# Patient Record
Sex: FEMALE | Race: BLACK OR AFRICAN AMERICAN | Employment: OTHER | URBAN - METROPOLITAN AREA
[De-identification: names, ages, dates, MRNs, and addresses within clinical notes are randomized per-mention and may not be internally consistent; named-entity substitution may affect disease eponyms.]

---

## 2017-01-06 ENCOUNTER — OFFICE VISIT (OUTPATIENT)
Dept: CARDIOLOGY CLINIC | Age: 67
End: 2017-01-06

## 2017-01-06 VITALS
DIASTOLIC BLOOD PRESSURE: 64 MMHG | BODY MASS INDEX: 24.03 KG/M2 | OXYGEN SATURATION: 98 % | HEART RATE: 64 BPM | RESPIRATION RATE: 16 BRPM | WEIGHT: 130.6 LBS | HEIGHT: 62 IN | SYSTOLIC BLOOD PRESSURE: 120 MMHG

## 2017-01-06 DIAGNOSIS — I49.5 TACHY-BRADY SYNDROME (HCC): Primary | ICD-10-CM

## 2017-01-06 NOTE — MR AVS SNAPSHOT
Visit Information Date & Time Provider Department Dept. Phone Encounter #  
 1/6/2017  3:00 PM Zuleyma Pantoja MD [facility] [phone] [id number] Upcoming Health Maintenance Date Due  
 GLAUCOMA SCREENING Q2Y 12/24/2015 OSTEOPOROSIS SCREENING (DEXA) 12/24/2015 Pneumococcal 65+ Low/Medium Risk (1 of 2 - PCV13) 12/24/2015 INFLUENZA AGE 9 TO ADULT 8/1/2016 FOBT Q 1 YEAR AGE 50-75 9/3/2016 MEDICARE YEARLY EXAM 12/1/2016 BREAST CANCER SCRN MAMMOGRAM 10/10/2018 DTaP/Tdap/Td series (2 - Td) 10/19/2025 Allergies as of 1/6/2017  Review Complete On: 1/6/2017 By: Zuleyma Pantoja MD  
  
 Severity Noted Reaction Type Reactions Ampicillin  05/15/2013    Hives, Nausea and Vomiting Current Immunizations  Never Reviewed Name Date Pneumococcal Polysaccharide (PPSV-23) 5/16/2013  6:39 PM  
 Tdap 10/19/2015  2:32 AM  
  
 Not reviewed this visit You Were Diagnosed With   
  
 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  
 120/64 (BP 1 Location: Left arm, BP Patient Position: Sitting) 64 16 5' 2\" (1.575 m) 130 lb 9.6 oz (59.2 kg) 98% BMI OB Status Smoking Status 23.89 kg/m2 Hysterectomy Never Smoker BMI and BSA Data Body Mass Index Body Surface Area  
 23.89 kg/m 2 1.61 m 2 Preferred Pharmacy Pharmacy Name Phone [address] [phone] Your Updated Medication List  
  
   
This list is accurate as of: 1/6/17  4:21 PM.  Always use your most recent med list.  
  
  
  
  
 albuterol 90 mcg/actuation inhaler Commonly known as:  PROVENTIL HFA, VENTOLIN HFA, PROAIR HFA Take 2 Puffs by inhalation every four (4) hours as needed for Wheezing. ALPRAZolam 1 mg tablet Commonly known as:  Johnnie Franklin Take 1 mg by mouth three (3) times daily. * clindamycin 150 mg capsule Commonly known as:  CLEOCIN  
  
 * clindamycin 300 mg capsule Commonly known as:  CLEOCIN  
  
 desonide 0.05 % topical lotion Commonly known as:  Isma Moan Apply  to affected area two (2) times a day. FLUoxetine 20 mg capsule Commonly known as:  PROzac Take 20 mg by mouth daily. fluticasone-salmeterol 250-50 mcg/dose diskus inhaler Commonly known as:  ADVAIR Take 1 Puff by inhalation every twelve (12) hours. hydroCHLOROthiazide 12.5 mg tablet Commonly known as:  HYDRODIURIL Take once daily HYDROcodone-acetaminophen 5-325 mg per tablet Commonly known as:  Leyda Petri Take 1 Tab by mouth every eight (8) hours as needed for Pain. Max Daily Amount: 3 Tabs. hydroquinone 4 % topical cream  
Commonly known as:  Roselinda Salon Apply  to affected area two (2) times a day. lidocaine 5 % Commonly known as:  LIDODERM  
1 Patch by TransDERmal route every twenty-four (24) hours. magnesium oxide 400 mg tablet Commonly known as:  MAG-OX Take 1 Tab by mouth daily. methocarbamol 750 mg tablet Commonly known as:  ROBAXIN Take 1 Tab by mouth three (3) times daily as needed. metoprolol tartrate 25 mg tablet Commonly known as:  LOPRESSOR Take 1 Tab by mouth two (2) times a day. 1/2 tablet twice a day  
  
 potassium chloride 20 mEq tablet Commonly known as:  K-DUR, KLOR-CON Take 1 Tab by mouth daily. * rivaroxaban 20 mg Tab tablet Commonly known as:  Shauna Taqueria Take 1 Tab by mouth daily. * rivaroxaban 20 mg Tab tablet Commonly known as:  Shauna Taqueria Take 1 Tab by mouth daily. rosuvastatin 10 mg tablet Commonly known as:  CRESTOR Take 1 tablet every day SEROquel 50 mg tablet Generic drug:  QUEtiapine Take 50 mg by mouth three (3) times daily. * Notice:   This list has 4 medication(s) that are the same as other medications prescribed for you. Read the directions carefully, and ask your doctor or other care provider to review them with you. We Performed the Following AMB POC EKG ROUTINE W/ 12 LEADS, INTER & REP [45279 CPT(R)] Patient Instructions Follow up with Jacob Larry in 6 months Introducing [facility] & [facility] SERVICES! Mary Saeede introduces [employer] patient portal. Now you can access parts of your medical record, email your doctor's office, and request medication refills online. 1. In your internet browser, go to [email or web address] 2. Click on the First Time User? Click Here link in the Sign In box. You will see the New Member Sign Up page. 3. Enter your [employer] Access Code exactly as it appears below. You will not need to use this code after youve completed the sign-up process. If you do not sign up before the expiration date, you must request a new code. · [employer] Access Code: [email or web address] Expires: 2/19/2017  4:25 PM 
 
4. Enter the last four digits of your Social Security Number (xxxx) and Date of Birth (mm/dd/yyyy) as indicated and click Submit. You will be taken to the next sign-up page. 5. Create a [employer] ID. This will be your [employer] login ID and cannot be changed, so think of one that is secure and easy to remember. 6. Create a [employer] password. You can change your password at any time. 7. Enter your Password Reset Question and Answer. This can be used at a later time if you forget your password. 8. Enter your e-mail address. You will receive e-mail notification when new information is available in [address] 9. Click Sign Up. You can now view and download portions of your medical record. 10. Click the Download Summary menu link to download a portable copy of your medical information. If you have questions, please visit the Frequently Asked Questions section of the [employer] website.  Remember, [employer] is NOT to be used for urgent needs. For medical emergencies, dial 911. Now available from your iPhone and Android! Please provide this summary of care documentation to your next provider. Your primary care clinician is listed as Swapna Moncada. If you have any questions after today's visit, please call [phone].

## 2017-01-06 NOTE — PROGRESS NOTES
Visited with Ms. Efrain Cicichevyscottie while she was in clinic today. She had questions regarding her drug assistance application, we reviewed notes which indicated that application was moving fwd as of 12/29. No new information. Also, she had questions about clearance for dental and orthopedic procedures, she was advised to discuss with dentist and with orthopedist, and if desires to move fwd their offices will request clearance from MD. The patient verbalized understanding and will call our office with any further questions or concerns.

## 2017-01-06 NOTE — PROGRESS NOTES
HISTORY OF PRESENT ILLNESS  Samantha Prieto is a 77 y.o. female. Patient with h/o PSVT and  Tachy iron followed in [place], admitted on 5/13 at [facility] with chest pain, at that time nuclear stress test showed no ischemia or MI and EF of 69%, patient remained in NSR during hospital stay. Here for follow up  Echo on 7/12 EF 55% mild TR and MR  Also h/o PAF as per her cardiologist in [place].   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  Stress echo on 7/15 : no ischemia or Mi and EF 60%  PAF during stress echo on 7/5/16 started on metoprolol at that time  Stress echo on 7/16 negative for ischemia and EF 60%  holter on 7/16:holter 7/28/2016 no AF, one  bpm and rare 2:1 av block, so no pacer yet          Past Medical History                 Past Medical History        Diagnosis      Date              Tachycardia                 Bradycardia                 Bipolar 2 disorder                 Hypercholesteremia                 Hypertension                 DVT (deep venous thrombosis)                 Tachycardia                 Asthma                 Psychiatric disorder      [de-identified]         Past Surgical History      Procedure    Laterality    Date          Hx back surgery                Hx hysterectomy                Ep study w/induction       3/30/2015                     Ablation of svt       3/30/2015                     Hx heart catheterization                Hx colonoscopy       9-          History                Social History          Marital Status:                Spouse Name:    N/A            Number of Children:    1          Years of Education:    N/A               Occupational History          retired--[employer]---disabled                  Social History Main Topics          Smoking status:    Never Smoker           Smokeless tobacco:    Never Used          Alcohol Use:    No          Drug Use:    No          Sexual Activity:    Not on file               Other Topics    Concern          Not on file          Social History Narrative            HPI  Occasional brief palpitations at night  Review of Systems   Respiratory: Negative. Cardiovascular: Positive for palpitations. Negative for chest pain, orthopnea, claudication, leg swelling and PND. Visit Vitals    /64 (BP 1 Location: Left arm, BP Patient Position: Sitting)    Pulse 64    Resp 16    Ht 5' 2\" (1.575 m)    Wt 59.2 kg (130 lb 9.6 oz)    SpO2 98%    BMI 23.89 kg/m2         Physical Exam   Neck: No JVD present. Carotid bruit is not present. Cardiovascular: Normal rate and regular rhythm. Pulmonary/Chest: Effort normal and breath sounds normal.   Abdominal: Soft. Musculoskeletal: She exhibits no edema. Psychiatric: She has a normal mood and affect. Current Outpatient Prescriptions on File Prior to Visit   Medication Sig Dispense Refill    rivaroxaban (XARELTO) 20 mg tab tablet Take 1 Tab by mouth daily. 10 Tab 0    potassium chloride (K-DUR, KLOR-CON) 20 mEq tablet Take 1 Tab by mouth daily. 90 Tab 3    rivaroxaban (XARELTO) 20 mg tab tablet Take 1 Tab by mouth daily. 21 Tab 0    metoprolol tartrate (LOPRESSOR) 25 mg tablet Take 1 Tab by mouth two (2) times a day. 1/2 tablet twice a day 60 Tab 3    HYDROcodone-acetaminophen (NORCO) 5-325 mg per tablet Take 1 Tab by mouth every eight (8) hours as needed for Pain. Max Daily Amount: 3 Tabs. 40 Tab 0    rosuvastatin (CRESTOR) 10 mg tablet Take 1 tablet every day 90 Tab 3    magnesium oxide (MAG-OX) 400 mg tablet Take 1 Tab by mouth daily. 90 Tab 3    albuterol (PROVENTIL HFA, VENTOLIN HFA, PROAIR HFA) 90 mcg/actuation inhaler Take 2 Puffs by inhalation every four (4) hours as needed for Wheezing. 3 Inhaler 3    fluticasone-salmeterol (ADVAIR) 250-50 mcg/dose diskus inhaler Take 1 Puff by inhalation every twelve (12) hours.  1 Inhaler 11    Hydrochlorothiazide (HYDRODIURIL) 12.5 mg tablet Take once daily 90 Tab 3    methocarbamol (ROBAXIN) 750 mg tablet Take 1 Tab by mouth three (3) times daily as needed. 270 Tab 3    clindamycin (CLEOCIN) 300 mg capsule       clindamycin (CLEOCIN) 150 mg capsule       hydroquinone (ESOTERICA, MELQUIN) 4 % topical cream Apply  to affected area two (2) times a day. 30 g 6    lidocaine (LIDODERM) 5 %(700 mg/patch) 1 Patch by TransDERmal route every twenty-four (24) hours. 3 Package 3    desonide (TRIDESILON) 0.05 % topical lotion Apply  to affected area two (2) times a day. 180 mL 3    ALPRAZolam (XANAX) 1 mg tablet Take 1 mg by mouth three (3) times daily.  QUEtiapine (SEROQUEL) 50 mg tablet Take 50 mg by mouth three (3) times daily.  FLUoxetine (PROZAC) 20 mg capsule Take 20 mg by mouth daily. No current facility-administered medications on file prior to visit. Lab Results   Component Value Date/Time    Cholesterol, total 167 01/11/2016 03:58 PM    HDL Cholesterol 99 01/11/2016 03:58 PM    LDL, calculated 49 01/11/2016 03:58 PM    VLDL, calculated 19 01/11/2016 03:58 PM    Triglyceride 96 01/11/2016 03:58 PM    CHOL/HDL Ratio 2.5 05/16/2013 02:30 AM     Lab Results   Component Value Date/Time    ALT 31 01/11/2016 03:58 PM    AST 18 01/11/2016 03:58 PM    Alk. 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 Continue observation for now, her ECG shows NSR, NSTT and pac  PAF: newly diagnosed. Now back in NSR. Already on [address] for DVT, this will need to be continued given also PAF now, continue small dose of lopressor.  Her ECG shows NSR and nt  HTN: controlled  DVT on xarelto long term no untoward effects thus far  HLD: on statin , closely followed by her pcp  CP:  Not recurrentl   See her back in 6 months otherwise

## 2017-02-09 ENCOUNTER — OFFICE VISIT (OUTPATIENT)
Dept: CARDIOLOGY CLINIC | Age: 67
End: 2017-02-09

## 2017-02-09 VITALS
BODY MASS INDEX: 24.84 KG/M2 | DIASTOLIC BLOOD PRESSURE: 80 MMHG | SYSTOLIC BLOOD PRESSURE: 138 MMHG | WEIGHT: 135 LBS | HEIGHT: 62 IN | HEART RATE: 73 BPM

## 2017-02-09 DIAGNOSIS — Z95.828 GREENFIELD FILTER IN PLACE: ICD-10-CM

## 2017-02-09 DIAGNOSIS — I48.0 PAF (PAROXYSMAL ATRIAL FIBRILLATION) (HCC): Primary | ICD-10-CM

## 2017-02-09 DIAGNOSIS — F41.9 ANXIETY: ICD-10-CM

## 2017-02-09 DIAGNOSIS — Z86.79 S/P CATHETER ABLATION OF SLOW PATHWAY: ICD-10-CM

## 2017-02-09 DIAGNOSIS — Z79.01 CHRONIC ANTICOAGULATION: ICD-10-CM

## 2017-02-09 DIAGNOSIS — M54.16 LUMBAR RADICULAR PAIN: ICD-10-CM

## 2017-02-09 DIAGNOSIS — I10 ESSENTIAL HYPERTENSION: ICD-10-CM

## 2017-02-09 DIAGNOSIS — Z98.890 S/P CATHETER ABLATION OF SLOW PATHWAY: ICD-10-CM

## 2017-02-09 NOTE — MR AVS SNAPSHOT
Visit Information Date & Time Provider Department Dept. Phone Encounter #  
 2/9/2017  4:40 PM Patric Baumgarten, MD [facility] [place] [facility]air [phone] [id number] Follow-up Instructions Return in about 6 months (around 8/9/2017). Follow-up and Disposition History Upcoming Health Maintenance Date Due  
 GLAUCOMA SCREENING Q2Y 12/24/2015 OSTEOPOROSIS SCREENING (DEXA) 12/24/2015 Pneumococcal 65+ Low/Medium Risk (1 of 2 - PCV13) 12/24/2015 INFLUENZA AGE 9 TO ADULT 8/1/2016 FOBT Q 1 YEAR AGE 50-75 9/3/2016 MEDICARE YEARLY EXAM 12/1/2016 BREAST CANCER SCRN MAMMOGRAM 10/10/2018 DTaP/Tdap/Td series (2 - Td) 10/19/2025 Allergies as of 2/9/2017  Review Complete On: 2/9/2017 By: Patric Baumgarten, MD  
  
 Severity Noted Reaction Type Reactions Ampicillin  05/15/2013    Hives, Nausea and Vomiting Current Immunizations  Never Reviewed Name Date Pneumococcal Polysaccharide (PPSV-23) 5/16/2013  6:39 PM  
 Tdap 10/19/2015  2:32 AM  
  
 Not reviewed this visit You Were Diagnosed With   
  
 Codes Comments PAF (paroxysmal atrial fibrillation) ([facility] [place] [address]    -  Primary ICD-10-CM: I48.0 ICD-9-CM: 427.31 Essential hypertension     ICD-10-CM: I10 
ICD-9-CM: 401.9 S/P catheter ablation of slow pathway     ICD-10-CM: Z98.890, Z86.79 
ICD-9-CM: V45.89 Freddie filter in place     ICD-10-CM: L92.742 ICD-9-CM: V45.89 Anxiety     ICD-10-CM: F41.9 ICD-9-CM: 300.00 Lumbar radicular pain     ICD-10-CM: M54.16 
ICD-9-CM: 724.4 Chronic anticoagulation     ICD-10-CM: Z79.01 
ICD-9-CM: V58.61 Vitals BP Pulse Height(growth percentile) Weight(growth percentile) BMI OB Status 138/80 (BP 1 Location: Right arm, BP Patient Position: Sitting) 73 5' 2\" (1.575 m) 135 lb (61.2 kg) 24.69 kg/m2 Hysterectomy Smoking Status Never Smoker Vitals History BMI and BSA Data Body Mass Index Body Surface Area 24.69 kg/m 2 1.64 m 2 Preferred Pharmacy Pharmacy Name Phone [facility] #[id number]- [facility] [address] [phone] Your Updated Medication List  
  
   
This list is accurate as of: 2/9/17 11:59 PM.  Always use your most recent med list.  
  
  
  
  
 albuterol 90 mcg/actuation inhaler Commonly known as:  PROVENTIL HFA, VENTOLIN HFA, PROAIR HFA Take 2 Puffs by inhalation every four (4) hours as needed for Wheezing. ALPRAZolam 1 mg tablet Commonly known as:  Pixie Oz Take 1 mg by mouth three (3) times daily. FLUoxetine 20 mg capsule Commonly known as:  PROzac Take 20 mg by mouth daily. fluticasone-salmeterol 250-50 mcg/dose diskus inhaler Commonly known as:  ADVAIR Take 1 Puff by inhalation every twelve (12) hours. hydroCHLOROthiazide 12.5 mg tablet Commonly known as:  HYDRODIURIL Take once daily HYDROcodone-acetaminophen 5-325 mg per tablet Commonly known as:  [address] Take 1 Tab by mouth every eight (8) hours as needed for Pain. Max Daily Amount: 3 Tabs.  
  
 lidocaine 5 % Commonly known as:  LIDODERM  
1 Patch by TransDERmal route every twenty-four (24) hours. magnesium oxide 400 mg tablet Commonly known as:  MAG-OX Take 1 Tab by mouth daily. methocarbamol 750 mg tablet Commonly known as:  ROBAXIN Take 1 Tab by mouth three (3) times daily as needed. metoprolol tartrate 25 mg tablet Commonly known as:  LOPRESSOR Take 1 Tab by mouth two (2) times a day. 1/2 tablet twice a day  
  
 potassium chloride 20 mEq tablet Commonly known as:  K-DUR, KLOR-CON Take 1 Tab by mouth daily. rivaroxaban 20 mg Tab tablet Commonly known as:  Brooklynn Hamiltonman Take 1 Tab by mouth daily. rosuvastatin 10 mg tablet Commonly known as:  CRESTOR Take 1 tablet every day SEROquel 50 mg tablet Generic drug:  QUEtiapine Take 50 mg by mouth three (3) times daily. Follow-up Instructions Return in about 6 months (around 8/9/2017). Patient Instructions Follow up with Dr. David Cartagena in 6 months. Introducing [facility] & HEALTH SERVICES! [employer] introduces [employer] patient portal. Now you can access parts of your medical record, email your doctor's office, and request medication refills online. 1. In your internet browser, go to [email or web address] 2. Click on the First Time User? Click Here link in the Sign In box. You will see the New Member Sign Up page. 3. Enter your [employer] Access Code exactly as it appears below. You will not need to use this code after youve completed the sign-up process. If you do not sign up before the expiration date, you must request a new code. · [employer] Access Code: [email or web address] Expires: 2/19/2017  4:25 PM 
 
4. Enter the last four digits of your Social Security Number (xxxx) and Date of Birth (mm/dd/yyyy) as indicated and click Submit. You will be taken to the next sign-up page. 5. Create a [employer] ID. This will be your [employer] login ID and cannot be changed, so think of one that is secure and easy to remember. 6. Create a [employer] password. You can change your password at any time. 7. Enter your Password Reset Question and Answer. This can be used at a later time if you forget your password. 8. Enter your e-mail address. You will receive e-mail notification when new information is available in [address] 9. Click Sign Up. You can now view and download portions of your medical record. 10. Click the Download Summary menu link to download a portable copy of your medical information. If you have questions, please visit the Frequently Asked Questions section of the [employer] website. Remember, [employer] is NOT to be used for urgent needs. For medical emergencies, dial 911. Now available from your iPhone and Android! Please provide this summary of care documentation to your next provider. Your primary care clinician is listed as Swapna Moncada. If you have any questions after today's visit, please call [phone].

## 2017-02-09 NOTE — PROGRESS NOTES
Cardiac Electrophysiology Office Note     Subjective:      Giovanni Gale is a 77 y.o. female patient who has had palpitations since 1998 and she used to live in [facility]  She had seen Dr Erika Hdz in 2013 and this is the first long 45 min of SVT so she came to ER  ECG showed short RP SVT  She was given adenosine and ECG showed sinus rhythm with second degree AV block, intermittent LBBB but she has recovered with inferolateral TWI but AV node conduction is normal when I saw her  She said she has IVC filter and 2 DVT before so she is on xarelto  She had h/o psvt Tachy iron followed in [facility]  Echo 7/12 EF 55% mild TR and MR  nuc 5/13 no ischemia or MI and EF of 69%  holter 9/13 SR, few PAC, PVC  h/o ? PAF as per her cardiologist in [place] [address] on 5/14: 0-9% b/l  TTE 1/15 LVEF 55 % to 60 %. No WMA. Mild MR, TR. Then she had EP study done and 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 She did well and last year 7/2016 when she did stress test she developed AFIB so this has made her really anxious  She is on metoprolol 12.5mg BID. She is on xarelto for hx of DVT, recently she was approved for drug assistance from the company. She mentions she may have had one episode that 10 minutes, she has not had any further episodes of palpitations since then. She wore a holter 7/28/2016 no AF, one  bpm and rare 2:1 av block, so no pacer yet per Dr Erika Hdz. Current Outpatient Prescriptions   Medication Sig Dispense Refill    potassium chloride (K-DUR, KLOR-CON) 20 mEq tablet Take 1 Tab by mouth daily. 90 Tab 3    rivaroxaban (XARELTO) 20 mg tab tablet Take 1 Tab by mouth daily. 21 Tab 0    metoprolol tartrate (LOPRESSOR) 25 mg tablet Take 1 Tab by mouth two (2) times a day.  1/2 tablet twice a day (Patient taking differently: Take 12.5 mg by mouth two (2) times a day.) 60 Tab 3    HYDROcodone-acetaminophen (NORCO) 5-325 mg per tablet Take 1 Tab by mouth every eight (8) hours as needed for Pain. Max Daily Amount: 3 Tabs. 40 Tab 0    rosuvastatin (CRESTOR) 10 mg tablet Take 1 tablet every day 90 Tab 3    magnesium oxide (MAG-OX) 400 mg tablet Take 1 Tab by mouth daily. 90 Tab 3    albuterol (PROVENTIL HFA, VENTOLIN HFA, PROAIR HFA) 90 mcg/actuation inhaler Take 2 Puffs by inhalation every four (4) hours as needed for Wheezing. 3 Inhaler 3    fluticasone-salmeterol (ADVAIR) 250-50 mcg/dose diskus inhaler Take 1 Puff by inhalation every twelve (12) hours. 1 Inhaler 11    Hydrochlorothiazide (HYDRODIURIL) 12.5 mg tablet Take once daily 90 Tab 3    methocarbamol (ROBAXIN) 750 mg tablet Take 1 Tab by mouth three (3) times daily as needed. 270 Tab 3    lidocaine (LIDODERM) 5 %(700 mg/patch) 1 Patch by TransDERmal route every twenty-four (24) hours. 3 Package 3    ALPRAZolam (XANAX) 1 mg tablet Take 1 mg by mouth three (3) times daily.  QUEtiapine (SEROQUEL) 50 mg tablet Take 50 mg by mouth three (3) times daily.  FLUoxetine (PROZAC) 20 mg capsule Take 20 mg by mouth daily. Allergies   Allergen Reactions    Ampicillin Hives and Nausea and Vomiting     Past Medical History   Diagnosis Date    Asthma     Bipolar 2 disorder ([facility] [address]     CAD (coronary artery disease)      ablation    DVT (deep venous thrombosis) (HCC)     Hypercholesteremia     Hypertension     Tachycardia      Past Surgical History   Procedure Laterality Date    Hx back surgery      Hx hysterectomy      Ep study w/induction  3/30/2015          Ablation of svt  3/30/2015          Hx heart catheterization      Hx colonoscopy  9-     No SVT in her family history. Social History   Substance Use Topics    Smoking status: Never Smoker    Smokeless tobacco: Never Used    Alcohol use No        Review of Systems:   Constitutional: Negative for fever, chills, weight loss  HEENT: Negative for nosebleeds, vision changes.    Respiratory: Negative for cough, hemoptysis, sputum production, and wheezing. Cardiovascular: Negative for chest pain, + palpitations, no orthopnea, claudication, leg swelling, syncope, and PND. Gastrointestinal: Negative for nausea, vomiting, diarrhea, constipation, blood in stool and melena. Genitourinary: Negative for dysuria, and hematuria. Musculoskeletal: Negative for myalgias, arthralgia. Skin: Negative for rash. Heme: Does not bleed or bruise easily. Neurological: Negative for speech change and focal weakness     Objective:     Visit Vitals    /80 (BP 1 Location: Right arm, BP Patient Position: Sitting)    Pulse 73    Ht 5' 2\" (1.575 m)    Wt 135 lb (61.2 kg)    BMI 24.69 kg/m2      Physical Exam:   Constitutional: Well-nourished. No distress. Head: Normocephalic and atraumatic. Eyes: Pupils are equal, round  Neck: supple. No JVD present. Cardiovascular: Normal rate, regular rhythm and normal heart sounds. Exam reveals no gallop and no friction rub. Pulmonary/Chest: Effort normal and breath sounds normal. No wheezes. Abdominal:  obesity  Musculoskeletal: no edema. Neurological: alert,oriented. Skin: Skin is warm and dry  Psychiatric: normal mood and affect. Behavior is normal. Judgment and thought content normal.        Assessment/Plan:       ICD-10-CM ICD-9-CM    1. PAF (paroxysmal atrial fibrillation) ([facility]) I48.0 427.31    2. Essential hypertension I10 401.9    3. S/P catheter ablation of slow pathway Z98.890 V45.89     Z86.79     4. Freddie filter in place Z95.828 V45.89    5. Anxiety F41.9 300.00    6. Lumbar radicular pain M54.16 724.4    7. Chronic anticoagulation Z79.01 V58.61      Reviewed PAF, she is doing well with small dose of metoprolol. She is on xarelto for hx of recurrent DVT and now emboli CVA prevention. She is tolerating the xarelto without bleeding side effects.  She did not noticed 2 :1 av block  She thinks palpitations still limited in frequency and duration so no ablation yet  She would not be good with more medications as she could require pacemaker to take more    Follow-up Disposition:  Return in about 6 months (around 8/9/2017). as she asked    Thank you for involving me in this patient's care and please call with further concerns or questions. Dolly Talley M.D.   Electrophysiology/Cardiology  [facility] [place]  [address] , [address]  [phone]

## 2017-02-09 NOTE — PROGRESS NOTES
Cardiac Electrophysiology Office Note     Subjective:      Radha Null is a 77 y.o. female patient who has had palpitations since 1998 and she used to live in [facility]  She had seen Dr Homero Rodriguez in 2013 and this is the first long 45 min of SVT so she came to ER  ECG showed short RP SVT  She was given adenosine and ECG showed sinus rhythm with second degree AV block, intermittent LBBB but she has recovered with inferolateral TWI but AV node conduction is normal when I saw her  She said she has IVC filter and 2 DVT before so she is on xarelto  h/o psvt Tachy iron followed in [facility]  Echo 7/12 EF 55% mild TR and MR  nuc 5/13 no ischemia or MI and EF of 69%  holter 9/13 SR, few PAC, PVC  h/o ? PAF as per her cardiologist in [place] [address] on 5/14: 0-9% b/l  TTE 1/15 LVEF 55 % to 60 %. No WMA. Mild MR, TR. 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 She just saw Dr. Homero Rodriguez, 1/2017. She had a stress test 7/2016, during the stress test she went into atrial fibrillation and was started on metoprolol 12.5mg BID. She is on xarelto for hx of DVT, recently she was approved for drug assistance. She mentions she may have had one episode that 10 minutes, she has not had any further episodes of palpitations since then. She wore a holter 7/28/2016 no AF, one  bpm and rare 2:1 av block, so no pacer yet per Dr Homero oRdriguez. Current Outpatient Prescriptions   Medication Sig Dispense Refill    potassium chloride (K-DUR, KLOR-CON) 20 mEq tablet Take 1 Tab by mouth daily. 90 Tab 3    rivaroxaban (XARELTO) 20 mg tab tablet Take 1 Tab by mouth daily. 21 Tab 0    metoprolol tartrate (LOPRESSOR) 25 mg tablet Take 1 Tab by mouth two (2) times a day.  1/2 tablet twice a day (Patient taking differently: Take 12.5 mg by mouth two (2) times a day.) 60 Tab 3    HYDROcodone-acetaminophen (NORCO) 5-325 mg per tablet Take 1 Tab by mouth every eight (8) hours as needed for Pain. Max Daily Amount: 3 Tabs. 40 Tab 0    rosuvastatin (CRESTOR) 10 mg tablet Take 1 tablet every day 90 Tab 3    magnesium oxide (MAG-OX) 400 mg tablet Take 1 Tab by mouth daily. 90 Tab 3    albuterol (PROVENTIL HFA, VENTOLIN HFA, PROAIR HFA) 90 mcg/actuation inhaler Take 2 Puffs by inhalation every four (4) hours as needed for Wheezing. 3 Inhaler 3    fluticasone-salmeterol (ADVAIR) 250-50 mcg/dose diskus inhaler Take 1 Puff by inhalation every twelve (12) hours. 1 Inhaler 11    Hydrochlorothiazide (HYDRODIURIL) 12.5 mg tablet Take once daily 90 Tab 3    methocarbamol (ROBAXIN) 750 mg tablet Take 1 Tab by mouth three (3) times daily as needed. 270 Tab 3    lidocaine (LIDODERM) 5 %(700 mg/patch) 1 Patch by TransDERmal route every twenty-four (24) hours. 3 Package 3    ALPRAZolam (XANAX) 1 mg tablet Take 1 mg by mouth three (3) times daily.  QUEtiapine (SEROQUEL) 50 mg tablet Take 50 mg by mouth three (3) times daily.  FLUoxetine (PROZAC) 20 mg capsule Take 20 mg by mouth daily. Allergies   Allergen Reactions    Ampicillin Hives and Nausea and Vomiting     Past Medical History   Diagnosis Date    Asthma     B[facility] 2 disorder ([facility] [address]     CAD (coronary artery disease)      ablation    DVT (deep venous thrombosis) (HCC)     Hypercholesteremia     Hypertension     Tachycardia      Past Surgical History   Procedure Laterality Date    Hx back surgery      Hx hysterectomy      Ep study w/induction  3/30/2015          Ablation of svt  3/30/2015          Hx heart catheterization      Hx colonoscopy  9-     No SVT in her family history. Social History   Substance Use Topics    Smoking status: Never Smoker    Smokeless tobacco: Never Used    Alcohol use No        Review of Systems:   Constitutional: Negative for fever, chills, weight loss  HEENT: Negative for nosebleeds, vision changes.    Respiratory: Negative for cough, hemoptysis, sputum production, and wheezing. Cardiovascular: Negative for chest pain, + palpitations, no orthopnea, claudication, leg swelling, syncope, and PND. Gastrointestinal: Negative for nausea, vomiting, diarrhea, constipation, blood in stool and melena. Genitourinary: Negative for dysuria, and hematuria. Musculoskeletal: Negative for myalgias, arthralgia. Skin: Negative for rash. Heme: Does not bleed or bruise easily. Neurological: Negative for speech change and focal weakness     Objective:     Visit Vitals    /80 (BP 1 Location: Right arm, BP Patient Position: Sitting)    Pulse 73    Ht 5' 2\" (1.575 m)    Wt 135 lb (61.2 kg)    BMI 24.69 kg/m2      Physical Exam:   Constitutional: Well-nourished. No distress. Head: Normocephalic and atraumatic. Eyes: Pupils are equal, round  Neck: supple. No JVD present. Cardiovascular: Normal rate, regular rhythm and normal heart sounds. Exam reveals no gallop and no friction rub. Pulmonary/Chest: Effort normal and breath sounds normal. No wheezes. Abdominal:  obesity  Musculoskeletal: no edema. Neurological: alert,oriented. Skin: Skin is warm and dry  Psychiatric: normal mood and affect. Behavior is normal. Judgment and thought content normal.        1/7/17 ECG  Sinus  Rhythm heart rate 84 bpm      Assessment/Plan:       ICD-10-CM ICD-9-CM    1. PAF (paroxysmal atrial fibrillation) ([facility]) I48.0 427.31    2. Essential hypertension I10 401.9    3. S/P catheter ablation of slow pathway Z98.890 V45.89     Z86.79     4. Boyle filter in place Z95.828 V45.89    5. Anxiety F41.9 300.00    6. Lumbar radicular pain M54.16 724.4    7. Chronic anticoagulation Z79.01 V58.61        Reviewed PAF, she is doing well with small dose of metoprolol. She is on xarelto for hx of recurrent DVT and now emboli CVA prevention. She is tolerating the xarelto without bleeding side effects. Reviewed s/s of A fib.      Follow-up Disposition: Not on File    Thank you for involving me in this patient's care and please call with further concerns or questions. Radha Licea M.D.   Electrophysiology/Cardiology  [facility] [place]  [facility] [address], [facility] [address] [place] [address]  [phone]

## 2017-02-09 NOTE — PROGRESS NOTES
Chief Complaint   Patient presents with    Irregular Heart Beat    Hypertension    Follow-up     Tried to verify medications but did not bring a list or bottles. Notified to bring a list or bottles with next appointment.

## 2017-02-28 ENCOUNTER — OFFICE VISIT (OUTPATIENT)
Dept: INTERNAL MEDICINE CLINIC | Age: 67
End: 2017-02-28

## 2017-02-28 VITALS
DIASTOLIC BLOOD PRESSURE: 79 MMHG | BODY MASS INDEX: 24.61 KG/M2 | OXYGEN SATURATION: 97 % | SYSTOLIC BLOOD PRESSURE: 155 MMHG | TEMPERATURE: 98.1 F | HEIGHT: 62 IN | WEIGHT: 133.7 LBS | HEART RATE: 67 BPM

## 2017-02-28 DIAGNOSIS — E78.5 DYSLIPIDEMIA: ICD-10-CM

## 2017-02-28 DIAGNOSIS — I48.0 PAROXYSMAL ATRIAL FIBRILLATION (HCC): Primary | ICD-10-CM

## 2017-02-28 DIAGNOSIS — I10 ESSENTIAL HYPERTENSION: ICD-10-CM

## 2017-02-28 DIAGNOSIS — F41.1 ANXIETY NEUROSIS: ICD-10-CM

## 2017-02-28 DIAGNOSIS — M54.16 LUMBAR RADICULAR PAIN: ICD-10-CM

## 2017-02-28 RX ORDER — HYDROCODONE BITARTRATE AND ACETAMINOPHEN 5; 325 MG/1; MG/1
1 TABLET ORAL
Qty: 40 TAB | Refills: 0 | Status: SHIPPED | OUTPATIENT
Start: 2017-02-28 | End: 2017-09-12 | Stop reason: SDUPTHER

## 2017-02-28 NOTE — MR AVS SNAPSHOT
Visit Information Date & Time Provider Department Dept. Phone Encounter #  
 2/28/2017  3:30 PM [facility] [phone] [id number] Your Appointments 5/30/2017  4:15 PM  
Any with Roldan Colin MD  
[address] [place] [address] and Primary Care [facility]) Appt Note: follow up 3mnths  
 [address] [place]  
  
   
 [phone] [id number]  
  
    
 8/16/2017 11:20 AM  
ESTABLISHED PATIENT with Juan C Reyes MD  
[facility] ([facility]) Appt Note: 6 month f/u per Dr. Nohemi Gordon [address]  
[address] [facility] 100 [facility] 7 [id number] Upcoming Health Maintenance Date Due  
 GLAUCOMA SCREENING Q2Y 12/24/2015 OSTEOPOROSIS SCREENING (DEXA) 12/24/2015 Pneumococcal 65+ Low/Medium Risk (1 of 2 - PCV13) 12/24/2015 INFLUENZA AGE 9 TO ADULT 8/1/2016 FOBT Q 1 YEAR AGE 50-75 9/3/2016 MEDICARE YEARLY EXAM 12/1/2016 BREAST CANCER SCRN MAMMOGRAM 10/10/2018 DTaP/Tdap/Td series (2 - Td) 10/19/2025 Allergies as of 2/28/2017  Review Complete On: 2/28/2017 By: Megan Black Severity Noted Reaction Type Reactions Ampicillin  05/15/2013    Hives, Nausea and Vomiting Current Immunizations  Never Reviewed Name Date Pneumococcal Polysaccharide (PPSV-23) 5/16/2013  6:39 PM  
 Tdap 10/19/2015  2:32 AM  
  
 Not reviewed this visit You Were Diagnosed With   
  
 Codes Comments Lumbar radicular pain    -  Primary ICD-10-CM: M54.16 
ICD-9-CM: 724.4 Essential hypertension     ICD-10-CM: I10 
ICD-9-CM: 401.9 Dyslipidemia     ICD-10-CM: E78.5 ICD-9-CM: 272.4 Anxiety neurosis     ICD-10-CM: F41.1 ICD-9-CM: 300.00 Vitals BP  
  
  
  
  
  
 155/79 (BP 1 Location: Right arm, BP Patient Position: Sitting) BMI and BSA Data Body Mass Index Body Surface Area  
 24.45 kg/m 2 1.63 m 2 Preferred Pharmacy Pharmacy Name Phone [id number]- [place], [place] - [address] [phone] Your Updated Medication List  
  
   
This list is accurate as of: 2/28/17  5:38 PM.  Always use your most recent med list.  
  
  
  
  
 albuterol 90 mcg/actuation inhaler Commonly known as:  PROVENTIL HFA, VENTOLIN HFA, PROAIR HFA Take 2 Puffs by inhalation every four (4) hours as needed for Wheezing. ALPRAZolam 1 mg tablet Commonly known as:  Ace Sa Take 1 mg by mouth three (3) times daily. FLUoxetine 20 mg capsule Commonly known as:  PROzac Take 20 mg by mouth daily. fluticasone-salmeterol 250-50 mcg/dose diskus inhaler Commonly known as:  ADVAIR Take 1 Puff by inhalation every twelve (12) hours. hydroCHLOROthiazide 12.5 mg tablet Commonly known as:  HYDRODIURIL Take once daily HYDROcodone-acetaminophen 5-325 mg per tablet Commonly known as:  Marie Oyster Take 1 Tab by mouth every eight (8) hours as needed for Pain. Max Daily Amount: 3 Tabs.  
  
 lidocaine 5 % Commonly known as:  LIDODERM  
1 Patch by TransDERmal route every twenty-four (24) hours. magnesium oxide 400 mg tablet Commonly known as:  MAG-OX Take 1 Tab by mouth daily. methocarbamol 750 mg tablet Commonly known as:  ROBAXIN Take 1 Tab by mouth three (3) times daily as needed. metoprolol tartrate 25 mg tablet Commonly known as:  LOPRESSOR Take 1 Tab by mouth two (2) times a day. 1/2 tablet twice a day  
  
 potassium chloride 20 mEq tablet Commonly known as:  K-DUR, KLOR-CON Take 1 Tab by mouth daily. rivaroxaban 20 mg Tab tablet Commonly known as:  Jonas Josué Take 1 Tab by mouth daily. rosuvastatin 10 mg tablet Commonly known as:  CRESTOR Take 1 tablet every day SEROquel 50 mg tablet Generic drug:  QUEtiapine Take 50 mg by mouth three (3) times daily. Prescriptions Printed Refills HYDROcodone-acetaminophen (NORCO) 5-325 mg per tablet 0 Sig: Take 1 Tab by mouth every eight (8) hours as needed for Pain. Max Daily Amount: 3 Tabs. Class: Print Route: Oral  
  
We Performed the Following APOLIPOPROTEIN B R731935 CPT(R)] CBC WITH AUTOMATED DIFF [19492 CPT(R)] LIPID PANEL [38954 CPT(R)] METABOLIC PANEL, COMPREHENSIVE [99060 CPT(R)] NM COLLECTION VENOUS BLOOD,VENIPUNCTURE [id number] CPT(R)] TSH 3RD GENERATION [30953 CPT(R)] URINALYSIS W/ RFLX MICROSCOPIC [38841 CPT(R)] Introducing [facility]! Kishan Friend introduces Ink[employer] patient portal. Now you can access parts of your medical record, email your doctor's office, and request medication refills online. 1. In your internet browser, go to [email or web address] 2. Click on the First Time User? Click Here link in the Sign In box. You will see the New Member Sign Up page. 3. Enter your Ink[employer] Access Code exactly as it appears below. You will not need to use this code after youve completed the sign-up process. If you do not sign up before the expiration date, you must request a new code. · Ink361 Access Code: [email or web address] Expires: 5/29/2017  5:38 PM 
 
4. Enter the last four digits of your Social Security Number (xxxx) and Date of Birth (mm/dd/yyyy) as indicated and click Submit. You will be taken to the next sign-up page. 5. Create a [employer] [employer] ID. This will be your Ink361 login ID and cannot be changed, so think of one that is secure and easy to remember. 6. Create a Ink[employer] password. You can change your password at any time. 7. Enter your Password Reset Question and Answer. This can be used at a later time if you forget your password. 8. Enter your e-mail address. You will receive e-mail notification when new information is available in [address] 9. Click Sign Up. You can now view and download portions of your medical record. 10. Click the Download Summary menu link to download a portable copy of your medical information. If you have questions, please visit the Frequently Asked Questions section of the [employer] website. Remember, [employer] is NOT to be used for urgent needs. For medical emergencies, dial 911. Now available from your iPhone and Android! Please provide this summary of care documentation to your next provider. Your primary care clinician is listed as Swapna Moncada. If you have any questions after today's visit, please call [phone].

## 2017-02-28 NOTE — PROGRESS NOTES
Chief Complaint   Patient presents with   [facility] Annual Wellness Visit     1. Have you been to the ER, urgent care clinic since your last visit? Hospitalized since your last visit? No    2. Have you seen or consulted any other health care providers outside of the [facility] since your last visit? Include any pap smears or colon screening.  No

## 2017-03-03 RX ORDER — HYDROQUINONE 40 MG/G
CREAM TOPICAL 2 TIMES DAILY
Qty: 30 G | Refills: 6 | Status: SHIPPED | OUTPATIENT
Start: 2017-03-03 | End: 2017-05-16 | Stop reason: SDUPTHER

## 2017-03-04 NOTE — PROGRESS NOTES
[facility]  [facility]  [address] [place] [address]  Phone:  [phone]  Fax: [phone]       Chief Complaint   Patient presents with   [facility] Wellness Visit   . SUBJECTIVE:    Kody Wood is a 77 y.o. female comes in for return visit stating that she has done fairly well. She follows up on a regular basis with her cardiologist.  She remains on cardioactive medication. She has had recurrence of pain in her back radiating to her leg and wishes to have a refill on her analgesic which she gets filled quite sparingly. She has been taking her antihypertensive medication as prescribed as is the case with her statin on a consistent basis. She also follows-up with her psychiatrist.     Fortunately she has not had a major problem with her medication. Current Outpatient Prescriptions   Medication Sig Dispense Refill    HYDROcodone-acetaminophen (NORCO) 5-325 mg per tablet Take 1 Tab by mouth every eight (8) hours as needed for Pain. Max Daily Amount: 3 Tabs. 40 Tab 0    potassium chloride (K-DUR, KLOR-CON) 20 mEq tablet Take 1 Tab by mouth daily. 90 Tab 3    rivaroxaban (XARELTO) 20 mg tab tablet Take 1 Tab by mouth daily. 21 Tab 0    metoprolol tartrate (LOPRESSOR) 25 mg tablet Take 1 Tab by mouth two (2) times a day. 1/2 tablet twice a day (Patient taking differently: Take 12.5 mg by mouth two (2) times a day.) 60 Tab 3    rosuvastatin (CRESTOR) 10 mg tablet Take 1 tablet every day 90 Tab 3    magnesium oxide (MAG-OX) 400 mg tablet Take 1 Tab by mouth daily. 90 Tab 3    Hydrochlorothiazide (HYDRODIURIL) 12.5 mg tablet Take once daily 90 Tab 3    methocarbamol (ROBAXIN) 750 mg tablet Take 1 Tab by mouth three (3) times daily as needed. 270 Tab 3    lidocaine (LIDODERM) 5 %(700 mg/patch) 1 Patch by TransDERmal route every twenty-four (24) hours.  3 Package 3    ALPRAZolam (XANAX) 1 mg tablet Take 1 mg by mouth three (3) times daily.      QUEtiapine (SEROQUEL) 50 mg tablet Take 50 mg by mouth three (3) times daily.  FLUoxetine (PROZAC) 20 mg capsule Take 20 mg by mouth daily.  hydroquinone (ESOTERICA, MELQUIN) 4 % topical cream Apply  to affected area two (2) times a day. 30 g 6    albuterol (PROVENTIL HFA, VENTOLIN HFA, PROAIR HFA) 90 mcg/actuation inhaler Take 2 Puffs by inhalation every four (4) hours as needed for Wheezing. 3 Inhaler 3    fluticasone-salmeterol (ADVAIR) 250-50 mcg/dose diskus inhaler Take 1 Puff by inhalation every twelve (12) hours.  1 Inhaler 11     Past Medical History:   Diagnosis Date    Asthma     B[facility] 2 [facility] ([facility] [address]     CAD (coronary artery disease)     ablation    [facility]     Hypercholesteremia     Hypertension     Tachycardia      Past Surgical History:   Procedure Laterality Date    ABLATION OF SVT  3/30/2015         EP STUDY W/INDUCTION  3/30/2015         HX BACK SURGERY      HX COLONOSCOPY  9-    HX HEART CATHETERIZATION      HX HYSTERECTOMY       Allergies   Allergen Reactions    Ampicillin Hives and Nausea and Vomiting         REVIEW OF SYSTEMS:  General: negative for - chills or fever  ENT: negative for - headaches, nasal congestion or tinnitus  Respiratory: negative for - cough, hemoptysis, shortness of breath or wheezing  Cardiovascular : negative for - chest pain, edema, palpitations or shortness of breath  Gastrointestinal: negative for - abdominal pain, blood in stools, heartburn or nausea/vomiting  Genito-Urinary: no dysuria, trouble voiding, or hematuria  Musculoskeletal: negative for - gait disturbance, joint pain, joint stiffness or joint swelling  Neurological: no TIA or stroke symptoms  Hematologic: no bruises, no bleeding, no swollen glands  Integument: no lumps, mole changes, nail changes or rash  Endocrine: no malaise/lethargy or unexpected weight changes      Social History     Social History    Marital status:  Spouse name: N/A    Number of children: 1    Years of education: N/A     Occupational History    retired--[employer]---disabled      Social History Main Topics    Smoking status: Never Smoker    Smokeless tobacco: Never Used    Alcohol use No    Drug use: No    Sexual activity: Yes     Partners: Male     Other Topics Concern    None     Social History Narrative     History reviewed. No pertinent family history. OBJECTIVE:    Visit Vitals    /79 (BP 1 Location: Right arm, BP Patient Position: Sitting)    Pulse 67    Temp 98.1 °F (36.7 °C) (Oral)    Ht 5' 2\" (1.575 m)    Wt 133 lb 11.2 oz (60.6 kg)    SpO2 97%    BMI 24.45 kg/m2     CONSTITUTIONAL: well , well nourished, appears age appropriate  EYES: perrla, eom intact  ENMT:moist mucous membranes, pharynx clear  NECK: supple. Thyroid normal  RESPIRATORY: Chest: clear to ascultation and percussion   CARDIOVASCULAR: Heart: regular rate and rhythm  GASTROINTESTINAL: Abdomen: soft, bowel sounds active  HEMATOLOGIC: no pathological lymph nodes palpated  MUSCULOSKELETAL: Extremities: no edema, pulse 1+   INTEGUMENT: No unusual rashes or suspicious skin lesions noted. Nails appear normal.  NEUROLOGIC: non-focal exam   MENTAL STATUS: alert and oriented, appropriate affect      ASSESSMENT:  1. Paroxysmal atrial fibrillation (HCC)    2. Lumbar radicular pain    3. Essential hypertension    4. Dyslipidemia    5. Anxiety neurosis        PLAN:    1. She will follow-up with her cardiologist for her paroxysmal atrial fibrillation. 2. She has a minor flare of her lumbar radiculopathy. I will give her an analgesic for this. Her last refill was three months ago. 3. Blood pressure is excellent. No adjustment is made today. Specifically her blood pressure is 135/70.  4. She will continue her statin as prescribed and efficacy and compliance will be assessed. 5. Her anxiety level is doing quite well.   She will follow-up with her psychiatrist. .  Orders Placed This Encounter    APOLIPOPROTEIN B    CBC WITH AUTOMATED DIFF    LIPID PANEL    URINALYSIS W/ RFLX MICROSCOPIC    TSH 3RD GENERATION    METABOLIC PANEL, COMPREHENSIVE    HYDROcodone-acetaminophen (NORCO) 5-325 mg per tablet         Follow-up Disposition:  Return in about 3 months (around 5/28/2017).       Jarrett Brink MD

## 2017-04-06 RX ORDER — HYDROCHLOROTHIAZIDE 12.5 MG/1
TABLET ORAL
Qty: 90 TAB | Refills: 3 | Status: SHIPPED | OUTPATIENT
Start: 2017-04-06 | End: 2017-04-13 | Stop reason: SDUPTHER

## 2017-04-13 RX ORDER — HYDROCHLOROTHIAZIDE 12.5 MG/1
TABLET ORAL
Qty: 30 TAB | Refills: 11 | Status: SHIPPED | OUTPATIENT
Start: 2017-04-13 | End: 2018-04-06 | Stop reason: SDUPTHER

## 2017-05-16 ENCOUNTER — OFFICE VISIT (OUTPATIENT)
Dept: INTERNAL MEDICINE CLINIC | Age: 67
End: 2017-05-16

## 2017-05-16 VITALS
RESPIRATION RATE: 18 BRPM | SYSTOLIC BLOOD PRESSURE: 151 MMHG | DIASTOLIC BLOOD PRESSURE: 81 MMHG | BODY MASS INDEX: 24.18 KG/M2 | WEIGHT: 131.4 LBS | OXYGEN SATURATION: 95 % | TEMPERATURE: 97.8 F | HEIGHT: 62 IN | HEART RATE: 59 BPM

## 2017-05-16 DIAGNOSIS — M54.16 LUMBAR RADICULAR PAIN: ICD-10-CM

## 2017-05-16 DIAGNOSIS — E78.5 DYSLIPIDEMIA: ICD-10-CM

## 2017-05-16 DIAGNOSIS — I48.0 PAROXYSMAL ATRIAL FIBRILLATION (HCC): ICD-10-CM

## 2017-05-16 DIAGNOSIS — I10 ESSENTIAL HYPERTENSION: ICD-10-CM

## 2017-05-16 DIAGNOSIS — J06.9 UPPER RESPIRATORY TRACT INFECTION, UNSPECIFIED TYPE: Primary | ICD-10-CM

## 2017-05-16 DIAGNOSIS — L30.9 DERMATITIS: ICD-10-CM

## 2017-05-16 RX ORDER — HYDROQUINONE 40 MG/G
CREAM TOPICAL 2 TIMES DAILY
Qty: 30 G | Refills: 6 | Status: SHIPPED | OUTPATIENT
Start: 2017-05-16 | End: 2017-09-12 | Stop reason: SDUPTHER

## 2017-05-16 RX ORDER — LIDOCAINE 50 MG/G
1 PATCH TOPICAL EVERY 24 HOURS
Qty: 3 PACKAGE | Refills: 3 | Status: SHIPPED | OUTPATIENT
Start: 2017-05-16 | End: 2019-10-03

## 2017-05-16 RX ORDER — AZITHROMYCIN 250 MG/1
TABLET, FILM COATED ORAL
Qty: 6 TAB | Refills: 0 | Status: SHIPPED | OUTPATIENT
Start: 2017-05-16 | End: 2017-05-21

## 2017-05-16 RX ORDER — DESONIDE 0.5 MG/ML
LOTION TOPICAL 2 TIMES DAILY
Qty: 118 ML | Refills: 11 | Status: SHIPPED | OUTPATIENT
Start: 2017-05-16 | End: 2018-03-29 | Stop reason: SDUPTHER

## 2017-05-16 RX ORDER — CODEINE PHOSPHATE AND GUAIFENESIN 10; 100 MG/5ML; MG/5ML
5 SOLUTION ORAL
Qty: 140 ML | Refills: 0 | Status: SHIPPED | OUTPATIENT
Start: 2017-05-16 | End: 2017-10-03 | Stop reason: ALTCHOICE

## 2017-05-16 NOTE — PROGRESS NOTES
[address] [facility]  [place] [address]  [address] [address] [facility] [address]  Phone:  [phone]  Fax: [phone]       Chief Complaint   Patient presents with    Cold Symptoms     patient states that she has had chest congestion x 7 days. she states that she has not taken any OTC medication. .      SUBJECTIVE:    Mery Dexter is a 77 y.o. female comes in for return visit complaining of upper respiratory symptoms consisting predominantly of nasal congestion and coughing. She has a cough that is occasionally productive of yellowish to greenish mucus but this is quite sporadic at this point. This started on Thursday which was about six days ago. Her low back pain is reasonably stable at this point. She also wishes to have refills on her dermatitis medication. She has been taking her antihypertensive medication as prescribed. Finally she follows up with her cardiologist for paroxysmal atrial fibrillation. Current Outpatient Prescriptions   Medication Sig Dispense Refill    lidocaine (LIDODERM) 5 % 1 Patch by TransDERmal route every twenty-four (24) hours. 3 Package 3    hydroquinone (ESOTERICA, MELQUIN) 4 % topical cream Apply  to affected area two (2) times a day. 30 g 6    desonide (TRIDESILON) 0.05 % topical lotion Apply  to affected area two (2) times a day. 118 mL 11    azithromycin (ZITHROMAX) 250 mg tablet Take 2 tablets today, then take 1 tablet daily 6 Tab 0    guaiFENesin-codeine (ROBITUSSIN AC) 100-10 mg/5 mL solution Take 5 mL by mouth three (3) times daily as needed for Cough. Max Daily Amount: 15 mL. 140 mL 0    hydroCHLOROthiazide (HYDRODIURIL) 12.5 mg tablet Take once daily 30 Tab 11    HYDROcodone-acetaminophen (NORCO) 5-325 mg per tablet Take 1 Tab by mouth every eight (8) hours as needed for Pain. Max Daily Amount: 3 Tabs. 40 Tab 0    potassium chloride (K-DUR, KLOR-CON) 20 mEq tablet Take 1 Tab by mouth daily.  90 Tab 3    rivaroxaban (XARELTO) 20 mg tab tablet Take 1 Tab by mouth daily. 21 Tab 0    metoprolol tartrate (LOPRESSOR) 25 mg tablet Take 1 Tab by mouth two (2) times a day. 1/2 tablet twice a day (Patient taking differently: Take 12.5 mg by mouth two (2) times a day.) 60 Tab 3    rosuvastatin (CRESTOR) 10 mg tablet Take 1 tablet every day 90 Tab 3    magnesium oxide (MAG-OX) 400 mg tablet Take 1 Tab by mouth daily. 90 Tab 3    methocarbamol (ROBAXIN) 750 mg tablet Take 1 Tab by mouth three (3) times daily as needed. 270 Tab 3    ALPRAZolam (XANAX) 1 mg tablet Take 1 mg by mouth three (3) times daily.  QUEtiapine (SEROQUEL) 50 mg tablet Take 50 mg by mouth three (3) times daily.  FLUoxetine (PROZAC) 20 mg capsule Take 20 mg by mouth daily.  fluticasone-salmeterol (ADVAIR) 250-50 mcg/dose diskus inhaler Take 1 Puff by inhalation every twelve (12) hours. 1 Inhaler 11    albuterol (PROVENTIL HFA, VENTOLIN HFA, PROAIR HFA) 90 mcg/actuation inhaler Take 2 Puffs by inhalation every four (4) hours as needed for Wheezing.  1 Inhaler 11     Past Medical History:   Diagnosis Date    Asthma     Bipolar 2 disorder ([facility] [address]     CAD (coronary artery disease)     ablation    DVT (deep venous thrombosis) (HCC)     Hypercholesteremia     Hypertension     Tachycardia      Past Surgical History:   Procedure Laterality Date    ABLATION OF SVT  3/30/2015         EP STUDY W/INDUCTION  3/30/2015         HX BACK SURGERY      HX COLONOSCOPY  9-    HX HEART CATHETERIZATION      HX HYSTERECTOMY       Allergies   Allergen Reactions    Ampicillin Hives and Nausea and Vomiting         REVIEW OF SYSTEMS:  General: negative for - chills or fever  ENT: negative for - headaches, nasal congestion or tinnitus  Respiratory: negative for - cough, hemoptysis, shortness of breath or wheezing  Cardiovascular : negative for - chest pain, edema, palpitations or shortness of breath  Gastrointestinal: negative for - abdominal pain, blood in stools, heartburn or nausea/vomiting  Genito-Urinary: no dysuria, trouble voiding, or hematuria  Musculoskeletal: negative for - gait disturbance, joint pain, joint stiffness or joint swelling  Neurological: no TIA or stroke symptoms  Hematologic: no bruises, no bleeding, no swollen glands  Integument: no lumps, mole changes, nail changes or rash  Endocrine: no malaise/lethargy or unexpected weight changes      Social History     Social History    Marital status:      Spouse name: N/A    Number of children: 1    Years of education: N/A     Occupational History    retired--[employer]---disabled      Social History Main Topics    Smoking status: Never Smoker    Smokeless tobacco: Never Used    Alcohol use No    Drug use: No    Sexual activity: Yes     Partners: Male     Other Topics Concern    None     Social History Narrative     History reviewed. No pertinent family history. OBJECTIVE:    Visit Vitals    /81 (BP 1 Location: Right arm, BP Patient Position: Sitting)    Pulse (!) 59    Temp 97.8 °F (36.6 °C) (Oral)    Resp 18    Ht 5' 2\" (1.575 m)    Wt 131 lb 6.4 oz (59.6 kg)    SpO2 95%    BMI 24.03 kg/m2     CONSTITUTIONAL: well , well nourished, appears age appropriate  EYES: perrla, eom intact  ENMT:moist mucous membranes, pharynx clear  NECK: supple. Thyroid normal  RESPIRATORY: Chest: clear to ascultation and percussion   CARDIOVASCULAR: Heart: regular rate and rhythm  GASTROINTESTINAL: Abdomen: soft, bowel sounds active  HEMATOLOGIC: no pathological lymph nodes palpated  MUSCULOSKELETAL: Extremities: no edema, pulse 1+   INTEGUMENT: No unusual rashes or suspicious skin lesions noted. Nails appear normal.  NEUROLOGIC: non-focal exam   MENTAL STATUS: alert and oriented, appropriate affect      ASSESSMENT:  1. Upper respiratory tract infection, unspecified type    2. Lumbar radicular pain    3. Dermatitis    4. Essential hypertension    5.  [facility] ([facility]    6. Dyslipidemia        PLAN:    1. The patient has an upper respiratory infection uncomplicated other than occasional reactive airway disease. Symptomatic treatment for now. 2. Her lumbar radiculopathy remains but is not as intrusive as it formally was. 3. She will be given prescriptions for her topical preparations for her chronic, recurring dermatitis. 4. Blood pressure is acceptable today at 140/80. No adjustments are made. 5. Her rhythm disturbance is also stable. She follows-up with her cardiologist on a regular basis. 6. She remains on her statin for her increased cardiovascular risk. .  Orders Placed This Encounter    APOLIPOPROTEIN B    CBC WITH AUTOMATED DIFF    LIPID PANEL    URINALYSIS W/ RFLX MICROSCOPIC    TSH 3RD GENERATION    METABOLIC PANEL, COMPREHENSIVE    MICROSCOPIC EXAMINATION    lidocaine (LIDODERM) 5 %    hydroquinone (ESOTERICA, MELQUIN) 4 % topical cream    desonide (TRIDESILON) 0.05 % topical lotion    azithromycin (ZITHROMAX) 250 mg tablet    guaiFENesin-codeine (ROBITUSSIN AC) 100-10 mg/5 mL solution         Follow-up Disposition:  Return in about 3 months (around 8/16/2017).       Ana Luisa Ghosh MD

## 2017-05-16 NOTE — MR AVS SNAPSHOT
Visit Information Date & Time Provider Department Dept. Phone Encounter #  
 5/16/2017 12:00 PM Baldemar Parks MD [facility] and [place] [address] [id number] Your Appointments 5/30/2017  4:15 PM  
Any with Baldemar Parks MD  
[address] [place] [address] and Primary Care [address]) Appt Note: follow up 3mnths  
 [phone]  
  
   
 [phone]  
  
    
 8/16/2017 11:20 AM  
ESTABLISHED PATIENT with Brennan Garg MD  
[facility] ([address]) Appt Note: 6 month f/u per Dr. Lexis Wheeler 200 1400 [address] [place] [address] [address] [facility] [address] Upcoming Health Maintenance Date Due FOBT Q 1 YEAR AGE 50-75 9/3/2016 MEDICARE YEARLY EXAM 12/1/2016 GLAUCOMA SCREENING Q2Y 8/16/2017* INFLUENZA AGE 9 TO ADULT 8/1/2017 Pneumococcal 65+ Low/Medium Risk (2 of 2 - PPSV23) 5/16/2018 BREAST CANCER SCRN MAMMOGRAM 10/10/2018 DTaP/Tdap/Td series (2 - Td) 10/19/2025 *Topic was postponed. The date shown is not the original due date. Allergies as of 5/16/2017  Review Complete On: 5/16/2017 By: Vaughn Perdomo Severity Noted Reaction Type Reactions Ampicillin  05/15/2013    Hives, Nausea and Vomiting Current Immunizations  Never Reviewed Name Date Pneumococcal Polysaccharide (PPSV-23) 5/16/2013  6:39 PM  
 Tdap 10/19/2015  2:32 AM  
  
 Not reviewed this visit You Were Diagnosed With   
  
 Codes Comments Upper respiratory tract infection, unspecified type    -  Primary ICD-10-CM: J06.9 ICD-9-CM: 465.9 Lumbar radicular pain     ICD-10-CM: M54.16 
ICD-9-CM: 724.4 Dermatitis     ICD-10-CM: L30.9 ICD-9-CM: 692.9 Essential hypertension     ICD-10-CM: I10 
ICD-9-CM: 401.9 Paroxysmal atrial fibrillation (HCC)     ICD-10-CM: I48.0 ICD-9-CM: 427.31 Dyslipidemia     ICD-10-CM: E78.5 ICD-9-CM: 272.4 Vitals BP Pulse Temp Resp Height(growth percentile) Weight(growth percentile) 151/81 (BP 1 Location: Right arm, BP Patient Position: Sitting) (!) 59 97.8 °F (36.6 °C) (Oral) 18 5' 2\" (1.575 m) 131 lb 6.4 oz (59.6 kg) SpO2 BMI OB Status Smoking Status 95% 24.03 kg/m2 Hysterectomy Never Smoker BMI and BSA Data Body Mass Index Body Surface Area 24.03 kg/m 2 1.61 m 2 Preferred Pharmacy Pharmacy Name Phone [facility][id number]- [place], [place] - [address] [phone] Your Updated Medication List  
  
   
This list is accurate as of: 5/16/17  1:46 PM.  Always use your most recent med list.  
  
  
  
  
 albuterol 90 mcg/actuation inhaler Commonly known as:  PROVENTIL HFA, VENTOLIN HFA, PROAIR HFA Take 2 Puffs by inhalation every four (4) hours as needed for Wheezing. ALPRAZolam 1 mg tablet Commonly known as:  Daily Barks Take 1 mg by mouth three (3) times daily. azithromycin 250 mg tablet Commonly known as:  [place] Janes Take 2 tablets today, then take 1 tablet daily  
  
 desonide 0.05 % topical lotion Commonly known as:  Martha Armor Apply  to affected area two (2) times a day. FLUoxetine 20 mg capsule Commonly known as:  PROzac Take 20 mg by mouth daily. fluticasone-salmeterol 250-50 mcg/dose diskus inhaler Commonly known as:  ADVAIR Take 1 Puff by inhalation every twelve (12) hours. guaiFENesin-codeine 100-10 mg/5 mL solution Commonly known as:  ROBITUSSIN AC Take 5 mL by mouth three (3) times daily as needed for Cough. Max Daily Amount: 15 mL.  
  
 hydroCHLOROthiazide 12.5 mg tablet Commonly known as:  HYDRODIURIL Take once daily HYDROcodone-acetaminophen 5-325 mg per tablet Commonly known as:  [address] Take 1 Tab by mouth every eight (8) hours as needed for Pain.  Max Daily Amount: 3 Tabs. hydroquinone 4 % topical cream  
Commonly known as:  Kellen Baptise Apply  to affected area two (2) times a day. lidocaine 5 % Commonly known as:  LIDODERM  
1 Patch by TransDERmal route every twenty-four (24) hours. magnesium oxide 400 mg tablet Commonly known as:  MAG-OX Take 1 Tab by mouth daily. methocarbamol 750 mg tablet Commonly known as:  ROBAXIN Take 1 Tab by mouth three (3) times daily as needed. metoprolol tartrate 25 mg tablet Commonly known as:  LOPRESSOR Take 1 Tab by mouth two (2) times a day. 1/2 tablet twice a day  
  
 potassium chloride 20 mEq tablet Commonly known as:  K-DUR, KLOR-CON Take 1 Tab by mouth daily. rivaroxaban 20 mg Tab tablet Commonly known as:  Corean Miser Take 1 Tab by mouth daily. rosuvastatin 10 mg tablet Commonly known as:  CRESTOR Take 1 tablet every day SEROquel 50 mg tablet Generic drug:  QUEtiapine Take 50 mg by mouth three (3) times daily. Prescriptions Printed Refills  
 guaiFENesin-codeine (ROBITUSSIN AC) 100-10 mg/5 mL solution 0 Sig: Take 5 mL by mouth three (3) times daily as needed for Cough. Max Daily Amount: 15 mL. Class: Print Route: Oral  
  
Prescriptions Sent to Pharmacy Refills  
 lidocaine (LIDODERM) 5 % 3 Si Patch by TransDERmal route every twenty-four (24) hours. Class: Normal  
 Pharmacy: [facility]/pharmacy [address] [place] [address] [address] Ph #: [phone] Route: TransDERmal  
 hydroquinone (ESOTERICA, MELQUIN) 4 % topical cream 6 Sig: Apply  to affected area two (2) times a day. Class: Normal  
 Pharmacy: [facility]/pharmacy [address] [place] [address] [address] Ph #: [phone] Route: Topical  
 desonide (TRIDESILON) 0.05 % topical lotion 11 Sig: Apply  to affected area two (2) times a day.   
 Class: Normal  
 [facility] [address] Ph #: [phone] Route: Topical  
 azithromycin (ZITHROMAX) 250 mg tablet 0 Sig: Take 2 tablets today, then take 1 tablet daily Class: Normal  
 Pharmacy: [facility]pharmacy [address] [address] [facility] [address] Ph #: [phone] We Performed the Following APOLIPOPROTEIN B [id number] CPT(R)] CBC WITH AUTOMATED DIFF [93746 CPT(R)] LIPID PANEL [58678 CPT(R)] METABOLIC PANEL, COMPREHENSIVE [39043 CPT(R)] WV COLLECTION VENOUS BLOOD,VENIPUNCTURE [id number] CPT(R)] TSH 3RD GENERATION [61457 CPT(R)] URINALYSIS W/ RFLX MICROSCOPIC [67697 CPT(R)] Introducing [facility]! Tiffany Ilan introduces [employer] patient portal. Now you can access parts of your medical record, email your doctor's office, and request medication refills online. 1. In your internet browser, go to [email or web address] 2. Click on the First Time User? Click Here link in the Sign In box. You will see the New Member Sign Up page. 3. Enter your [employer] Access Code exactly as it appears below. You will not need to use this code after youve completed the sign-up process. If you do not sign up before the expiration date, you must request a new code. · [employer] Access Code: [email or web address] Expires: 5/29/2017  6:38 PM 
 
4. Enter the last four digits of your Social Security Number (xxxx) and Date of Birth (mm/dd/yyyy) as indicated and click Submit. You will be taken to the next sign-up page. 5. Create a [employer] ID. This will be your [employer] login ID and cannot be changed, so think of one that is secure and easy to remember. 6. Create a [employer] password. You can change your password at any time. 7. Enter your Password Reset Question and Answer. This can be used at a later time if you forget your password. 8. Enter your e-mail address. You will receive e-mail notification when new information is available in [address] 9. Click Sign Up. You can now view and download portions of your medical record. 10. Click the Download Summary menu link to download a portable copy of your medical information. If you have questions, please visit the Frequently Asked Questions section of the "[employer] website. Remember, "[employer] is NOT to be used for urgent needs. For medical emergencies, dial 911. Now available from your iPhone and Android! Please provide this summary of care documentation to your next provider. Your primary care clinician is listed as Swapna Moncada. If you have any questions after today's visit, please call [phone].

## 2017-05-16 NOTE — PROGRESS NOTES
Chief Complaint   Patient presents with    Cold Symptoms     patient states that she has had chest congestion x 7 days. she states that she has not taken any OTC medication. 1. Have you been to the ER, urgent care clinic since your last visit? Hospitalized since your last visit? No    2. Have you seen or consulted any other health care providers outside of the [address] [place] [address] since your last visit? Include any pap smears or colon screening.  No

## 2017-05-17 LAB
ALBUMIN SERPL-MCNC: 4.6 G/DL (ref 3.6–4.8)
ALBUMIN/GLOB SERPL: 1.2 {RATIO} (ref 1.2–2.2)
ALP SERPL-CCNC: 43 IU/L (ref 39–117)
ALT SERPL-CCNC: 18 IU/L (ref 0–32)
APO B SERPL-MCNC: 66 MG/DL (ref 54–133)
APPEARANCE UR: CLEAR
AST SERPL-CCNC: 25 IU/L (ref 0–40)
BACTERIA #/AREA URNS HPF: ABNORMAL /[HPF]
BASOPHILS # BLD AUTO: 0 X10E3/UL (ref 0–0.2)
BASOPHILS NFR BLD AUTO: 1 %
BILIRUB SERPL-MCNC: 1 MG/DL (ref 0–1.2)
BILIRUB UR QL STRIP: NEGATIVE
BUN SERPL-MCNC: 9 MG/DL (ref 8–27)
BUN/CREAT SERPL: 9 (ref 12–28)
CALCIUM SERPL-MCNC: 10.1 MG/DL (ref 8.7–10.3)
CASTS URNS QL MICRO: ABNORMAL /LPF
CHLORIDE SERPL-SCNC: 101 MMOL/L (ref 96–106)
CHOLEST SERPL-MCNC: 158 MG/DL (ref 100–199)
CO2 SERPL-SCNC: 22 MMOL/L (ref 18–29)
COLOR UR: YELLOW
CREAT SERPL-MCNC: 0.96 MG/DL (ref 0.57–1)
EOSINOPHIL # BLD AUTO: 0.2 X10E3/UL (ref 0–0.4)
EOSINOPHIL NFR BLD AUTO: 3 %
EPI CELLS #/AREA URNS HPF: ABNORMAL /HPF
ERYTHROCYTE [DISTWIDTH] IN BLOOD BY AUTOMATED COUNT: 14.7 % (ref 12.3–15.4)
GLOBULIN SER CALC-MCNC: 3.7 G/DL (ref 1.5–4.5)
GLUCOSE SERPL-MCNC: 94 MG/DL (ref 65–99)
GLUCOSE UR QL: NEGATIVE
HCT VFR BLD AUTO: 41.7 % (ref 34–46.6)
HDLC SERPL-MCNC: 80 MG/DL
HGB BLD-MCNC: 14.1 G/DL (ref 11.1–15.9)
HGB UR QL STRIP: NEGATIVE
IMM GRANULOCYTES # BLD: 0 X10E3/UL (ref 0–0.1)
IMM GRANULOCYTES NFR BLD: 0 %
KETONES UR QL STRIP: NEGATIVE
LDLC SERPL CALC-MCNC: 60 MG/DL (ref 0–99)
LEUKOCYTE ESTERASE UR QL STRIP: ABNORMAL
LYMPHOCYTES # BLD AUTO: 2.4 X10E3/UL (ref 0.7–3.1)
LYMPHOCYTES NFR BLD AUTO: 51 %
MCH RBC QN AUTO: 29.8 PG (ref 26.6–33)
MCHC RBC AUTO-ENTMCNC: 33.8 G/DL (ref 31.5–35.7)
MCV RBC AUTO: 88 FL (ref 79–97)
MICRO URNS: ABNORMAL
MONOCYTES # BLD AUTO: 0.5 X10E3/UL (ref 0.1–0.9)
MONOCYTES NFR BLD AUTO: 10 %
MUCOUS THREADS URNS QL MICRO: PRESENT
NEUTROPHILS # BLD AUTO: 1.6 X10E3/UL (ref 1.4–7)
NEUTROPHILS NFR BLD AUTO: 35 %
NITRITE UR QL STRIP: NEGATIVE
PH UR STRIP: 7 [PH] (ref 5–7.5)
PLATELET # BLD AUTO: 225 X10E3/UL (ref 150–379)
POTASSIUM SERPL-SCNC: 4.1 MMOL/L (ref 3.5–5.2)
PROT SERPL-MCNC: 8.3 G/DL (ref 6–8.5)
PROT UR QL STRIP: NEGATIVE
RBC # BLD AUTO: 4.73 X10E6/UL (ref 3.77–5.28)
RBC #/AREA URNS HPF: ABNORMAL /HPF
SODIUM SERPL-SCNC: 144 MMOL/L (ref 134–144)
SP GR UR: 1 (ref 1–1.03)
TRIGL SERPL-MCNC: 89 MG/DL (ref 0–149)
TSH SERPL DL<=0.005 MIU/L-ACNC: 0.84 UIU/ML (ref 0.45–4.5)
UROBILINOGEN UR STRIP-MCNC: 0.2 MG/DL (ref 0.2–1)
VLDLC SERPL CALC-MCNC: 18 MG/DL (ref 5–40)
WBC # BLD AUTO: 4.6 X10E3/UL (ref 3.4–10.8)
WBC #/AREA URNS HPF: ABNORMAL /HPF

## 2017-05-18 RX ORDER — ALBUTEROL SULFATE 90 UG/1
2 AEROSOL, METERED RESPIRATORY (INHALATION)
Qty: 3 INHALER | Refills: 3 | Status: SHIPPED | OUTPATIENT
Start: 2017-05-18 | End: 2017-05-19 | Stop reason: SDUPTHER

## 2017-05-18 RX ORDER — FLUTICASONE PROPIONATE AND SALMETEROL 250; 50 UG/1; UG/1
1 POWDER RESPIRATORY (INHALATION) EVERY 12 HOURS
Qty: 1 INHALER | Refills: 11 | Status: SHIPPED | OUTPATIENT
Start: 2017-05-18 | End: 2017-05-19 | Stop reason: SDUPTHER

## 2017-05-19 RX ORDER — FLUTICASONE PROPIONATE AND SALMETEROL 250; 50 UG/1; UG/1
1 POWDER RESPIRATORY (INHALATION) EVERY 12 HOURS
Qty: 1 INHALER | Refills: 11 | Status: SHIPPED | OUTPATIENT
Start: 2017-05-19 | End: 2019-10-04 | Stop reason: SDUPTHER

## 2017-05-19 RX ORDER — ALBUTEROL SULFATE 90 UG/1
2 AEROSOL, METERED RESPIRATORY (INHALATION)
Qty: 1 INHALER | Refills: 11 | Status: SHIPPED | OUTPATIENT
Start: 2017-05-19 | End: 2017-09-12 | Stop reason: CLARIF

## 2017-05-26 RX ORDER — NITROFURANTOIN 25; 75 MG/1; MG/1
100 CAPSULE ORAL 2 TIMES DAILY
Qty: 6 CAP | Refills: 0 | Status: SHIPPED | OUTPATIENT
Start: 2017-05-26 | End: 2017-05-29

## 2017-06-09 RX ORDER — POTASSIUM CHLORIDE 20 MEQ/1
20 TABLET, EXTENDED RELEASE ORAL DAILY
Qty: 90 TAB | Refills: 3 | Status: SHIPPED | OUTPATIENT
Start: 2017-06-09 | End: 2017-12-26 | Stop reason: SDUPTHER

## 2017-06-14 RX ORDER — METOPROLOL TARTRATE 25 MG/1
TABLET, FILM COATED ORAL
Qty: 45 TAB | Refills: 3 | Status: SHIPPED | OUTPATIENT
Start: 2017-06-14 | End: 2017-10-20 | Stop reason: SDUPTHER

## 2017-06-23 ENCOUNTER — OFFICE VISIT (OUTPATIENT)
Dept: INTERNAL MEDICINE CLINIC | Age: 67
End: 2017-06-23

## 2017-06-23 VITALS
WEIGHT: 129 LBS | DIASTOLIC BLOOD PRESSURE: 81 MMHG | BODY MASS INDEX: 23.74 KG/M2 | SYSTOLIC BLOOD PRESSURE: 127 MMHG | HEIGHT: 62 IN | OXYGEN SATURATION: 99 % | HEART RATE: 64 BPM | RESPIRATION RATE: 21 BRPM | TEMPERATURE: 97.5 F

## 2017-06-23 DIAGNOSIS — J06.9 UPPER RESPIRATORY TRACT INFECTION, UNSPECIFIED TYPE: Primary | ICD-10-CM

## 2017-06-23 DIAGNOSIS — I10 ESSENTIAL HYPERTENSION: ICD-10-CM

## 2017-06-23 DIAGNOSIS — Z00.00 ROUTINE GENERAL MEDICAL EXAMINATION AT A HEALTH CARE FACILITY: ICD-10-CM

## 2017-06-23 DIAGNOSIS — I48.0 PAROXYSMAL ATRIAL FIBRILLATION (HCC): ICD-10-CM

## 2017-06-23 NOTE — MR AVS SNAPSHOT
Visit Information Date & Time Provider Department Dept. Phone Encounter #  
 6/23/2017 11:00 AM Jorge Salinas MD [facility] [phone] [id number] Your Appointments 8/16/2017 11:20 AM  
ESTABLISHED PATIENT with Mirna Marinelli MD  
[facility] ([facility]) Appt Note: 6 month f/u per Dr. Erin Lassiter [address] [facility] [id number]  
[address] [facility] [address]  
  
    
 8/29/2017  3:30 PM  
Any with Jorge Salinas MD  
[address] [facility] [address] [place] [address]) Appt Note: follow up 3mnths  
 [address] [facility]  
  
   
 [address] [id number] Upcoming Health Maintenance Date Due FOBT Q 1 YEAR AGE 50-75 9/3/2016 GLAUCOMA SCREENING Q2Y 8/16/2017* INFLUENZA AGE 9 TO ADULT 8/1/2017 Pneumococcal 65+ Low/Medium Risk (2 of 2 - PPSV23) 5/16/2018 MEDICARE YEARLY EXAM 6/24/2018 BREAST CANCER SCRN MAMMOGRAM 10/10/2018 DTaP/Tdap/Td series (2 - Td) 10/19/2025 *Topic was postponed. The date shown is not the original due date. Allergies as of 6/23/2017  Review Complete On: 6/23/2017 By: Sameera Pettit Severity Noted Reaction Type Reactions Ampicillin  05/15/2013    Hives, Nausea and Vomiting Current Immunizations  Never Reviewed Name Date Pneumococcal Polysaccharide (PPSV-23) 5/16/2013  6:39 PM  
 Tdap 10/19/2015  2:32 AM  
  
 Not reviewed this visit Vitals BP Pulse Temp Resp Height(growth percentile) Weight(growth percentile) 127/81 (BP 1 Location: Right arm, BP Patient Position: Sitting) 64 97.5 °F (36.4 °C) (Oral) 21 5' 2\" (1.575 m) 129 lb (58.5 kg) SpO2 BMI OB Status Smoking Status 99% 23.59 kg/m2 Hysterectomy Never Smoker BMI and BSA Data  Body Mass Index Body Surface Area  
 23.59 kg/m 2 1.6 m 2  
  
  
 Preferred Pharmacy Pharmacy Name Phone [facility]/PHARMACY #[id number]- [facility] [address] [phone] Your Updated Medication List  
  
   
This list is accurate as of: 6/23/17 11:36 AM.  Always use your most recent med list.  
  
  
  
  
 albuterol 90 mcg/actuation inhaler Commonly known as:  PROVENTIL HFA, VENTOLIN HFA, PROAIR HFA Take 2 Puffs by inhalation every four (4) hours as needed for Wheezing. ALPRAZolam 1 mg tablet Commonly known as:  Peola Layla Take 1 mg by mouth three (3) times daily. desonide 0.05 % topical lotion Commonly known as:  Beth Montoya Apply  to affected area two (2) times a day. FLUoxetine 20 mg capsule Commonly known as:  PROzac Take 20 mg by mouth daily. fluticasone-salmeterol 250-50 mcg/dose diskus inhaler Commonly known as:  ADVAIR Take 1 Puff by inhalation every twelve (12) hours. guaiFENesin-codeine 100-10 mg/5 mL solution Commonly known as:  ROBITUSSIN AC Take 5 mL by mouth three (3) times daily as needed for Cough. Max Daily Amount: 15 mL.  
  
 hydroCHLOROthiazide 12.5 mg tablet Commonly known as:  HYDRODIURIL Take once daily HYDROcodone-acetaminophen 5-325 mg per tablet Commonly known as:  Kavon Brittle Take 1 Tab by mouth every eight (8) hours as needed for Pain. Max Daily Amount: 3 Tabs. hydroquinone 4 % topical cream  
Commonly known as:  Moustapha Quick Apply  to affected area two (2) times a day. lidocaine 5 % Commonly known as:  LIDODERM  
1 Patch by TransDERmal route every twenty-four (24) hours. magnesium oxide 400 mg tablet Commonly known as:  MAG-OX Take 1 Tab by mouth daily. methocarbamol 750 mg tablet Commonly known as:  ROBAXIN Take 1 Tab by mouth three (3) times daily as needed. metoprolol tartrate 25 mg tablet Commonly known as:  LOPRESSOR  
1/2 tablet twice a day  
  
 potassium chloride 20 mEq tablet Commonly known as:  K-DUR, KLOR-CON Take 1 Tab by mouth daily. rivaroxaban 20 mg Tab tablet Commonly known as:  Zina Carrier Take 1 Tab by mouth daily. rosuvastatin 10 mg tablet Commonly known as:  CRESTOR Take 1 tablet every day SEROquel 50 mg tablet Generic drug:  QUEtiapine Take 50 mg by mouth three (3) times daily. Introducing [facility]! Yamileth Cardenas introduces SpeakUp patient portal. Now you can access parts of your medical record, email your doctor's office, and request medication refills online. 1. In your internet browser, go to [email or web address] 2. Click on the First Time User? Click Here link in the Sign In box. You will see the New Member Sign Up page. 3. Enter your SpeakUp Access Code exactly as it appears below. You will not need to use this code after youve completed the sign-up process. If you do not sign up before the expiration date, you must request a new code. · SpeakUp Access Code: [email or web address] Expires: 9/21/2017 11:14 AM 
 
4. Enter the last four digits of your Social Security Number (xxxx) and Date of Birth (mm/dd/yyyy) as indicated and click Submit. You will be taken to the next sign-up page. 5. Create a SpeakUp ID. This will be your SpeakUp login ID and cannot be changed, so think of one that is secure and easy to remember. 6. Create a SpeakUp password. You can change your password at any time. 7. Enter your Password Reset Question and Answer. This can be used at a later time if you forget your password. 8. Enter your e-mail address. You will receive e-mail notification when new information is available in [address] 9. Click Sign Up. You can now view and download portions of your medical record. 10. Click the Download Summary menu link to download a portable copy of your medical information.  
 
If you have questions, please visit the Frequently Asked Questions section of the 10X10 Room. Remember, eLamahart is NOT to be used for urgent needs. For medical emergencies, dial 911. Now available from your iPhone and Android! Please provide this summary of care documentation to your next provider. Your primary care clinician is listed as Swapna Moncada. If you have any questions after today's visit, please call [phone].

## 2017-06-23 NOTE — PROGRESS NOTES
Chief Complaint   [facility]   [facility]   . SUBECTIVE:    Jp De Leon is a 77 y.o. female comes in for return visit complaining of a three day history of increasing clearing of her throat, nasal congestion and the clearing of her throat of slight blood tinging of her mucus. This is predominantly worse in the morning. She has no other upper respiratory symptoms. She denies any chest pain or shortness of breath. She is compliant with all of her cardioactive medications. Current Outpatient Prescriptions   Medication Sig Dispense Refill    metoprolol tartrate (LOPRESSOR) 25 mg tablet 1/2 tablet twice a day 45 Tab 3    potassium chloride (K-DUR, KLOR-CON) 20 mEq tablet Take 1 Tab by mouth daily. 90 Tab 3    fluticasone-salmeterol (ADVAIR) 250-50 mcg/dose diskus inhaler Take 1 Puff by inhalation every twelve (12) hours. 1 Inhaler 11    albuterol (PROVENTIL HFA, VENTOLIN HFA, PROAIR HFA) 90 mcg/actuation inhaler Take 2 Puffs by inhalation every four (4) hours as needed for Wheezing. 1 Inhaler 11    desonide (TRIDESILON) 0.05 % topical lotion Apply  to affected area two (2) times a day. 118 mL 11    hydroCHLOROthiazide (HYDRODIURIL) 12.5 mg tablet Take once daily 30 Tab 11    rivaroxaban (XARELTO) 20 mg tab tablet Take 1 Tab by mouth daily. 21 Tab 0    rosuvastatin (CRESTOR) 10 mg tablet Take 1 tablet every day 90 Tab 3    magnesium oxide (MAG-OX) 400 mg tablet Take 1 Tab by mouth daily. 90 Tab 3    methocarbamol (ROBAXIN) 750 mg tablet Take 1 Tab by mouth three (3) times daily as needed. 270 Tab 3    ALPRAZolam (XANAX) 1 mg tablet Take 1 mg by mouth three (3) times daily.  QUEtiapine (SEROQUEL) 50 mg tablet Take 50 mg by mouth three (3) times daily.  FLUoxetine (PROZAC) 20 mg capsule Take 20 mg by mouth daily.  lidocaine (LIDODERM) 5 % 1 Patch by TransDERmal route every twenty-four (24) hours.  3 Package 3    hydroquinone (ESOTERICA, MELQUIN) 4 % topical cream Apply  to affected area two (2) times a day. 30 g 6    guaiFENesin-codeine (ROBITUSSIN AC) 100-10 mg/5 mL solution Take 5 mL by mouth three (3) times daily as needed for Cough. Max Daily Amount: 15 mL. 140 mL 0    HYDROcodone-acetaminophen (NORCO) 5-325 mg per tablet Take 1 Tab by mouth every eight (8) hours as needed for Pain. Max Daily Amount: 3 Tabs.  36 Tab 0     Past Medical History:   Diagnosis Date    Asthma     Bipolar 2 disorder ([address]     CAD (coronary artery disease)     ablation    D[facility]deep venous thrombosis) ([facility])     Hypercholesteremia     Hypertension     Tachycardia      Past Surgical History:   Procedure Laterality Date    ABLATION OF SVT  3/30/2015         EP STUDY W/INDUCTION  3/30/2015         HX BACK SURGERY      HX COLONOSCOPY  9-    HX HEART CATHETERIZATION      HX HYSTERECTOMY       Allergies   Allergen Reactions    Ampicillin Hives and Nausea and Vomiting       REVIEW OF SYSTEMS:  Review of Systems - Negative except   ENT ROS: negative for - headaches, hearing change, nasal congestion, oral lesions, tinnitus, visual changes or   Respiratory ROS: no cough, shortness of breath, or wheezing  Cardiovascular ROS: no chest pain or dyspnea on exertion  Gastrointestinal ROS: no abdominal pain, change in bowel habits, or black or blood  Genito-Urinary ROS: no dysuria, trouble voiding, or hematuria  Musculoskeletal ROS: negative  Neurological ROS: no TIA or stroke symptoms      Social History     Social History    Marital status:      Spouse name: N/A    Number of children: 1    Years of education: N/A     Occupational History    retired--[employer]---disabled      Social History Main Topics    Smoking status: Never Smoker    Smokeless tobacco: Never Used    Alcohol use No    Drug use: No    Sexual activity: Yes     Partners: Male     Other Topics Concern    None     Social History Narrative   r  No family history on file.    OBJECTIVE:  Visit Vitals    /81 (BP 1 Location: Right arm, BP Patient Position: Sitting)    Pulse 64    Temp 97.5 °F (36.4 °C) (Oral)    Resp 21    Ht 5' 2\" (1.575 m)    Wt 129 lb (58.5 kg)    SpO2 99%    BMI 23.59 kg/m2     ENT: perrla,  eom intact  NECK: supple. Thyroid normal, no JVD  CHEST: clear to ascultation and percussion   HEART: regular rate and rhythm  ABD: soft, bowel sounds active,   EXTREMITIES: no edema, pulse 1+  INTEGUMENT: clear      ASSESSMENT:  1. Upper respiratory tract infection, unspecified type    2. Paroxysmal atrial fibrillation (HCC)    3. Essential hypertension    4. Routine general medical examination at a health care facility        PLAN:    1. Current symptoms are compatible with a viral rhinitis. This is self limiting. I suggest Claritin D12 one tablet twice a day 7 a.m and 3 p.m for the next five days. 2. The blood tinging of the mucus is emanating from her gingiva most likely. I suggest using Hydrogen peroxide at least twice a day full strength as a rinse. I will see how she fairs with this. 3. Blood pressure is excellent. No adjustments are made. 4. Heart rhythm and rate are stable. Follow-up Disposition:  Return in about 3 months (around 9/23/2017). Olga Lidia Santoyo MD  This is a Subsequent Medicare Annual Wellness Visit providing Personalized Prevention Plan Services (PPPS) (Performed 12 months after initial AWV and PPPS )    I have reviewed the patient's medical history in detail and updated the computerized patient record.      History     Past Medical History:   Diagnosis Date    Asthma     [facility]     CAD (coronary artery disease)     ablation    [facility]     Hypercholesteremia     Hypertension     Tachycardia       Past Surgical History:   Procedure Laterality Date    ABLATION OF SVT  3/30/2015         EP STUDY W/INDUCTION  3/30/2015         HX BACK SURGERY      HX COLONOSCOPY  9-    HX HEART CATHETERIZATION      HX HYSTERECTOMY       Current Outpatient Prescriptions   Medication Sig Dispense Refill    metoprolol tartrate (LOPRESSOR) 25 mg tablet 1/2 tablet twice a day 45 Tab 3    potassium chloride (K-DUR, KLOR-CON) 20 mEq tablet Take 1 Tab by mouth daily. 90 Tab 3    fluticasone-salmeterol (ADVAIR) 250-50 mcg/dose diskus inhaler Take 1 Puff by inhalation every twelve (12) hours. 1 Inhaler 11    albuterol (PROVENTIL HFA, VENTOLIN HFA, PROAIR HFA) 90 mcg/actuation inhaler Take 2 Puffs by inhalation every four (4) hours as needed for Wheezing. 1 Inhaler 11    desonide (TRIDESILON) 0.05 % topical lotion Apply  to affected area two (2) times a day. 118 mL 11    hydroCHLOROthiazide (HYDRODIURIL) 12.5 mg tablet Take once daily 30 Tab 11    rivaroxaban (XARELTO) 20 mg tab tablet Take 1 Tab by mouth daily. 21 Tab 0    rosuvastatin (CRESTOR) 10 mg tablet Take 1 tablet every day 90 Tab 3    magnesium oxide (MAG-OX) 400 mg tablet Take 1 Tab by mouth daily. 90 Tab 3    methocarbamol (ROBAXIN) 750 mg tablet Take 1 Tab by mouth three (3) times daily as needed. 270 Tab 3    ALPRAZolam (XANAX) 1 mg tablet Take 1 mg by mouth three (3) times daily.  QUEtiapine (SEROQUEL) 50 mg tablet Take 50 mg by mouth three (3) times daily.  FLUoxetine (PROZAC) 20 mg capsule Take 20 mg by mouth daily.  lidocaine (LIDODERM) 5 % 1 Patch by TransDERmal route every twenty-four (24) hours. 3 Package 3    hydroquinone (ESOTERICA, MELQUIN) 4 % topical cream Apply  to affected area two (2) times a day. 30 g 6    guaiFENesin-codeine (ROBITUSSIN AC) 100-10 mg/5 mL solution Take 5 mL by mouth three (3) times daily as needed for Cough. Max Daily Amount: 15 mL. 140 mL 0    HYDROcodone-acetaminophen (NORCO) 5-325 mg per tablet Take 1 Tab by mouth every eight (8) hours as needed for Pain. Max Daily Amount: 3 Tabs.  40 Tab 0     Allergies   Allergen Reactions    Ampicillin Hives and Nausea and Vomiting     No family history on file. 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 No    Do you average more than 7 drinks per week? No        Functional Ability and Level of Safety:     Hearing Loss   none    Activities of Daily Living   Self-care. Requires assistance with: no ADLs    Fall Risk   Fall Risk Assessment, last 12 mths 6/23/2017   Able to walk? Yes   Fall in past 12 months? No     Abuse Screen   Patient is not abused    Review of Systems   A comprehensive review of systems was negative.     Physical Examination     Evaluation of Cognitive Function:  Mood/affect:  neutral  Appearance: age appropriate  Family member/caregiver input: na    Visit Vitals    /81 (BP 1 Location: Right arm, BP Patient Position: Sitting)    Pulse 64    Temp 97.5 °F (36.4 °C) (Oral)    Resp 21    Ht 5' 2\" (1.575 m)    Wt 129 lb (58.5 kg)    SpO2 99%    BMI 23.59 kg/m2     General appearance: alert, cooperative, no distress, appears stated age  Neck: supple, symmetrical, trachea midline, no adenopathy, thyroid: not enlarged, symmetric, no tenderness/mass/nodules, no carotid bruit and no JVD  Lungs: clear to auscultation bilaterally  Heart: regular rate and rhythm, S1, S2 normal, no murmur, click, rub or gallop  Abdomen: soft, non-tender. Bowel sounds normal. No masses,  no organomegaly  Extremities: extremities normal, atraumatic, no cyanosis or edema  Neurologic: Grossly normal    Patient Care Team:  Jorge Salinas MD as PCP - General (Internal Medicine)  Mirna Marinelli MD (Cardiology)  Pearl Jackson MD (Cardiology)  Samm Estrada RN as Ambulatory Care Navigator    Advice/Referrals/Counseling   Education and counseling provided:  Are appropriate based on today's review and evaluation      Assessment/Plan       ICD-10-CM ICD-9-CM    1. Upper respiratory tract infection, unspecified type J06.9 465.9    2. Paroxysmal atrial fibrillation (HCC) I48.0 427.31    3. Essential hypertension I10 401.9    4. Routine general medical examination at a health care facility Z00.00 V70.0    .

## 2017-06-23 NOTE — PROGRESS NOTES
.1. Have you been to the ER, urgent care clinic since your last visit? Hospitalized since your last visit? No    2. Have you seen or consulted any other health care providers outside of the [facility] since your last visit? Include any pap smears or colon screening.  No

## 2017-07-06 DIAGNOSIS — I49.5 TACHY-BRADY SYNDROME (HCC): ICD-10-CM

## 2017-07-06 DIAGNOSIS — E78.5 DYSLIPIDEMIA: ICD-10-CM

## 2017-07-06 RX ORDER — ROSUVASTATIN CALCIUM 10 MG/1
TABLET, COATED ORAL
Qty: 90 TAB | Refills: 3 | Status: SHIPPED | OUTPATIENT
Start: 2017-07-06 | End: 2017-07-25 | Stop reason: SDUPTHER

## 2017-07-25 DIAGNOSIS — E78.5 DYSLIPIDEMIA: ICD-10-CM

## 2017-07-25 DIAGNOSIS — I49.5 TACHY-BRADY SYNDROME (HCC): ICD-10-CM

## 2017-07-26 RX ORDER — ROSUVASTATIN CALCIUM 10 MG/1
TABLET, COATED ORAL
Qty: 90 TAB | Refills: 3 | Status: SHIPPED | OUTPATIENT
Start: 2017-07-26 | End: 2018-10-09 | Stop reason: SDUPTHER

## 2017-09-12 ENCOUNTER — OFFICE VISIT (OUTPATIENT)
Dept: INTERNAL MEDICINE CLINIC | Age: 67
End: 2017-09-12

## 2017-09-12 VITALS
HEIGHT: 62 IN | WEIGHT: 136 LBS | BODY MASS INDEX: 25.03 KG/M2 | TEMPERATURE: 97.9 F | SYSTOLIC BLOOD PRESSURE: 134 MMHG | HEART RATE: 68 BPM | RESPIRATION RATE: 18 BRPM | DIASTOLIC BLOOD PRESSURE: 87 MMHG | OXYGEN SATURATION: 95 %

## 2017-09-12 DIAGNOSIS — I10 ESSENTIAL HYPERTENSION: ICD-10-CM

## 2017-09-12 DIAGNOSIS — E78.5 DYSLIPIDEMIA: ICD-10-CM

## 2017-09-12 DIAGNOSIS — Z12.31 ENCOUNTER FOR SCREENING MAMMOGRAM FOR HIGH-RISK PATIENT: ICD-10-CM

## 2017-09-12 DIAGNOSIS — Z20.2 SEXUALLY TRANSMITTED DISEASE EXPOSURE: ICD-10-CM

## 2017-09-12 DIAGNOSIS — J45.20 MILD INTERMITTENT ASTHMA WITHOUT COMPLICATION: ICD-10-CM

## 2017-09-12 DIAGNOSIS — M54.16 LUMBAR RADICULAR PAIN: ICD-10-CM

## 2017-09-12 DIAGNOSIS — Z23 ENCOUNTER FOR IMMUNIZATION: Primary | ICD-10-CM

## 2017-09-12 DIAGNOSIS — G47.00 INSOMNIA, UNSPECIFIED TYPE: ICD-10-CM

## 2017-09-12 DIAGNOSIS — M85.80 OSTEOPENIA, UNSPECIFIED LOCATION: ICD-10-CM

## 2017-09-12 RX ORDER — HYDROQUINONE 40 MG/G
CREAM TOPICAL 2 TIMES DAILY
Qty: 30 G | Refills: 6 | Status: SHIPPED | OUTPATIENT
Start: 2017-09-12 | End: 2018-03-29 | Stop reason: SDUPTHER

## 2017-09-12 RX ORDER — ALBUTEROL SULFATE 90 UG/1
2 AEROSOL, METERED RESPIRATORY (INHALATION)
Qty: 1 INHALER | Refills: 11 | Status: SHIPPED | OUTPATIENT
Start: 2017-09-12 | End: 2018-11-01 | Stop reason: SDUPTHER

## 2017-09-12 RX ORDER — HYDROCODONE BITARTRATE AND ACETAMINOPHEN 5; 325 MG/1; MG/1
1 TABLET ORAL
Qty: 40 TAB | Refills: 0 | Status: SHIPPED | OUTPATIENT
Start: 2017-09-12 | End: 2017-10-03 | Stop reason: ALTCHOICE

## 2017-09-12 NOTE — MR AVS SNAPSHOT
Visit Information Date & Time Provider Department Dept. Phone Encounter #  
 9/12/2017  2:30 PM Jaime Patricia [facility] Sports Medicine and [address] [id number] Your Appointments 11/20/2017  1:00 PM  
Any with Marycruz Torres MD  
[address] [place] [address] and Primary Care [facility]) Appt Note: 3 month f/u  
 [address] [facility]  
  
   
 [address] [phone] Upcoming Health Maintenance Date Due  
 GLAUCOMA SCREENING Q2Y 12/24/2015 FOBT Q 1 YEAR AGE 50-75 9/3/2016 INFLUENZA AGE 9 TO ADULT 8/1/2017 Pneumococcal 65+ Low/Medium Risk (2 of 2 - PPSV23) 5/16/2018 MEDICARE YEARLY EXAM 6/24/2018 BREAST CANCER SCRN MAMMOGRAM 10/10/2018 DTaP/Tdap/Td series (2 - Td) 10/19/2025 Allergies as of 9/12/2017  Review Complete On: 9/12/2017 By: Paula Adames LPN Severity Noted Reaction Type Reactions Ampicillin  05/15/2013    Hives, Nausea and Vomiting Current Immunizations  Never Reviewed Name Date Influenza High Dose Vaccine PF 9/12/2017 Pneumococcal Polysaccharide (PPSV-23) 5/16/2013  6:39 PM  
 Tdap 10/19/2015  2:32 AM  
  
 Not reviewed this visit You Were Diagnosed With   
  
 Codes Comments Encounter for immunization    -  Primary ICD-10-CM: P07 ICD-9-CM: V03.89 Lumbar radicular pain     ICD-10-CM: M54.16 
ICD-9-CM: 724.4 Insomnia, unspecified type     ICD-10-CM: G47.00 ICD-9-CM: 780.52 Osteopenia, unspecified location     ICD-10-CM: M85.80 ICD-9-CM: 733.90 Essential hypertension     ICD-10-CM: I10 
ICD-9-CM: 401.9 Dyslipidemia     ICD-10-CM: E78.5 ICD-9-CM: 272.4 Mild intermittent asthma without complication     AYG-14-UA: J45.20 ICD-9-CM: 493.90 Sexually transmitted disease exposure     ICD-10-CM: Z20.2 ICD-9-CM: V01.6  Encounter for screening mammogram for high-risk patient     ICD-10-CM: Z12.31 
 ICD-9-CM: V76.11 Vitals BP Pulse Temp Resp Height(growth percentile) Weight(growth percentile) 134/87 (BP 1 Location: Right leg, BP Patient Position: Sitting) 68 97.9 °F (36.6 °C) (Oral) 18 5' 2\" (1.575 m) 136 lb (61.7 kg) SpO2 BMI OB Status Smoking Status 95% 24.87 kg/m2 Hysterectomy Never Smoker Vitals History BMI and BSA Data Body Mass Index Body Surface Area  
 24.87 kg/m 2 1.64 m 2 Preferred Pharmacy Pharmacy Name Phone [facility] [address] [place] [phone] [address] [phone] Your Updated Medication List  
  
   
This list is accurate as of: 9/12/17  4:55 PM.  Always use your most recent med list.  
  
  
  
  
 albuterol 90 mcg/actuation inhaler Commonly known as:  VENTOLIN HFA Take 2 Puffs by inhalation every four (4) hours as needed for Wheezing. ALPRAZolam 1 mg tablet Commonly known as:  Ronny Harvey Take 1 mg by mouth three (3) times daily. desonide 0.05 % topical lotion Commonly known as:  Volney Ambrosia Apply  to affected area two (2) times a day. FLUoxetine 20 mg capsule Commonly known as:  PROzac Take 20 mg by mouth daily. fluticasone-salmeterol 250-50 mcg/dose diskus inhaler Commonly known as:  ADVAIR Take 1 Puff by inhalation every twelve (12) hours. guaiFENesin-codeine 100-10 mg/5 mL solution Commonly known as:  ROBITUSSIN AC Take 5 mL by mouth three (3) times daily as needed for Cough. Max Daily Amount: 15 mL.  
  
 hydroCHLOROthiazide 12.5 mg tablet Commonly known as:  HYDRODIURIL Take once daily HYDROcodone-acetaminophen 5-325 mg per tablet Commonly known as:  Ritchie Plum Take 1 Tab by mouth every eight (8) hours as needed for Pain. Max Daily Amount: 3 Tabs. hydroquinone 4 % topical cream  
Commonly known as:  Maria Teresa Bon Apply  to affected area two (2) times a day. lidocaine 5 % Commonly known as:  Roz Mcfarlane  
 1 Patch by TransDERmal route every twenty-four (24) hours. magnesium oxide 400 mg tablet Commonly known as:  MAG-OX Take 1 Tab by mouth daily. methocarbamol 750 mg tablet Commonly known as:  ROBAXIN Take 1 Tab by mouth three (3) times daily as needed. metoprolol tartrate 25 mg tablet Commonly known as:  LOPRESSOR  
1/2 tablet twice a day  
  
 potassium chloride 20 mEq tablet Commonly known as:  K-DUR, KLOR-CON Take 1 Tab by mouth daily. rivaroxaban 20 mg Tab tablet Commonly known as:  Rip Hollow Take 1 Tab by mouth daily. rosuvastatin 10 mg tablet Commonly known as:  CRESTOR Take 1 tablet every day SEROquel 50 mg tablet Generic drug:  QUEtiapine Take 50 mg by mouth three (3) times daily. Prescriptions Printed Refills HYDROcodone-acetaminophen (NORCO) 5-325 mg per tablet 0 Sig: Take 1 Tab by mouth every eight (8) hours as needed for Pain. Max Daily Amount: 3 Tabs. Class: Print Route: Oral  
  
Prescriptions Sent to Pharmacy Refills  
 hydroquinone (ESOTERICA, MELQUIN) 4 % topical cream 6 Sig: Apply  to affected area two (2) times a day. Class: Normal  
 Pharmacy: [address] [place] [address], [address] [place] [address] Ph #: [phone] Route: Topical  
 albuterol (VENTOLIN HFA) 90 mcg/actuation inhaler 11 Sig: Take 2 Puffs by inhalation every four (4) hours as needed for Wheezing. Class: Normal  
 Pharmacy: [address] [place] [address] Ph #: [phone] Route: Inhalation We Performed the Following HCV RNA MANOHAR QUALITATIVE [87212 CPT(R)] HIV 1/2 AG/AB, 4TH GENERATION,W RFLX CONFIRM [RVK07167 Custom] INFLUENZA VIRUS VACCINE, HIGH DOSE SEASONAL, PRESERVATIVE FREE [90106 CPT(R)] NM IMMUNIZ ADMIN,1 SINGLE/COMB VAC/TOXOID [id number] CPT(R)] To-Do List   
 09/19/2017   Imaging:  CHRIS MAMMO BI SCREENING INCL CAD   
  
  
 Introducing [facility]! [employer] introduces Action patient portal. Now you can access parts of your medical record, email your doctor's office, and request medication refills online. 1. In your internet browser, go to [email or web address] 2. Click on the First Time User? Click Here link in the Sign In box. You will see the New Member Sign Up page. 3. Enter your Action Access Code exactly as it appears below. You will not need to use this code after youve completed the sign-up process. If you do not sign up before the expiration date, you must request a new code. · Action Access Code: [email or web address] Expires: 9/21/2017 11:14 AM 
 
4. Enter the last four digits of your Social Security Number (xxxx) and Date of Birth (mm/dd/yyyy) as indicated and click Submit. You will be taken to the next sign-up page. 5. Create a Action ID. This will be your Action login ID and cannot be changed, so think of one that is secure and easy to remember. 6. Create a Action password. You can change your password at any time. 7. Enter your Password Reset Question and Answer. This can be used at a later time if you forget your password. 8. Enter your e-mail address. You will receive e-mail notification when new information is available in [address] 9. Click Sign Up. You can now view and download portions of your medical record. 10. Click the Download Summary menu link to download a portable copy of your medical information. If you have questions, please visit the Frequently Asked Questions section of the Action website. Remember, Action is NOT to be used for urgent needs. For medical emergencies, dial 911. Now available from your iPhone and Android! Please provide this summary of care documentation to your next provider. Your primary care clinician is listed as Swapna Moncada. If you have any questions after today's visit, please call [phone].

## 2017-09-12 NOTE — PROGRESS NOTES
1. Have you been to the ER, urgent care clinic since your last visit? Hospitalized since your last visit? No    2. Have you seen or consulted any other health care providers outside of the [address] [place] [address] since your last visit? Include any pap smears or colon screening.  No

## 2017-09-12 NOTE — PROGRESS NOTES
[facility] and Primary Care  [place] [address]  [address] [place] [address]  Phone:  [phone]  Fax: [phone]       Chief Complaint   Patient presents with    Follow-up     3 month    . SUBJECTIVE:    Brissa Carmen is a 77 y.o. female Comes in for return visit with several complaints. She complains of insomnia. She wants to know if she can take Melatonin. She has episodic reactive airway disease, and has a Rescue inhaler, as well as Advair. She does not use it that much. She would like to have a bone density study. She follows with a psychiatrist and takes Xanax on a p.r.n. basis. She is reducing the number that she takes daily by one tablet. She has pain in her right buttock when she walks distances. She has a history paroxysmal atrial fibrillation and remains on her Xarelto. She has a past history of primary hypertension and dyslipidemia. Current Outpatient Prescriptions   Medication Sig Dispense Refill    HYDROcodone-acetaminophen (NORCO) 5-325 mg per tablet Take 1 Tab by mouth every eight (8) hours as needed for Pain. Max Daily Amount: 3 Tabs. 40 Tab 0    hydroquinone (ESOTERICA, MELQUIN) 4 % topical cream Apply  to affected area two (2) times a day. 30 g 6    albuterol (VENTOLIN HFA) 90 mcg/actuation inhaler Take 2 Puffs by inhalation every four (4) hours as needed for Wheezing. 1 Inhaler 11    rosuvastatin (CRESTOR) 10 mg tablet Take 1 tablet every day 90 Tab 3    magnesium oxide (MAG-OX) 400 mg tablet Take 1 Tab by mouth daily. 90 Tab 3    metoprolol tartrate (LOPRESSOR) 25 mg tablet 1/2 tablet twice a day 45 Tab 3    potassium chloride (K-DUR, KLOR-CON) 20 mEq tablet Take 1 Tab by mouth daily. 90 Tab 3    lidocaine (LIDODERM) 5 % 1 Patch by TransDERmal route every twenty-four (24) hours. 3 Package 3    desonide (TRIDESILON) 0.05 % topical lotion Apply  to affected area two (2) times a day.  118 mL 11    hydroCHLOROthiazide (HYDRODIURIL) 12.5 mg tablet Take once daily 30 Tab 11    rivaroxaban (XARELTO) 20 mg tab tablet Take 1 Tab by mouth daily. 21 Tab 0    methocarbamol (ROBAXIN) 750 mg tablet Take 1 Tab by mouth three (3) times daily as needed. 270 Tab 3    ALPRAZolam (XANAX) 1 mg tablet Take 1 mg by mouth three (3) times daily.  QUEtiapine (SEROQUEL) 50 mg tablet Take 50 mg by mouth three (3) times daily.  FLUoxetine (PROZAC) 20 mg capsule Take 20 mg by mouth daily.  fluticasone-salmeterol (ADVAIR) 250-50 mcg/dose diskus inhaler Take 1 Puff by inhalation every twelve (12) hours. 1 Inhaler 11    guaiFENesin-codeine (ROBITUSSIN AC) 100-10 mg/5 mL solution Take 5 mL by mouth three (3) times daily as needed for Cough.  Max Daily Amount: 15 mL. 140 mL 0     Past Medical History:   Diagnosis Date    Asthma     Bipolar 2 disorder ([facility] [address]     CAD (coronary artery disease)     ablation    DVT [facility]deep venous thrombosis) ([facility])     Hypercholesteremia     Hypertension     Tachycardia      Past Surgical History:   Procedure Laterality Date    ABLATION OF SVT  3/30/2015         EP STUDY W/INDUCTION  3/30/2015         HX BACK SURGERY      HX COLONOSCOPY  9-    HX HEART CATHETERIZATION      HX HYSTERECTOMY       Allergies   Allergen Reactions    Ampicillin Hives and Nausea and Vomiting         REVIEW OF SYSTEMS:  General: negative for - chills or fever  ENT: negative for - headaches, nasal congestion or tinnitus  Respiratory: negative for - cough, hemoptysis, shortness of breath or wheezing  Cardiovascular : negative for - chest pain, edema, palpitations or shortness of breath  Gastrointestinal: negative for - abdominal pain, blood in stools, heartburn or nausea/vomiting  Genito-Urinary: no dysuria, trouble voiding, or hematuria  Musculoskeletal: negative for - gait disturbance, joint pain, joint stiffness or joint swelling  Neurological: no TIA or stroke symptoms  Hematologic: no bruises, no bleeding, no swollen glands  Integument: no lumps, mole changes, nail changes or rash  Endocrine: no malaise/lethargy or unexpected weight changes      Social History     Social History    Marital status:      Spouse name: N/A    Number of children: 1    Years of education: N/A     Occupational History    retired--[employer]---disabled      Social History Main Topics    Smoking status: Never Smoker    Smokeless tobacco: Never Used    Alcohol use No      Comment: occasional    Drug use: No    Sexual activity: Yes     Partners: Male     Other Topics Concern    None     Social History Narrative     History reviewed. No pertinent family history. OBJECTIVE:    Visit Vitals    /87 (BP 1 Location: Right leg, BP Patient Position: Sitting)    Pulse 68    Temp 97.9 °F (36.6 °C) (Oral)    Resp 18    Ht 5' 2\" (1.575 m)    Wt 136 lb (61.7 kg)    SpO2 95%    BMI 24.87 kg/m2     CONSTITUTIONAL: well , well nourished, appears age appropriate  EYES: perrla, eom intact  ENMT:moist mucous membranes, pharynx clear  NECK: supple. Thyroid normal  RESPIRATORY: Chest: clear to ascultation and percussion   CARDIOVASCULAR: Heart: regular rate and rhythm  GASTROINTESTINAL: Abdomen: soft, bowel sounds active  HEMATOLOGIC: no pathological lymph nodes palpated  MUSCULOSKELETAL: Extremities: no edema, pulse 1+   INTEGUMENT: No unusual rashes or suspicious skin lesions noted. Nails appear normal.  NEUROLOGIC: non-focal exam   MENTAL STATUS: alert and oriented, appropriate affect      ASSESSMENT:  1. Encounter for immunization    2. Lumbar radicular pain    3. Insomnia, unspecified type    4. Osteopenia, unspecified location    5. Essential hypertension    6. Dyslipidemia    7. Mild intermittent asthma without complication    8. Sexually transmitted disease exposure    9. Encounter for screening mammogram for high-risk patient        PLAN:    1.  The patient continues to have periodic pain in her buttock related to radiculopathy. She will continue p.r.n. use of analgesic, which she takes quite sparingly. Her last prescription for this was in May. 2. She does have insomnia and will take Benadryl for this. 3. For her osteopenia, gone density study will be obtained. 4. Blood pressure is excellent today. No adjustments are made. 5. She will continue statin as prescribed. Her last Apo B level was excellent. 6. She does have mild reactive airway disease episodically. She will use her Rescue inhaler primarily and it becomes sustained. A dual steroid/LABA MDI. Jamal Gilman Orders Placed This Encounter    CHRIS MAMMO BI SCREENING INCL CAD    Influenza virus vaccine (FLUZONE HIGH-DOSE) 65 years and older    HIV 1/2 AG/AB, 4TH GENERATION,W RFLX CONFIRM    HCV RNA MANOHAR QUALITATIVE    HYDROcodone-acetaminophen (NORCO) 5-325 mg per tablet    hydroquinone (ESOTERICA, MELQUIN) 4 % topical cream    albuterol (VENTOLIN HFA) 90 mcg/actuation inhaler         Follow-up Disposition:  Return in about 3 months (around 12/12/2017).       Bret Corral MD

## 2017-09-15 LAB
HCV RNA SERPL QL NAA+PROBE: NEGATIVE
HIV 1+2 AB+HIV1 P24 AG SERPL QL IA: NON REACTIVE

## 2017-10-02 ENCOUNTER — OFFICE VISIT (OUTPATIENT)
Dept: CARDIOLOGY CLINIC | Age: 67
End: 2017-10-02

## 2017-10-02 VITALS
RESPIRATION RATE: 16 BRPM | WEIGHT: 136 LBS | OXYGEN SATURATION: 99 % | BODY MASS INDEX: 25.03 KG/M2 | HEIGHT: 62 IN | SYSTOLIC BLOOD PRESSURE: 120 MMHG | DIASTOLIC BLOOD PRESSURE: 80 MMHG | HEART RATE: 73 BPM

## 2017-10-02 DIAGNOSIS — I48.92 ATRIAL FLUTTER, UNSPECIFIED TYPE (HCC): Primary | ICD-10-CM

## 2017-10-02 DIAGNOSIS — I49.5 TACHY-BRADY SYNDROME (HCC): ICD-10-CM

## 2017-10-02 NOTE — PROGRESS NOTES
HISTORY OF PRESENT ILLNESS  Steph Santacruz is a 77 y.o. female. .Patient with h/o PSVT and  Tachy iron followed in [place], admitted on 5/13 at [facility] with chest pain, at that time nuclear stress test showed no ischemia or MI and EF of 69%, patient remained in NSR during hospital stay. Here for follow up  Echo on 7/12 EF 55% mild TR and MR  Also h/o PAF as per her cardiologist in [place].   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  Stress echo on 7/15 : no ischemia or Mi and EF 60%  PAF during stress echo on 7/5/16 started on metoprolol at that time  Stress echo on 7/16 negative for ischemia and EF 60%  holter on 7/16:holter 7/28/2016 no AF, one  bpm and rare 2:1 av block, so no pacer yet             Past Medical History                     Past Medical History        Diagnosis      Date              Tachycardia                 Bradycardia                 Bipolar 2 disorder                 Hypercholesteremia                 Hypertension                 DVT (deep venous thrombosis)                 Tachycardia                 Asthma                 Psychiatric disorder      [de-identified]              Past Surgical History      Procedure    Laterality    Date          Hx back surgery                Hx hysterectomy                Ep study w/induction       3/30/2015                     Ablation of svt       3/30/2015                     Hx heart catheterization                Hx colonoscopy       9-          History                    Social History          Marital Status:                Spouse Name:    N/A            Number of Children:    1          Years of Education:    N/A                  Occupational History          retired--[employer]---disabled                     Social History Main Topics          Smoking status:    Never Smoker           Smokeless tobacco:    Never Used          Alcohol Use:    No          Drug Use:    No          Sexual Activity:    Not on file          HPI  She tells me that she felt af 2 months ago and maybe a bit anxious this am but overall doing well no cp or sob or fatigue or dizziness reported  Review of Systems   Respiratory: Negative. Cardiovascular: Negative. Visit Vitals    /80 (BP 1 Location: Left arm, BP Patient Position: Sitting)    Pulse 73    Resp 16    Ht 5' 2\" (1.575 m)    Wt 61.7 kg (136 lb)    SpO2 99%    BMI 24.87 kg/m2       Physical Exam   Neck: No JVD present. Carotid bruit is not present. Cardiovascular: Normal rate. An irregularly irregular rhythm present. Pulmonary/Chest: Effort normal and breath sounds normal.   Abdominal: Soft. Musculoskeletal: She exhibits no edema. Psychiatric: She has a normal mood and affect. Current Outpatient Prescriptions on File Prior to Visit   Medication Sig Dispense Refill    HYDROcodone-acetaminophen (NORCO) 5-325 mg per tablet Take 1 Tab by mouth every eight (8) hours as needed for Pain. Max Daily Amount: 3 Tabs. 40 Tab 0    hydroquinone (ESOTERICA, MELQUIN) 4 % topical cream Apply  to affected area two (2) times a day. 30 g 6    albuterol (VENTOLIN HFA) 90 mcg/actuation inhaler Take 2 Puffs by inhalation every four (4) hours as needed for Wheezing. 1 Inhaler 11    rosuvastatin (CRESTOR) 10 mg tablet Take 1 tablet every day 90 Tab 3    magnesium oxide (MAG-OX) 400 mg tablet Take 1 Tab by mouth daily. 90 Tab 3    metoprolol tartrate (LOPRESSOR) 25 mg tablet 1/2 tablet twice a day 45 Tab 3    potassium chloride (K-DUR, KLOR-CON) 20 mEq tablet Take 1 Tab by mouth daily. 90 Tab 3    lidocaine (LIDODERM) 5 % 1 Patch by TransDERmal route every twenty-four (24) hours. 3 Package 3    desonide (TRIDESILON) 0.05 % topical lotion Apply  to affected area two (2) times a day.  118 mL 11    hydroCHLOROthiazide (HYDRODIURIL) 12.5 mg tablet Take once daily 30 Tab 11    rivaroxaban (XARELTO) 20 mg tab tablet Take 1 Tab by mouth daily. 21 Tab 0    methocarbamol (ROBAXIN) 750 mg tablet Take 1 Tab by mouth three (3) times daily as needed. 270 Tab 3    ALPRAZolam (XANAX) 1 mg tablet Take 1 mg by mouth three (3) times daily.  QUEtiapine (SEROQUEL) 50 mg tablet Take 50 mg by mouth three (3) times daily.  FLUoxetine (PROZAC) 20 mg capsule Take 20 mg by mouth daily.  fluticasone-salmeterol (ADVAIR) 250-50 mcg/dose diskus inhaler Take 1 Puff by inhalation every twelve (12) hours. 1 Inhaler 11    guaiFENesin-codeine (ROBITUSSIN AC) 100-10 mg/5 mL solution Take 5 mL by mouth three (3) times daily as needed for Cough. Max Daily Amount: 15 mL. 140 mL 0     No current facility-administered medications on file prior to visit. 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 Continue observation for now,   PAF:  Now back in AF , a bit anxious but not really symptomatic , no palpitations or other cardiac symptoms reported. Her ECG shows atrial flutter with controlled rate  Already on [address] [place] [address] for DVT, this will need to be continued long term continue small dose of lopressor. Proceed with follow up echo and hold off holter for now. Given previous h/o excessive bradycardia I will refer her back to ep before start considering cardioversion and or additional medications  HTN: controlled  DVT on xarelto long term no untoward effects thus far  HLD: on statin , closely followed by her pcp  CP:  Not recurrent!   See her back after ep evaluation

## 2017-10-02 NOTE — MR AVS SNAPSHOT
Visit Information Date & Time Provider Department Dept. Phone Encounter #  
 10/2/2017  2:20 PM Alba Cook MD [facility] Cori Mathew [phone] [id number] Your Appointments 12/21/2017  3:30 PM  
Any with Mary Welch MD  
[address] [place] [address] and Primary Care [address]) Appt Note: 3 month f/u; 3 month f/u  
 [address] [facility]  
  
   
 [id number] [phone] Upcoming Health Maintenance Date Due  
 GLAUCOMA SCREENING Q2Y 12/24/2015 FOBT Q 1 YEAR AGE 50-75 9/3/2016 Pneumococcal 65+ Low/Medium Risk (2 of 2 - PPSV23) 5/16/2018 MEDICARE YEARLY EXAM 6/24/2018 BREAST CANCER SCRN MAMMOGRAM 10/10/2018 DTaP/Tdap/Td series (2 - Td) 10/19/2025 Allergies as of 10/2/2017  Review Complete On: 10/2/2017 By: Antonio Zavala Severity Noted Reaction Type Reactions Ampicillin  05/15/2013    Hives, Nausea and Vomiting Current Immunizations  Never Reviewed Name Date Influenza High Dose Vaccine PF 9/12/2017 Pneumococcal Polysaccharide (PPSV-23) 5/16/2013  6:39 PM  
 Tdap 10/19/2015  2:32 AM  
  
 Not reviewed this visit You Were Diagnosed With   
  
 Codes Comments Tachy-iron syndrome ([facility]    -  Primary ICD-10-CM: I49.5 ICD-9-CM: 427.81 Vitals BP Pulse Resp Height(growth percentile) Weight(growth percentile) SpO2  
 120/80 (BP 1 Location: Left arm, BP Patient Position: Sitting) 73 16 5' 2\" (1.575 m) 136 lb (61.7 kg) 99% BMI OB Status Smoking Status 24.87 kg/m2 Hysterectomy Never Smoker BMI and BSA Data Body Mass Index Body Surface Area  
 24.87 kg/m 2 1.64 m 2 Preferred Pharmacy Pharmacy Name Phone [facility] [address] - [address] [place] [address] [address] [place] [address] [phone] Your Updated Medication List  
  
   
This list is accurate as of: 10/2/17  4:49 PM.  Always use your most recent med list.  
  
  
  
  
 albuterol 90 mcg/actuation inhaler Commonly known as:  VENTOLIN HFA Take 2 Puffs by inhalation every four (4) hours as needed for Wheezing. ALPRAZolam 1 mg tablet Commonly known as:  Laurance Kaska Take 1 mg by mouth three (3) times daily. desonide 0.05 % topical lotion Commonly known as:  Katherine Carte Apply  to affected area two (2) times a day. FLUoxetine 20 mg capsule Commonly known as:  PROzac Take 20 mg by mouth daily. fluticasone-salmeterol 250-50 mcg/dose diskus inhaler Commonly known as:  ADVAIR Take 1 Puff by inhalation every twelve (12) hours. guaiFENesin-codeine 100-10 mg/5 mL solution Commonly known as:  ROBITUSSIN AC Take 5 mL by mouth three (3) times daily as needed for Cough. Max Daily Amount: 15 mL.  
  
 hydroCHLOROthiazide 12.5 mg tablet Commonly known as:  HYDRODIURIL Take once daily HYDROcodone-acetaminophen 5-325 mg per tablet Commonly known as:  Cobb Minor Take 1 Tab by mouth every eight (8) hours as needed for Pain. Max Daily Amount: 3 Tabs. hydroquinone 4 % topical cream  
Commonly known as:  Deveron Salines Apply  to affected area two (2) times a day. lidocaine 5 % Commonly known as:  LIDODERM  
1 Patch by TransDERmal route every twenty-four (24) hours. magnesium oxide 400 mg tablet Commonly known as:  MAG-OX Take 1 Tab by mouth daily. methocarbamol 750 mg tablet Commonly known as:  ROBAXIN Take 1 Tab by mouth three (3) times daily as needed. metoprolol tartrate 25 mg tablet Commonly known as:  LOPRESSOR  
1/2 tablet twice a day  
  
 potassium chloride 20 mEq tablet Commonly known as:  K-DUR, KLOR-CON Take 1 Tab by mouth daily. rivaroxaban 20 mg Tab tablet Commonly known as:  Marcine Veronica Take 1 Tab by mouth daily. rosuvastatin 10 mg tablet Commonly known as:  CRESTOR Take 1 tablet every day SEROquel 50 mg tablet Generic drug:  QUEtiapine Take 50 mg by mouth three (3) times daily. We Performed the Following AMB POC EKG ROUTINE W/ 12 LEADS, INTER & REP [83050 CPT(R)] To-Do List   
 10/11/2017 2:30 PM  
  Appointment with [facility] CHRIS 2 at [address] [place] [address] ([phone]) Shower or bathe using soap and water. Do not use deodorant, powder, perfumes, or lotion the day of your exam.  If your prior mammograms were not performed at [facility] 6 please bring films with you or forward prior images 2 days before your procedure. Check in at registration 15min before your appointment time unless you were instructed to do otherwise. A script is not necessary for screening. If you have one, please bring it on the day of the mammogram or have it faxed to the department. [facility]  [phone] [facility] [phone] [facility] [phone] [facility] [phone] Patient Instructions Echo now Refer to  for Atrial flutter Introducing [facility] HE[facility]! Amairani Gan introduces [email or web address] patient portal. Now you can access parts of your medical record, email your doctor's office, and request medication refills online. 1. In your internet browser, go to [email or web address] 2. Click on the First Time User? Click Here link in the Sign In box. You will see the New Member Sign Up page. 3. Enter your [email or web address] Access Code exactly as it appears below. You will not need to use this code after youve completed the sign-up process. If you do not sign up before the expiration date, you must request a new code. · [email or web address] Access Code: [email or web address] Expires: 12/31/2017  3:49 PM 
 
4. Enter the last four digits of your Social Security Number (xxxx) and Date of Birth (mm/dd/yyyy) as indicated and click Submit. You will be taken to the next sign-up page. 5. Create a [email or web address] ID.  This will be your [email or web address] login ID and cannot be changed, so think of one that is secure and easy to remember. 6. Create a [employer] password. You can change your password at any time. 7. Enter your Password Reset Question and Answer. This can be used at a later time if you forget your password. 8. Enter your e-mail address. You will receive e-mail notification when new information is available in [address] 9. Click Sign Up. You can now view and download portions of your medical record. 10. Click the Download Summary menu link to download a portable copy of your medical information. If you have questions, please visit the Frequently Asked Questions section of the [employer] website. Remember, [employer] is NOT to be used for urgent needs. For medical emergencies, dial 911. Now available from your iPhone and Android! Please provide this summary of care documentation to your next provider. Your primary care clinician is listed as Swapna Moncada. If you have any questions after today's visit, please call [phone].

## 2017-10-03 ENCOUNTER — OFFICE VISIT (OUTPATIENT)
Dept: CARDIOLOGY CLINIC | Age: 67
End: 2017-10-03

## 2017-10-03 VITALS
WEIGHT: 135.2 LBS | SYSTOLIC BLOOD PRESSURE: 130 MMHG | BODY MASS INDEX: 24.88 KG/M2 | HEIGHT: 62 IN | RESPIRATION RATE: 16 BRPM | DIASTOLIC BLOOD PRESSURE: 84 MMHG | HEART RATE: 84 BPM

## 2017-10-03 DIAGNOSIS — I48.92 ATRIAL FLUTTER, UNSPECIFIED TYPE (HCC): ICD-10-CM

## 2017-10-03 DIAGNOSIS — I10 ESSENTIAL HYPERTENSION WITH GOAL BLOOD PRESSURE LESS THAN 140/90: ICD-10-CM

## 2017-10-03 DIAGNOSIS — I48.0 PAF (PAROXYSMAL ATRIAL FIBRILLATION) (HCC): Primary | ICD-10-CM

## 2017-10-03 DIAGNOSIS — I49.5 TACHY-BRADY SYNDROME (HCC): ICD-10-CM

## 2017-10-03 DIAGNOSIS — F41.9 ANXIETY: ICD-10-CM

## 2017-10-03 NOTE — PROGRESS NOTES
Cardiac Electrophysiology Office Note     Subjective:      Senthil Ca is a 77 y.o. female patient who has atrial fibrillation when she saw Dr Bria Stevenson yesterday  She had palpitations since 1998 and she used to live in [facility]  She had seen Dr Bria Stevenson in 2013 and this is the first long 45 min of SVT so she came to ER  ECG showed short RP SVT  She was given adenosine and ECG showed sinus rhythm with second degree AV block, intermittent LBBB but she has recovered with inferolateral TWI but AV node conduction is normal when I saw her  She said she has IVC filter and 2 DVT before so she is on xarelto  She had h/o psvt tachy iron followed in [place]  Then she had EP study done and 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 She did well and last year 7/2016 when she did stress test she developed AFIB so this has made her really anxious. She wore a holter 7/28/2016 no AF, one  bpm and rare 2:1 av block, so no pacer yet per Dr Bria Stevenson. She is here today for follow up referred back today by Dr. Bria Stevenson for atrial fibrillation with controlled ventricular rate. She was aware that she was fluttering. She denies lightheadedness, dizziness, palpitations or SOB. This morning she felt irregularity with her pulse. She says she felt atrial fibrillation twice this year. She is on metoprolol 12.5mg BID. She is on xarelto for hx of DVT, recently she was approved for drug assistance from the company. Cardiographics  Echo 7/12 EF 55% mild TR and MR  nuc 5/13 no ischemia or MI and EF of 69%  holter 9/13 SR, few PAC, PVC  h/o ? PAF as per her cardiologist in [facility] 67 on 5/14: 0-9% b/l  TTE 1/15 LVEF 55 % to 60 %. No WMA. Mild MR, TR. Current Outpatient Prescriptions   Medication Sig Dispense Refill    hydroquinone (ESOTERICA, MELQUIN) 4 % topical cream Apply  to affected area two (2) times a day.  30 g 6    albuterol (VENTOLIN HFA) 90 mcg/actuation inhaler Take 2 Puffs by inhalation every four (4) hours as needed for Wheezing. 1 Inhaler 11    rosuvastatin (CRESTOR) 10 mg tablet Take 1 tablet every day 90 Tab 3    magnesium oxide (MAG-OX) 400 mg tablet Take 1 Tab by mouth daily. 90 Tab 3    metoprolol tartrate (LOPRESSOR) 25 mg tablet 1/2 tablet twice a day 45 Tab 3    potassium chloride (K-DUR, KLOR-CON) 20 mEq tablet Take 1 Tab by mouth daily. 90 Tab 3    fluticasone-salmeterol (ADVAIR) 250-50 mcg/dose diskus inhaler Take 1 Puff by inhalation every twelve (12) hours. 1 Inhaler 11    lidocaine (LIDODERM) 5 % 1 Patch by TransDERmal route every twenty-four (24) hours. 3 Package 3    desonide (TRIDESILON) 0.05 % topical lotion Apply  to affected area two (2) times a day. 118 mL 11    hydroCHLOROthiazide (HYDRODIURIL) 12.5 mg tablet Take once daily 30 Tab 11    rivaroxaban (XARELTO) 20 mg tab tablet Take 1 Tab by mouth daily. 21 Tab 0    methocarbamol (ROBAXIN) 750 mg tablet Take 1 Tab by mouth three (3) times daily as needed. 270 Tab 3    ALPRAZolam (XANAX) 1 mg tablet Take 1 mg by mouth three (3) times daily.  QUEtiapine (SEROQUEL) 50 mg tablet Take 50 mg by mouth three (3) times daily.  FLUoxetine (PROZAC) 20 mg capsule Take 20 mg by mouth daily. Allergies   Allergen Reactions    Ampicillin Hives and Nausea and Vomiting     Past Medical History:   Diagnosis Date    Asthma     Bipolar 2 disorder ([facility]     CAD (coronary artery disease)     ablation    DVT (deep venous thrombosis) ([facility])     Hypercholesteremia     Hypertension     Tachycardia      Past Surgical History:   Procedure Laterality Date    ABLATION OF SVT  3/30/2015         EP STUDY W/INDUCTION  3/30/2015         HX BACK SURGERY      HX COLONOSCOPY  9-    HX HEART CATHETERIZATION      HX HYSTERECTOMY       No SVT in her family history.   Social History   Substance Use Topics    Smoking status: Never Smoker    Smokeless tobacco: Never Used    Alcohol use No      Comment: occasional        Review of Systems:   Constitutional: Negative for fever, chills, weight loss  HEENT: Negative for nosebleeds, vision changes. Respiratory: Negative for cough, hemoptysis, sputum production, and wheezing. Cardiovascular: Negative for chest pain, + palpitations, no orthopnea, claudication, leg swelling, syncope, and PND. Gastrointestinal: Negative for nausea, vomiting, diarrhea, constipation, blood in stool and melena. Genitourinary: Negative for dysuria, and hematuria. Musculoskeletal: Negative for myalgias, arthralgia. Skin: Negative for rash. Heme: Does not bleed or bruise easily. Neurological: Negative for speech change and focal weakness     Objective:     Visit Vitals    /84 (BP 1 Location: Left arm, BP Patient Position: Sitting)    Pulse 84    Resp 16    Ht 5' 2\" (1.575 m)    Wt 135 lb 3.2 oz (61.3 kg)    BMI 24.73 kg/m2      Physical Exam:   Constitutional: Well-nourished. No distress. Head: Normocephalic and atraumatic. Eyes: Pupils are equal, round  Neck: supple. No JVD present. Cardiovascular: Normal rate, irregular rhythm and normal heart sounds. Exam reveals no gallop and no friction rub. Pulmonary/Chest: Effort normal and breath sounds normal. No wheezes. Abdominal:  obesity  Musculoskeletal: no edema. Neurological: alert,oriented. Skin: Skin is warm and dry  Psychiatric: normal mood and affect. Behavior is normal. Judgment and thought content normal.      ECG yesterday atrial fibrillation/flutter with ventricular rate controlled    Assessment/Plan:       ICD-10-CM ICD-9-CM    1. PAF (paroxysmal atrial fibrillation) ([facility]) I48.0 427.31    2. Atrial flutter, unspecified type ([facility] I48.92 427.32    3. Anxiety F41.9 300.00    4. Essential hypertension with goal blood pressure less than 140/90 I10 401.9    5. 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  Do not recommend DCC/AAD d/t hx of bradycardia (AV block) and then she would need pacemaker. She wants to come back on Thursday to have an ECG done to see if she is still in atrial fibrillation. If she is in atrial fibrillation she wants to proceed with atrial fibrillation ablation. She is tolerating xarelto without bleeding side effects. BP controlled. Risks include but are not limited to bleeding in the groin, infection in the groin and/or heart valves, fistula between the groin arteries and veins, valvular damage, diaphragmatic paralysis, aortic dissection, heart attack, stroke, blood clot in the leg, pulmonary embolism, lung collapse (pneumothorax or hemothorax), heart collapse (pericardial tamponade), death. The added risks for left atrial ablation may be atrial-esophageal fistula, pulmonary vein stenoses, kidney failure (from contrast injection), higher risk of bleeding, stroke and heart attack. Elective or emergency surgery may be required to repair some of these complications. Prolonged hospitalization would be required. General anesthesia and transeptal catheterization may be required for the procedure    Follow-up Disposition:  Return in about 4 weeks (around 10/31/2017). as she asked    Thank you for involving me in this patient's care and please call with further concerns or questions. Mirna Griffin M.D.   Electrophysiology/Cardiology  [facility] [place]  [address] [place] [address]  [address] [facility], [address] [place] [address]  [phone]

## 2017-10-03 NOTE — PROGRESS NOTES
Chief Complaint   Patient presents with    Irregular Heart Beat     f/u per Dr. Clark Nurse     1. Have you been to the ER, urgent care clinic since your last visit? Hospitalized since your last visit? NO    2. Have you seen or consulted any other health care providers outside of the [address] [place] [address] since your last visit? Include any pap smears or colon screening.  NO

## 2017-10-03 NOTE — PROGRESS NOTES
Cardiac Electrophysiology Office Note     Subjective:      Joseline uY is a 77 y.o. 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 She did well and last year 7/2016 when she did stress test she developed AFIB so this has made her really anxious. She wore a holter 7/28/2016 no AF, one  bpm and rare 2:1 av block, so no pacer yet per Dr Danna Abraham. She is here today for follow up referred back today by Dr. Danna Abraham for atrial fibrillation with controlled ventricular rate. She was unaware that she was in atrial flutter. She denies lightheadedness, dizziness, palpitations or SOB. This morning she felt irregularity with her pulse. She says she felt atrial fibrillation twice this year. She is on metoprolol 12.5mg BID. She is on xarelto for hx of DVT, recently she was approved for drug assistance from the company. Cardiographics  Echo 7/12 EF 55% mild TR and MR  nuc 5/13 no ischemia or MI and EF of 69%  holter 9/13 SR, few PAC, PVC  h/o ? PAF as per her cardiologist in [facility] on 5/14: 0-9% b/l  TTE 1/15 LVEF 55 % to 60 %. No WMA. Mild MR, TR. Current Outpatient Prescriptions   Medication Sig Dispense Refill    hydroquinone (ESOTERICA, MELQUIN) 4 % topical cream Apply  to affected area two (2) times a day.  30 g 6    albuterol (VENTOLIN HFA) 90 mcg/actuation inhaler Take 2 Puffs by inhalation every four (4) hours as needed for Wheezing. 1 Inhaler 11    rosuvastatin (CRESTOR) 10 mg tablet Take 1 tablet every day 90 Tab 3    magnesium oxide (MAG-OX) 400 mg tablet Take 1 Tab by mouth daily. 90 Tab 3    metoprolol tartrate (LOPRESSOR) 25 mg tablet 1/2 tablet twice a day 45 Tab 3    potassium chloride (K-DUR, KLOR-CON) 20 mEq tablet Take 1 Tab by mouth daily. 90 Tab 3    fluticasone-salmeterol (ADVAIR) 250-50 mcg/dose diskus inhaler Take 1 Puff by inhalation every twelve (12) hours. 1 Inhaler 11    lidocaine (LIDODERM) 5 % 1 Patch by TransDERmal route every twenty-four (24) hours. 3 Package 3    desonide (TRIDESILON) 0.05 % topical lotion Apply  to affected area two (2) times a day. 118 mL 11    hydroCHLOROthiazide (HYDRODIURIL) 12.5 mg tablet Take once daily 30 Tab 11    rivaroxaban (XARELTO) 20 mg tab tablet Take 1 Tab by mouth daily. 21 Tab 0    methocarbamol (ROBAXIN) 750 mg tablet Take 1 Tab by mouth three (3) times daily as needed. 270 Tab 3    ALPRAZolam (XANAX) 1 mg tablet Take 1 mg by mouth three (3) times daily.  QUEtiapine (SEROQUEL) 50 mg tablet Take 50 mg by mouth three (3) times daily.  FLUoxetine (PROZAC) 20 mg capsule Take 20 mg by mouth daily. Allergies   Allergen Reactions    Ampicillin Hives and Nausea and Vomiting     Past Medical History:   Diagnosis Date    Asthma     Bipolar 2 disorder ([facility]     CAD (coronary artery disease)     ablation    DVT (deep venous thrombosis) (HCC)     Hypercholesteremia     Hypertension     Tachycardia      Past Surgical History:   Procedure Laterality Date    ABLATION OF SVT  3/30/2015         EP STUDY W/INDUCTION  3/30/2015         HX BACK SURGERY      HX COLONOSCOPY  9-    HX HEART CATHETERIZATION      HX HYSTERECTOMY       No SVT in her family history.   Social History   Substance Use Topics    Smoking status: Never Smoker    Smokeless tobacco: Never Used    Alcohol use No      Comment: occasional        Review of Systems:   Constitutional: Negative for fever, chills, weight loss  HEENT: Negative for nosebleeds, vision changes. Respiratory: Negative for cough, hemoptysis, sputum production, and wheezing. Cardiovascular: Negative for chest pain, + palpitations, no orthopnea, claudication, leg swelling, syncope, and PND. Gastrointestinal: Negative for nausea, vomiting, diarrhea, constipation, blood in stool and melena. Genitourinary: Negative for dysuria, and hematuria. Musculoskeletal: Negative for myalgias, arthralgia. Skin: Negative for rash. Heme: Does not bleed or bruise easily. Neurological: Negative for speech change and focal weakness     Objective:     Visit Vitals    /84 (BP 1 Location: Left arm, BP Patient Position: Sitting)    Pulse 84    Resp 16    Ht 5' 2\" (1.575 m)    Wt 135 lb 3.2 oz (61.3 kg)    BMI 24.73 kg/m2      Physical Exam:   Constitutional: Well-nourished. No distress. Head: Normocephalic and atraumatic. Eyes: Pupils are equal, round  Neck: supple. No JVD present. Cardiovascular: Normal rate, irregular rhythm and normal heart sounds. Exam reveals no gallop and no friction rub. Pulmonary/Chest: Effort normal and breath sounds normal. No wheezes. Abdominal:  obesity  Musculoskeletal: no edema. Neurological: alert,oriented. Skin: Skin is warm and dry  Psychiatric: normal mood and affect. Behavior is normal. Judgment and thought content normal.        Assessment/Plan:       ICD-10-CM ICD-9-CM    1. PAF (paroxysmal atrial fibrillation) ([facility]) I48.0 427.31    2. Atrial flutter, unspecified type ([facility] I48.92 427.32    3. Anxiety F41.9 300.00    4. Essential hypertension with goal blood pressure less than 140/90 I10 401.9    5. 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 Do not recommend DCC/AAD d/t hx of bradycardia. She wants to come back on Thursday to have an ECG done to see if she is still in atrial fibrillation.  If she is in atrial fibrillation she wants to proceed with atrial fibrillation ablation. She is tolerating xarelto without bleeding side effects. BP controlled. Follow-up Disposition:  Return in about 6 months (around 4/3/2018). as she asked    Thank you for involving me in this patient's care and please call with further concerns or questions. Rosita Diop M.D.   Electrophysiology/Cardiology  [facility] [place]  [address] [place] [address]  [phone]

## 2017-10-03 NOTE — MR AVS SNAPSHOT
Visit Information Date & Time Provider Department Dept. Phone Encounter #  
 10/3/2017  1:20 PM Albert Grimes MD [facility] Elizabeth Zamarripa [phone] [id number] Follow-up Instructions Return in about 6 months (around 4/3/2018). Your Appointments 10/5/2017  3:00 PM  
ECHO CARDIOGRAMS 2D with Maya Junior [facility] ([facility] SCHEDULING) Appt Note: af per dr Carmel Aguayo 200 [facility] [address]  
[address] [facility] [address]  
  
    
 12/21/2017  3:30 PM  
Any with Lizbet Merchant MD  
[address] [facility]) Appt Note: 3 month f/u; 3 month f/u  
 [address] [facility]  
  
   
 [address] [id number] Upcoming Health Maintenance Date Due  
 GLAUCOMA SCREENING Q2Y 12/24/2015 FOBT Q 1 YEAR AGE 50-75 9/3/2016 Pneumococcal 65+ Low/Medium Risk (2 of 2 - PPSV23) 5/16/2018 MEDICARE YEARLY EXAM 6/24/2018 BREAST CANCER SCRN MAMMOGRAM 10/10/2018 DTaP/Tdap/Td series (2 - Td) 10/19/2025 Allergies as of 10/3/2017  Review Complete On: 10/3/2017 By: Albert Grimes MD  
  
 Severity Noted Reaction Type Reactions Ampicillin  05/15/2013    Hives, Nausea and Vomiting Current Immunizations  Never Reviewed Name Date Influenza High Dose Vaccine PF 9/12/2017 Pneumococcal Polysaccharide (PPSV-23) 5/16/2013  6:39 PM  
 Tdap 10/19/2015  2:32 AM  
  
 Not reviewed this visit You Were Diagnosed With   
  
 Codes Comments PAF (paroxysmal atrial fibrillation) ([facility]    -  Primary ICD-10-CM: I48.0 ICD-9-CM: 427.31 Atrial flutter, unspecified type ([facility]     ICD-10-CM: I48.92 
ICD-9-CM: 427.32 Anxiety     ICD-10-CM: F41.9 ICD-9-CM: 300.00 Essential hypertension with goal blood pressure less than 140/90     ICD-10-CM: I10 
ICD-9-CM: 401.9 Tachy-iron syndrome ([facility]     ICD-10-CM: I49.5 ICD-9-CM: 427.81 Vitals BP Pulse Resp Height(growth percentile) Weight(growth percentile) BMI  
 130/84 (BP 1 Location: Left arm, BP Patient Position: Sitting) 84 16 5' 2\" (1.575 m) 135 lb 3.2 oz (61.3 kg) 24.73 kg/m2 OB Status Smoking Status Hysterectomy Never Smoker Vitals History BMI and BSA Data Body Mass Index Body Surface Area 24.73 kg/m 2 1.64 m 2 Preferred Pharmacy Pharmacy Name Phone [facility] [address] [place] [address] [address] [phone] Your Updated Medication List  
  
   
This list is accurate as of: 10/3/17  2:14 PM.  Always use your most recent med list.  
  
  
  
  
 albuterol 90 mcg/actuation inhaler Commonly known as:  VENTOLIN HFA Take 2 Puffs by inhalation every four (4) hours as needed for Wheezing. ALPRAZolam 1 mg tablet Commonly known as:  Edenton Fine Take 1 mg by mouth three (3) times daily. desonide 0.05 % topical lotion Commonly known as:  Lady Jose Juan Apply  to affected area two (2) times a day. FLUoxetine 20 mg capsule Commonly known as:  PROzac Take 20 mg by mouth daily. fluticasone-salmeterol 250-50 mcg/dose diskus inhaler Commonly known as:  ADVAIR Take 1 Puff by inhalation every twelve (12) hours. hydroCHLOROthiazide 12.5 mg tablet Commonly known as:  HYDRODIURIL Take once daily  
  
 hydroquinone 4 % topical cream  
Commonly known as:  Katia Edelson Apply  to affected area two (2) times a day. lidocaine 5 % Commonly known as:  LIDODERM  
1 Patch by TransDERmal route every twenty-four (24) hours. magnesium oxide 400 mg tablet Commonly known as:  MAG-OX Take 1 Tab by mouth daily. methocarbamol 750 mg tablet Commonly known as:  ROBAXIN Take 1 Tab by mouth three (3) times daily as needed. metoprolol tartrate 25 mg tablet Commonly known as:  LOPRESSOR  
 1/2 tablet twice a day  
  
 potassium chloride 20 mEq tablet Commonly known as:  K-DUR, KLOR-CON Take 1 Tab by mouth daily. rivaroxaban 20 mg Tab tablet Commonly known as:  Arvlyndsey Corley Take 1 Tab by mouth daily. rosuvastatin 10 mg tablet Commonly known as:  CRESTOR Take 1 tablet every day SEROquel 50 mg tablet Generic drug:  QUEtiapine Take 50 mg by mouth three (3) times daily. Follow-up Instructions Return in about 6 months (around 4/3/2018). To-Do List   
 10/11/2017 2:30 PM  
  Appointment with [facility] CHRIS 2 at [address] [place] [address] ([phone]) Shower or bathe using soap and water. Do not use deodorant, powder, perfumes, or lotion the day of your exam.  If your prior mammograms were not performed at [facility] 6 please bring films with you or forward prior images 2 days before your procedure. Check in at registration 15min before your appointment time unless you were instructed to do otherwise. A script is not necessary for screening. If you have one, please bring it on the day of the mammogram or have it faxed to the department. [facility]  [phone] [facility] [phone] [facility] [phone] [facility] [phone] Patient Instructions Please come in 15 minutes before your Echo on Thursday 10/5/17 so we can do and EKG Introducing \[facility]\"" & HEALTH SERVICES! Amy Ca introduces CloudApps patient portal. Now you can access parts of your medical record, email your doctor's office, and request medication refills online. 1. In your internet browser, go to [email or web address] 2. Click on the First Time User? Click Here link in the Sign In box. You will see the New Member Sign Up page. 3. Enter your CloudApps Access Code exactly as it appears below. You will not need to use this code after youve completed the sign-up process. If you do not sign up before the expiration date, you must request a new code. · Indie Vinos Access Code: [email or web address] Expires: 12/31/2017  3:49 PM 
 
4. Enter the last four digits of your Social Security Number (xxxx) and Date of Birth (mm/dd/yyyy) as indicated and click Submit. You will be taken to the next sign-up page. 5. Create a Indie Vinos ID. This will be your Indie Vinos login ID and cannot be changed, so think of one that is secure and easy to remember. 6. Create a Indie Vinos password. You can change your password at any time. 7. Enter your Password Reset Question and Answer. This can be used at a later time if you forget your password. 8. Enter your e-mail address. You will receive e-mail notification when new information is available in [address] 9. Click Sign Up. You can now view and download portions of your medical record. 10. Click the Download Summary menu link to download a portable copy of your medical information. If you have questions, please visit the Frequently Asked Questions section of the Indie Vinos website. Remember, Indie Vinos is NOT to be used for urgent needs. For medical emergencies, dial 911. Now available from your iPhone and Android! Please provide this summary of care documentation to your next provider. Your primary care clinician is listed as Swapna Moncada. If you have any questions after today's visit, please call [phone].

## 2017-10-05 ENCOUNTER — CLINICAL SUPPORT (OUTPATIENT)
Dept: CARDIOLOGY CLINIC | Age: 67
End: 2017-10-05

## 2017-10-05 DIAGNOSIS — I48.92 ATRIAL FLUTTER, UNSPECIFIED TYPE (HCC): ICD-10-CM

## 2017-10-05 DIAGNOSIS — I48.0 PAROXYSMAL ATRIAL FIBRILLATION (HCC): Primary | ICD-10-CM

## 2017-10-05 NOTE — PROGRESS NOTES
ECG show atrial fibrillation ventricular rate 63 bpm  She denies palpitations, irregular heart beat, SOB, chest pain or LE edema. She is xarelto for [facility]. She will call back if she decides to schedule the ablation. She will follow up with Dr Joana Severino in November.

## 2017-10-05 NOTE — PROGRESS NOTES
I spoke to her in person  She has gotten booklet on ablation and has to decide and call back if she wants ablation

## 2017-10-06 ENCOUNTER — DOCUMENTATION ONLY (OUTPATIENT)
Dept: CARDIOLOGY CLINIC | Age: 67
End: 2017-10-06

## 2017-10-09 ENCOUNTER — OFFICE VISIT (OUTPATIENT)
Dept: INTERNAL MEDICINE CLINIC | Age: 67
End: 2017-10-09

## 2017-10-09 VITALS
WEIGHT: 135.1 LBS | DIASTOLIC BLOOD PRESSURE: 83 MMHG | TEMPERATURE: 97.6 F | OXYGEN SATURATION: 97 % | BODY MASS INDEX: 24.86 KG/M2 | RESPIRATION RATE: 14 BRPM | SYSTOLIC BLOOD PRESSURE: 125 MMHG | HEIGHT: 62 IN | HEART RATE: 71 BPM

## 2017-10-09 DIAGNOSIS — J20.9 ACUTE BRONCHITIS, UNSPECIFIED ORGANISM: ICD-10-CM

## 2017-10-09 DIAGNOSIS — J06.9 UPPER RESPIRATORY TRACT INFECTION, UNSPECIFIED TYPE: Primary | ICD-10-CM

## 2017-10-09 DIAGNOSIS — I48.0 PAROXYSMAL ATRIAL FIBRILLATION (HCC): ICD-10-CM

## 2017-10-09 NOTE — PROGRESS NOTES
Chief Complaint   Patient presents with    Cold     patient complains of cough with \"green phlem\" and cold symptoms, and tooth is bothering her. patient also states that she went to see her cardiologist a few days last week and was informed that she is in \"A-fib\". 1. Have you been to the ER, urgent care clinic since your last visit? Hospitalized since your last visit? No    2. Have you seen or consulted any other health care providers outside of the [address] [place] [address] since your last visit? Include any pap smears or colon screening.  No

## 2017-10-09 NOTE — MR AVS SNAPSHOT
Visit Information Date & Time Provider Department Dept. Phone Encounter #  
 10/9/2017 11:45 AM Lexi Mcdaniel, [address] [facility] 21  Your Appointments 11/7/2017 11:40 AM  
ESTABLISHED PATIENT with Thor Barajas MD  
[facility] ([address]) Appt Note: appt konstantin'd by pt possible ablation [address]  [address] [place] [address]  
[address] [address]  
  
    
 12/21/2017  3:30 PM  
Any with Lexi Mcdaniel MD  
[address] [facility] [address]) Appt Note: 3 month f/u; 3 month f/u  
 [address] [facility]  
  
   
 [address] [phone] Upcoming Health Maintenance Date Due  
 GLAUCOMA SCREENING Q2Y 12/24/2015 FOBT Q 1 YEAR AGE 50-75 9/3/2016 Pneumococcal 65+ Low/Medium Risk (2 of 2 - PPSV23) 5/16/2018 MEDICARE YEARLY EXAM 6/24/2018 BREAST CANCER SCRN MAMMOGRAM 10/10/2018 DTaP/Tdap/Td series (2 - Td) 10/19/2025 Allergies as of 10/9/2017  Review Complete On: 10/9/2017 By: Moon Mahan Severity Noted Reaction Type Reactions Ampicillin  05/15/2013    Hives, Nausea and Vomiting Current Immunizations  Never Reviewed Name Date Influenza High Dose Vaccine PF 9/12/2017 Pneumococcal Polysaccharide (PPSV-23) 5/16/2013  6:39 PM  
 Tdap 10/19/2015  2:32 AM  
  
 Not reviewed this visit Vitals BP Pulse Temp Resp Height(growth percentile) Weight(growth percentile) 125/83 (BP 1 Location: Left arm, BP Patient Position: Sitting) 71 97.6 °F (36.4 °C) (Oral) 14 5' 2\" (1.575 m) 135 lb 1.6 oz (61.3 kg) SpO2 BMI OB Status Smoking Status 97% 24.71 kg/m2 Hysterectomy Never Smoker BMI and BSA Data Body Mass Index Body Surface Area 24.71 kg/m 2 1.64 m 2 Preferred Pharmacy Pharmacy Name Phone [facility], [place] - [address] [phone] Your Updated Medication List  
  
   
This list is accurate as of: 10/9/17  2:13 PM.  Always use your most recent med list.  
  
  
  
  
 albuterol 90 mcg/actuation inhaler Commonly known as:  VENTOLIN HFA Take 2 Puffs by inhalation every four (4) hours as needed for Wheezing. ALPRAZolam 1 mg tablet Commonly known as:  Lita Devi Take 1 mg by mouth three (3) times daily. desonide 0.05 % topical lotion Commonly known as:  Jany Pimple Apply  to affected area two (2) times a day. FLUoxetine 20 mg capsule Commonly known as:  PROzac Take 20 mg by mouth daily. fluticasone-salmeterol 250-50 mcg/dose diskus inhaler Commonly known as:  ADVAIR Take 1 Puff by inhalation every twelve (12) hours. hydroCHLOROthiazide 12.5 mg tablet Commonly known as:  HYDRODIURIL Take once daily  
  
 hydroquinone 4 % topical cream  
Commonly known as:  Red Petite Apply  to affected area two (2) times a day. lidocaine 5 % Commonly known as:  LIDODERM  
1 Patch by TransDERmal route every twenty-four (24) hours. magnesium oxide 400 mg tablet Commonly known as:  MAG-OX Take 1 Tab by mouth daily. methocarbamol 750 mg tablet Commonly known as:  ROBAXIN Take 1 Tab by mouth three (3) times daily as needed. metoprolol tartrate 25 mg tablet Commonly known as:  LOPRESSOR  
1/2 tablet twice a day  
  
 potassium chloride 20 mEq tablet Commonly known as:  K-DUR, KLOR-CON Take 1 Tab by mouth daily. rivaroxaban 20 mg Tab tablet Commonly known as:  Naveed Monday Take 1 Tab by mouth daily. rosuvastatin 10 mg tablet Commonly known as:  CRESTOR Take 1 tablet every day SEROquel 50 mg tablet Generic drug:  QUEtiapine Take 50 mg by mouth three (3) times daily.   
  
  
  
  
To-Do List   
 10/11/2017 2:30 PM  
 Appointment with [facility] CHRIS 2 at [address] [place] [address] ([phone]) Shower or bathe using soap and water. Do not use deodorant, powder, perfumes, or lotion the day of your exam.  If your prior mammograms were not performed at [facility] 6 please bring films with you or forward prior images 2 days before your procedure. Check in at registration 15min before your appointment time unless you were instructed to do otherwise. A script is not necessary for screening. If you have one, please bring it on the day of the mammogram or have it faxed to the department. [facility]  [phone] [facility] [phone] [facility] [place] [phone] [facility] [phone] Introducing [facility] & [facility] SERVICES! Naman Escamilla introduces [email or web address] patient portal. Now you can access parts of your medical record, email your doctor's office, and request medication refills online. 1. In your internet browser, go to [email or web address] 2. Click on the First Time User? Click Here link in the Sign In box. You will see the New Member Sign Up page. 3. Enter your [email or web address] Access Code exactly as it appears below. You will not need to use this code after youve completed the sign-up process. If you do not sign up before the expiration date, you must request a new code. · [email or web address] Access Code: [email or web address] Expires: 12/31/2017  3:49 PM 
 
4. Enter the last four digits of your Social Security Number (xxxx) and Date of Birth (mm/dd/yyyy) as indicated and click Submit. You will be taken to the next sign-up page. 5. Create a R-Evolution Industriest ID. This will be your [email or web address] login ID and cannot be changed, so think of one that is secure and easy to remember. 6. Create a [employer] password. You can change your password at any time. 7. Enter your Password Reset Question and Answer. This can be used at a later time if you forget your password. 8. Enter your e-mail address.  You will receive e-mail notification when new information is available in Chiasma. 9. Click Sign Up. You can now view and download portions of your medical record. 10. Click the Download Summary menu link to download a portable copy of your medical information. If you have questions, please visit the Frequently Asked Questions section of the Chiasma website. Remember, Chiasma is NOT to be used for urgent needs. For medical emergencies, dial 911. Now available from your iPhone and Android! Please provide this summary of care documentation to your next provider. Your primary care clinician is listed as Swapna Moncada. If you have any questions after today's visit, please call [phone].

## 2017-10-15 NOTE — PROGRESS NOTES
Chief Complaint   Patient presents with    Cold     patient complains of cough with \"green phlem\" and cold symptoms, and tooth is bothering her. patient also states that she went to see her cardiologist a few days last week and was informed that she is in \"A-fib\". .      SUBECTIVE:    Dora Reyna is a 77 y.o. female Comes in for return visit complaining of upper respiratory symptoms the last three or four days. Nasal congestion has been better, but she is now left with a hacky cough productive of greenish sputum. She has not had any wheezing. She denies any shortness of breath also. She is in atrial fibrillation and her cardiologist is quite aware of this. They are not anxious to do anything just yet. Her ventricular rate is well controlled. Current Outpatient Prescriptions   Medication Sig Dispense Refill    hydroquinone (ESOTERICA, MELQUIN) 4 % topical cream Apply  to affected area two (2) times a day. 30 g 6    albuterol (VENTOLIN HFA) 90 mcg/actuation inhaler Take 2 Puffs by inhalation every four (4) hours as needed for Wheezing. 1 Inhaler 11    rosuvastatin (CRESTOR) 10 mg tablet Take 1 tablet every day 90 Tab 3    magnesium oxide (MAG-OX) 400 mg tablet Take 1 Tab by mouth daily. 90 Tab 3    metoprolol tartrate (LOPRESSOR) 25 mg tablet 1/2 tablet twice a day 45 Tab 3    potassium chloride (K-DUR, KLOR-CON) 20 mEq tablet Take 1 Tab by mouth daily. 90 Tab 3    fluticasone-salmeterol (ADVAIR) 250-50 mcg/dose diskus inhaler Take 1 Puff by inhalation every twelve (12) hours. 1 Inhaler 11    lidocaine (LIDODERM) 5 % 1 Patch by TransDERmal route every twenty-four (24) hours. 3 Package 3    desonide (TRIDESILON) 0.05 % topical lotion Apply  to affected area two (2) times a day. 118 mL 11    hydroCHLOROthiazide (HYDRODIURIL) 12.5 mg tablet Take once daily 30 Tab 11    rivaroxaban (XARELTO) 20 mg tab tablet Take 1 Tab by mouth daily.  21 Tab 0    methocarbamol (ROBAXIN) 750 mg tablet Take 1 Tab by mouth three (3) times daily as needed. 270 Tab 3    ALPRAZolam (XANAX) 1 mg tablet Take 1 mg by mouth three (3) times daily.  QUEtiapine (SEROQUEL) 50 mg tablet Take 50 mg by mouth three (3) times daily.  FLUoxetine (PROZAC) 20 mg capsule Take 20 mg by mouth daily. Past Medical History:   Diagnosis Date    Asthma     Bipolar 2 disorder ([facility]     CAD (coronary artery disease)     ablation    D[facility] (deep venous thrombosis) ([facility])     Hypercholesteremia     Hypertension     Tachycardia      Past Surgical History:   Procedure Laterality Date    ABLATION OF SVT  3/30/2015         EP STUDY W/INDUCTION  3/30/2015         HX BACK SURGERY      HX COLONOSCOPY  9-    HX HEART CATHETERIZATION      HX HYSTERECTOMY       Allergies   Allergen Reactions    Ampicillin Hives and Nausea and Vomiting       REVIEW OF SYSTEMS:  Review of Systems - Negative except   ENT ROS: negative for - headaches, hearing change, nasal congestion, oral lesions, tinnitus, visual changes or   Respiratory ROS: no cough, shortness of breath, or wheezing  Cardiovascular ROS: no chest pain or dyspnea on exertion  Gastrointestinal ROS: no abdominal pain, change in bowel habits, or black or blood  Genito-Urinary ROS: no dysuria, trouble voiding, or hematuria  Musculoskeletal ROS: negative  Neurological ROS: no TIA or stroke symptoms      Social History     Social History    Marital status:      Spouse name: N/A    Number of children: 1    Years of education: N/A     Occupational History    retired--[employer]---disabled      Social History Main Topics    Smoking status: Never Smoker    Smokeless tobacco: Never Used    Alcohol use No      Comment: occasional    Drug use: No    Sexual activity: Yes     Partners: Male     Other Topics Concern    None     Social History Narrative   r  History reviewed. No pertinent family history.     OBJECTIVE:  Visit Vitals    /83 (BP 1 Location: Left arm, BP Patient Position: Sitting)    Pulse 71    Temp 97.6 °F (36.4 °C) (Oral)    Resp 14    Ht 5' 2\" (1.575 m)    Wt 135 lb 1.6 oz (61.3 kg)    SpO2 97%    BMI 24.71 kg/m2     ENT: perrla,  eom intact  NECK: supple. Thyroid normal, no JVD  CHEST: clear to ascultation and percussion   HEART: irregular rate and rhythm  ABD: soft, bowel sounds active,   EXTREMITIES: no edema, pulse 1+  INTEGUMENT: clear      ASSESSMENT:  1. Upper respiratory tract infection, unspecified type    2. Acute bronchitis, unspecified organism    3. Paroxysmal atrial fibrillation (HCC)        PLAN:    1. The patient has upper respiratory infection complicated by bronchitis. There does not appear to be active airway component at this point. Antibiotics only. 2. She will follow with the cardiologist regarding the atrial fib. She is fully anticoagulated so this is not a problem. Fortunately her ventricular rate is well controlled also. 3. She will continue all of her other medications. Follow-up Disposition:  Return keep old apt.       Lizbet Merchant MD

## 2017-10-19 ENCOUNTER — TELEPHONE (OUTPATIENT)
Dept: INTERNAL MEDICINE CLINIC | Age: 67
End: 2017-10-19

## 2017-10-19 NOTE — TELEPHONE ENCOUNTER
Patient called stating that she is still having green mucous. Per Dr. Piyush Venegas patient given Levoquin 500mg 1 tab daily x 7 days.

## 2017-10-20 DIAGNOSIS — I48.20 CHRONIC ATRIAL FIBRILLATION (HCC): ICD-10-CM

## 2017-10-20 RX ORDER — LANOLIN ALCOHOL/MO/W.PET/CERES
400 CREAM (GRAM) TOPICAL DAILY
Qty: 90 TAB | Refills: 3 | Status: SHIPPED | OUTPATIENT
Start: 2017-10-20 | End: 2018-01-04 | Stop reason: SDUPTHER

## 2017-10-20 RX ORDER — METOPROLOL TARTRATE 25 MG/1
TABLET, FILM COATED ORAL
Qty: 90 TAB | Refills: 3 | Status: SHIPPED | OUTPATIENT
Start: 2017-10-20 | End: 2018-01-04 | Stop reason: SDUPTHER

## 2017-10-24 ENCOUNTER — OFFICE VISIT (OUTPATIENT)
Dept: INTERNAL MEDICINE CLINIC | Age: 67
End: 2017-10-24

## 2017-10-24 VITALS
BODY MASS INDEX: 24.68 KG/M2 | TEMPERATURE: 97.5 F | RESPIRATION RATE: 16 BRPM | SYSTOLIC BLOOD PRESSURE: 141 MMHG | DIASTOLIC BLOOD PRESSURE: 81 MMHG | OXYGEN SATURATION: 99 % | HEART RATE: 69 BPM | HEIGHT: 62 IN | WEIGHT: 134.1 LBS

## 2017-10-24 DIAGNOSIS — I48.0 PAROXYSMAL ATRIAL FIBRILLATION (HCC): ICD-10-CM

## 2017-10-24 DIAGNOSIS — M65.9 SYNOVITIS OF KNEE: Primary | ICD-10-CM

## 2017-10-24 NOTE — MR AVS SNAPSHOT
Visit Information Date & Time Provider Department Dept. Phone Encounter #  
 10/24/2017 11:45 AM Liz Mchugh, [address] Sports Medicine and [address] [id number] Your Appointments 11/7/2017 11:40 AM  
ESTABLISHED PATIENT with Lucero Stein MD  
[facility] ([facility]) Appt Note: appt konstantin'd by pt possible ablation kmr [address]  [address] [facility] [address] [facility] [address]  
[address] [facility] [address]  
  
    
 12/21/2017  3:30 PM  
Any with Liz Mchugh MD  
[address] [facility]) Appt Note: 3 month f/u; 3 month f/u  
 [address] [facility]  
  
   
 [address] [id number] Upcoming Health Maintenance Date Due  
 GLAUCOMA SCREENING Q2Y 12/24/2015 FOBT Q 1 YEAR AGE 50-75 9/3/2016 Pneumococcal 65+ Low/Medium Risk (2 of 2 - PPSV23) 5/16/2018 MEDICARE YEARLY EXAM 6/24/2018 BREAST CANCER SCRN MAMMOGRAM 10/10/2018 DTaP/Tdap/Td series (2 - Td) 10/19/2025 Allergies as of 10/24/2017  Review Complete On: 10/24/2017 By: Charmaine Alvarez Severity Noted Reaction Type Reactions Ampicillin  05/15/2013    Hives, Nausea and Vomiting Current Immunizations  Never Reviewed Name Date Influenza High Dose Vaccine PF 9/12/2017 Pneumococcal Polysaccharide (PPSV-23) 5/16/2013  6:39 PM  
 Tdap 10/19/2015  2:32 AM  
  
 Not reviewed this visit Vitals BP Pulse Temp Resp Height(growth percentile) Weight(growth percentile) 141/81 (BP 1 Location: Left arm, BP Patient Position: Sitting) 69 97.5 °F (36.4 °C) (Oral) 16 5' 2\" (1.575 m) 134 lb 1.6 oz (60.8 kg) SpO2 BMI OB Status Smoking Status 99% 24.53 kg/m2 Hysterectomy Never Smoker Vitals History BMI and BSA Data Body Mass Index Body Surface Area  24.53 kg/m 2 1.63 m 2  
  
  
 Preferred Pharmacy Pharmacy Name Phone [id number] [address] [phone] Your Updated Medication List  
  
   
This list is accurate as of: 10/24/17  1:57 PM.  Always use your most recent med list.  
  
  
  
  
 albuterol 90 mcg/actuation inhaler Commonly known as:  VENTOLIN HFA Take 2 Puffs by inhalation every four (4) hours as needed for Wheezing. ALPRAZolam 1 mg tablet Commonly known as:  Speedy Sequin Take 1 mg by mouth three (3) times daily. desonide 0.05 % topical lotion Commonly known as:  Kim Rathke Apply  to affected area two (2) times a day. FLUoxetine 20 mg capsule Commonly known as:  PROzac Take 20 mg by mouth daily. fluticasone-salmeterol 250-50 mcg/dose diskus inhaler Commonly known as:  ADVAIR Take 1 Puff by inhalation every twelve (12) hours. hydroCHLOROthiazide 12.5 mg tablet Commonly known as:  HYDRODIURIL Take once daily  
  
 hydroquinone 4 % topical cream  
Commonly known as:  Sherrie Lamas Apply  to affected area two (2) times a day. lidocaine 5 % Commonly known as:  LIDODERM  
1 Patch by TransDERmal route every twenty-four (24) hours. magnesium oxide 400 mg tablet Commonly known as:  MAG-OX Take 1 Tab by mouth daily. methocarbamol 750 mg tablet Commonly known as:  ROBAXIN Take 1 Tab by mouth three (3) times daily as needed. metoprolol tartrate 25 mg tablet Commonly known as:  LOPRESSOR  
1/2 tablet twice a day  
  
 potassium chloride 20 mEq tablet Commonly known as:  K-DUR, KLOR-CON Take 1 Tab by mouth daily. rivaroxaban 20 mg Tab tablet Commonly known as:  Rock Snowball Take 1 Tab by mouth daily. rosuvastatin 10 mg tablet Commonly known as:  CRESTOR Take 1 tablet every day SEROquel 50 mg tablet Generic drug:  QUEtiapine Take 50 mg by mouth three (3) times daily.   
  
  
  
  
To-Do List   
 10/31/2017 2:30 PM  
  Appointment with [facility] CHRIS 2 at [address] [place] [address] ([phone]) Shower or bathe using soap and water. Do not use deodorant, powder, perfumes, or lotion the day of your exam.  If your prior mammograms were not performed at [facility] 6 please bring films with you or forward prior images 2 days before your procedure. Check in at registration 15min before your appointment time unless you were instructed to do otherwise. A script is not necessary for screening. If you have one, please bring it on the day of the mammogram or have it faxed to the department. [facility]  [phone] [facility] [phone] [facility] [phone] Introducing [address]! Jeni Barbosa introduces [employer] patient portal. Now you can access parts of your medical record, email your doctor's office, and request medication refills online. 1. In your internet browser, go to [email or web address] 2. Click on the First Time User? Click Here link in the Sign In box. You will see the New Member Sign Up page. 3. Enter your [employer] Access Code exactly as it appears below. You will not need to use this code after youve completed the sign-up process. If you do not sign up before the expiration date, you must request a new code. · [employer] Access Code: [email or web address] Expires: 12/31/2017  3:49 PM 
 
4. Enter the last four digits of your Social Security Number (xxxx) and Date of Birth (mm/dd/yyyy) as indicated and click Submit. You will be taken to the next sign-up page. 5. Create a [employer] ID. This will be your [employer] login ID and cannot be changed, so think of one that is secure and easy to remember. 6. Create a [employer] password. You can change your password at any time. 7. Enter your Password Reset Question and Answer. This can be used at a later time if you forget your password. 8. Enter your e-mail address.  You will receive e-mail notification when new information is available in "[employer]". 9. Click Sign Up. You can now view and download portions of your medical record. 10. Click the Download Summary menu link to download a portable copy of your medical information. If you have questions, please visit the Frequently Asked Questions section of the "[employer]" website. Remember, "[employer]" is NOT to be used for urgent needs. For medical emergencies, dial 911. Now available from your iPhone and Android! Please provide this summary of care documentation to your next provider. Your primary care clinician is listed as Swapna Moncada. If you have any questions after today's visit, please call [phone].

## 2017-10-25 ENCOUNTER — TELEPHONE (OUTPATIENT)
Dept: INTERNAL MEDICINE CLINIC | Age: 67
End: 2017-10-25

## 2017-10-27 ENCOUNTER — OFFICE VISIT (OUTPATIENT)
Dept: INTERNAL MEDICINE CLINIC | Age: 67
End: 2017-10-27

## 2017-10-27 VITALS
RESPIRATION RATE: 18 BRPM | TEMPERATURE: 98 F | OXYGEN SATURATION: 96 % | HEART RATE: 74 BPM | SYSTOLIC BLOOD PRESSURE: 146 MMHG | HEIGHT: 62 IN | BODY MASS INDEX: 24.37 KG/M2 | DIASTOLIC BLOOD PRESSURE: 88 MMHG | WEIGHT: 132.4 LBS

## 2017-10-27 DIAGNOSIS — I48.0 PAROXYSMAL ATRIAL FIBRILLATION (HCC): ICD-10-CM

## 2017-10-27 DIAGNOSIS — E78.5 DYSLIPIDEMIA: ICD-10-CM

## 2017-10-27 DIAGNOSIS — F41.1 ANXIETY NEUROSIS: Primary | ICD-10-CM

## 2017-10-27 DIAGNOSIS — I10 ESSENTIAL HYPERTENSION: ICD-10-CM

## 2017-10-27 DIAGNOSIS — R00.2 PALPITATIONS: ICD-10-CM

## 2017-10-27 RX ORDER — ALPRAZOLAM 1 MG/1
1 TABLET ORAL
Qty: 10 TAB | Refills: 0 | Status: SHIPPED | OUTPATIENT
Start: 2017-10-27 | End: 2022-05-02

## 2017-10-27 NOTE — PROGRESS NOTES
[address] [facility]  [place] [address]  [address]  [facility] [address]  Phone:  [phone]  Fax: [phone]       Chief Complaint   Patient presents with    Medication Problem     patient states that she has slept in about 3 days, and she is requesting a refill of Xanax just until she can see her psychiatrist on Monday. .      SUBJECTIVE:    Ernesto Cosme is a 77 y.o. female Comes in for return visit quite anxious today. She sees her psychiatrist on Monday, but needs a refill on her Xanax. She complains of insomnia of late. She has had nocturnal palpitations. She is concerned about her atrial fibrillation. She has a past history of dyslipidemia and primary hypertension. Current Outpatient Prescriptions   Medication Sig Dispense Refill    ALPRAZolam (XANAX) 1 mg tablet Take 1 Tab by mouth three (3) times daily as needed for Anxiety. Max Daily Amount: 3 mg. 10 Tab 0    magnesium oxide (MAG-OX) 400 mg tablet Take 1 Tab by mouth daily. 90 Tab 3    metoprolol tartrate (LOPRESSOR) 25 mg tablet 1/2 tablet twice a day 90 Tab 3    hydroquinone (ESOTERICA, MELQUIN) 4 % topical cream Apply  to affected area two (2) times a day. 30 g 6    albuterol (VENTOLIN HFA) 90 mcg/actuation inhaler Take 2 Puffs by inhalation every four (4) hours as needed for Wheezing. 1 Inhaler 11    rosuvastatin (CRESTOR) 10 mg tablet Take 1 tablet every day 90 Tab 3    potassium chloride (K-DUR, KLOR-CON) 20 mEq tablet Take 1 Tab by mouth daily. 90 Tab 3    fluticasone-salmeterol (ADVAIR) 250-50 mcg/dose diskus inhaler Take 1 Puff by inhalation every twelve (12) hours. 1 Inhaler 11    lidocaine (LIDODERM) 5 % 1 Patch by TransDERmal route every twenty-four (24) hours. 3 Package 3    desonide (TRIDESILON) 0.05 % topical lotion Apply  to affected area two (2) times a day.  118 mL 11    hydroCHLOROthiazide (HYDRODIURIL) 12.5 mg tablet Take once daily 30 Tab 11    rivaroxaban (XARELTO) 20 mg tab tablet Take 1 Tab by mouth daily. 21 Tab 0    methocarbamol (ROBAXIN) 750 mg tablet Take 1 Tab by mouth three (3) times daily as needed. 270 Tab 3    QUEtiapine (SEROQUEL) 50 mg tablet Take 50 mg by mouth three (3) times daily.  FLUoxetine (PROZAC) 20 mg capsule Take 20 mg by mouth daily.        Past Medical History:   Diagnosis Date    Asthma     Bipolar 2 disorder ([facility] [address]     CAD (coronary artery disease)     ablation    [facility] thrombosis) ([facility])     Hypercholesteremia     Hypertension     Tachycardia      Past Surgical History:   Procedure Laterality Date    ABLATION OF SVT  3/30/2015         EP STUDY W/INDUCTION  3/30/2015         HX BACK SURGERY      HX COLONOSCOPY  9-    HX HEART CATHETERIZATION      HX HYSTERECTOMY       Allergies   Allergen Reactions    Ampicillin Hives and Nausea and Vomiting         REVIEW OF SYSTEMS:  General: negative for - chills or fever  ENT: negative for - headaches, nasal congestion or tinnitus  Respiratory: negative for - cough, hemoptysis, shortness of breath or wheezing  Cardiovascular : negative for - chest pain, edema, palpitations or shortness of breath  Gastrointestinal: negative for - abdominal pain, blood in stools, heartburn or nausea/vomiting  Genito-Urinary: no dysuria, trouble voiding, or hematuria  Musculoskeletal: negative for - gait disturbance, joint pain, joint stiffness or joint swelling  Neurological: no TIA or stroke symptoms  Hematologic: no bruises, no bleeding, no swollen glands  Integument: no lumps, mole changes, nail changes or rash  Endocrine: no malaise/lethargy or unexpected weight changes      Social History     Social History    Marital status:      Spouse name: N/A    Number of children: 1    Years of education: N/A     Occupational History    retired--[employer]---disabled      Social History Main Topics    Smoking status: Never Smoker    Smokeless tobacco: Never Used    Alcohol use No      Comment: occasional    Drug use: No    Sexual activity: Yes     Partners: Male     Other Topics Concern    None     Social History Narrative     History reviewed. No pertinent family history. OBJECTIVE:    Visit Vitals    /88 (BP 1 Location: Left arm, BP Patient Position: Sitting)    Pulse 74    Temp 98 °F (36.7 °C) (Oral)    Resp 18    Ht 5' 2\" (1.575 m)    Wt 132 lb 6.4 oz (60.1 kg)    SpO2 96%    BMI 24.22 kg/m2     CONSTITUTIONAL: well , well nourished, appears age appropriate  EYES: perrla, eom intact  ENMT:moist mucous membranes, pharynx clear  NECK: supple. Thyroid normal  RESPIRATORY: Chest: clear to ascultation and percussion   CARDIOVASCULAR: Heart: regular rate and rhythm  GASTROINTESTINAL: Abdomen: soft, bowel sounds active  HEMATOLOGIC: no pathological lymph nodes palpated  MUSCULOSKELETAL: Extremities: no edema, pulse 1+   INTEGUMENT: No unusual rashes or suspicious skin lesions noted. Nails appear normal.  NEUROLOGIC: non-focal exam   MENTAL STATUS: alert and oriented, appropriate affect      ASSESSMENT:  1. Anxiety neurosis    2. Paroxysmal atrial fibrillation (HCC)    3. Palpitations    4. Dyslipidemia    5. Essential hypertension        PLAN:    1. She is quite anxious today, and I think that this level is a bit higher than previously. She really needs to see a psychiatrist.  In the intervening time, I will give her enough Xanax to last her until Monday. 2. The palpitations that she is getting probably does not represent her heart beating fast. The reason that I make this comment is because previously when it was beating fast she was not even aware of it. She is to follow up with cardiology. The ablation was successful at reducing the ventricular response. 3. As far as her sleep is concerned, the Xanax will assist with this. 4. She will continue statin as prescribed in view of her increased cardiovascular risk, which is primary.    5. Her blood pressure is excellent today. No adjustments are made. Follow-up Disposition:  Return keep old apt.       Parmjit Dalton MD

## 2017-10-27 NOTE — MR AVS SNAPSHOT
Visit Information Date & Time Provider Department Dept. Phone Encounter #  
 10/27/2017  9:45 AM Jaime Carpenter 80 [facility] [id number] Follow-up Instructions Return keep old apt. Your Appointments 11/7/2017 11:40 AM  
ESTABLISHED PATIENT with Gabe Stock MD  
[facility] ([facility]) Appt Note: appt konstantin'd by pt possible ablation kmr [address]  [address] [facility] [address]  
[address] [facility] [address]  
  
    
 12/21/2017  3:30 PM  
Any with Vandana Alvarado MD  
[address] [facility] and Primary Care [facility]) Appt Note: 3 month f/u; 3 month f/u  
 [address] [facility]  
  
   
 [address] [id number] Upcoming Health Maintenance Date Due  
 GLAUCOMA SCREENING Q2Y 12/24/2015 FOBT Q 1 YEAR AGE 50-75 9/3/2016 Pneumococcal 65+ Low/Medium Risk (2 of 2 - PPSV23) 5/16/2018 MEDICARE YEARLY EXAM 6/24/2018 BREAST CANCER SCRN MAMMOGRAM 10/10/2018 DTaP/Tdap/Td series (2 - Td) 10/19/2025 Allergies as of 10/27/2017  Review Complete On: 10/27/2017 By: Lida Bowling Severity Noted Reaction Type Reactions Ampicillin  05/15/2013    Hives, Nausea and Vomiting Current Immunizations  Never Reviewed Name Date Influenza High Dose Vaccine PF 9/12/2017 Pneumococcal Polysaccharide (PPSV-23) 5/16/2013  6:39 PM  
 Tdap 10/19/2015  2:32 AM  
  
 Not reviewed this visit You Were Diagnosed With   
  
 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 
ICD-9-CM: 401.9 Vitals BP Pulse Temp Resp Height(growth percentile) Weight(growth percentile) 146/88 (BP 1 Location: Left arm, BP Patient Position: Sitting) 74 98 °F (36.7 °C) (Oral) 18 5' 2\" (1.575 m) 132 lb 6.4 oz (60.1 kg) SpO2 BMI OB Status Smoking Status 96% 24.22 kg/m2 Hysterectomy Never Smoker BMI and BSA Data Body Mass Index Body Surface Area  
 24.22 kg/m 2 1.62 m 2 Preferred Pharmacy Pharmacy Name Phone [facility]/PHARMACY #[id number]- [place], [place] - [address] [phone] Your Updated Medication List  
  
   
This list is accurate as of: 10/27/17 11:11 AM.  Always use your most recent med list.  
  
  
  
  
 albuterol 90 mcg/actuation inhaler Commonly known as:  VENTOLIN HFA Take 2 Puffs by inhalation every four (4) hours as needed for Wheezing. ALPRAZolam 1 mg tablet Commonly known as:  Pixie Oz Take 1 Tab by mouth three (3) times daily as needed for Anxiety. Max Daily Amount: 3 mg.  
  
 desonide 0.05 % topical lotion Commonly known as:  Martha Gayle Apply  to affected area two (2) times a day. FLUoxetine 20 mg capsule Commonly known as:  PROzac Take 20 mg by mouth daily. fluticasone-salmeterol 250-50 mcg/dose diskus inhaler Commonly known as:  ADVAIR Take 1 Puff by inhalation every twelve (12) hours. hydroCHLOROthiazide 12.5 mg tablet Commonly known as:  HYDRODIURIL Take once daily  
  
 hydroquinone 4 % topical cream  
Commonly known as:  Isha Bonds Apply  to affected area two (2) times a day. lidocaine 5 % Commonly known as:  LIDODERM  
1 Patch by TransDERmal route every twenty-four (24) hours. magnesium oxide 400 mg tablet Commonly known as:  MAG-OX Take 1 Tab by mouth daily. methocarbamol 750 mg tablet Commonly known as:  ROBAXIN Take 1 Tab by mouth three (3) times daily as needed. metoprolol tartrate 25 mg tablet Commonly known as:  LOPRESSOR  
1/2 tablet twice a day  
  
 potassium chloride 20 mEq tablet Commonly known as:  K-DUR, KLOR-CON Take 1 Tab by mouth daily. rivaroxaban 20 mg Tab tablet Commonly known as:  Campbell Capes Take 1 Tab by mouth daily. rosuvastatin 10 mg tablet Commonly known as:  CRESTOR Take 1 tablet every day SEROquel 50 mg tablet Generic drug:  QUEtiapine Take 50 mg by mouth three (3) times daily. Prescriptions Printed Refills ALPRAZolam (XANAX) 1 mg tablet 0 Sig: Take 1 Tab by mouth three (3) times daily as needed for Anxiety. Max Daily Amount: 3 mg. Class: Print Route: Oral  
  
Follow-up Instructions Return keep old apt. To-Do List   
 10/31/2017 2:30 PM  
  Appointment with [facility] CHRIS 2 at [address] [place] [address] ([phone]) Shower or bathe using soap and water. Do not use deodorant, powder, perfumes, or lotion the day of your exam.  If your prior mammograms were not performed at [facility] 6 please bring films with you or forward prior images 2 days before your procedure. Check in at registration 15min before your appointment time unless you were instructed to do otherwise. A script is not necessary for screening. If you have one, please bring it on the day of the mammogram or have it faxed to the department. [facility] [place]  [phone] [facility] [phone] [facility] [phone] [facility] [phone] Introducing [facility]! Romayne Duster introduces [employer] patient portal. Now you can access parts of your medical record, email your doctor's office, and request medication refills online. 1. In your internet browser, go to [email or web address] 2. Click on the First Time User? Click Here link in the Sign In box. You will see the New Member Sign Up page. 3. Enter your [employer] Access Code exactly as it appears below. You will not need to use this code after youve completed the sign-up process.  If you do not sign up before the expiration date, you must request a new code. · [employer] Access Code: [email or web address] Expires: 12/31/2017  3:49 PM 
 
4. Enter the last four digits of your Social Security Number (xxxx) and Date of Birth (mm/dd/yyyy) as indicated and click Submit. You will be taken to the next sign-up page. 5. Create a [employer] ID. This will be your [employer] login ID and cannot be changed, so think of one that is secure and easy to remember. 6. Create a [employer] password. You can change your password at any time. 7. Enter your Password Reset Question and Answer. This can be used at a later time if you forget your password. 8. Enter your e-mail address. You will receive e-mail notification when new information is available in [address]. 9. Click Sign Up. You can now view and download portions of your medical record. 10. Click the Download Summary menu link to download a portable copy of your medical information. If you have questions, please visit the Frequently Asked Questions section of the [employer] website. Remember, [employer] is NOT to be used for urgent needs. For medical emergencies, dial 911. Now available from your iPhone and Android! Please provide this summary of care documentation to your next provider. Your primary care clinician is listed as Swapna Moncada. If you have any questions after today's visit, please call [phone].

## 2017-10-29 NOTE — PROGRESS NOTES
Chief Complaint   Patient presents with    Leg Swelling     x 4days, patient also complains of bilateral leg pain    . SUBECTIVE:    Alan Christensen is a 77 y.o. female She comes in complaining of pain and swelling of her left leg. Most of the swelling is in the area of her knee. She denies any similar symptoms in the past.  She is also actively seeing a cardiologist for her atrial fibrillation. She remains on her anticoagulant. Current Outpatient Prescriptions   Medication Sig Dispense Refill    magnesium oxide (MAG-OX) 400 mg tablet Take 1 Tab by mouth daily. 90 Tab 3    metoprolol tartrate (LOPRESSOR) 25 mg tablet 1/2 tablet twice a day 90 Tab 3    hydroquinone (ESOTERICA, MELQUIN) 4 % topical cream Apply  to affected area two (2) times a day. 30 g 6    albuterol (VENTOLIN HFA) 90 mcg/actuation inhaler Take 2 Puffs by inhalation every four (4) hours as needed for Wheezing. 1 Inhaler 11    rosuvastatin (CRESTOR) 10 mg tablet Take 1 tablet every day 90 Tab 3    potassium chloride (K-DUR, KLOR-CON) 20 mEq tablet Take 1 Tab by mouth daily. 90 Tab 3    fluticasone-salmeterol (ADVAIR) 250-50 mcg/dose diskus inhaler Take 1 Puff by inhalation every twelve (12) hours. 1 Inhaler 11    lidocaine (LIDODERM) 5 % 1 Patch by TransDERmal route every twenty-four (24) hours. 3 Package 3    desonide (TRIDESILON) 0.05 % topical lotion Apply  to affected area two (2) times a day. 118 mL 11    hydroCHLOROthiazide (HYDRODIURIL) 12.5 mg tablet Take once daily 30 Tab 11    rivaroxaban (XARELTO) 20 mg tab tablet Take 1 Tab by mouth daily. 21 Tab 0    methocarbamol (ROBAXIN) 750 mg tablet Take 1 Tab by mouth three (3) times daily as needed. 270 Tab 3    QUEtiapine (SEROQUEL) 50 mg tablet Take 50 mg by mouth three (3) times daily.  FLUoxetine (PROZAC) 20 mg capsule Take 20 mg by mouth daily.       ALPRAZolam (XANAX) 1 mg tablet Take 1 Tab by mouth three (3) times daily as needed for Anxiety. Max Daily Amount: 3 mg. 10 Tab 0     Past Medical History:   Diagnosis Date    Asthma     [facility] [address]     CAD (coronary artery disease)     ablation    D[facility]deep [facility] thrombosis) ([facility]     Hypercholesteremia     Hypertension     Tachycardia      Past Surgical History:   Procedure Laterality Date    ABLATION OF SVT  3/30/2015         EP STUDY W/INDUCTION  3/30/2015         HX BACK SURGERY      HX COLONOSCOPY  9-    HX HEART CATHETERIZATION      HX HYSTERECTOMY       Allergies   Allergen Reactions    Ampicillin Hives and Nausea and Vomiting       REVIEW OF SYSTEMS:  Review of Systems - Negative except   ENT ROS: negative for - headaches, hearing change, nasal congestion, oral lesions, tinnitus, visual changes or   Respiratory ROS: no cough, shortness of breath, or wheezing  Cardiovascular ROS: no chest pain or dyspnea on exertion  Gastrointestinal ROS: no abdominal pain, change in bowel habits, or black or blood  Genito-Urinary ROS: no dysuria, trouble voiding, or hematuria  Musculoskeletal ROS: negative  Neurological ROS: no TIA or stroke symptoms      Social History     Social History    Marital status:      Spouse name: N/A    Number of children: 1    Years of education: N/A     Occupational History    retired--[employer]---disabled      Social History Main Topics    Smoking status: Never Smoker    Smokeless tobacco: Never Used    Alcohol use No      Comment: occasional    Drug use: No    Sexual activity: Yes     Partners: Male     Other Topics Concern    None     Social History Narrative   r  History reviewed. No pertinent family history. OBJECTIVE:  Visit Vitals    /81 (BP 1 Location: Left arm, BP Patient Position: Sitting)    Pulse 69    Temp 97.5 °F (36.4 °C) (Oral)    Resp 16    Ht 5' 2\" (1.575 m)    Wt 134 lb 1.6 oz (60.8 kg)    SpO2 99%    BMI 24.53 kg/m2     ENT: perrla,  eom intact  NECK: supple.  Thyroid normal, no JVD  CHEST: clear to ascultation and percussion   HEART: regular rate and rhythm  ABD: soft, bowel sounds active,   EXTREMITIES: no edema, pulse 1+, pain elicited to flexion and hyperextension left knee  INTEGUMENT: clear      ASSESSMENT:  1. Synovitis of knee    2. Paroxysmal atrial fibrillation (HCC)        PLAN:  1. The patient has what appears to be a mild synovitis of the left knee. She is not a great candidate for NSAIDs because of the anticoagulation. Prednisone low dose would be a meaningful long term and I will try this. 2. Her ventricular rate is excellent. She remains in atrial fibrillation. She will follow up with cardiology. 3. She is otherwise stable. Follow-up Disposition:  Return keep old apt.       Nazia Walker MD

## 2017-10-31 ENCOUNTER — HOSPITAL ENCOUNTER (OUTPATIENT)
Dept: MAMMOGRAPHY | Age: 67
Discharge: HOME | End: 2017-10-31
Attending: INTERNAL MEDICINE
Payer: MEDICARE

## 2017-10-31 DIAGNOSIS — Z12.31 VISIT FOR SCREENING MAMMOGRAM: ICD-10-CM

## 2017-10-31 PROCEDURE — 77063 BREAST TOMOSYNTHESIS BI: CPT

## 2017-12-21 ENCOUNTER — OFFICE VISIT (OUTPATIENT)
Dept: INTERNAL MEDICINE CLINIC | Age: 67
End: 2017-12-21

## 2017-12-21 VITALS
RESPIRATION RATE: 18 BRPM | TEMPERATURE: 97.5 F | SYSTOLIC BLOOD PRESSURE: 105 MMHG | HEART RATE: 78 BPM | BODY MASS INDEX: 24.79 KG/M2 | HEIGHT: 62 IN | OXYGEN SATURATION: 95 % | DIASTOLIC BLOOD PRESSURE: 69 MMHG | WEIGHT: 134.7 LBS

## 2017-12-21 DIAGNOSIS — I10 ESSENTIAL HYPERTENSION: ICD-10-CM

## 2017-12-21 DIAGNOSIS — I48.0 PAROXYSMAL ATRIAL FIBRILLATION (HCC): ICD-10-CM

## 2017-12-21 DIAGNOSIS — E78.5 DYSLIPIDEMIA: ICD-10-CM

## 2017-12-21 DIAGNOSIS — M65.9 SYNOVITIS OF KNEE: Primary | ICD-10-CM

## 2017-12-21 RX ORDER — SALSALATE 750 MG
750 TABLET ORAL 3 TIMES DAILY
Qty: 90 TAB | Refills: 0 | Status: SHIPPED | OUTPATIENT
Start: 2017-12-21 | End: 2019-08-16

## 2017-12-21 NOTE — PROGRESS NOTES
1. Have you been to the ER, urgent care clinic since your last visit? Hospitalized since your last visit? No    2. Have you seen or consulted any other health care providers outside of the [address] [place] [address] since your last visit? Include any pap smears or colon screening. No     Complaints of knee pain. Patient states that she wants to have her heart,  lungs and bone density checked for the end of the year.

## 2017-12-21 NOTE — MR AVS SNAPSHOT
Visit Information Date & Time Provider Department Dept. Phone Encounter #  
 12/21/2017  3:30 PM [facility] [phone] [id number] Upcoming Health Maintenance Date Due COLONOSCOPY 12/24/1968 GLAUCOMA SCREENING Q2Y 12/24/2015 Pneumococcal 65+ Low/Medium Risk (2 of 2 - PPSV23) 5/16/2018 MEDICARE YEARLY EXAM 6/24/2018 DTaP/Tdap/Td series (2 - Td) 10/19/2025 Allergies as of 12/21/2017  Review Complete On: 12/21/2017 By: Vinayak Jasmine LPN Severity Noted Reaction Type Reactions Ampicillin  05/15/2013    Hives, Nausea and Vomiting Current Immunizations  Never Reviewed Name Date Influenza High Dose Vaccine PF 9/12/2017 Pneumococcal Polysaccharide (PPSV-23) 5/16/2013  6:39 PM  
 Tdap 10/19/2015  2:32 AM  
  
 Not reviewed this visit Vitals BP Pulse Temp Resp Height(growth percentile) Weight(growth percentile) 105/69 78 97.5 °F (36.4 °C) (Oral) 18 5' 2\" (1.575 m) 134 lb 11.2 oz (61.1 kg) SpO2 BMI OB Status Smoking Status 95% 24.64 kg/m2 Hysterectomy Never Smoker BMI and BSA Data Body Mass Index Body Surface Area  
 24.64 kg/m 2 1.63 m 2 Preferred Pharmacy Pharmacy Name Phone [id number] [address] [phone] Your Updated Medication List  
  
   
This list is accurate as of: 12/21/17  5:42 PM.  Always use your most recent med list.  
  
  
  
  
 albuterol 90 mcg/actuation inhaler Commonly known as:  VENTOLIN HFA Take 2 Puffs by inhalation every four (4) hours as needed for Wheezing. ALPRAZolam 1 mg tablet Commonly known as:  [place] Amend Take 1 Tab by mouth three (3) times daily as needed for Anxiety. Max Daily Amount: 3 mg.  
  
 desonide 0.05 % topical lotion Commonly known as:  Hensesar Garvey Apply  to affected area two (2) times a day. FLUoxetine 20 mg capsule Commonly known as:  PROzac Take 20 mg by mouth daily. fluticasone-salmeterol 250-50 mcg/dose diskus inhaler Commonly known as:  ADVAIR Take 1 Puff by inhalation every twelve (12) hours. hydroCHLOROthiazide 12.5 mg tablet Commonly known as:  HYDRODIURIL Take once daily  
  
 hydroquinone 4 % topical cream  
Commonly known as:  Denisse Power Apply  to affected area two (2) times a day. lidocaine 5 % Commonly known as:  LIDODERM  
1 Patch by TransDERmal route every twenty-four (24) hours. magnesium oxide 400 mg tablet Commonly known as:  MAG-OX Take 1 Tab by mouth daily. methocarbamol 750 mg tablet Commonly known as:  ROBAXIN Take 1 Tab by mouth three (3) times daily as needed. metoprolol tartrate 25 mg tablet Commonly known as:  LOPRESSOR  
1/2 tablet twice a day  
  
 potassium chloride 20 mEq tablet Commonly known as:  K-DUR, KLOR-CON Take 1 Tab by mouth daily. rivaroxaban 20 mg Tab tablet Commonly known as:  Edmonia Punch Take 1 Tab by mouth daily. rosuvastatin 10 mg tablet Commonly known as:  CRESTOR Take 1 tablet every day SEROquel 50 mg tablet Generic drug:  QUEtiapine Take 50 mg by mouth three (3) times daily. Introducing [facility] & HEALTH SERVICES! Heather Haider introduces TweetPhoto patient portal. Now you can access parts of your medical record, email your doctor's office, and request medication refills online. 1. In your internet browser, go to [email or web address] 2. Click on the First Time User? Click Here link in the Sign In box. You will see the New Member Sign Up page. 3. Enter your TweetPhoto Access Code exactly as it appears below. You will not need to use this code after youve completed the sign-up process. If you do not sign up before the expiration date, you must request a new code. · TweetPhoto Access Code: [email or web address] Expires: 12/31/2017  2:49 PM 
 
 4. Enter the last four digits of your Social Security Number (xxxx) and Date of Birth (mm/dd/yyyy) as indicated and click Submit. You will be taken to the next sign-up page. 5. Create a Beestar ID. This will be your Beestar login ID and cannot be changed, so think of one that is secure and easy to remember. 6. Create a Beestar password. You can change your password at any time. 7. Enter your Password Reset Question and Answer. This can be used at a later time if you forget your password. 8. Enter your e-mail address. You will receive e-mail notification when new information is available in [address] 9. Click Sign Up. You can now view and download portions of your medical record. 10. Click the Download Summary menu link to download a portable copy of your medical information. If you have questions, please visit the Frequently Asked Questions section of the Beestar website. Remember, Beestar is NOT to be used for urgent needs. For medical emergencies, dial 911. Now available from your iPhone and Android! Please provide this summary of care documentation to your next provider. Your primary care clinician is listed as Swapna Moncada. If you have any questions after today's visit, please call [phone].

## 2017-12-22 NOTE — PROGRESS NOTES
[address] [place] [address] and Primary Care  [place] [address]  [address] [place] [address]  Phone:  [phone]  Fax: [phone]       Chief Complaint   Patient presents with    Knee Pain   . SUBJECTIVE:    Norbert Motley is a 77 y.o. female   Comes in for a return visit stating that she has done fairly well. Her biggest complaint is that of pain in both knees which has been present now for the last month. There has been no history of trauma. She denies similar symptoms in the past.     She has taken Apixaban for paroxysmal atrial fibrillation. She is stable with her last cardiology appointment being four months ago. She has a past history of primary hypertension and dyslipidemia. Her anxiety level has been reasonably stable also. MedDATA/gwo            Current Outpatient Prescriptions   Medication Sig Dispense Refill    salsalate (DISALCID) 750 mg tablet Take 1 Tab by mouth three (3) times daily. 90 Tab 0    ALPRAZolam (XANAX) 1 mg tablet Take 1 Tab by mouth three (3) times daily as needed for Anxiety. Max Daily Amount: 3 mg. 10 Tab 0    magnesium oxide (MAG-OX) 400 mg tablet Take 1 Tab by mouth daily. 90 Tab 3    metoprolol tartrate (LOPRESSOR) 25 mg tablet 1/2 tablet twice a day 90 Tab 3    hydroquinone (ESOTERICA, MELQUIN) 4 % topical cream Apply  to affected area two (2) times a day. 30 g 6    albuterol (VENTOLIN HFA) 90 mcg/actuation inhaler Take 2 Puffs by inhalation every four (4) hours as needed for Wheezing. 1 Inhaler 11    rosuvastatin (CRESTOR) 10 mg tablet Take 1 tablet every day 90 Tab 3    potassium chloride (K-DUR, KLOR-CON) 20 mEq tablet Take 1 Tab by mouth daily. 90 Tab 3    fluticasone-salmeterol (ADVAIR) 250-50 mcg/dose diskus inhaler Take 1 Puff by inhalation every twelve (12) hours. 1 Inhaler 11    lidocaine (LIDODERM) 5 % 1 Patch by TransDERmal route every twenty-four (24) hours.  3 Package 3    desonide (TRIDESILON) 0.05 % topical lotion Apply  to affected area two (2) times a day. 118 mL 11    hydroCHLOROthiazide (HYDRODIURIL) 12.5 mg tablet Take once daily 30 Tab 11    rivaroxaban (XARELTO) 20 mg tab tablet Take 1 Tab by mouth daily. 21 Tab 0    methocarbamol (ROBAXIN) 750 mg tablet Take 1 Tab by mouth three (3) times daily as needed. 270 Tab 3    QUEtiapine (SEROQUEL) 50 mg tablet Take 50 mg by mouth three (3) times daily.  FLUoxetine (PROZAC) 20 mg capsule Take 20 mg by mouth daily.        Past Medical History:   Diagnosis Date    Asthma     [facility] ([facility] [address]     CAD (coronary artery disease)     ablation    [facility]     Hypercholesteremia     Hypertension     Tachycardia      Past Surgical History:   Procedure Laterality Date    ABLATION OF SVT  3/30/2015         EP STUDY W/INDUCTION  3/30/2015         HX BACK SURGERY      HX COLONOSCOPY  9-    HX HEART CATHETERIZATION      HX HYSTERECTOMY       Allergies   Allergen Reactions    Ampicillin Hives and Nausea and Vomiting         REVIEW OF SYSTEMS:  General: negative for - chills or fever  ENT: negative for - headaches, nasal congestion or tinnitus  Respiratory: negative for - cough, hemoptysis, shortness of breath or wheezing  Cardiovascular : negative for - chest pain, edema, palpitations or shortness of breath  Gastrointestinal: negative for - abdominal pain, blood in stools, heartburn or nausea/vomiting  Genito-Urinary: no dysuria, trouble voiding, or hematuria  Musculoskeletal: negative for - gait disturbance, joint pain, joint stiffness or joint swelling  Neurological: no TIA or stroke symptoms  Hematologic: no bruises, no bleeding, no swollen glands  Integument: no lumps, mole changes, nail changes or rash  Endocrine: no malaise/lethargy or unexpected weight changes      Social History     Social History    Marital status:      Spouse name: N/A    Number of children: 1    Years of education: N/A     Occupational History    retired--[employer]---disabled      Social History Main Topics    Smoking status: Never Smoker    Smokeless tobacco: Never Used    Alcohol use No      Comment: occasional    Drug use: No    Sexual activity: Yes     Partners: Male     Other Topics Concern    None     Social History Narrative     History reviewed. No pertinent family history. OBJECTIVE:    Visit Vitals    /69    Pulse 78    Temp 97.5 °F (36.4 °C) (Oral)    Resp 18    Ht 5' 2\" (1.575 m)    Wt 134 lb 11.2 oz (61.1 kg)    SpO2 95%    BMI 24.64 kg/m2     CONSTITUTIONAL: well , well nourished, appears age appropriate  EYES: perrla, eom intact  ENMT:moist mucous membranes, pharynx clear  NECK: supple. Thyroid normal  RESPIRATORY: Chest: clear to ascultation and percussion   CARDIOVASCULAR: Heart: regular rate and rhythm  GASTROINTESTINAL: Abdomen: soft, bowel sounds active  HEMATOLOGIC: no pathological lymph nodes palpated  MUSCULOSKELETAL: Extremities: no edema, pulse 1+, pain elicited to hyperextension and flexion right knee, pain elicited flexion only left knee, no effusion in either knee  INTEGUMENT: No unusual rashes or suspicious skin lesions noted. Nails appear normal.  NEUROLOGIC: non-focal exam   MENTAL STATUS: alert and oriented, appropriate affect      ASSESSMENT:  1. Synovitis of knee    2. Paroxysmal atrial fibrillation (HCC)    3. Essential hypertension    4. Dyslipidemia        PLAN:    1. The patient has synovitis predominantly of the right knee although the left knee is mildly involved. She declines an injection. She is not a candidate for traditional NSAIDs. I will therefore try her on a non-aspirin salicylate which can be reasonably used in individuals who are anticoagulated. I will see how she fares with this and see her back in the office in one month. 2. Her rhythm is perfectly regular today so she is in sinus rhythm now. 3. Blood pressure is excellent, no adjustments are made.    4. She will continue statin as prescribed. Her last apoB level was reasonable. 5. She will also follow up with psychiatry. She is quite stable today. MedDATA/gwo     . Orders Placed This Encounter    salsalate (DISALCID) 750 mg tablet         Follow-up Disposition:  Return in about 4 weeks (around 1/18/2018).       Nazia Walker MD

## 2017-12-26 ENCOUNTER — OFFICE VISIT (OUTPATIENT)
Dept: INTERNAL MEDICINE CLINIC | Age: 67
End: 2017-12-26

## 2017-12-26 VITALS
TEMPERATURE: 97.4 F | DIASTOLIC BLOOD PRESSURE: 64 MMHG | HEART RATE: 99 BPM | OXYGEN SATURATION: 96 % | HEIGHT: 62 IN | BODY MASS INDEX: 25.16 KG/M2 | WEIGHT: 136.7 LBS | SYSTOLIC BLOOD PRESSURE: 108 MMHG | RESPIRATION RATE: 20 BRPM

## 2017-12-26 DIAGNOSIS — J06.9 UPPER RESPIRATORY TRACT INFECTION, UNSPECIFIED TYPE: ICD-10-CM

## 2017-12-26 DIAGNOSIS — M65.9 SYNOVITIS OF KNEE: Primary | ICD-10-CM

## 2017-12-26 DIAGNOSIS — I48.0 PAROXYSMAL ATRIAL FIBRILLATION (HCC): ICD-10-CM

## 2017-12-26 RX ORDER — POTASSIUM CHLORIDE 20 MEQ/1
20 TABLET, EXTENDED RELEASE ORAL DAILY
Qty: 90 TAB | Refills: 3 | Status: SHIPPED | OUTPATIENT
Start: 2017-12-26 | End: 2018-01-04 | Stop reason: SDUPTHER

## 2017-12-26 RX ORDER — LIDOCAINE HYDROCHLORIDE 10 MG/ML
5 INJECTION, SOLUTION EPIDURAL; INFILTRATION; INTRACAUDAL; PERINEURAL ONCE
Qty: 5 ML | Refills: 0
Start: 2017-12-26 | End: 2017-12-26

## 2017-12-26 RX ORDER — AZITHROMYCIN 250 MG/1
TABLET, FILM COATED ORAL
Qty: 6 TAB | Refills: 0 | Status: SHIPPED | OUTPATIENT
Start: 2017-12-26 | End: 2017-12-31

## 2017-12-26 RX ORDER — TRIAMCINOLONE ACETONIDE 40 MG/ML
40 INJECTION, SUSPENSION INTRA-ARTICULAR; INTRAMUSCULAR ONCE
Qty: 1 ML | Refills: 0
Start: 2017-12-26 | End: 2017-12-26

## 2017-12-26 NOTE — PROGRESS NOTES
1. Have you been to the ER, urgent care clinic since your last visit? Hospitalized since your last visit? No    2. Have you seen or consulted any other health care providers outside of the [address] [place] [address] since your last visit? Include any pap smears or colon screening.  No     Cold symptoms  Requesting refill

## 2017-12-26 NOTE — MR AVS SNAPSHOT
Visit Information Date & Time Provider Department Dept. Phone Encounter #  
 12/26/2017  4:00 PM Chinyere Conn MD [facility] Medicine and Primary Care [phone] Your Appointments 1/4/2018  4:15 PM  
Any with Chinyere Conn MD  
[address] [place] [address] and Primary Care [facility]) Appt Note: 2 wk f/up [address] [facility]  
  
   
 [address] [id number] Upcoming Health Maintenance Date Due COLONOSCOPY 12/24/1968 GLAUCOMA SCREENING Q2Y 12/24/2015 Pneumococcal 65+ Low/Medium Risk (2 of 2 - PPSV23) 5/16/2018 MEDICARE YEARLY EXAM 6/24/2018 DTaP/Tdap/Td series (2 - Td) 10/19/2025 Allergies as of 12/26/2017  Review Complete On: 12/26/2017 By: Deyanira Munoz LPN Severity Noted Reaction Type Reactions Ampicillin  05/15/2013    Hives, Nausea and Vomiting Current Immunizations  Never Reviewed Name Date Influenza High Dose Vaccine PF 9/12/2017 Pneumococcal Polysaccharide (PPSV-23) 5/16/2013  6:39 PM  
 Tdap 10/19/2015  2:32 AM  
  
 Not reviewed this visit You Were Diagnosed With   
  
 Codes Comments Synovitis of knee    -  Primary ICD-10-CM: M65.9 ICD-9-CM: 727.09 Upper respiratory tract infection, unspecified type     ICD-10-CM: J06.9 ICD-9-CM: 465.9 Paroxysmal atrial fibrillation (HCC)     ICD-10-CM: I48.0 ICD-9-CM: 427.31 Vitals BP Pulse Temp Resp Height(growth percentile) Weight(growth percentile) 108/64 99 97.4 °F (36.3 °C) (Oral) 20 5' 2\" (1.575 m) 136 lb 11.2 oz (62 kg) SpO2 BMI OB Status Smoking Status 96% 25 kg/m2 Hysterectomy Never Smoker BMI and BSA Data Body Mass Index Body Surface Area  
 25 kg/m 2 1.65 m 2 Preferred Pharmacy Pharmacy Name Phone [id number] [address] [phone] Your Updated Medication List  
  
   
This list is accurate as of: 12/26/17  6:02 PM.  Always use your most recent med list.  
  
  
  
  
 albuterol 90 mcg/actuation inhaler Commonly known as:  VENTOLIN HFA Take 2 Puffs by inhalation every four (4) hours as needed for Wheezing. ALPRAZolam 1 mg tablet Commonly known as:  Leward Lennox Take 1 Tab by mouth three (3) times daily as needed for Anxiety. Max Daily Amount: 3 mg.  
  
 azithromycin 250 mg tablet Commonly known as:  Yong Reed Take 2 tablets today, then take 1 tablet daily  
  
 desonide 0.05 % topical lotion Commonly known as:  Tommye Sluder Apply  to affected area two (2) times a day. FLUoxetine 20 mg capsule Commonly known as:  PROzac Take 20 mg by mouth daily. fluticasone-salmeterol 250-50 mcg/dose diskus inhaler Commonly known as:  ADVAIR Take 1 Puff by inhalation every twelve (12) hours. hydroCHLOROthiazide 12.5 mg tablet Commonly known as:  HYDRODIURIL Take once daily  
  
 hydroquinone 4 % topical cream  
Commonly known as:  Abiodun Colon Apply  to affected area two (2) times a day. lidocaine 5 % Commonly known as:  LIDODERM  
1 Patch by TransDERmal route every twenty-four (24) hours. magnesium oxide 400 mg tablet Commonly known as:  MAG-OX Take 1 Tab by mouth daily. methocarbamol 750 mg tablet Commonly known as:  ROBAXIN Take 1 Tab by mouth three (3) times daily as needed. metoprolol tartrate 25 mg tablet Commonly known as:  LOPRESSOR  
1/2 tablet twice a day  
  
 potassium chloride 20 mEq tablet Commonly known as:  K-DUR, KLOR-CON Take 1 Tab by mouth daily. rivaroxaban 20 mg Tab tablet Commonly known as:  Tridell Honor Take 1 Tab by mouth daily. rosuvastatin 10 mg tablet Commonly known as:  CRESTOR Take 1 tablet every day  
  
 salsalate 750 mg tablet Commonly known as:  DISALCID Take 1 Tab by mouth three (3) times daily. SEROquel 50 mg tablet Generic drug:  QUEtiapine Take 50 mg by mouth three (3) times daily. Prescriptions Sent to Pharmacy Refills  
 potassium chloride (K-DUR, KLOR-CON) 20 mEq tablet 3 Sig: Take 1 Tab by mouth daily. Class: Normal  
 Pharmacy: [facility]/pharmacy [facility] [address] Ph #: [phone] Route: Oral  
 azithromycin (ZITHROMAX) 250 mg tablet 0 Sig: Take 2 tablets today, then take 1 tablet daily Class: Normal  
 Pharmacy: [facility]/pharmacy [address] Ph #: [phone] Introducing [facility] & HE[facility]! Yanet Thomas introduces RegaloCard patient portal. Now you can access parts of your medical record, email your doctor's office, and request medication refills online. 1. In your internet browser, go to [email or web address] 2. Click on the First Time User? Click Here link in the Sign In box. You will see the New Member Sign Up page. 3. Enter your RegaloCard Access Code exactly as it appears below. You will not need to use this code after youve completed the sign-up process. If you do not sign up before the expiration date, you must request a new code. · RegaloCard Access Code: [email or web address] Expires: 12/31/2017  2:49 PM 
 
4. Enter the last four digits of your Social Security Number (xxxx) and Date of Birth (mm/dd/yyyy) as indicated and click Submit. You will be taken to the next sign-up page. 5. Create a iPipelinet ID. This will be your RegaloCard login ID and cannot be changed, so think of one that is secure and easy to remember. 6. Create a RegaloCard password. You can change your password at any time. 7. Enter your Password Reset Question and Answer. This can be used at a later time if you forget your password. 8. Enter your e-mail address. You will receive e-mail notification when new information is available in [address] 9. Click Sign Up. You can now view and download portions of your medical record. 10. Click the Download Summary menu link to download a portable copy of your medical information. If you have questions, please visit the Frequently Asked Questions section of the [employer] website. Remember, [employer] is NOT to be used for urgent needs. For medical emergencies, dial 911. Now available from your iPhone and Android! Please provide this summary of care documentation to your next provider. Your primary care clinician is listed as Swapna Moncada. If you have any questions after today's visit, please call [phone].

## 2017-12-27 NOTE — PROGRESS NOTES
Chief Complaint   Patient presents with    Cold Symptoms   . SUBECTIVE:    Delvis Godl is a 79 y.o. female comes in for return visit complaining of upper respiratory symptoms starting yesterday. She has nasal congestion, frequent clearing of her throat, hoarseness and occasional cough. The cough is indeed mildly productive of greenish mucus. Additionally, she continues to complain of pain in her knees and would like injections today. She never got the salicylate filled. She has a history of paroxysmal atrial fibrillation. Current Outpatient Prescriptions   Medication Sig Dispense Refill    potassium chloride (K-DUR, KLOR-CON) 20 mEq tablet Take 1 Tab by mouth daily. 90 Tab 3    azithromycin (ZITHROMAX) 250 mg tablet Take 2 tablets today, then take 1 tablet daily 6 Tab 0    salsalate (DISALCID) 750 mg tablet Take 1 Tab by mouth three (3) times daily. 90 Tab 0    ALPRAZolam (XANAX) 1 mg tablet Take 1 Tab by mouth three (3) times daily as needed for Anxiety. Max Daily Amount: 3 mg. 10 Tab 0    magnesium oxide (MAG-OX) 400 mg tablet Take 1 Tab by mouth daily. 90 Tab 3    metoprolol tartrate (LOPRESSOR) 25 mg tablet 1/2 tablet twice a day 90 Tab 3    hydroquinone (ESOTERICA, MELQUIN) 4 % topical cream Apply  to affected area two (2) times a day. 30 g 6    albuterol (VENTOLIN HFA) 90 mcg/actuation inhaler Take 2 Puffs by inhalation every four (4) hours as needed for Wheezing. 1 Inhaler 11    rosuvastatin (CRESTOR) 10 mg tablet Take 1 tablet every day 90 Tab 3    fluticasone-salmeterol (ADVAIR) 250-50 mcg/dose diskus inhaler Take 1 Puff by inhalation every twelve (12) hours. 1 Inhaler 11    lidocaine (LIDODERM) 5 % 1 Patch by TransDERmal route every twenty-four (24) hours. 3 Package 3    desonide (TRIDESILON) 0.05 % topical lotion Apply  to affected area two (2) times a day.  118 mL 11    hydroCHLOROthiazide (HYDRODIURIL) 12.5 mg tablet Take once daily 30 Tab 11    rivaroxaban (XARELTO) 20 mg tab tablet Take 1 Tab by mouth daily. 21 Tab 0    methocarbamol (ROBAXIN) 750 mg tablet Take 1 Tab by mouth three (3) times daily as needed. 270 Tab 3    QUEtiapine (SEROQUEL) 50 mg tablet Take 50 mg by mouth three (3) times daily.  FLUoxetine (PROZAC) 20 mg capsule Take 20 mg by mouth daily. Past Medical History:   Diagnosis Date    Asthma     Bipolar 2 disorder ([facility] [address]     CAD (coronary artery disease)     ablation    [facility]) ([facility]     Hypercholesteremia     Hypertension     Tachycardia      Past Surgical History:   Procedure Laterality Date    ABLATION OF SVT  3/30/2015         EP STUDY W/INDUCTION  3/30/2015         HX BACK SURGERY      HX COLONOSCOPY  9-    HX HEART CATHETERIZATION      HX HYSTERECTOMY       Allergies   Allergen Reactions    Ampicillin Hives and Nausea and Vomiting       REVIEW OF SYSTEMS:  Review of Systems - Negative except   ENT ROS: negative for - headaches, hearing change, nasal congestion, oral lesions, tinnitus, visual changes or   Respiratory ROS: no cough, shortness of breath, or wheezing  Cardiovascular ROS: no chest pain or dyspnea on exertion  Gastrointestinal ROS: no abdominal pain, change in bowel habits, or black or blood  Genito-Urinary ROS: no dysuria, trouble voiding, or hematuria  Musculoskeletal ROS: negative  Neurological ROS: no TIA or stroke symptoms      Social History     Social History    Marital status:      Spouse name: N/A    Number of children: 1    Years of education: N/A     Occupational History    retired--[employer]---disabled      Social History Main Topics    Smoking status: Never Smoker    Smokeless tobacco: Never Used    Alcohol use No      Comment: occasional    Drug use: No    Sexual activity: Yes     Partners: Male     Other Topics Concern    None     Social History Narrative   r  History reviewed. No pertinent family history.     OBJECTIVE:  Visit Vitals    /64    Pulse 99    Temp 97.4 °F (36.3 °C) (Oral)    Resp 20    Ht 5' 2\" (1.575 m)    Wt 136 lb 11.2 oz (62 kg)    SpO2 96%    BMI 25 kg/m2     ENT: perrla,  eom intact  NECK: supple. Thyroid normal, no JVD  CHEST: clear to ascultation and percussion   HEART: irregular rate and rhythm  ABD: soft, bowel sounds active,   EXTREMITIES: no edema, pulse 1+, pain elicited to flexion and hyperextension both knees right greater than left  INTEGUMENT: clear      ASSESSMENT:  1. Synovitis of knee    2. Upper respiratory tract infection, unspecified type    3. Paroxysmal atrial fibrillation (HCC)        PLAN:    1. The patient has synovitis of both knees, right greater than left. Procedure:  Using sterile technique, I injected Kenalog 40 mg and xylocaine 1% 5 cc into lateral aspect of each knee. She tolerated the procedure well. She will not have to take the salicylate at this point. 2. She has an upper respiratory infection complicated by mild bronchitis. I will give her a Z-José Miguel and she was told to take Claritin-D 12 one tablet BID for the next five days. The upper respiratory symptoms are indeed self-limiting. 3. Today, she her rate is irregular indicating that she is in atrial fibrillation as compared to her last visit. .  Orders Placed This Encounter    potassium chloride (K-DUR, KLOR-CON) 20 mEq tablet    azithromycin (ZITHROMAX) 250 mg tablet    triamcinolone acetonide (KENALOG) 40 mg/mL injection    lidocaine, PF, (XYLOCAINE) 10 mg/mL (1 %) injection    triamcinolone acetonide (KENALOG) 40 mg/mL injection    lidocaine, PF, (XYLOCAINE) 10 mg/mL (1 %) injection       Follow-up Disposition:  Return keep old apt.       Vandana Alvarado MD

## 2018-01-04 DIAGNOSIS — I48.20 CHRONIC ATRIAL FIBRILLATION (HCC): ICD-10-CM

## 2018-01-04 RX ORDER — LANOLIN ALCOHOL/MO/W.PET/CERES
400 CREAM (GRAM) TOPICAL DAILY
Qty: 90 TAB | Refills: 3 | Status: SHIPPED | OUTPATIENT
Start: 2018-01-04 | End: 2019-01-07 | Stop reason: SDUPTHER

## 2018-01-04 RX ORDER — METOPROLOL TARTRATE 25 MG/1
TABLET, FILM COATED ORAL
Qty: 45 TAB | Refills: 3 | Status: SHIPPED | OUTPATIENT
Start: 2018-01-04 | End: 2018-09-28 | Stop reason: SDUPTHER

## 2018-01-04 RX ORDER — POTASSIUM CHLORIDE 20 MEQ/1
20 TABLET, EXTENDED RELEASE ORAL DAILY
Qty: 90 TAB | Refills: 3 | Status: SHIPPED | OUTPATIENT
Start: 2018-01-04 | End: 2019-01-07 | Stop reason: SDUPTHER

## 2018-01-16 ENCOUNTER — TELEPHONE (OUTPATIENT)
Dept: CARDIOLOGY CLINIC | Age: 68
End: 2018-01-16

## 2018-01-16 ENCOUNTER — CLINICAL SUPPORT (OUTPATIENT)
Dept: INTERNAL MEDICINE CLINIC | Age: 68
End: 2018-01-16

## 2018-01-16 DIAGNOSIS — I10 ESSENTIAL HYPERTENSION: Primary | ICD-10-CM

## 2018-01-16 NOTE — TELEPHONE ENCOUNTER
The patient requested a call back on [phone]. She dropped off paper work for a medication exception. Thanks!

## 2018-01-16 NOTE — TELEPHONE ENCOUNTER
Faxed patient assistance form  to [employer] at fax number [phone]. Fax confirmation received.

## 2018-01-16 NOTE — TELEPHONE ENCOUNTER
Patient walked in requesting samples of xarelto 20mg every day. Gave 2 bottles VO per Dr Kassandra Bee.

## 2018-01-22 ENCOUNTER — OFFICE VISIT (OUTPATIENT)
Dept: INTERNAL MEDICINE CLINIC | Age: 68
End: 2018-01-22

## 2018-01-22 DIAGNOSIS — L30.9 DERMATITIS: ICD-10-CM

## 2018-01-22 DIAGNOSIS — I10 ESSENTIAL HYPERTENSION: Primary | ICD-10-CM

## 2018-01-22 DIAGNOSIS — I48.0 PAROXYSMAL ATRIAL FIBRILLATION (HCC): ICD-10-CM

## 2018-01-22 DIAGNOSIS — G44.219 EPISODIC TENSION-TYPE HEADACHE, NOT INTRACTABLE: ICD-10-CM

## 2018-01-22 DIAGNOSIS — M65.9 SYNOVITIS OF KNEE: ICD-10-CM

## 2018-01-22 NOTE — PROGRESS NOTES
Chief Complaint   Patient presents with    Elevated Blood Pressure     1. Have you been to the ER, urgent care clinic since your last visit? Hospitalized since your last visit? No    2. Have you seen or consulted any other health care providers outside of the [address] [place] [address] since your last visit? Include any pap smears or colon screening. No    Patient states that she has been experiencing headaches x 2 weeks. She is also requesting to have \"A-fib\" checked.

## 2018-01-22 NOTE — MR AVS SNAPSHOT
[address] [place] [address]. [phone] [id number] 
[phone] Patient: Ra Dupont MRN: [id number] Visit Information Date & Time Provider Department Dept. Phone Encounter #  
 1/22/2018  1:00 PM Charo Llanes MD [facility] [phone] [id number] Upcoming Health Maintenance Date Due COLONOSCOPY 12/24/1968 GLAUCOMA SCREENING Q2Y 12/24/2015 Pneumococcal 65+ Low/Medium Risk (2 of 2 - PPSV23) 5/16/2018 MEDICARE YEARLY EXAM 6/24/2018 BREAST CANCER SCRN MAMMOGRAM 10/31/2019 DTaP/Tdap/Td series (2 - Td) 10/19/2025 Allergies as of 1/22/2018  Review Complete On: 12/26/2017 By: Charo Llanes MD  
  
 Severity Noted Reaction Type Reactions Ampicillin  05/15/2013    Hives, Nausea and Vomiting Current Immunizations  Never Reviewed Name Date Influenza High Dose Vaccine PF 9/12/2017 Pneumococcal Polysaccharide (PPSV-23) 5/16/2013  6:39 PM  
 Tdap 10/19/2015  2:32 AM  
  
 Not reviewed this visit Vitals OB Status Smoking Status Hysterectomy Never Smoker Your Updated Medication List  
  
   
This list is accurate as of: 1/22/18  1:53 PM.  Always use your most recent med list.  
  
  
  
  
 albuterol 90 mcg/actuation inhaler Commonly known as:  VENTOLIN HFA Take 2 Puffs by inhalation every four (4) hours as needed for Wheezing. ALPRAZolam 1 mg tablet Commonly known as:  Whitley Mettle Take 1 Tab by mouth three (3) times daily as needed for Anxiety. Max Daily Amount: 3 mg.  
  
 desonide 0.05 % topical lotion Commonly known as:  Everette Rockers Apply  to affected area two (2) times a day. FLUoxetine 20 mg capsule Commonly known as:  PROzac Take 20 mg by mouth daily. fluticasone-salmeterol 250-50 mcg/dose diskus inhaler Commonly known as:  ADVAIR Take 1 Puff by inhalation every twelve (12) hours. hydroCHLOROthiazide 12.5 mg tablet Commonly known as:  HYDRODIURIL Take once daily  
  
 hydroquinone 4 % topical cream  
Commonly known as:  Baltazar Jennifer Apply  to affected area two (2) times a day. lidocaine 5 % Commonly known as:  LIDODERM  
1 Patch by TransDERmal route every twenty-four (24) hours. magnesium oxide 400 mg tablet Commonly known as:  MAG-OX Take 1 Tab by mouth daily. methocarbamol 750 mg tablet Commonly known as:  ROBAXIN Take 1 Tab by mouth three (3) times daily as needed. metoprolol tartrate 25 mg tablet Commonly known as:  LOPRESSOR  
1/2 tablet twice a day  
  
 potassium chloride 20 mEq tablet Commonly known as:  K-DUR, KLOR-CON Take 1 Tab by mouth daily. rivaroxaban 20 mg Tab tablet Commonly known as:  Paredes Conquest Take 1 Tab by mouth daily. rosuvastatin 10 mg tablet Commonly known as:  CRESTOR Take 1 tablet every day  
  
 salsalate 750 mg tablet Commonly known as:  DISALCID Take 1 Tab by mouth three (3) times daily. SEROquel 50 mg tablet Generic drug:  QUEtiapine Take 50 mg by mouth three (3) times daily. Introducing [facility]! Joselyn Boyce introduces [employer] patient portal. Now you can access parts of your medical record, email your doctor's office, and request medication refills online. 1. In your internet browser, go to [email or web address] 2. Click on the First Time User? Click Here link in the Sign In box. You will see the New Member Sign Up page. 3. Enter your [employer] Access Code exactly as it appears below. You will not need to use this code after youve completed the sign-up process. If you do not sign up before the expiration date, you must request a new code. · [employer] Access Code: [email or web address] Expires: 4/3/2018 12:25 PM 
 
4.  Enter the last four digits of your Social Security Number (xxxx) and Date of Birth (mm/dd/yyyy) as indicated and click Submit. You will be taken to the next sign-up page. 5. Create a [employer] ID. This will be your [employer] login ID and cannot be changed, so think of one that is secure and easy to remember. 6. Create a [employer] password. You can change your password at any time. 7. Enter your Password Reset Question and Answer. This can be used at a later time if you forget your password. 8. Enter your e-mail address. You will receive e-mail notification when new information is available in [address] 9. Click Sign Up. You can now view and download portions of your medical record. 10. Click the Download Summary menu link to download a portable copy of your medical information. If you have questions, please visit the Frequently Asked Questions section of the [employer] website. Remember, [employer] is NOT to be used for urgent needs. For medical emergencies, dial 911. Now available from your iPhone and Android! Please provide this summary of care documentation to your next provider. Your primary care clinician is listed as Swapna Moncada. If you have any questions after today's visit, please call [phone].

## 2018-01-28 VITALS
TEMPERATURE: 97.3 F | DIASTOLIC BLOOD PRESSURE: 82 MMHG | HEART RATE: 60 BPM | HEIGHT: 62 IN | SYSTOLIC BLOOD PRESSURE: 153 MMHG | BODY MASS INDEX: 25.21 KG/M2 | RESPIRATION RATE: 16 BRPM | WEIGHT: 137 LBS

## 2018-01-28 PROBLEM — M65.9 SYNOVITIS OF KNEE: Status: ACTIVE | Noted: 2018-01-28

## 2018-01-28 PROBLEM — G44.219 EPISODIC TENSION-TYPE HEADACHE, NOT INTRACTABLE: Status: ACTIVE | Noted: 2018-01-28

## 2018-01-28 NOTE — PROGRESS NOTES
[address] [place] [address] and Primary Care  [place] [address]  [address] [place] [address]  Phone:  [phone]  Fax: [phone]       Chief Complaint   Patient presents with    Elevated Blood Pressure   . SUBJECTIVE:    Mariam Maldonado is a 79 y.o. female   Comes in for a return visit complaining of headaches. She then makes the association between headaches and high blood pressure and thinking they were associated. She has done this previously. She is concerned also about discoloration noted on her lower extremities and arms. On her last visit, I injected her knees and she is much better from the apparent synovitis that occurred. She did not require, as I suspected, po medications. She has a past history of atrial fibrillation and dyslipidemia. MedDATA/gwo            Current Outpatient Prescriptions   Medication Sig Dispense Refill    rivaroxaban (XARELTO) 20 mg tab tablet Take 1 Tab by mouth daily. 90 Tab 3    potassium chloride (K-DUR, KLOR-CON) 20 mEq tablet Take 1 Tab by mouth daily. 90 Tab 3    magnesium oxide (MAG-OX) 400 mg tablet Take 1 Tab by mouth daily. 90 Tab 3    metoprolol tartrate (LOPRESSOR) 25 mg tablet 1/2 tablet twice a day 45 Tab 3    salsalate (DISALCID) 750 mg tablet Take 1 Tab by mouth three (3) times daily. 90 Tab 0    ALPRAZolam (XANAX) 1 mg tablet Take 1 Tab by mouth three (3) times daily as needed for Anxiety. Max Daily Amount: 3 mg. 10 Tab 0    hydroquinone (ESOTERICA, MELQUIN) 4 % topical cream Apply  to affected area two (2) times a day. 30 g 6    albuterol (VENTOLIN HFA) 90 mcg/actuation inhaler Take 2 Puffs by inhalation every four (4) hours as needed for Wheezing. 1 Inhaler 11    rosuvastatin (CRESTOR) 10 mg tablet Take 1 tablet every day 90 Tab 3    fluticasone-salmeterol (ADVAIR) 250-50 mcg/dose diskus inhaler Take 1 Puff by inhalation every twelve (12) hours.  1 Inhaler 11    lidocaine (LIDODERM) 5 % 1 Patch by TransDERmal route every twenty-four (24) hours. 3 Package 3    desonide (TRIDESILON) 0.05 % topical lotion Apply  to affected area two (2) times a day. 118 mL 11    hydroCHLOROthiazide (HYDRODIURIL) 12.5 mg tablet Take once daily 30 Tab 11    methocarbamol (ROBAXIN) 750 mg tablet Take 1 Tab by mouth three (3) times daily as needed. 270 Tab 3    QUEtiapine (SEROQUEL) 50 mg tablet Take 50 mg by mouth three (3) times daily.  FLUoxetine (PROZAC) 20 mg capsule Take 20 mg by mouth daily.        Past Medical History:   Diagnosis Date    Asthma     B[facility] 2 disorder ([facility] [address]     CAD (coronary artery disease)     ablation    [facility]) ([facility]     Hypercholesteremia     Hypertension     Tachycardia      Past Surgical History:   Procedure Laterality Date    ABLATION OF SVT  3/30/2015         EP STUDY W/INDUCTION  3/30/2015         HX BACK SURGERY      HX COLONOSCOPY  9-    HX HEART CATHETERIZATION      HX HYSTERECTOMY       Allergies   Allergen Reactions    Ampicillin Hives and Nausea and Vomiting         REVIEW OF SYSTEMS:  General: negative for - chills or fever  ENT: negative for - headaches, nasal congestion or tinnitus  Respiratory: negative for - cough, hemoptysis, shortness of breath or wheezing  Cardiovascular : negative for - chest pain, edema, palpitations or shortness of breath  Gastrointestinal: negative for - abdominal pain, blood in stools, heartburn or nausea/vomiting  Genito-Urinary: no dysuria, trouble voiding, or hematuria  Musculoskeletal: negative for - gait disturbance, joint pain, joint stiffness or joint swelling  Neurological: no TIA or stroke symptoms  Hematologic: no bruises, no bleeding, no swollen glands  Integument: no lumps, mole changes, nail changes or rash  Endocrine: no malaise/lethargy or unexpected weight changes      Social History     Social History    Marital status:      Spouse name: N/A    Number of children: 1    Years of education: N/A     Occupational History    retired--[employer]---disabled      Social History Main Topics    Smoking status: Never Smoker    Smokeless tobacco: Never Used    Alcohol use No      Comment: occasional    Drug use: No    Sexual activity: Yes     Partners: Male     Other Topics Concern    None     Social History Narrative     History reviewed. No pertinent family history. OBJECTIVE:    Visit Vitals    /82 (BP 1 Location: Left arm, BP Patient Position: Sitting)  Comment: repeat 142/78    Pulse 60    Temp 97.3 °F (36.3 °C) (Oral)    Resp 16    Ht 5' 2\" (1.575 m)    Wt 137 lb (62.1 kg)    BMI 25.06 kg/m2     CONSTITUTIONAL: well , well nourished, appears age appropriate  EYES: perrla, eom intact  ENMT:moist mucous membranes, pharynx clear  NECK: supple. Thyroid normal  RESPIRATORY: Chest: clear to ascultation and percussion   CARDIOVASCULAR: Heart: regular rate and rhythm  GASTROINTESTINAL: Abdomen: soft, bowel sounds active  HEMATOLOGIC: no pathological lymph nodes palpated  MUSCULOSKELETAL: Extremities: no edema, pulse 1+   INTEGUMENT: No unusual rashes or suspicious skin lesions noted. Nails appear normal.  NEUROLOGIC: non-focal exam   MENTAL STATUS: alert and oriented, appropriate affect      ASSESSMENT:  1. Essential hypertension    2. Episodic tension-type headache, not intractable    3. Dermatitis    4. Synovitis of knee    5. Paroxysmal atrial fibrillation ([address]      Diagnoses and all orders for this visit:    1. Essential hypertension    2. Episodic tension-type headache, not intractable    3. Dermatitis    4. Synovitis of knee    5. Paroxysmal atrial fibrillation [facility])  Assessment & Plan: This condition is managed by Specialist.  Key CAD CHF Meds             rivaroxaban (XARELTO) 20 mg tab tablet  (Taking) Take 1 Tab by mouth daily. metoprolol tartrate (LOPRESSOR) 25 mg tablet  (Taking) 1/2 tablet twice a day    salsalate (DISALCID) 750 mg tablet  (Taking) Take 1 Tab by mouth three (3) times daily. hydroCHLOROthiazide (HYDRODIURIL) 12.5 mg tablet  (Taking) Take once daily        Key Antihyperlipidemia Meds             rosuvastatin (CRESTOR) 10 mg tablet  (Taking) Take 1 tablet every day        Lab Results   Component Value Date/Time    Sodium 144 05/16/2017 01:17 PM    Potassium 4.1 05/16/2017 01:17 PM    Cholesterol, total 158 05/16/2017 01:17 PM    HDL Cholesterol 80 05/16/2017 01:17 PM    LDL, calculated 60 05/16/2017 01:17 PM    Triglyceride 89 05/16/2017 01:17 PM             PLAN:    1. Her blood pressure is 142/78. I explained to her that actually if she were at home it would even be better than this, specifically normal. No change is made in her antihypertensive medication. 2. Her episodic headaches are muscle tension in origin. They are not related to her blood pressure. 3. The discoloration of her skin is basically age spots. Nothing need be done, or can be done. 4. Her synovitis has resolved in both knees. 5. Her rhythm appears to be regular today but she does have a history of paroxysmal atrial fibrillation for which she is on an anticoagulant and sees her cardiologist.     MedDATA/gwo           Follow-up Disposition:  Return in about 2 months (around 3/22/2018).       Tra Gore MD

## 2018-01-28 NOTE — ASSESSMENT & PLAN NOTE
This condition is managed by Specialist.  Key CAD CHF Meds             rivaroxaban (XARELTO) 20 mg tab tablet  (Taking) Take 1 Tab by mouth daily. metoprolol tartrate (LOPRESSOR) 25 mg tablet  (Taking) 1/2 tablet twice a day    salsalate (DISALCID) 750 mg tablet  (Taking) Take 1 Tab by mouth three (3) times daily.     hydroCHLOROthiazide (HYDRODIURIL) 12.5 mg tablet  (Taking) Take once daily        Key Antihyperlipidemia Meds             rosuvastatin (CRESTOR) 10 mg tablet  (Taking) Take 1 tablet every day        Lab Results   Component Value Date/Time    Sodium 144 05/16/2017 01:17 PM    Potassium 4.1 05/16/2017 01:17 PM    Cholesterol, total 158 05/16/2017 01:17 PM    HDL Cholesterol 80 05/16/2017 01:17 PM    LDL, calculated 60 05/16/2017 01:17 PM    Triglyceride 89 05/16/2017 01:17 PM

## 2018-01-30 VITALS — SYSTOLIC BLOOD PRESSURE: 142 MMHG | DIASTOLIC BLOOD PRESSURE: 91 MMHG

## 2018-01-31 ENCOUNTER — TELEPHONE (OUTPATIENT)
Dept: CARDIOLOGY CLINIC | Age: 68
End: 2018-01-31

## 2018-01-31 NOTE — TELEPHONE ENCOUNTER
Patient walked into office to notify that she was denied for [address] for patient assistance. VO per Dr. Ronald Stein samples of Xarelto given. Patient scheduled for follow up appointment to discuss alternatives.

## 2018-02-02 ENCOUNTER — OFFICE VISIT (OUTPATIENT)
Dept: INTERNAL MEDICINE CLINIC | Age: 68
End: 2018-02-02

## 2018-02-02 ENCOUNTER — TELEPHONE (OUTPATIENT)
Dept: CARDIOLOGY CLINIC | Age: 68
End: 2018-02-02

## 2018-02-02 VITALS
HEIGHT: 62 IN | WEIGHT: 136 LBS | HEART RATE: 69 BPM | BODY MASS INDEX: 25.03 KG/M2 | TEMPERATURE: 97.5 F | RESPIRATION RATE: 16 BRPM | DIASTOLIC BLOOD PRESSURE: 89 MMHG | OXYGEN SATURATION: 96 % | SYSTOLIC BLOOD PRESSURE: 166 MMHG

## 2018-02-02 DIAGNOSIS — L30.9 DERMATITIS: Primary | ICD-10-CM

## 2018-02-02 DIAGNOSIS — F41.1 ANXIETY NEUROSIS: ICD-10-CM

## 2018-02-02 DIAGNOSIS — I48.0 PAROXYSMAL ATRIAL FIBRILLATION (HCC): ICD-10-CM

## 2018-02-02 NOTE — MR AVS SNAPSHOT
[address] [facility] 
 
 
 [address] [id number] 
[phone] Patient: Annita Goldmann MRN: [id number] HOL:[id number] Visit Information Date & Time Provider Department Dept. Phone Encounter #  
 2/2/2018 11:00 AM Tra Gore MD [facility] [phone] [id number] Your Appointments 3/5/2018  2:20 PM  
ESTABLISHED PATIENT with Jarred Cade MD  
[facility] ([address]) Appt Note: DR Jamshid Padron Follow Up discuss medications [address] [place] [address]  [address] [place] [address]  
[address] [facility] [address]  
  
    
 3/29/2018  4:00 PM  
Any with Tra Gore MD  
[address] [facility] [address] [facility]) Appt Note: 2 month f/u  
 [address] [facility]  
  
   
 [address] [id number] Upcoming Health Maintenance Date Due COLONOSCOPY 12/24/1968 GLAUCOMA SCREENING Q2Y 12/24/2015 Pneumococcal 65+ Low/Medium Risk (2 of 2 - PPSV23) 5/16/2018 MEDICARE YEARLY EXAM 6/24/2018 BREAST CANCER SCRN MAMMOGRAM 10/31/2019 DTaP/Tdap/Td series (2 - Td) 10/19/2025 Allergies as of 2/2/2018  Review Complete On: 2/2/2018 By: Teresa Xavier Severity Noted Reaction Type Reactions Ampicillin  05/15/2013    Hives, Nausea and Vomiting Current Immunizations  Never Reviewed Name Date Influenza High Dose Vaccine PF 9/12/2017 Pneumococcal Polysaccharide (PPSV-23) 5/16/2013  6:39 PM  
 Tdap 10/19/2015  2:32 AM  
  
 Not reviewed this visit Vitals BP Pulse Temp Resp Height(growth percentile) Weight(growth percentile) 166/89 (BP 1 Location: Right arm, BP Patient Position: Sitting) 69 97.5 °F (36.4 °C) (Oral) 16 5' 2\" (1.575 m) 136 lb (61.7 kg) SpO2 BMI OB Status Smoking Status 96% 24.87 kg/m2 Hysterectomy Never Smoker BMI and BSA Data Body Mass Index Body Surface Area  
 24.87 kg/m 2 1.64 m 2 Preferred Pharmacy Pharmacy Name Phone [facility] [address] [place] [phone] [facility] [address] [phone] Your Updated Medication List  
  
   
This list is accurate as of: 2/2/18  1:58 PM.  Always use your most recent med list.  
  
  
  
  
 albuterol 90 mcg/actuation inhaler Commonly known as:  VENTOLIN HFA Take 2 Puffs by inhalation every four (4) hours as needed for Wheezing. ALPRAZolam 1 mg tablet Commonly known as:  Marysol Lynnwood Take 1 Tab by mouth three (3) times daily as needed for Anxiety. Max Daily Amount: 3 mg.  
  
 desonide 0.05 % topical lotion Commonly known as:  Conny Stade Apply  to affected area two (2) times a day. FLUoxetine 20 mg capsule Commonly known as:  PROzac Take 20 mg by mouth daily. fluticasone-salmeterol 250-50 mcg/dose diskus inhaler Commonly known as:  ADVAIR Take 1 Puff by inhalation every twelve (12) hours. hydroCHLOROthiazide 12.5 mg tablet Commonly known as:  HYDRODIURIL Take once daily  
  
 hydroquinone 4 % topical cream  
Commonly known as:  Adria Burow Apply  to affected area two (2) times a day. lidocaine 5 % Commonly known as:  LIDODERM  
1 Patch by TransDERmal route every twenty-four (24) hours. magnesium oxide 400 mg tablet Commonly known as:  MAG-OX Take 1 Tab by mouth daily. methocarbamol 750 mg tablet Commonly known as:  ROBAXIN Take 1 Tab by mouth three (3) times daily as needed. metoprolol tartrate 25 mg tablet Commonly known as:  LOPRESSOR  
1/2 tablet twice a day  
  
 potassium chloride 20 mEq tablet Commonly known as:  K-DUR, KLOR-CON Take 1 Tab by mouth daily. rivaroxaban 20 mg Tab tablet Commonly known as:  Chacha Minor Take 1 Tab by mouth daily. rosuvastatin 10 mg tablet Commonly known as:  CRESTOR Take 1 tablet every day  
  
 salsalate 750 mg tablet Commonly known as:  DISALCID Take 1 Tab by mouth three (3) times daily. SEROquel 50 mg tablet Generic drug:  QUEtiapine Take 50 mg by mouth three (3) times daily. Introducing [facility] & [facility]! Rafael Sinha introduces Roomorama patient portal. Now you can access parts of your medical record, email your doctor's office, and request medication refills online. 1. In your internet browser, go to [email or web address] 2. Click on the First Time User? Click Here link in the Sign In box. You will see the New Member Sign Up page. 3. Enter your Roomorama Access Code exactly as it appears below. You will not need to use this code after youve completed the sign-up process. If you do not sign up before the expiration date, you must request a new code. · Roomorama Access Code: [email or web address] Expires: 4/3/2018 12:25 PM 
 
4. Enter the last four digits of your Social Security Number (xxxx) and Date of Birth (mm/dd/yyyy) as indicated and click Submit. You will be taken to the next sign-up page. 5. Create a Roomorama ID. This will be your Roomorama login ID and cannot be changed, so think of one that is secure and easy to remember. 6. Create a Roomorama password. You can change your password at any time. 7. Enter your Password Reset Question and Answer. This can be used at a later time if you forget your password. 8. Enter your e-mail address. You will receive e-mail notification when new information is available in [address] 9. Click Sign Up. You can now view and download portions of your medical record. 10. Click the Download Summary menu link to download a portable copy of your medical information. If you have questions, please visit the Frequently Asked Questions section of the Roomorama website. Remember, Roomorama is NOT to be used for urgent needs. For medical emergencies, dial 911. Now available from your iPhone and Android! Please provide this summary of care documentation to your next provider. Your primary care clinician is listed as Swapna Moncada. If you have any questions after today's visit, please call [phone].

## 2018-02-02 NOTE — TELEPHONE ENCOUNTER
Received a form for tier exception for xarelto. Completed form, Dr Kathryn Valadez signed and faxed to [phone]. Fax conformation received.

## 2018-02-02 NOTE — PROGRESS NOTES
Chief Complaint   Patient presents with    Rash     1. Have you been to the ER, urgent care clinic since your last visit? Hospitalized since your last visit? No    2. Have you seen or consulted any other health care providers outside of the [address] [place] [address] since your last visit? Include any pap smears or colon screening.  No

## 2018-02-02 NOTE — TELEPHONE ENCOUNTER
Xarelto tier exception denied. Patient has pending appt with Dr Yeni Conley and will discuss changing xarelto. Attempted to reach patient by telephone. A message was left for return call.

## 2018-02-23 ENCOUNTER — TELEPHONE (OUTPATIENT)
Dept: CARDIOLOGY CLINIC | Age: 68
End: 2018-02-23

## 2018-02-23 NOTE — TELEPHONE ENCOUNTER
Patient walked into clinic stating she is almost out of Xarelto. Patient has pending appointment with Dr. Marlene Deluna on 3/5/18 to discussing medication. VO per Dr. Marlene Deluna for samples.

## 2018-03-01 NOTE — PROGRESS NOTES
Chief Complaint   Patient presents with    Rash   . SUBECTIVE:    Annita Goldmann is a 79 y.o. female She comes in for a return visit complaining about a rash. It is slightly pruritic. She is concerned that she might have scabies, as suggested by someone else. .      She is quite anxious today. Her anxiety level is high, as a direct result of the fear of having scabies. From a cardiac standpoint, she is doing well. She has no palpitations. Current Outpatient Prescriptions   Medication Sig Dispense Refill    potassium chloride (K-DUR, KLOR-CON) 20 mEq tablet Take 1 Tab by mouth daily. 90 Tab 3    magnesium oxide (MAG-OX) 400 mg tablet Take 1 Tab by mouth daily. 90 Tab 3    metoprolol tartrate (LOPRESSOR) 25 mg tablet 1/2 tablet twice a day 45 Tab 3    salsalate (DISALCID) 750 mg tablet Take 1 Tab by mouth three (3) times daily. 90 Tab 0    ALPRAZolam (XANAX) 1 mg tablet Take 1 Tab by mouth three (3) times daily as needed for Anxiety. Max Daily Amount: 3 mg. 10 Tab 0    hydroquinone (ESOTERICA, MELQUIN) 4 % topical cream Apply  to affected area two (2) times a day. 30 g 6    albuterol (VENTOLIN HFA) 90 mcg/actuation inhaler Take 2 Puffs by inhalation every four (4) hours as needed for Wheezing. 1 Inhaler 11    rosuvastatin (CRESTOR) 10 mg tablet Take 1 tablet every day 90 Tab 3    fluticasone-salmeterol (ADVAIR) 250-50 mcg/dose diskus inhaler Take 1 Puff by inhalation every twelve (12) hours. 1 Inhaler 11    lidocaine (LIDODERM) 5 % 1 Patch by TransDERmal route every twenty-four (24) hours. 3 Package 3    desonide (TRIDESILON) 0.05 % topical lotion Apply  to affected area two (2) times a day. 118 mL 11    hydroCHLOROthiazide (HYDRODIURIL) 12.5 mg tablet Take once daily 30 Tab 11    methocarbamol (ROBAXIN) 750 mg tablet Take 1 Tab by mouth three (3) times daily as needed. 270 Tab 3    QUEtiapine (SEROQUEL) 50 mg tablet Take 50 mg by mouth three (3) times daily.       FLUoxetine (PROZAC) 20 mg capsule Take 20 mg by mouth daily.  rivaroxaban (XARELTO) 20 mg tab tablet Take 1 Tab by mouth daily. 7 Tab 0     Past Medical History:   Diagnosis Date    Asthma     Bipolar 2 disorder ([facility] [address]     CAD (coronary artery disease)     ablation    DVT (deep venous thrombosis) (HCC)     Hypercholesteremia     Hypertension     Tachycardia      Past Surgical History:   Procedure Laterality Date    ABLATION OF SVT  3/30/2015         EP STUDY W/INDUCTION  3/30/2015         HX BACK SURGERY      HX COLONOSCOPY  9-    HX HEART CATHETERIZATION      HX HYSTERECTOMY       Allergies   Allergen Reactions    Ampicillin Hives and Nausea and Vomiting       REVIEW OF SYSTEMS:  Review of Systems - Negative except   ENT ROS: negative for - headaches, hearing change, nasal congestion, oral lesions, tinnitus, visual changes or   Respiratory ROS: no cough, shortness of breath, or wheezing  Cardiovascular ROS: no chest pain or dyspnea on exertion  Gastrointestinal ROS: no abdominal pain, change in bowel habits, or black or blood  Genito-Urinary ROS: no dysuria, trouble voiding, or hematuria  Musculoskeletal ROS: negative  Neurological ROS: no TIA or stroke symptoms      Social History     Social History    Marital status:      Spouse name: N/A    Number of children: 1    Years of education: N/A     Occupational History    retired--[employer]---disabled      Social History Main Topics    Smoking status: Never Smoker    Smokeless tobacco: Never Used    Alcohol use No      Comment: occasional    Drug use: No    Sexual activity: Yes     Partners: Male     Other Topics Concern    None     Social History Narrative   r  History reviewed. No pertinent family history.     OBJECTIVE:  Visit Vitals    /89 (BP 1 Location: Right arm, BP Patient Position: Sitting)    Pulse 69    Temp 97.5 °F (36.4 °C) (Oral)    Resp 16    Ht 5' 2\" (1.575 m)    Wt 136 lb (61.7 kg)    SpO2 96%    BMI 24.87 kg/m2     ENT: perrla,  eom intact  NECK: supple. Thyroid normal, no JVD  CHEST: clear to ascultation and percussion   HEART: regular rate and rhythm  ABD: soft, bowel sounds active,   EXTREMITIES: no edema, pulse 1+  INTEGUMENT: clear      ASSESSMENT:  1. Dermatitis    2. Anxiety neurosis    3. Paroxysmal atrial fibrillation (HCC)        PLAN:  1. The patient has a nonspecific eruption. Nothing needs to be done at this point because it is involuting. This does not in any way suggest scabies. 2. Her anxiety level is quite high and she will continue her anxiolytic as prescribed. 3. She is in sinus rhythm today based on regular heart rhythm. She will follow up with cardiology. Follow-up Disposition:  Return keep old apt.       Dex Boswell MD

## 2018-03-05 ENCOUNTER — OFFICE VISIT (OUTPATIENT)
Dept: CARDIOLOGY CLINIC | Age: 68
End: 2018-03-05

## 2018-03-05 VITALS
HEIGHT: 62 IN | RESPIRATION RATE: 16 BRPM | HEART RATE: 63 BPM | SYSTOLIC BLOOD PRESSURE: 110 MMHG | BODY MASS INDEX: 25.36 KG/M2 | WEIGHT: 137.8 LBS | DIASTOLIC BLOOD PRESSURE: 70 MMHG | OXYGEN SATURATION: 99 %

## 2018-03-05 DIAGNOSIS — I48.0 PAROXYSMAL ATRIAL FIBRILLATION (HCC): Primary | ICD-10-CM

## 2018-03-05 NOTE — PROGRESS NOTES
HISTORY OF PRESENT ILLNESS  Skyler Garg is a 79 y.o. female. Patient with h/o PSVT and  Tachy iron followed in [facility], admitted on 5/13 at [facility] with chest pain, at that time nuclear stress test showed no ischemia or MI and EF of 69%, patient remained in NSR during hospital stay. Here for follow up  Echo on 7/12 EF 55% mild TR and MR  Also h/o PAF as per her cardiologist in [place]. 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 Ejection fraction was estimated to be 63 %. No obvious wall motion  abnormalities identified in the views obtained. Right ventricle: The size was at the upper limits of normal.    Left atrium: The atrium was moderately dilated. Right atrium: The atrium was moderately dilated. Mitral valve: There was mild regurgitation. Tricuspid valve: There was moderate annular dilatation.  There was moderate   Recurrent atrial flutter in 10/ 2017 seen by Ep, ablation offered she decided to wait                Past Medical History                         Past Medical History        Diagnosis      Date              Tachycardia                 Bradycardia                 Bipolar 2 disorder                 Hypercholesteremia                 Hypertension                 DVT (deep venous thrombosis)                 Tachycardia                 Asthma                 Psychiatric disorder                                Carmela Zhang   Past Surgical History      Procedure    Laterality    Date          Hx back surgery                Hx hysterectomy                Ep study w/induction       3/30/2015                     Ablation of svt       3/30/2015                     Hx heart catheterization                Hx colonoscopy       9-          History                        Social History          Marital Status:                Spouse Name:    N/A            Number of Children:    1          Years of Education:    N/A                     Occupational History          retired--[employer]---disabled                        Social History Main Topics          Smoking status:    Never Smoker           Smokeless tobacco:    Never Verl Poag       Alcohol Use:    No          Drug Use:    No          Sexual Activity:    Not on file           HPI  Doing ok no cp or sob palpitations about 2-3 times a month but she is not sure and not very symptomatic with it  Review of Systems   Respiratory: Negative. Cardiovascular: Positive for palpitations. Negative for chest pain, orthopnea, claudication, leg swelling and PND. Visit Vitals    /70 (BP 1 Location: Left arm, BP Patient Position: Sitting)    Pulse 63    Resp 16    Ht 5' 2\" (1.575 m)    Wt 62.5 kg (137 lb 12.8 oz)    SpO2 99%    BMI 25.2 kg/m2       Physical Exam   Neck: No JVD present. Carotid bruit is not present. Cardiovascular: Normal rate and regular rhythm. Pulmonary/Chest: Effort normal and breath sounds normal.   Abdominal: Soft. Musculoskeletal: She exhibits no edema. Psychiatric: She has a normal mood and affect. Current Outpatient Prescriptions on File Prior to Visit   Medication Sig Dispense Refill    rivaroxaban (XARELTO) 20 mg tab tablet Take 1 Tab by mouth daily. 7 Tab 0    potassium chloride (K-DUR, KLOR-CON) 20 mEq tablet Take 1 Tab by mouth daily. 90 Tab 3    magnesium oxide (MAG-OX) 400 mg tablet Take 1 Tab by mouth daily. 90 Tab 3    metoprolol tartrate (LOPRESSOR) 25 mg tablet 1/2 tablet twice a day 45 Tab 3    salsalate (DISALCID) 750 mg tablet Take 1 Tab by mouth three (3) times daily.  90 Tab 0    ALPRAZolam (XANAX) 1 mg tablet Take 1 Tab by mouth three (3) times daily as needed for Anxiety. Max Daily Amount: 3 mg. 10 Tab 0    hydroquinone (ESOTERICA, MELQUIN) 4 % topical cream Apply  to affected area two (2) times a day. 30 g 6    albuterol (VENTOLIN HFA) 90 mcg/actuation inhaler Take 2 Puffs by inhalation every four (4) hours as needed for Wheezing. 1 Inhaler 11    rosuvastatin (CRESTOR) 10 mg tablet Take 1 tablet every day 90 Tab 3    fluticasone-salmeterol (ADVAIR) 250-50 mcg/dose diskus inhaler Take 1 Puff by inhalation every twelve (12) hours. 1 Inhaler 11    lidocaine (LIDODERM) 5 % 1 Patch by TransDERmal route every twenty-four (24) hours. 3 Package 3    desonide (TRIDESILON) 0.05 % topical lotion Apply  to affected area two (2) times a day. 118 mL 11    hydroCHLOROthiazide (HYDRODIURIL) 12.5 mg tablet Take once daily 30 Tab 11    methocarbamol (ROBAXIN) 750 mg tablet Take 1 Tab by mouth three (3) times daily as needed. 270 Tab 3    QUEtiapine (SEROQUEL) 50 mg tablet Take 50 mg by mouth three (3) times daily.  FLUoxetine (PROZAC) 20 mg capsule Take 20 mg by mouth daily. No current facility-administered medications on file prior to visit. 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 Continue observation for now,   PAF:previous Atrial flutter now back in NSR ECG today with NSR and no significant st t changes  Already on [facility] for DVT, this will need to be continued long term continue small dose of lopressor. Hold off ablation unless of recurrent more frequent atrial fluter  No untoward effects with oacs  HTN: controlled  DVT on xarelto long term no untoward effects thus far  HLD: on statin , closely followed by her pcp  CP:  Not recurrent!   See her back in 4 months otherwise

## 2018-03-05 NOTE — MR AVS SNAPSHOT
[address] [facility] [address] [place] [address] 
[phone] Patient: Francy Araujo MRN: [id number] NMP:00/23/9071 Visit Information Date & Time Provider Department Dept. Phone Encounter #  
 3/5/2018  2:20 PM Lucas Laura MD [facility] Tomeka Younger [phone] [id number] Follow-up Instructions Return in about 4 months (around 7/5/2018). Follow-up and Disposition History Your Appointments 3/29/2018  4:00 PM  
Any with Angeles Ramirez MD  
[address] [place] [address] and Primary Care [facility]) Appt Note: 2 month f/u  
 [address] [facility]  
  
   
 [address] [id number] Upcoming Health Maintenance Date Due COLONOSCOPY 12/24/1968 GLAUCOMA SCREENING Q2Y 12/24/2015 Pneumococcal 65+ Low/Medium Risk (2 of 2 - PPSV23) 5/16/2018 MEDICARE YEARLY EXAM 6/24/2018 BREAST CANCER SCRN MAMMOGRAM 10/31/2019 DTaP/Tdap/Td series (2 - Td) 10/19/2025 Allergies as of 3/5/2018  Review Complete On: 3/5/2018 By: Lucas Laura MD  
  
 Severity Noted Reaction Type Reactions Ampicillin  05/15/2013    Hives, Nausea and Vomiting Current Immunizations  Never Reviewed Name Date Influenza High Dose Vaccine PF 9/12/2017 Pneumococcal Polysaccharide (PPSV-23) 5/16/2013  6:39 PM  
 Tdap 10/19/2015  2:32 AM  
  
 Not reviewed this visit You Were Diagnosed With   
  
 Codes Comments Paroxysmal atrial fibrillation (HCC)    -  Primary ICD-10-CM: I48.0 ICD-9-CM: 427.31 Vitals BP Pulse Resp Height(growth percentile) Weight(growth percentile) SpO2  
 110/70 (BP 1 Location: Left arm, BP Patient Position: Sitting) 63 16 5' 2\" (1.575 m) 137 lb 12.8 oz (62.5 kg) 99% BMI OB Status Smoking Status 25.2 kg/m2 Hysterectomy Never Smoker BMI and BSA Data Body Mass Index Body Surface Area 25.2 kg/m 2 1.65 m 2 Preferred Pharmacy Pharmacy Name Phone [facility] [address] [place] [address] [address] [phone] Your Updated Medication List  
  
   
This list is accurate as of 3/5/18  4:21 PM.  Always use your most recent med list.  
  
  
  
  
 albuterol 90 mcg/actuation inhaler Commonly known as:  VENTOLIN HFA Take 2 Puffs by inhalation every four (4) hours as needed for Wheezing. ALPRAZolam 1 mg tablet Commonly known as:  Gifty Brisk Take 1 Tab by mouth three (3) times daily as needed for Anxiety. Max Daily Amount: 3 mg.  
  
 desonide 0.05 % topical lotion Commonly known as:  Denise Mahin Apply  to affected area two (2) times a day. FLUoxetine 20 mg capsule Commonly known as:  PROzac Take 20 mg by mouth daily. fluticasone-salmeterol 250-50 mcg/dose diskus inhaler Commonly known as:  ADVAIR Take 1 Puff by inhalation every twelve (12) hours. hydroCHLOROthiazide 12.5 mg tablet Commonly known as:  HYDRODIURIL Take once daily  
  
 hydroquinone 4 % topical cream  
Commonly known as:  Ramya Renetta Apply  to affected area two (2) times a day. lidocaine 5 % Commonly known as:  LIDODERM  
1 Patch by TransDERmal route every twenty-four (24) hours. magnesium oxide 400 mg tablet Commonly known as:  MAG-OX Take 1 Tab by mouth daily. methocarbamol 750 mg tablet Commonly known as:  ROBAXIN Take 1 Tab by mouth three (3) times daily as needed. metoprolol tartrate 25 mg tablet Commonly known as:  LOPRESSOR  
1/2 tablet twice a day  
  
 potassium chloride 20 mEq tablet Commonly known as:  K-DUR, KLOR-CON Take 1 Tab by mouth daily. rivaroxaban 20 mg Tab tablet Commonly known as:  Pryor Bruins Take 1 Tab by mouth daily. rosuvastatin 10 mg tablet Commonly known as:  CRESTOR Take 1 tablet every day  
  
 salsalate 750 mg tablet Commonly known as:  DISALCID  
 Take 1 Tab by mouth three (3) times daily. SEROquel 50 mg tablet Generic drug:  QUEtiapine Take 50 mg by mouth three (3) times daily. We Performed the Following AMB POC EKG ROUTINE W/ 12 LEADS, INTER & REP [13256 CPT(R)] Follow-up Instructions Return in about 4 months (around 7/5/2018). Introducing [facility]! Emiliaearl Kaela introduces LibertadCard patient portal. Now you can access parts of your medical record, email your doctor's office, and request medication refills online. 1. In your internet browser, go to [email or web address] 2. Click on the First Time User? Click Here link in the Sign In box. You will see the New Member Sign Up page. 3. Enter your LibertadCard Access Code exactly as it appears below. You will not need to use this code after youve completed the sign-up process. If you do not sign up before the expiration date, you must request a new code. · LibertadCard Access Code: [email or web address] Expires: 4/3/2018 12:25 PM 
 
4. Enter the last four digits of your Social Security Number (xxxx) and Date of Birth (mm/dd/yyyy) as indicated and click Submit. You will be taken to the next sign-up page. 5. Create a LibertadCard ID. This will be your LibertadCard login ID and cannot be changed, so think of one that is secure and easy to remember. 6. Create a LibertadCard password. You can change your password at any time. 7. Enter your Password Reset Question and Answer. This can be used at a later time if you forget your password. 8. Enter your e-mail address. You will receive e-mail notification when new information is available in [address] 9. Click Sign Up. You can now view and download portions of your medical record. 10. Click the Download Summary menu link to download a portable copy of your medical information.  
 
If you have questions, please visit the Frequently Asked Questions section of the [employer]. Remember, [employer] Mom[employer] is NOT to be used for urgent needs. For medical emergencies, dial 911. Now available from your iPhone and Android! Please provide this summary of care documentation to your next provider. Your primary care clinician is listed as Swapna Moncada. If you have any questions after today's visit, please call [phone].

## 2018-03-19 DIAGNOSIS — I48.0 PAROXYSMAL ATRIAL FIBRILLATION (HCC): ICD-10-CM

## 2018-03-26 DIAGNOSIS — I48.0 PAROXYSMAL ATRIAL FIBRILLATION (HCC): ICD-10-CM

## 2018-03-29 ENCOUNTER — OFFICE VISIT (OUTPATIENT)
Dept: INTERNAL MEDICINE CLINIC | Age: 68
End: 2018-03-29

## 2018-03-29 VITALS
SYSTOLIC BLOOD PRESSURE: 108 MMHG | DIASTOLIC BLOOD PRESSURE: 71 MMHG | WEIGHT: 135.4 LBS | HEART RATE: 72 BPM | RESPIRATION RATE: 16 BRPM | OXYGEN SATURATION: 96 % | HEIGHT: 62 IN | BODY MASS INDEX: 24.92 KG/M2 | TEMPERATURE: 98.2 F

## 2018-03-29 DIAGNOSIS — E78.5 DYSLIPIDEMIA: ICD-10-CM

## 2018-03-29 DIAGNOSIS — M54.41 CHRONIC MIDLINE LOW BACK PAIN WITH RIGHT-SIDED SCIATICA: ICD-10-CM

## 2018-03-29 DIAGNOSIS — G89.29 CHRONIC MIDLINE LOW BACK PAIN WITH RIGHT-SIDED SCIATICA: ICD-10-CM

## 2018-03-29 DIAGNOSIS — I48.0 PAROXYSMAL ATRIAL FIBRILLATION (HCC): ICD-10-CM

## 2018-03-29 DIAGNOSIS — I10 ESSENTIAL HYPERTENSION: ICD-10-CM

## 2018-03-29 DIAGNOSIS — R00.2 PALPITATIONS: ICD-10-CM

## 2018-03-29 DIAGNOSIS — R55 VASOVAGAL REACTION: Primary | ICD-10-CM

## 2018-03-29 RX ORDER — DESONIDE 0.5 MG/ML
LOTION TOPICAL 2 TIMES DAILY
Qty: 118 ML | Refills: 11 | Status: SHIPPED | OUTPATIENT
Start: 2018-03-29 | End: 2019-10-03

## 2018-03-29 RX ORDER — HYDROQUINONE 40 MG/G
CREAM TOPICAL 2 TIMES DAILY
Qty: 30 G | Refills: 6 | Status: SHIPPED | OUTPATIENT
Start: 2018-03-29 | End: 2019-03-05 | Stop reason: SDUPTHER

## 2018-03-29 NOTE — MR AVS SNAPSHOT
[address] [facility]. [place] [address] [address] 
[phone] Patient: Kb Da Silva MRN: [id number] Visit Information Date & Time Provider Department Dept. Phone Encounter #  
 3/29/2018  4:00 PM [facility] [id number] Your Appointments 7/9/2018  2:20 PM  
ESTABLISHED PATIENT with Sherley Chiang MD  
[facility] Appt Note: 4  month f/u  
 [address] [facility] [address] [address] 2301 [facility],[address] [facility] 7 [address] Upcoming Health Maintenance Date Due COLONOSCOPY 12/24/1968 GLAUCOMA SCREENING Q2Y 4/29/2018* Pneumococcal 65+ Low/Medium Risk (2 of 2 - PPSV23) 5/16/2018 MEDICARE YEARLY EXAM 6/24/2018 BREAST CANCER SCRN MAMMOGRAM 10/31/2019 DTaP/Tdap/Td series (2 - Td) 10/19/2025 *Topic was postponed. The date shown is not the original due date. Allergies as of 3/29/2018  Review Complete On: 3/29/2018 By: Jarvis Lab Severity Noted Reaction Type Reactions Ampicillin  05/15/2013    Hives, Nausea and Vomiting Current Immunizations  Never Reviewed Name Date Influenza High Dose Vaccine PF 9/12/2017 Pneumococcal Polysaccharide (PPSV-23) 5/16/2013  6:39 PM  
 Tdap 10/19/2015  2:32 AM  
  
 Not reviewed this visit Vitals BP Pulse Temp Resp Height(growth percentile) Weight(growth percentile) 108/71 (BP 1 Location: Left arm, BP Patient Position: Sitting) 72 98.2 °F (36.8 °C) (Oral) 16 5' 2\" (1.575 m) 135 lb 6.4 oz (61.4 kg) SpO2 BMI OB Status Smoking Status 96% 24.76 kg/m2 Hysterectomy Never Smoker BMI and BSA Data Body Mass Index Body Surface Area 24.76 kg/m 2 1.64 m 2 Preferred Pharmacy Pharmacy Name Phone [address] - [address] [place] [address] [phone] Your Updated Medication List  
  
   
This list is accurate as of 3/29/18  5:30 PM.  Always use your most recent med list.  
  
  
  
  
 albuterol 90 mcg/actuation inhaler Commonly known as:  VENTOLIN HFA Take 2 Puffs by inhalation every four (4) hours as needed for Wheezing. ALPRAZolam 1 mg tablet Commonly known as:  Jeannette Wampum Take 1 Tab by mouth three (3) times daily as needed for Anxiety. Max Daily Amount: 3 mg.  
  
 desonide 0.05 % topical lotion Commonly known as:  Marene Flatness Apply  to affected area two (2) times a day. FLUoxetine 20 mg capsule Commonly known as:  PROzac Take 20 mg by mouth daily. fluticasone-salmeterol 250-50 mcg/dose diskus inhaler Commonly known as:  ADVAIR Take 1 Puff by inhalation every twelve (12) hours. hydroCHLOROthiazide 12.5 mg tablet Commonly known as:  HYDRODIURIL Take once daily  
  
 hydroquinone 4 % topical cream  
Commonly known as:  Yasmin Milder Apply  to affected area two (2) times a day. lidocaine 5 % Commonly known as:  LIDODERM  
1 Patch by TransDERmal route every twenty-four (24) hours. magnesium oxide 400 mg tablet Commonly known as:  MAG-OX Take 1 Tab by mouth daily. methocarbamol 750 mg tablet Commonly known as:  ROBAXIN Take 1 Tab by mouth three (3) times daily as needed. metoprolol tartrate 25 mg tablet Commonly known as:  LOPRESSOR  
1/2 tablet twice a day  
  
 potassium chloride 20 mEq tablet Commonly known as:  K-DUR, KLOR-CON Take 1 Tab by mouth daily. rivaroxaban 20 mg Tab tablet Commonly known as:  Arva Jaramillo Take 1 Tab by mouth daily. rosuvastatin 10 mg tablet Commonly known as:  CRESTOR Take 1 tablet every day  
  
 salsalate 750 mg tablet Commonly known as:  DISALCID Take 1 Tab by mouth three (3) times daily. SEROquel 50 mg tablet Generic drug:  QUEtiapine Take 50 mg by mouth three (3) times daily. Introducing [facility]! [employer] introduces [employer]Seek patient portal. Now you can access parts of your medical record, email your doctor's office, and request medication refills online. 1. In your internet browser, go to [email or web address] 2. Click on the First Time User? Click Here link in the Sign In box. You will see the New Member Sign Up page. 3. Enter your CoachSeek Access Code exactly as it appears below. You will not need to use this code after youve completed the sign-up process. If you do not sign up before the expiration date, you must request a new code. · CoachSeek Access Code: [email or web address] Expires: 4/3/2018  1:25 PM 
 
4. Enter the last four digits of your Social Security Number (xxxx) and Date of Birth (mm/dd/yyyy) as indicated and click Submit. You will be taken to the next sign-up page. 5. Create a CoachSeek ID. This will be your CoachSeek login ID and cannot be changed, so think of one that is secure and easy to remember. 6. Create a CoachSeek password. You can change your password at any time. 7. Enter your Password Reset Question and Answer. This can be used at a later time if you forget your password. 8. Enter your e-mail address. You will receive e-mail notification when new information is available in [address] 9. Click Sign Up. You can now view and download portions of your medical record. 10. Click the Download Summary menu link to download a portable copy of your medical information. If you have questions, please visit the Frequently Asked Questions section of the CoachSeek website. Remember, CoachSeek is NOT to be used for urgent needs. For medical emergencies, dial 911. Now available from your iPhone and Android! Please provide this summary of care documentation to your next provider. Your primary care clinician is listed as Swapna Moncada. If you have any questions after today's visit, please call [phone].

## 2018-03-29 NOTE — PROGRESS NOTES
Chief Complaint   Patient presents with    Anxiety     2 month follow up      1. Have you been to the ER, urgent care clinic since your last visit? Hospitalized since your last visit? No    2. Have you seen or consulted any other health care providers outside of the [address] [place] [address] since your last visit? Include any pap smears or colon screening.  No

## 2018-03-30 NOTE — PROGRESS NOTES
[address] [place] [facility]  [place] [address]  [address] [place] [address]  Phone:  [phone]  Fax: [phone]       Chief Complaint   Patient presents with    Anxiety     2 month follow up    . SUBJECTIVE:    Dylan Barney is a 79 y.o. female She comes in for a return visit stating that she has not done well. Several nights ago, she developed dizziness accompanied by mild nausea. No vertiginous component to this. She continued to work and within several hours it abated. She has not had any further episodes. She has had episodic palpitations, but did not check her pulse during this time. She noted a mild flare of her low back pain of late. There is a past history of primary hypertension and dyslipidemia and paroxysmal atrial fibrillation. She is actively followed by cardiology. Current Outpatient Prescriptions   Medication Sig Dispense Refill    hydroquinone (ESOTERICA, MELQUIN) 4 % topical cream Apply  to affected area two (2) times a day. 30 g 6    desonide (TRIDESILON) 0.05 % topical lotion Apply  to affected area two (2) times a day. 118 mL 11    rivaroxaban (XARELTO) 20 mg tab tablet Take 1 Tab by mouth daily. 30 Tab 11    potassium chloride (K-DUR, KLOR-CON) 20 mEq tablet Take 1 Tab by mouth daily. 90 Tab 3    magnesium oxide (MAG-OX) 400 mg tablet Take 1 Tab by mouth daily. 90 Tab 3    metoprolol tartrate (LOPRESSOR) 25 mg tablet 1/2 tablet twice a day 45 Tab 3    salsalate (DISALCID) 750 mg tablet Take 1 Tab by mouth three (3) times daily. 90 Tab 0    ALPRAZolam (XANAX) 1 mg tablet Take 1 Tab by mouth three (3) times daily as needed for Anxiety. Max Daily Amount: 3 mg. 10 Tab 0    albuterol (VENTOLIN HFA) 90 mcg/actuation inhaler Take 2 Puffs by inhalation every four (4) hours as needed for Wheezing.  1 Inhaler 11    rosuvastatin (CRESTOR) 10 mg tablet Take 1 tablet every day 90 Tab 3    fluticasone-salmeterol (ADVAIR) 250-50 mcg/dose diskus inhaler Take 1 Puff by inhalation every twelve (12) hours. 1 Inhaler 11    lidocaine (LIDODERM) 5 % 1 Patch by TransDERmal route every twenty-four (24) hours. 3 Package 3    hydroCHLOROthiazide (HYDRODIURIL) 12.5 mg tablet Take once daily 30 Tab 11    methocarbamol (ROBAXIN) 750 mg tablet Take 1 Tab by mouth three (3) times daily as needed. 270 Tab 3    QUEtiapine (SEROQUEL) 50 mg tablet Take 50 mg by mouth three (3) times daily.  FLUoxetine (PROZAC) 20 mg capsule Take 20 mg by mouth daily.        Past Medical History:   Diagnosis Date    Asthma     Bipolar 2 disorder ([facility] [address]     CAD (coronary artery disease)     ablation    D[facility]deep venous thrombosis) ([facility]     Hypercholesteremia     Hypertension     Tachycardia      Past Surgical History:   Procedure Laterality Date    ABLATION OF SVT  3/30/2015         EP STUDY W/INDUCTION  3/30/2015         HX BACK SURGERY      HX COLONOSCOPY  9-    HX HEART CATHETERIZATION      HX HYSTERECTOMY       Allergies   Allergen Reactions    Ampicillin Hives and Nausea and Vomiting         REVIEW OF SYSTEMS:  General: negative for - chills or fever  ENT: negative for - headaches, nasal congestion or tinnitus  Respiratory: negative for - cough, hemoptysis, shortness of breath or wheezing  Cardiovascular : negative for - chest pain, edema, palpitations or shortness of breath  Gastrointestinal: negative for - abdominal pain, blood in stools, heartburn or nausea/vomiting  Genito-Urinary: no dysuria, trouble voiding, or hematuria  Musculoskeletal: negative for - gait disturbance, joint pain, joint stiffness or joint swelling  Neurological: no TIA or stroke symptoms  Hematologic: no bruises, no bleeding, no swollen glands  Integument: no lumps, mole changes, nail changes or rash  Endocrine: no malaise/lethargy or unexpected weight changes      Social History     Social History    Marital status:      Spouse name: N/A    Number of children: 1    Years of education: N/A     Occupational History    retired--[employer]---disabled      Social History Main Topics    Smoking status: Never Smoker    Smokeless tobacco: Never Used    Alcohol use No      Comment: occasional    Drug use: No    Sexual activity: Yes     Partners: Male     Other Topics Concern    None     Social History Narrative     History reviewed. No pertinent family history. OBJECTIVE:    Visit Vitals    /71 (BP 1 Location: Left arm, BP Patient Position: Sitting)    Pulse 72    Temp 98.2 °F (36.8 °C) (Oral)    Resp 16    Ht 5' 2\" (1.575 m)    Wt 135 lb 6.4 oz (61.4 kg)    SpO2 96%    BMI 24.76 kg/m2     CONSTITUTIONAL: well , well nourished, appears age appropriate  EYES: perrla, eom intact  ENMT:moist mucous membranes, pharynx clear  NECK: supple. Thyroid normal  RESPIRATORY: Chest: clear to ascultation and percussion   CARDIOVASCULAR: Heart: regular rate and rhythm  GASTROINTESTINAL: Abdomen: soft, bowel sounds active  HEMATOLOGIC: no pathological lymph nodes palpated  MUSCULOSKELETAL: Extremities: no edema, pulse 1+   INTEGUMENT: No unusual rashes or suspicious skin lesions noted. Nails appear normal.  NEUROLOGIC: non-focal exam   MENTAL STATUS: alert and oriented, appropriate affect      ASSESSMENT:  1. Vasovagal reaction    2. Palpitations    3. Paroxysmal atrial fibrillation (HCC)    4. Chronic midline low back pain with right-sided sciatica    5. Essential hypertension    6. Dyslipidemia        PLAN:    1. The patient apparently had an avoidant vasovagal reaction. Nothing more needs to be done for now. 2. As far as her palpitations are concerned, this could represent paroxysmal atrial fibrillation, but she is well covered. She remains on Xarelto, as well as her cardiac medications given to her by her cardiologist.    3. She has chronic low back pain with occasional exacerbations, as has occurred most recently. Continue symptomatic treatment.   Fortunately, she is not taking any narcotics for that now. 4. Her blood pressure is excellent today and no adjustments were made. 5. She will continue her statin as prescribed. 6. I encouraged her to remain as physically active as possible. Follow-up Disposition:  Return in about 3 months (around 6/29/2018).       Migdalia Mckenna MD

## 2018-04-05 RX ORDER — HYDROCHLOROTHIAZIDE 12.5 MG/1
TABLET ORAL
Qty: 30 TAB | Refills: 11 | Status: CANCELLED | OUTPATIENT
Start: 2018-04-05

## 2018-04-06 RX ORDER — HYDROCHLOROTHIAZIDE 12.5 MG/1
TABLET ORAL
Qty: 90 TAB | Refills: 3 | Status: SHIPPED | OUTPATIENT
Start: 2018-04-06 | End: 2019-03-26 | Stop reason: SDUPTHER

## 2018-05-03 ENCOUNTER — OFFICE VISIT (OUTPATIENT)
Dept: INTERNAL MEDICINE CLINIC | Age: 68
End: 2018-05-03

## 2018-05-03 VITALS
WEIGHT: 137.1 LBS | HEART RATE: 72 BPM | DIASTOLIC BLOOD PRESSURE: 77 MMHG | TEMPERATURE: 98.1 F | OXYGEN SATURATION: 95 % | HEIGHT: 62 IN | SYSTOLIC BLOOD PRESSURE: 142 MMHG | RESPIRATION RATE: 18 BRPM | BODY MASS INDEX: 25.23 KG/M2

## 2018-05-03 DIAGNOSIS — E78.5 DYSLIPIDEMIA: ICD-10-CM

## 2018-05-03 DIAGNOSIS — M54.16 LUMBAR RADICULAR PAIN: ICD-10-CM

## 2018-05-03 DIAGNOSIS — G44.219 EPISODIC TENSION-TYPE HEADACHE, NOT INTRACTABLE: Primary | ICD-10-CM

## 2018-05-03 DIAGNOSIS — I10 ESSENTIAL HYPERTENSION: ICD-10-CM

## 2018-05-03 DIAGNOSIS — I48.0 PAROXYSMAL ATRIAL FIBRILLATION (HCC): ICD-10-CM

## 2018-05-03 DIAGNOSIS — R11.0 NAUSEA: ICD-10-CM

## 2018-05-03 RX ORDER — HYDROCODONE BITARTRATE AND ACETAMINOPHEN 5; 325 MG/1; MG/1
1 TABLET ORAL
Qty: 30 TAB | Refills: 0 | Status: SHIPPED | OUTPATIENT
Start: 2018-05-03 | End: 2018-10-09 | Stop reason: SDUPTHER

## 2018-05-03 NOTE — PROGRESS NOTES
1. Have you been to the ER, urgent care clinic since your last visit? Hospitalized since your last visit? No    2. Have you seen or consulted any other health care providers outside of the [address] [place] [address] since your last visit? Include any pap smears or colon screening. No     Complaints of nausea, headache, dizziness.  Requesting a MRI

## 2018-05-03 NOTE — MR AVS SNAPSHOT
[address] [facility] 
 
 
 [address] [address] 
[phone] Patient: Kong Rajan MRN: [id number] Visit Information Date & Time Provider Department Dept. Phone Encounter #  
 5/3/2018  3:30 PM Brissa Motta MD [facility] [phone] [id number] Your Appointments 6/28/2018  4:15 PM  
Any with Brissa Motta MD  
[address] [place] [address] and Primary Care [address] [place] [address]) Appt Note: 3 month follow up  
 [address] 90 [phone]  
  
   
 [address] [id number]  
  
    
 7/9/2018  2:20 PM  
ESTABLISHED PATIENT with Olinda Corets MD  
[facility] ([address] [facility]) Appt Note: 4  month f/u  
 [address] [facility] [address] Gabrielle Jean 13  
[address] [facility] 7 [address] Upcoming Health Maintenance Date Due COLONOSCOPY 12/24/1968 GLAUCOMA SCREENING Q2Y 12/24/2015 Pneumococcal 65+ Low/Medium Risk (2 of 2 - PPSV23) 5/16/2018 MEDICARE YEARLY EXAM 6/24/2018 Influenza Age 5 to Adult 8/1/2018 BREAST CANCER SCRN MAMMOGRAM 10/31/2019 DTaP/Tdap/Td series (2 - Td) 10/19/2025 Allergies as of 5/3/2018  Review Complete On: 5/3/2018 By: Becca Muniz LPN Severity Noted Reaction Type Reactions Ampicillin  05/15/2013    Hives, Nausea and Vomiting Current Immunizations  Never Reviewed Name Date Influenza High Dose Vaccine PF 9/12/2017 Pneumococcal Polysaccharide (PPSV-23) 5/16/2013  6:39 PM  
 Tdap 10/19/2015  2:32 AM  
  
 Not reviewed this visit You Were Diagnosed With   
  
 Codes Comments Episodic tension-type headache, not intractable    -  Primary ICD-10-CM: J56.502 ICD-9-CM: 339.11 Nausea     ICD-10-CM: R11.0 ICD-9-CM: 787.02 Lumbar radicular pain     ICD-10-CM: M54.16 
ICD-9-CM: 724.4 Paroxysmal atrial fibrillation (HCC)     ICD-10-CM: I48.0 ICD-9-CM: 427.31 Vitals BP Pulse Temp Resp Height(growth percentile) Weight(growth percentile) 142/77 72 98.1 °F (36.7 °C) (Oral) 18 5' 2\" (1.575 m) 137 lb 1.6 oz (62.2 kg) SpO2 BMI OB Status Smoking Status 95% 25.08 kg/m2 Hysterectomy Never Smoker Vitals History BMI and BSA Data Body Mass Index Body Surface Area 25.08 kg/m 2 1.65 m 2 Preferred Pharmacy Pharmacy Name Phone [facility]ann[facility] [address] [place] [address] [address] [phone] Your Updated Medication List  
  
   
This list is accurate as of 5/3/18  3:46 PM.  Always use your most recent med list.  
  
  
  
  
 albuterol 90 mcg/actuation inhaler Commonly known as:  VENTOLIN HFA Take 2 Puffs by inhalation every four (4) hours as needed for Wheezing. ALPRAZolam 1 mg tablet Commonly known as:  Carmita Peppers Take 1 Tab by mouth three (3) times daily as needed for Anxiety. Max Daily Amount: 3 mg.  
  
 desonide 0.05 % topical lotion Commonly known as:  Wendall Favors Apply  to affected area two (2) times a day. FLUoxetine 20 mg capsule Commonly known as:  PROzac Take 20 mg by mouth daily. fluticasone-salmeterol 250-50 mcg/dose diskus inhaler Commonly known as:  ADVAIR Take 1 Puff by inhalation every twelve (12) hours. hydroCHLOROthiazide 12.5 mg tablet Commonly known as:  HYDRODIURIL Take once daily HYDROcodone-acetaminophen 5-325 mg per tablet Commonly known as:  Lindsey Fore Take 1 Tab by mouth two (2) times daily as needed for Pain. Max Daily Amount: 2 Tabs. hydroquinone 4 % topical cream  
Commonly known as:  Judythe Bussing Apply  to affected area two (2) times a day. lidocaine 5 % Commonly known as:  LIDODERM  
1 Patch by TransDERmal route every twenty-four (24) hours. magnesium oxide 400 mg tablet Commonly known as:  MAG-OX Take 1 Tab by mouth daily. methocarbamol 750 mg tablet Commonly known as:  ROBAXIN Take 1 Tab by mouth three (3) times daily as needed. metoprolol tartrate 25 mg tablet Commonly known as:  LOPRESSOR  
1/2 tablet twice a day  
  
 potassium chloride 20 mEq tablet Commonly known as:  K-DUR, KLOR-CON Take 1 Tab by mouth daily. rivaroxaban 20 mg Tab tablet Commonly known as:  Mary Span Take 1 Tab by mouth daily. rosuvastatin 10 mg tablet Commonly known as:  CRESTOR Take 1 tablet every day  
  
 salsalate 750 mg tablet Commonly known as:  DISALCID Take 1 Tab by mouth three (3) times daily. SEROquel 50 mg tablet Generic drug:  QUEtiapine Take 50 mg by mouth three (3) times daily. Prescriptions Printed Refills HYDROcodone-acetaminophen (NORCO) 5-325 mg per tablet 0 Sig: Take 1 Tab by mouth two (2) times daily as needed for Pain. Max Daily Amount: 2 Tabs. Class: Print Route: Oral  
  
Introducing [facility]! Shayna Faith introduces Prompt.ly patient portal. Now you can access parts of your medical record, email your doctor's office, and request medication refills online. 1. In your internet browser, go to [email or web address] 2. Click on the First Time User? Click Here link in the Sign In box. You will see the New Member Sign Up page. 3. Enter your Prompt.ly Access Code exactly as it appears below. You will not need to use this code after youve completed the sign-up process. If you do not sign up before the expiration date, you must request a new code. · Prompt.ly Access Code: [email or web address] Expires: 8/1/2018  3:46 PM 
 
4. Enter the last four digits of your Social Security Number (xxxx) and Date of Birth (mm/dd/yyyy) as indicated and click Submit. You will be taken to the next sign-up page. 5. Create a Prompt.ly ID.  This will be your Prompt.ly login ID and cannot be changed, so think of one that is secure and easy to remember. 6. Create a [employer] password. You can change your password at any time. 7. Enter your Password Reset Question and Answer. This can be used at a later time if you forget your password. 8. Enter your e-mail address. You will receive e-mail notification when new information is available in [address] 9. Click Sign Up. You can now view and download portions of your medical record. 10. Click the Download Summary menu link to download a portable copy of your medical information. If you have questions, please visit the Frequently Asked Questions section of the [employer] website. Remember, [employer] is NOT to be used for urgent needs. For medical emergencies, dial 911. Now available from your iPhone and Android! Please provide this summary of care documentation to your next provider. Your primary care clinician is listed as Swapna Moncada. If you have any questions after today's visit, please call [phone].

## 2018-05-04 NOTE — PROGRESS NOTES
[address] [place] [address] and Primary Care  [place] [address]  [address] [place] [address]  Phone:  [phone]  Fax: [phone]       Chief Complaint   Patient presents with    Irregular Heart Beat   . SUBJECTIVE:    Amie Louis is a 79 y.o. female She comes in complaining of headaches. She has noted this over the last 24-48 hours. She just does not feel well in general.      Today, she has noted mild nausea, but no chasity vomiting or diarrhea. She has a past history of paroxysmal atrial fibrillation, primary hypertension, as well as dyslipidemia. Current Outpatient Prescriptions   Medication Sig Dispense Refill    HYDROcodone-acetaminophen (NORCO) 5-325 mg per tablet Take 1 Tab by mouth two (2) times daily as needed for Pain. Max Daily Amount: 2 Tabs. 30 Tab 0    hydroCHLOROthiazide (HYDRODIURIL) 12.5 mg tablet Take once daily 90 Tab 3    hydroquinone (ESOTERICA, MELQUIN) 4 % topical cream Apply  to affected area two (2) times a day. 30 g 6    desonide (TRIDESILON) 0.05 % topical lotion Apply  to affected area two (2) times a day. 118 mL 11    rivaroxaban (XARELTO) 20 mg tab tablet Take 1 Tab by mouth daily. 30 Tab 11    potassium chloride (K-DUR, KLOR-CON) 20 mEq tablet Take 1 Tab by mouth daily. 90 Tab 3    magnesium oxide (MAG-OX) 400 mg tablet Take 1 Tab by mouth daily. 90 Tab 3    metoprolol tartrate (LOPRESSOR) 25 mg tablet 1/2 tablet twice a day 45 Tab 3    ALPRAZolam (XANAX) 1 mg tablet Take 1 Tab by mouth three (3) times daily as needed for Anxiety. Max Daily Amount: 3 mg. 10 Tab 0    albuterol (VENTOLIN HFA) 90 mcg/actuation inhaler Take 2 Puffs by inhalation every four (4) hours as needed for Wheezing. 1 Inhaler 11    rosuvastatin (CRESTOR) 10 mg tablet Take 1 tablet every day 90 Tab 3    fluticasone-salmeterol (ADVAIR) 250-50 mcg/dose diskus inhaler Take 1 Puff by inhalation every twelve (12) hours.  1 Inhaler 11    lidocaine (LIDODERM) 5 % 1 Patch by TransDERmal route every twenty-four (24) hours. 3 Package 3    methocarbamol (ROBAXIN) 750 mg tablet Take 1 Tab by mouth three (3) times daily as needed. 270 Tab 3    QUEtiapine (SEROQUEL) 50 mg tablet Take 50 mg by mouth three (3) times daily.  FLUoxetine (PROZAC) 20 mg capsule Take 20 mg by mouth daily.  salsalate (DISALCID) 750 mg tablet Take 1 Tab by mouth three (3) times daily.  80 Tab 0     Past Medical History:   Diagnosis Date    Asthma     [facility] ([facility] [address]     CAD (coronary artery disease)     ablation    [facility]     Hypercholesteremia     Hypertension     Tachycardia      Past Surgical History:   Procedure Laterality Date    ABLATION OF SVT  3/30/2015         EP STUDY W/INDUCTION  3/30/2015         HX BACK SURGERY      HX COLONOSCOPY  9-    HX HEART CATHETERIZATION      HX HYSTERECTOMY       Allergies   Allergen Reactions    Ampicillin Hives and Nausea and Vomiting         REVIEW OF SYSTEMS:  General: negative for - chills or fever  ENT: negative for - headaches, nasal congestion or tinnitus  Respiratory: negative for - cough, hemoptysis, shortness of breath or wheezing  Cardiovascular : negative for - chest pain, edema, palpitations or shortness of breath  Gastrointestinal: negative for - abdominal pain, blood in stools, heartburn or nausea/vomiting  Genito-Urinary: no dysuria, trouble voiding, or hematuria  Musculoskeletal: negative for - gait disturbance, joint pain, joint stiffness or joint swelling  Neurological: no TIA or stroke symptoms  Hematologic: no bruises, no bleeding, no swollen glands  Integument: no lumps, mole changes, nail changes or rash  Endocrine: no malaise/lethargy or unexpected weight changes      Social History     Social History    Marital status:      Spouse name: N/A    Number of children: 1    Years of education: N/A     Occupational History    retired--[employer]---disabled      Social History Main Topics    Smoking status: Never Smoker    Smokeless tobacco: Never Used    Alcohol use No      Comment: rarely    Drug use: No    Sexual activity: Not Currently     Partners: Male     Other Topics Concern    None     Social History Narrative     History reviewed. No pertinent family history. OBJECTIVE:    Visit Vitals    /77    Pulse 72    Temp 98.1 °F (36.7 °C) (Oral)    Resp 18    Ht 5' 2\" (1.575 m)    Wt 137 lb 1.6 oz (62.2 kg)    SpO2 95%    BMI 25.08 kg/m2     CONSTITUTIONAL: well , well nourished, appears age appropriate  EYES: perrla, eom intact  ENMT:moist mucous membranes, pharynx clear  NECK: supple. Thyroid normal  RESPIRATORY: Chest: clear to ascultation and percussion   CARDIOVASCULAR: Heart: regular rate and rhythm  GASTROINTESTINAL: Abdomen: soft, bowel sounds active  HEMATOLOGIC: no pathological lymph nodes palpated  MUSCULOSKELETAL: Extremities: no edema, pulse 1+   INTEGUMENT: No unusual rashes or suspicious skin lesions noted. Nails appear normal.  NEUROLOGIC: non-focal exam   MENTAL STATUS: alert and oriented, appropriate affect      ASSESSMENT:  1. Episodic tension-type headache, not intractable    2. Nausea    3. Lumbar radicular pain    4. Paroxysmal atrial fibrillation (HCC)    5. Essential hypertension    6. Dyslipidemia        PLAN:    1. She has episodic headaches, which strongly suggest muscle tension in origin. Nothing needs to be done at this point. If any new symptoms develop or the headaches persist then further workup as necessary. 2. Her nausea is most likely related to mild superficial gastritis. 3. She has intermittent low back pain, which she has had. She needs a refill on her analgesic, which she takes quite sparingly. 4. Her rhythm is quite regular today. She does however have a history of paroxysmal atrial fibrillation and follows up with cardiology regarding this. 5. Her blood pressure is excellent and no adjustments were made. 6. She will continue her statin as prescribed in view of her primary cardiovascular risk prevention. .  Orders Placed This Encounter    REFERRAL TO OPHTHALMOLOGY    HYDROcodone-acetaminophen ([address]) 5-325 mg per tablet         Follow-up Disposition:  Return in about 3 months (around 8/3/2018).       Megan Enciso MD

## 2018-06-28 ENCOUNTER — OFFICE VISIT (OUTPATIENT)
Dept: INTERNAL MEDICINE CLINIC | Age: 68
End: 2018-06-28

## 2018-06-28 VITALS
BODY MASS INDEX: 25.1 KG/M2 | HEIGHT: 62 IN | WEIGHT: 136.4 LBS | TEMPERATURE: 98.1 F | SYSTOLIC BLOOD PRESSURE: 128 MMHG | DIASTOLIC BLOOD PRESSURE: 81 MMHG | RESPIRATION RATE: 18 BRPM | OXYGEN SATURATION: 97 % | HEART RATE: 69 BPM

## 2018-06-28 DIAGNOSIS — Z13.31 SCREENING FOR DEPRESSION: ICD-10-CM

## 2018-06-28 DIAGNOSIS — Z00.00 WELLNESS EXAMINATION: ICD-10-CM

## 2018-06-28 DIAGNOSIS — I10 ESSENTIAL HYPERTENSION: ICD-10-CM

## 2018-06-28 DIAGNOSIS — Z13.39 SCREENING FOR ALCOHOLISM: ICD-10-CM

## 2018-06-28 DIAGNOSIS — E78.5 DYSLIPIDEMIA: ICD-10-CM

## 2018-06-28 DIAGNOSIS — I48.0 PAROXYSMAL ATRIAL FIBRILLATION (HCC): Primary | ICD-10-CM

## 2018-06-28 DIAGNOSIS — M54.16 LUMBAR RADICULAR PAIN: ICD-10-CM

## 2018-06-28 DIAGNOSIS — F41.1 ANXIETY NEUROSIS: ICD-10-CM

## 2018-06-28 RX ORDER — METHOCARBAMOL 750 MG/1
750 TABLET, FILM COATED ORAL
Qty: 270 TAB | Refills: 3 | Status: SHIPPED | OUTPATIENT
Start: 2018-06-28 | End: 2018-10-09 | Stop reason: SDUPTHER

## 2018-06-28 NOTE — MR AVS SNAPSHOT
[address] [facility]. [place] [address] [address] 
[phone] Patient: Kan Toure MRN: [id number] KND:[id number] Visit Information Date & Time Provider Department Dept. Phone Encounter #  
 6/28/2018  4:15 PM [facility] [phone] [id number] Your Appointments 7/31/2018  9:40 AM  
ESTABLISHED PATIENT with Kayla Levi MD  
[facility] ([address]) Appt Note: 4  month f/u; 4 month f/u r/s form 7/9 to 7/31 [address] [facility] [address] [place] [address]  
[address] [facility] [address] [address] Upcoming Health Maintenance Date Due COLONOSCOPY 12/24/1968 GLAUCOMA SCREENING Q2Y 12/24/2015 MEDICARE YEARLY EXAM 6/24/2018 Pneumococcal 65+ Low/Medium Risk (2 of 2 - PPSV23) 6/28/2019* Influenza Age 5 to Adult 8/1/2018 BREAST CANCER SCRN MAMMOGRAM 10/31/2019 DTaP/Tdap/Td series (2 - Td) 10/19/2025 *Topic was postponed. The date shown is not the original due date. Allergies as of 6/28/2018  Review Complete On: 6/28/2018 By: Nicki Garcia LPN Severity Noted Reaction Type Reactions Ampicillin  05/15/2013    Hives, Nausea and Vomiting Current Immunizations  Never Reviewed Name Date Influenza High Dose Vaccine PF 9/12/2017 Pneumococcal Polysaccharide (PPSV-23) 5/16/2013  6:39 PM  
 Tdap 10/19/2015  2:32 AM  
  
 Not reviewed this visit Vitals BP Pulse Temp Resp Height(growth percentile) Weight(growth percentile) 128/81 69 98.1 °F (36.7 °C) (Oral) 18 5' 2\" (1.575 m) 136 lb 6.4 oz (61.9 kg) SpO2 BMI OB Status Smoking Status 97% 24.95 kg/m2 Hysterectomy Never Smoker Vitals History BMI and BSA Data Body Mass Index Body Surface Area 24.95 kg/m 2 1.65 m 2 Preferred Pharmacy Pharmacy Name Phone [address] [phone] Your Updated Medication List  
  
   
This list is accurate as of 6/28/18  5:38 PM.  Always use your most recent med list.  
  
  
  
  
 albuterol 90 mcg/actuation inhaler Commonly known as:  VENTOLIN HFA Take 2 Puffs by inhalation every four (4) hours as needed for Wheezing. ALPRAZolam 1 mg tablet Commonly known as:  [place] Take 1 Tab by mouth three (3) times daily as needed for Anxiety. Max Daily Amount: 3 mg.  
  
 desonide 0.05 % topical lotion Commonly known as:  Veleria Mc Apply  to affected area two (2) times a day. FLUoxetine 20 mg capsule Commonly known as:  PROzac Take 20 mg by mouth daily. fluticasone-salmeterol 250-50 mcg/dose diskus inhaler Commonly known as:  ADVAIR Take 1 Puff by inhalation every twelve (12) hours. hydroCHLOROthiazide 12.5 mg tablet Commonly known as:  HYDRODIURIL Take once daily HYDROcodone-acetaminophen 5-325 mg per tablet Commonly known as:  Sayner Monica Take 1 Tab by mouth two (2) times daily as needed for Pain. Max Daily Amount: 2 Tabs. hydroquinone 4 % topical cream  
Commonly known as:  Cheril Forget Apply  to affected area two (2) times a day. lidocaine 5 % Commonly known as:  LIDODERM  
1 Patch by TransDERmal route every twenty-four (24) hours. magnesium oxide 400 mg tablet Commonly known as:  MAG-OX Take 1 Tab by mouth daily. methocarbamol 750 mg tablet Commonly known as:  ROBAXIN Take 1 Tab by mouth three (3) times daily as needed. metoprolol tartrate 25 mg tablet Commonly known as:  LOPRESSOR  
1/2 tablet twice a day  
  
 potassium chloride 20 mEq tablet Commonly known as:  K-DUR, KLOR-CON Take 1 Tab by mouth daily. rivaroxaban 20 mg Tab tablet Commonly known as:  Elesa Hoof Take 1 Tab by mouth daily. rosuvastatin 10 mg tablet Commonly known as:  CRESTOR  
 Take 1 tablet every day  
  
 salsalate 750 mg tablet Commonly known as:  DISALCID Take 1 Tab by mouth three (3) times daily. SEROquel 50 mg tablet Generic drug:  QUEtiapine Take 50 mg by mouth three (3) times daily. Introducing \[facility] [place] [facility]\"" & HEALTH SERVICES! Sami Fuller introduces [employer] patient portal. Now you can access parts of your medical record, email your doctor's office, and request medication refills online. 1. In your internet browser, go to [email or web address] 2. Click on the First Time User? Click Here link in the Sign In box. You will see the New Member Sign Up page. 3. Enter your [employer] Access Code exactly as it appears below. You will not need to use this code after youve completed the sign-up process. If you do not sign up before the expiration date, you must request a new code. · [employer] Access Code: [email or web address] Expires: 8/1/2018  3:46 PM 
 
4. Enter the last four digits of your Social Security Number (xxxx) and Date of Birth (mm/dd/yyyy) as indicated and click Submit. You will be taken to the next sign-up page. 5. Create a [employer] ID. This will be your [employer] login ID and cannot be changed, so think of one that is secure and easy to remember. 6. Create a [employer] password. You can change your password at any time. 7. Enter your Password Reset Question and Answer. This can be used at a later time if you forget your password. 8. Enter your e-mail address. You will receive e-mail notification when new information is available in [address] 9. Click Sign Up. You can now view and download portions of your medical record. 10. Click the Download Summary menu link to download a portable copy of your medical information. If you have questions, please visit the Frequently Asked Questions section of the [employer] website. Remember, [employer] is NOT to be used for urgent needs. For medical emergencies, dial 911. Now available from your iPhone and Android! Please provide this summary of care documentation to your next provider. Your primary care clinician is listed as Swapna Moncada. If you have any questions after today's visit, please call [phone].

## 2018-06-29 NOTE — PROGRESS NOTES
[address] [facility] and Primary Care  [place] [address]  [address] [address] [facility] [address] [address]  Phone:  [phone]  Fax: [phone]       Chief Complaint   Patient presents with   [facility] Wellness Visit   . SUBJECTIVE:    Sergey Martínez is a 79 y.o. female   She returns for a visit stating she in general is doing well. She has had occasional palpitations, but nothing frequent. She denies any shortness of breath, orthopnea, PND. She remains reasonably active although not as physically active as I think she should be. She continues to have pain in her back radiating to both hips which is episodic. She has a past history of primary hypertension and dyslipidemia as well as anxiety. She continues to follow up with her psychiatrist also. Current Outpatient Prescriptions   Medication Sig Dispense Refill    methocarbamol (ROBAXIN) 750 mg tablet Take 1 Tab by mouth three (3) times daily as needed. 270 Tab 3    HYDROcodone-acetaminophen (NORCO) 5-325 mg per tablet Take 1 Tab by mouth two (2) times daily as needed for Pain. Max Daily Amount: 2 Tabs. 30 Tab 0    hydroCHLOROthiazide (HYDRODIURIL) 12.5 mg tablet Take once daily 90 Tab 3    hydroquinone (ESOTERICA, MELQUIN) 4 % topical cream Apply  to affected area two (2) times a day. 30 g 6    desonide (TRIDESILON) 0.05 % topical lotion Apply  to affected area two (2) times a day. 118 mL 11    rivaroxaban (XARELTO) 20 mg tab tablet Take 1 Tab by mouth daily. 30 Tab 11    potassium chloride (K-DUR, KLOR-CON) 20 mEq tablet Take 1 Tab by mouth daily. 90 Tab 3    magnesium oxide (MAG-OX) 400 mg tablet Take 1 Tab by mouth daily. 90 Tab 3    metoprolol tartrate (LOPRESSOR) 25 mg tablet 1/2 tablet twice a day 45 Tab 3    salsalate (DISALCID) 750 mg tablet Take 1 Tab by mouth three (3) times daily. 90 Tab 0    ALPRAZolam (XANAX) 1 mg tablet Take 1 Tab by mouth three (3) times daily as needed for Anxiety.  Max Daily Amount: 3 mg. 10 Tab 0    albuterol (VENTOLIN HFA) 90 mcg/actuation inhaler Take 2 Puffs by inhalation every four (4) hours as needed for Wheezing. 1 Inhaler 11    rosuvastatin (CRESTOR) 10 mg tablet Take 1 tablet every day 90 Tab 3    fluticasone-salmeterol (ADVAIR) 250-50 mcg/dose diskus inhaler Take 1 Puff by inhalation every twelve (12) hours. 1 Inhaler 11    lidocaine (LIDODERM) 5 % 1 Patch by TransDERmal route every twenty-four (24) hours. 3 Package 3    QUEtiapine (SEROQUEL) 50 mg tablet Take 50 mg by mouth three (3) times daily.  FLUoxetine (PROZAC) 20 mg capsule Take 20 mg by mouth daily.        Past Medical History:   Diagnosis Date    Asthma     [facility] ([facility] [address]     CAD (coronary artery disease)     ablation    [facility]     Hypercholesteremia     Hypertension     Tachycardia      Past Surgical History:   Procedure Laterality Date    ABLATION OF SVT  3/30/2015         EP STUDY W/INDUCTION  3/30/2015         HX BACK SURGERY      HX COLONOSCOPY  9-    HX HEART CATHETERIZATION      HX HYSTERECTOMY       Allergies   Allergen Reactions    Ampicillin Hives and Nausea and Vomiting         REVIEW OF SYSTEMS:  General: negative for - chills or fever  ENT: negative for - headaches, nasal congestion or tinnitus  Respiratory: negative for - cough, hemoptysis, shortness of breath or wheezing  Cardiovascular : negative for - chest pain, edema, palpitations or shortness of breath  Gastrointestinal: negative for - abdominal pain, blood in stools, heartburn or nausea/vomiting  Genito-Urinary: no dysuria, trouble voiding, or hematuria  Musculoskeletal: negative for - gait disturbance, joint pain, joint stiffness or joint swelling  Neurological: no TIA or stroke symptoms  Hematologic: no bruises, no bleeding, no swollen glands  Integument: no lumps, mole changes, nail changes or rash  Endocrine: no malaise/lethargy or unexpected weight changes      Social History     Social History  Marital status:      Spouse name: N/A    Number of children: 1    Years of education: N/A     Occupational History    retired--[employer]---disabled      Social History Main Topics    Smoking status: Never Smoker    Smokeless tobacco: Never Used    Alcohol use No      Comment: rarely    Drug use: No    Sexual activity: Not Currently     Partners: Male     Other Topics Concern    None     Social History Narrative     Family History   Problem Relation Age of Onset    Heart Disease Father     Stroke Father        OBJECTIVE:    Visit Vitals    /81    Pulse 69    Temp 98.1 °F (36.7 °C) (Oral)    Resp 18    Ht 5' 2\" (1.575 m)    Wt 136 lb 6.4 oz (61.9 kg)    SpO2 97%    BMI 24.95 kg/m2     CONSTITUTIONAL: well , well nourished, appears age appropriate  EYES: perrla, eom intact  ENMT:moist mucous membranes, pharynx clear  NECK: supple. Thyroid normal  RESPIRATORY: Chest: clear to ascultation and percussion   CARDIOVASCULAR: Heart: regular rate and rhythm  GASTROINTESTINAL: Abdomen: soft, bowel sounds active  HEMATOLOGIC: no pathological lymph nodes palpated  MUSCULOSKELETAL: Extremities: no edema, pulse 1+   INTEGUMENT: No unusual rashes or suspicious skin lesions noted. Nails appear normal.  NEUROLOGIC: non-focal exam   MENTAL STATUS: alert and oriented, appropriate affect      ASSESSMENT:  1. Paroxysmal atrial fibrillation (HCC)    2. Essential hypertension    3. Dyslipidemia    4. Lumbar radicular pain    5. Anxiety neurosis    6. Screening for alcoholism    7. Screening for depression    8. Wellness examination        PLAN:    1. The patient's rhythm today is regular. She will follow up with her cardiologist. She will continue Xarelto as well as her beta-blocker. 2. Blood pressure today is excellent, no adjustments are made. 3. She continues statin as prescribed. Her last  ApoB level was excellent. She is in a primary cardiovascular risk prevention mode.   4. Her low back pain is most likely related to lumbar radiculopathy. Symptomatic treatment only. 5. Her anxiety is quite stable. 6. I encouraged her to become more physically active on a consistent basis. .  Orders Placed This Encounter    Depression Screen Annual    methocarbamol (ROBAXIN) 750 mg tablet         Follow-up Disposition:  Return in about 3 months (around 9/28/2018). Demetrius Ybarra MD      This is the Subsequent Medicare Annual Wellness Exam, performed 12 months or more after the Initial AWV or the last Subsequent AWV    I have reviewed the patient's medical history in detail and updated the computerized patient record. History     Past Medical History:   Diagnosis Date    Asthma     [facility] ([facility] [address]     CAD (coronary artery disease)     ablation    [facility]     Hypercholesteremia     Hypertension     Tachycardia       Past Surgical History:   Procedure Laterality Date    ABLATION OF SVT  3/30/2015         EP STUDY W/INDUCTION  3/30/2015         HX BACK SURGERY      HX COLONOSCOPY  9-    HX HEART CATHETERIZATION      HX HYSTERECTOMY       Current Outpatient Prescriptions   Medication Sig Dispense Refill    methocarbamol (ROBAXIN) 750 mg tablet Take 1 Tab by mouth three (3) times daily as needed. 270 Tab 3    HYDROcodone-acetaminophen (NORCO) 5-325 mg per tablet Take 1 Tab by mouth two (2) times daily as needed for Pain. Max Daily Amount: 2 Tabs. 30 Tab 0    hydroCHLOROthiazide (HYDRODIURIL) 12.5 mg tablet Take once daily 90 Tab 3    hydroquinone (ESOTERICA, MELQUIN) 4 % topical cream Apply  to affected area two (2) times a day. 30 g 6    desonide (TRIDESILON) 0.05 % topical lotion Apply  to affected area two (2) times a day. 118 mL 11    rivaroxaban (XARELTO) 20 mg tab tablet Take 1 Tab by mouth daily. 30 Tab 11    potassium chloride (K-DUR, KLOR-CON) 20 mEq tablet Take 1 Tab by mouth daily.  90 Tab 3    magnesium oxide (MAG-OX) 400 mg tablet Take 1 Tab by mouth daily. 90 Tab 3    metoprolol tartrate (LOPRESSOR) 25 mg tablet 1/2 tablet twice a day 45 Tab 3    salsalate (DISALCID) 750 mg tablet Take 1 Tab by mouth three (3) times daily. 90 Tab 0    ALPRAZolam (XANAX) 1 mg tablet Take 1 Tab by mouth three (3) times daily as needed for Anxiety. Max Daily Amount: 3 mg. 10 Tab 0    albuterol (VENTOLIN HFA) 90 mcg/actuation inhaler Take 2 Puffs by inhalation every four (4) hours as needed for Wheezing. 1 Inhaler 11    rosuvastatin (CRESTOR) 10 mg tablet Take 1 tablet every day 90 Tab 3    fluticasone-salmeterol (ADVAIR) 250-50 mcg/dose diskus inhaler Take 1 Puff by inhalation every twelve (12) hours. 1 Inhaler 11    lidocaine (LIDODERM) 5 % 1 Patch by TransDERmal route every twenty-four (24) hours. 3 Package 3    QUEtiapine (SEROQUEL) 50 mg tablet Take 50 mg by mouth three (3) times daily.  FLUoxetine (PROZAC) 20 mg capsule Take 20 mg by mouth daily. Allergies   Allergen Reactions    Ampicillin Hives and Nausea and Vomiting     Family History   Problem Relation Age of Onset    Heart Disease Father     Stroke Father      Social History   Substance Use Topics    Smoking status: Never Smoker    Smokeless tobacco: Never Used    Alcohol use No      Comment: rarely     Patient Active Problem List   Diagnosis Code    Atrial fibrillation (HCC) I48.91    HTN (hypertension) I10    Lumbar radicular pain M54.16    Hematest positive stools R19.5    Anxiety neurosis F41.1    Dyspnea R06.00    Dyslipidemia E78.5    Ogema filter in place Z95.828    Dermatitis L30.9    Asthma J45.909    Episodic tension-type headache, not intractable G44.219    Synovitis of knee M65.9       Depression Risk Factor Screening:     PHQ over the last two weeks 6/28/2018   PHQ Not Done -   Little interest or pleasure in doing things Not at all   Feeling down, depressed or hopeless Not at all   Total Score PHQ 2 0     Alcohol Risk Factor Screening:    You do not drink alcohol or very rarely. Functional Ability and Level of Safety:   Hearing Loss  Hearing is good. Activities of Daily Living  The home contains: no safety equipment. Patient does total self care    Fall Risk  Fall Risk Assessment, last 12 mths 6/28/2018   Able to walk? Yes   Fall in past 12 months? No       Abuse Screen  Patient is not abused    Cognitive Screening   Evaluation of Cognitive Function:  Has your family/caregiver stated any concerns about your memory: no  Normal    Patient Care Team   Patient Care Team:  Shagufta Willett MD as PCP - General (Internal Medicine)  Letty Landers MD (Cardiology)  Holly Medina MD (Cardiology)  Rodo Barber RN as Ambulatory Care Navigator    Assessment/Plan   Education and counseling provided:  Are appropriate based on today's review and evaluation    Diagnoses and all orders for this visit:    1. Paroxysmal atrial fibrillation (HCC)    2. Essential hypertension    3. Dyslipidemia    4. Lumbar radicular pain    5. Anxiety neurosis    6. Screening for alcoholism  -     Annual  Alcohol Screen 15 min ()    7. Screening for depression  -     Depression Screen Annual    8. Wellness examination    Other orders  -     methocarbamol (ROBAXIN) 750 mg tablet; Take 1 Tab by mouth three (3) times daily as needed.         Health Maintenance Due   Topic Date Due    COLONOSCOPY  12/24/1968    GLAUCOMA SCREENING Q2Y  12/24/2015

## 2018-06-29 NOTE — PATIENT INSTRUCTIONS
Medicare Wellness Visit, Female    The best way to live healthy is to have a lifestyle where you eat a well-balanced diet, exercise regularly, limit alcohol use, and quit all forms of tobacco/nicotine, if applicable. Regular preventive services are another way to keep healthy. Preventive services (vaccines, screening tests, monitoring & exams) can help personalize your care plan, which helps you manage your own care. Screening tests can find health problems at the earliest stages, when they are easiest to treat. [facility] follows the current, evidence-based guidelines published by the [facility] ([facility]) when recommending preventive services for our patients. Because we follow these guidelines, sometimes recommendations change over time as research supports it. (For example, mammograms used to be recommended annually. Even though Medicare will still pay for an annual mammogram, the newer guidelines recommend a mammogram every two years for women of average risk.)    Of course, you and your provider may decide to screen more often for some diseases, based on your risk and co-morbidities (chronic disease you are already diagnosed with). Preventive services for you include:    - Medicare offers their members a free annual wellness visit, which is time for you and your primary care provider to discuss and plan for your preventive service needs. Take advantage of this benefit every year!    -All people over age 72 should receive the recommended pneumonia vaccines. Current USPSTF guidelines recommend a series of two vaccines for the best pneumonia protection.     -All adults should have a yearly flu vaccine and a tetanus vaccine every 10 years. All adults age 61 years should receive a shingles vaccine once in their lifetime.      -A bone mass density test is recommended when a woman turns 65 to screen for osteoporosis.  This test is only recommended once as a screening. Some providers will use this same test as a disease monitoring tool if you already have osteoporosis. -All adults age 38-68 years who are overweight should have a diabetes screening test once every three years.     -Other screening tests & preventive services for persons with diabetes include: an eye exam to screen for diabetic retinopathy, a kidney function test, a foot exam, and stricter control over your cholesterol.     -Cardiovascular screening for adults with routine risk involves an electrocardiogram (ECG) at intervals determined by the provider.     -Colorectal cancer screenings should be done for adults age 54-65 years with normal risk. There are a number of acceptable methods of screening for this type of cancer. Each test has its own benefits and drawbacks. Discuss with your provider what is most appropriate for you during your annual wellness visit. The different tests include: colonoscopy (considered the best screening method), a fecal occult blood test, a fecal DNA test, and sigmoidoscopy. -Breast cancer screenings are recommended every other year for women of normal risk age 54-69 years.     -Cervical cancer screenings for women over age 72 are only recommended with certain risk factors.     -All adults born between [facility] should be screened once for Hepatitis C.      Here is a list of your current Health Maintenance items (your personalized list of preventive services) with a due date:  Health Maintenance Due   Topic Date Due    Colonoscopy  12/24/1968    Glaucoma Screening   12/24/2015    Annual Well Visit  06/24/2018

## 2018-07-31 ENCOUNTER — OFFICE VISIT (OUTPATIENT)
Dept: CARDIOLOGY CLINIC | Age: 68
End: 2018-07-31

## 2018-07-31 VITALS
DIASTOLIC BLOOD PRESSURE: 89 MMHG | BODY MASS INDEX: 24.66 KG/M2 | OXYGEN SATURATION: 97 % | RESPIRATION RATE: 16 BRPM | WEIGHT: 134 LBS | SYSTOLIC BLOOD PRESSURE: 120 MMHG | HEART RATE: 61 BPM | HEIGHT: 62 IN

## 2018-07-31 DIAGNOSIS — I48.0 PAROXYSMAL ATRIAL FIBRILLATION (HCC): Primary | ICD-10-CM

## 2018-07-31 NOTE — PROGRESS NOTES
HISTORY OF PRESENT ILLNESS  Steph Santacruz is a 79 y.o. female. Patient with h/o PSVT and  Tachy iron followed in [place], admitted on 5/13 at [facility] with chest pain, at that time nuclear stress test showed no ischemia or MI and EF of 69%, patient remained in NSR during hospital stay. Here for follow up  Echo on 7/12 EF 55% mild TR and MR  Also h/o PAF as per her cardiologist in [place]. 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 Ejection fraction was estimated to be 63 %. No obvious wall motion  abnormalities identified in the views obtained. Right ventricle: The size was at the upper limits of normal.    Left atrium: The atrium was moderately dilated. Right atrium: The atrium was moderately dilated. Mitral valve: There was mild regurgitation. Tricuspid valve: There was moderate annular dilatation.  There was moderate   Recurrent atrial flutter in 10/ 2017 seen by Ep, ablation offered she decided to wait                Past Medical History                         Past Medical History        Diagnosis      Date              Tachycardia                 Bradycardia                 Bipolar 2 disorder                 Hypercholesteremia                 Hypertension                 DVT (deep venous thrombosis)                 Tachycardia                 Asthma                 Psychiatric disorder                                Karl Lank   Past Surgical History      Procedure    Laterality    Date          Hx back surgery                Hx hysterectomy                Ep study w/induction       3/30/2015                     Ablation of svt       3/30/2015                     Hx heart catheterization                Hx colonoscopy       9-          History                        Social History          Marital Status:                Spouse Name:    N/A            Number of Children:    1          Years of Education:    N/A                     Occupational History          retired--[employer]---disabled                        Social History Main Topics          Smoking status:    Never Smoker           Smokeless tobacco:    Never Used          Alcohol Use:    No          Drug Use:    No          Sexual Activity:    Not on file          HPI  No cp or sob short live palpitations about 20 minutes every 2 months approximately  Review of Systems   Respiratory: Negative. Cardiovascular: Positive for palpitations. Negative for chest pain, orthopnea, claudication, leg swelling and PND. Visit Vitals    /89 (BP 1 Location: Left arm, BP Patient Position: Sitting)    Pulse 61    Resp 16    Ht 5' 2\" (1.575 m)    Wt 60.8 kg (134 lb)    SpO2 97%    BMI 24.51 kg/m2       Physical Exam   Neck: No JVD present. Carotid bruit is not present. Cardiovascular: Normal rate and regular rhythm. Pulmonary/Chest: Effort normal and breath sounds normal.   Abdominal: Soft. Musculoskeletal: She exhibits no edema. Psychiatric: She has a normal mood and affect. Lab Results   Component Value Date/Time    Cholesterol, total 158 05/16/2017 01:17 PM    HDL Cholesterol 80 05/16/2017 01:17 PM    LDL, calculated 60 05/16/2017 01:17 PM    VLDL, calculated 18 05/16/2017 01:17 PM    Triglyceride 89 05/16/2017 01:17 PM    CHOL/HDL Ratio 2.5 05/16/2013 02:30 AM     Current Outpatient Prescriptions on File Prior to Visit   Medication Sig Dispense Refill    methocarbamol (ROBAXIN) 750 mg tablet Take 1 Tab by mouth three (3) times daily as needed.  270 Tab 3    HYDROcodone-acetaminophen (NORCO) 5-325 mg per tablet Take 1 Tab by mouth two (2) times daily as needed for Pain. Max Daily Amount: 2 Tabs. 30 Tab 0    hydroCHLOROthiazide (HYDRODIURIL) 12.5 mg tablet Take once daily 90 Tab 3    hydroquinone (ESOTERICA, MELQUIN) 4 % topical cream Apply  to affected area two (2) times a day. 30 g 6    desonide (TRIDESILON) 0.05 % topical lotion Apply  to affected area two (2) times a day. 118 mL 11    rivaroxaban (XARELTO) 20 mg tab tablet Take 1 Tab by mouth daily. 30 Tab 11    potassium chloride (K-DUR, KLOR-CON) 20 mEq tablet Take 1 Tab by mouth daily. 90 Tab 3    magnesium oxide (MAG-OX) 400 mg tablet Take 1 Tab by mouth daily. 90 Tab 3    metoprolol tartrate (LOPRESSOR) 25 mg tablet 1/2 tablet twice a day 45 Tab 3    salsalate (DISALCID) 750 mg tablet Take 1 Tab by mouth three (3) times daily. 90 Tab 0    ALPRAZolam (XANAX) 1 mg tablet Take 1 Tab by mouth three (3) times daily as needed for Anxiety. Max Daily Amount: 3 mg. 10 Tab 0    albuterol (VENTOLIN HFA) 90 mcg/actuation inhaler Take 2 Puffs by inhalation every four (4) hours as needed for Wheezing. 1 Inhaler 11    rosuvastatin (CRESTOR) 10 mg tablet Take 1 tablet every day 90 Tab 3    fluticasone-salmeterol (ADVAIR) 250-50 mcg/dose diskus inhaler Take 1 Puff by inhalation every twelve (12) hours. 1 Inhaler 11    lidocaine (LIDODERM) 5 % 1 Patch by TransDERmal route every twenty-four (24) hours. 3 Package 3    QUEtiapine (SEROQUEL) 50 mg tablet Take 50 mg by mouth three (3) times daily.  FLUoxetine (PROZAC) 20 mg capsule Take 20 mg by mouth daily. No current facility-administered medications on file prior to visit. 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  Continue observation for now    PAF:previous Atrial flutter now back in NSR ECG today with NSR and no significant st t changes  Occasional palpitations reported but in my opinion too short and infrequent to change medications at this time or consider ablation  She will let me know if frequency increases  Already on [address] for DVT, this will need to be continued long term continue small dose of lopressor. Hold off ablation unless of recurrent more frequent atrial fluter  No untoward effects with oacs    HTN: controlled    DVT on xarelto long term no untoward effects thus far    HLD: on statin , closely followed by her pcp    CP:  Not recurrent!     See her back in 6 months otherwise

## 2018-07-31 NOTE — MR AVS SNAPSHOT
[address] [facility] [address] [place] 
[phone] Patient: Jitendra Jesus MRN: [id number]:[id number] Visit Information Date & Time Provider Department Dept. Phone Encounter #  
 7/31/2018  9:40 AM Lucero Stein MD [facility] Lizet Adames [phone] [id number] Follow-up Instructions Return in about 6 months (around 1/31/2019). Follow-up and Disposition History Your Appointments 9/27/2018  3:30 PM  
Any with Liz Mchugh MD  
[address] [place] [address] and Primary Care [facility]) Appt Note: 3 month follow up  
 Dereje Herron [address] [facility]  
  
   
 Dereje Herron 90 [address] Upcoming Health Maintenance Date Due COLONOSCOPY 12/24/1968 GLAUCOMA SCREENING Q2Y 12/24/2015 Pneumococcal 65+ Low/Medium Risk (2 of 2 - PPSV23) 6/28/2019* Influenza Age 5 to Adult 8/1/2018 MEDICARE YEARLY EXAM 6/29/2019 BREAST CANCER SCRN MAMMOGRAM 10/31/2019 DTaP/Tdap/Td series (2 - Td) 10/19/2025 *Topic was postponed. The date shown is not the original due date. Allergies as of 7/31/2018  Review Complete On: 7/31/2018 By: Lucero Stein MD  
  
 Severity Noted Reaction Type Reactions Ampicillin  05/15/2013    Hives, Nausea and Vomiting Current Immunizations  Never Reviewed Name Date Influenza High Dose Vaccine PF 9/12/2017 Pneumococcal Polysaccharide (PPSV-23) 5/16/2013  6:39 PM  
 Tdap 10/19/2015  2:32 AM  
  
 Not reviewed this visit You Were Diagnosed With   
  
 Codes Comments Paroxysmal atrial fibrillation (HCC)    -  Primary ICD-10-CM: I48.0 ICD-9-CM: 427.31 Vitals BP Pulse Resp Height(growth percentile) Weight(growth percentile) SpO2  
 120/89 (BP 1 Location: Left arm, BP Patient Position: Sitting) 61 16 5' 2\" (1.575 m) 134 lb (60.8 kg) 97% BMI OB Status Smoking Status 24.51 kg/m2 Hysterectomy Never Smoker Vitals History BMI and BSA Data Body Mass Index Body Surface Area 24.51 kg/m 2 1.63 m 2 Preferred Pharmacy Pharmacy Name Phone Yohannes Abbott [address] [place] [address] [address] [phone] Your Updated Medication List  
  
   
This list is accurate as of 7/31/18 11:21 AM.  Always use your most recent med list.  
  
  
  
  
 albuterol 90 mcg/actuation inhaler Commonly known as:  VENTOLIN HFA Take 2 Puffs by inhalation every four (4) hours as needed for Wheezing. ALPRAZolam 1 mg tablet Commonly known as:  Raymond Curio Take 1 Tab by mouth three (3) times daily as needed for Anxiety. Max Daily Amount: 3 mg.  
  
 desonide 0.05 % topical lotion Commonly known as:  Learta Mohs Apply  to affected area two (2) times a day. FLUoxetine 20 mg capsule Commonly known as:  PROzac Take 20 mg by mouth daily. fluticasone-salmeterol 250-50 mcg/dose diskus inhaler Commonly known as:  ADVAIR Take 1 Puff by inhalation every twelve (12) hours. hydroCHLOROthiazide 12.5 mg tablet Commonly known as:  HYDRODIURIL Take once daily HYDROcodone-acetaminophen 5-325 mg per tablet Commonly known as:  Rolinda Doles Take 1 Tab by mouth two (2) times daily as needed for Pain. Max Daily Amount: 2 Tabs. hydroquinone 4 % topical cream  
Commonly known as:  Priscila Ravena Apply  to affected area two (2) times a day. lidocaine 5 % Commonly known as:  LIDODERM  
1 Patch by TransDERmal route every twenty-four (24) hours. magnesium oxide 400 mg tablet Commonly known as:  MAG-OX Take 1 Tab by mouth daily. methocarbamol 750 mg tablet Commonly known as:  ROBAXIN Take 1 Tab by mouth three (3) times daily as needed. metoprolol tartrate 25 mg tablet Commonly known as:  LOPRESSOR  
1/2 tablet twice a day  
  
 potassium chloride 20 mEq tablet Commonly known as:  K-DUR, KLOR-CON Take 1 Tab by mouth daily. rivaroxaban 20 mg Tab tablet Commonly known as:  Dexter Root Take 1 Tab by mouth daily. rosuvastatin 10 mg tablet Commonly known as:  CRESTOR Take 1 tablet every day  
  
 salsalate 750 mg tablet Commonly known as:  DISALCID Take 1 Tab by mouth three (3) times daily. SEROquel 50 mg tablet Generic drug:  QUEtiapine Take 50 mg by mouth three (3) times daily. We Performed the Following AMB POC EKG ROUTINE W/ 12 LEADS, INTER & REP [00850 CPT(R)] Follow-up Instructions Return in about 6 months (around 1/31/2019). Patient Instructions Follow up with Senthil Francisco in 6 months Introducing \[facility]\"" & HEALTH SERVICES! Carlos Hay introduces [employer] patient portal. Now you can access parts of your medical record, email your doctor's office, and request medication refills online. 1. In your internet browser, go to [email or web address] 2. Click on the First Time User? Click Here link in the Sign In box. You will see the New Member Sign Up page. 3. Enter your [employer] Access Code exactly as it appears below. You will not need to use this code after youve completed the sign-up process. If you do not sign up before the expiration date, you must request a new code. · [employer] Access Code: [email or web address] Expires: 8/1/2018  3:46 PM 
 
4. Enter the last four digits of your Social Security Number (xxxx) and Date of Birth (mm/dd/yyyy) as indicated and click Submit. You will be taken to the next sign-up page. 5. Create a Party Eartht ID. This will be your [employer] login ID and cannot be changed, so think of one that is secure and easy to remember. 6. Create a [employer] password. You can change your password at any time. 7. Enter your Password Reset Question and Answer. This can be used at a later time if you forget your password. 8. Enter your e-mail address. You will receive e-mail notification when new information is available in [address] 9. Click Sign Up. You can now view and download portions of your medical record. 10. Click the Download Summary menu link to download a portable copy of your medical information. If you have questions, please visit the Frequently Asked Questions section of the You[employer] website. Remember, [employer] is NOT to be used for urgent needs. For medical emergencies, dial 911. Now available from your iPhone and Android! Please provide this summary of care documentation to your next provider. Your primary care clinician is listed as Swapna Moncada. If you have any questions after today's visit, please call [phone].

## 2018-09-28 RX ORDER — METOPROLOL TARTRATE 25 MG/1
TABLET, FILM COATED ORAL
Qty: 90 TAB | Refills: 3 | Status: SHIPPED | OUTPATIENT
Start: 2018-09-28 | End: 2019-08-16 | Stop reason: ALTCHOICE

## 2018-10-09 ENCOUNTER — OFFICE VISIT (OUTPATIENT)
Dept: INTERNAL MEDICINE CLINIC | Age: 68
End: 2018-10-09

## 2018-10-09 VITALS
TEMPERATURE: 98 F | RESPIRATION RATE: 16 BRPM | BODY MASS INDEX: 25.36 KG/M2 | HEART RATE: 66 BPM | WEIGHT: 137.8 LBS | OXYGEN SATURATION: 96 % | DIASTOLIC BLOOD PRESSURE: 77 MMHG | SYSTOLIC BLOOD PRESSURE: 122 MMHG | HEIGHT: 62 IN

## 2018-10-09 DIAGNOSIS — I10 ESSENTIAL HYPERTENSION: Primary | ICD-10-CM

## 2018-10-09 DIAGNOSIS — I49.5 TACHY-BRADY SYNDROME (HCC): ICD-10-CM

## 2018-10-09 DIAGNOSIS — M54.16 LUMBAR RADICULAR PAIN: ICD-10-CM

## 2018-10-09 DIAGNOSIS — F41.1 ANXIETY NEUROSIS: ICD-10-CM

## 2018-10-09 DIAGNOSIS — E78.5 DYSLIPIDEMIA: ICD-10-CM

## 2018-10-09 RX ORDER — HYDROCODONE BITARTRATE AND ACETAMINOPHEN 5; 325 MG/1; MG/1
1 TABLET ORAL
Qty: 30 TAB | Refills: 0 | Status: SHIPPED | OUTPATIENT
Start: 2018-10-09 | End: 2019-06-20 | Stop reason: SDUPTHER

## 2018-10-09 NOTE — PROGRESS NOTES
Chief Complaint Patient presents with  Irregular Heart Beat  Hypertension 1. Have you been to the ER, urgent care clinic since your last visit? Hospitalized since your last visit? No 
 
2. Have you seen or consulted any other health care providers outside of the [address] [place] [address] since your last visit? Include any pap smears or colon screening.  No

## 2018-10-09 NOTE — MR AVS SNAPSHOT
Niesha Alan 
 
 
 . [facility] 90 [id number] 
[phone] Patient: Nael Meza MRN: [id number] TWT:[id number] Visit Information Date & Time Provider Department Dept. Phone Encounter #  
 10/9/2018  3:30 PM Renu Arcos MD [facility] [phone] [id number] Your Appointments 2/1/2019  3:40 PM  
ESTABLISHED PATIENT with Laure Lechuga MD  
[facility] ([address]) Appt Note: 6 mo fu per dr Raquel Hanleyien [address] [place] [address]  
[address] [facility] 7 [address] Upcoming Health Maintenance Date Due COLONOSCOPY 12/24/1968 Shingrix Vaccine Age 50> (1 of 2) 12/24/2000 GLAUCOMA SCREENING Q2Y 12/24/2015 Influenza Age 5 to Adult 8/1/2018 Pneumococcal 65+ Low/Medium Risk (2 of 2 - PPSV23) 6/28/2019* MEDICARE YEARLY EXAM 6/29/2019 BREAST CANCER SCRN MAMMOGRAM 10/31/2019 DTaP/Tdap/Td series (2 - Td) 10/19/2025 *Topic was postponed. The date shown is not the original due date. Allergies as of 10/9/2018  Review Complete On: 10/9/2018 By: Tyshawn Ventura Severity Noted Reaction Type Reactions Ampicillin  05/15/2013    Hives, Nausea and Vomiting Current Immunizations  Never Reviewed Name Date Influenza High Dose Vaccine PF 9/12/2017 Pneumococcal Polysaccharide (PPSV-23) 5/16/2013  6:39 PM  
 Tdap 10/19/2015  2:32 AM  
  
 Not reviewed this visit You Were Diagnosed With   
  
 Codes Comments Lumbar radicular pain     ICD-10-CM: M54.16 
ICD-9-CM: 724.4 Tachy-iron syndrome ([facility] [address]     ICD-10-CM: I49.5 ICD-9-CM: 427.81 Dyslipidemia     ICD-10-CM: E78.5 ICD-9-CM: 272.4 Vitals BP Pulse Temp Resp Height(growth percentile) Weight(growth percentile)  122/77 (BP 1 Location: Right arm, BP Patient Position: Sitting) 66 98 °F (36.7 °C) (Oral) 16 5' 2\" (1.575 m) 137 lb 12.8 oz (62.5 kg) SpO2 BMI OB Status Smoking Status 96% 25.2 kg/m2 Hysterectomy Never Smoker BMI and BSA Data Body Mass Index Body Surface Area  
 25.2 kg/m 2 1.65 m 2 Preferred Pharmacy Pharmacy Name Phone [id number] [address] [phone] Your Updated Medication List  
  
   
This list is accurate as of 10/9/18  5:17 PM.  Always use your most recent med list.  
  
  
  
  
 albuterol 90 mcg/actuation inhaler Commonly known as:  VENTOLIN HFA Take 2 Puffs by inhalation every four (4) hours as needed for Wheezing. ALPRAZolam 1 mg tablet Commonly known as:  Lesta Robinsons Take 1 Tab by mouth three (3) times daily as needed for Anxiety. Max Daily Amount: 3 mg.  
  
 desonide 0.05 % topical lotion Commonly known as:  Eduardo Lamp Apply  to affected area two (2) times a day. FLUoxetine 20 mg capsule Commonly known as:  PROzac Take 20 mg by mouth daily. fluticasone-salmeterol 250-50 mcg/dose diskus inhaler Commonly known as:  ADVAIR Take 1 Puff by inhalation every twelve (12) hours. hydroCHLOROthiazide 12.5 mg tablet Commonly known as:  HYDRODIURIL Take once daily HYDROcodone-acetaminophen 5-325 mg per tablet Commonly known as:  Sharlee Sheyenne Take 1 Tab by mouth two (2) times daily as needed for Pain. Max Daily Amount: 2 Tabs. hydroquinone 4 % topical cream  
Commonly known as:  Yaritza Big Apply  to affected area two (2) times a day. lidocaine 5 % Commonly known as:  LIDODERM  
1 Patch by TransDERmal route every twenty-four (24) hours. magnesium oxide 400 mg tablet Commonly known as:  MAG-OX Take 1 Tab by mouth daily. methocarbamol 750 mg tablet Commonly known as:  ROBAXIN Take 1 Tab by mouth three (3) times daily as needed. metoprolol tartrate 25 mg tablet Commonly known as:  LOPRESSOR  
1/2 tablet twice a day  
  
 potassium chloride 20 mEq tablet Commonly known as:  K-DUR, KLOR-CON Take 1 Tab by mouth daily. rivaroxaban 20 mg Tab tablet Commonly known as:  Enrique Phillips Take 1 Tab by mouth daily. rosuvastatin 10 mg tablet Commonly known as:  CRESTOR Take 1 tablet every day  
  
 salsalate 750 mg tablet Commonly known as:  DISALCID Take 1 Tab by mouth three (3) times daily. SEROquel 50 mg tablet Generic drug:  QUEtiapine Take 50 mg by mouth three (3) times daily. Prescriptions Printed Refills HYDROcodone-acetaminophen (NORCO) 5-325 mg per tablet 0 Sig: Take 1 Tab by mouth two (2) times daily as needed for Pain. Max Daily Amount: 2 Tabs. Class: Print Route: Oral  
  
Introducing [facility] & HEALTH SERVICES! [facility] introduces [employer] patient portal. Now you can access parts of your medical record, email your doctor's office, and request medication refills online. 1. In your internet browser, go to [email or web address]/"[email or web address] 2. Click on the First Time User? Click Here link in the Sign In box. You will see the New Member Sign Up page. 3. Enter your [employer] Access Code exactly as it appears below. You will not need to use this code after youve completed the sign-up process. If you do not sign up before the expiration date, you must request a new code. · [employer] Access Code: [email or web address] Expires: 1/7/2019  5:17 PM 
 
4. Enter the last four digits of your Social Security Number (xxxx) and Date of Birth (mm/dd/yyyy) as indicated and click Submit. You will be taken to the next sign-up page. 5. Create a M[employer] ID. This will be your [employer] login ID and cannot be changed, so think of one that is secure and easy to remember. 6. Create a [employer] password. You can change your password at any time. 7. Enter your Password Reset Question and Answer.  This can be used at a later time if you forget your password. 8. Enter your e-mail address. You will receive e-mail notification when new information is available in [address] 9. Click Sign Up. You can now view and download portions of your medical record. 10. Click the Download Summary menu link to download a portable copy of your medical information. If you have questions, please visit the Frequently Asked Questions section of the [employer] website. Remember, [employer] is NOT to be used for urgent needs. For medical emergencies, dial 911. Now available from your iPhone and Android! Please provide this summary of care documentation to your next provider. Your primary care clinician is listed as Swapna Moncada. If you have any questions after today's visit, please call [phone].

## 2018-10-14 RX ORDER — ROSUVASTATIN CALCIUM 10 MG/1
TABLET, COATED ORAL
Qty: 90 TAB | Refills: 3 | Status: SHIPPED | OUTPATIENT
Start: 2018-10-14 | End: 2018-10-18 | Stop reason: SDUPTHER

## 2018-10-14 RX ORDER — METHOCARBAMOL 750 MG/1
750 TABLET, FILM COATED ORAL
Qty: 270 TAB | Refills: 3 | Status: SHIPPED | OUTPATIENT
Start: 2018-10-14 | End: 2018-10-18 | Stop reason: SDUPTHER

## 2018-10-14 NOTE — PROGRESS NOTES
[address] [place] [address] and Primary Care 
[place] [address] 
[address] [address] Phone:  [phone]  Fax: [phone] Chief Complaint Patient presents with  Irregular Heart Beat  Hypertension Adrien Latham SUBJECTIVE: 
  Vanessa Gonzalez is a 79 y.o. female comes in for return visit stating that she is done well. She has periodic pain currently in the low back area. She has a known lumbar radiculopathy which periodically flares up. She has had periodic palpitations but all have been short-lived. She follows up with a cardiologist and thus far has remained in sinus rhythm. She has been taking her antihypertensive medication as prescribed. She also has a history of dyslipidemia and is compliant with her statin. She is in a primary cardiovascular risk prevention mode. I remind her of the importance of increasing her physical activity. Current Outpatient Prescriptions Medication Sig Dispense Refill  methocarbamol (ROBAXIN) 750 mg tablet Take 1 Tab by mouth three (3) times daily as needed. 270 Tab 3  
 rosuvastatin (CRESTOR) 10 mg tablet Take 1 tablet every day 90 Tab 3  
 HYDROcodone-acetaminophen (NORCO) 5-325 mg per tablet Take 1 Tab by mouth two (2) times daily as needed for Pain. Max Daily Amount: 2 Tabs. 30 Tab 0  
 metoprolol tartrate (LOPRESSOR) 25 mg tablet 1/2 tablet twice a day 90 Tab 3  
 hydroCHLOROthiazide (HYDRODIURIL) 12.5 mg tablet Take once daily 90 Tab 3  
 hydroquinone (ESOTERICA, MELQUIN) 4 % topical cream Apply  to affected area two (2) times a day. 30 g 6  
 desonide (TRIDESILON) 0.05 % topical lotion Apply  to affected area two (2) times a day. 118 mL 11  
 rivaroxaban (XARELTO) 20 mg tab tablet Take 1 Tab by mouth daily. 30 Tab 11  
 potassium chloride (K-DUR, KLOR-CON) 20 mEq tablet Take 1 Tab by mouth daily. 90 Tab 3  
 magnesium oxide (MAG-OX) 400 mg tablet Take 1 Tab by mouth daily.  90 Tab 3  
  salsalate (DISALCID) 750 mg tablet Take 1 Tab by mouth three (3) times daily. 90 Tab 0  ALPRAZolam (XANAX) 1 mg tablet Take 1 Tab by mouth three (3) times daily as needed for Anxiety. Max Daily Amount: 3 mg. 10 Tab 0  
 albuterol (VENTOLIN HFA) 90 mcg/actuation inhaler Take 2 Puffs by inhalation every four (4) hours as needed for Wheezing. 1 Inhaler 11  
 fluticasone-salmeterol (ADVAIR) 250-50 mcg/dose diskus inhaler Take 1 Puff by inhalation every twelve (12) hours. 1 Inhaler 11  
 lidocaine (LIDODERM) 5 % 1 Patch by TransDERmal route every twenty-four (24) hours. 3 Package 3  
 QUEtiapine (SEROQUEL) 50 mg tablet Take 50 mg by mouth three (3) times daily.  FLUoxetine (PROZAC) 20 mg capsule Take 20 mg by mouth daily. Past Medical History:  
Diagnosis Date  Asthma  [facility]  CAD (coronary artery disease) ablation  DVT (deep venous thrombosis) ([facility]  Hypercholesteremia  Hypertension  Tachycardia Past Surgical History:  
Procedure Laterality Date  ABLATION OF SVT  3/30/2015  EP STUDY W/INDUCTION  3/30/2015  HX BACK SURGERY    
 HX COLONOSCOPY  9-  HX HEART CATHETERIZATION    
 HX HYSTERECTOMY Allergies Allergen Reactions  Ampicillin Hives and Nausea and Vomiting REVIEW OF SYSTEMS: 
General: negative for - chills or fever ENT: negative for - headaches, nasal congestion or tinnitus Respiratory: negative for - cough, hemoptysis, shortness of breath or wheezing Cardiovascular : negative for - chest pain, edema, palpitations or shortness of breath Gastrointestinal: negative for - abdominal pain, blood in stools, heartburn or nausea/vomiting Genito-Urinary: no dysuria, trouble voiding, or hematuria Musculoskeletal: negative for - gait disturbance, joint pain, joint stiffness or joint swelling Neurological: no TIA or stroke symptoms Hematologic: no bruises, no bleeding, no swollen glands Integument: no lumps, mole changes, nail changes or rash Endocrine: no malaise/lethargy or unexpected weight changes Social History Social History  Marital status:  Spouse name: N/A  
 Number of children: 1  Years of education: N/A Occupational History  retired--[employer]---disabled Social History Main Topics  Smoking status: Never Smoker  Smokeless tobacco: Never Used  Alcohol use No  
   Comment: rarely  Drug use: No  
 Sexual activity: Not Currently Partners: Male Other Topics Concern  None Social History Narrative Family History Problem Relation Age of Onset  Heart Disease Father  Stroke Father OBJECTIVE: 
 
Visit Vitals  /77 (BP 1 Location: Right arm, BP Patient Position: Sitting)  Pulse 66  Temp 98 °F (36.7 °C) (Oral)  Resp 16  
 Ht 5' 2\" (1.575 m)  Wt 137 lb 12.8 oz (62.5 kg)  SpO2 96%  BMI 25.2 kg/m2 CONSTITUTIONAL: well , well nourished, appears age appropriate EYES: perrla, eom intact ENMT:moist mucous membranes, pharynx clear NECK: supple. Thyroid normal 
RESPIRATORY: Chest: clear to ascultation and percussion CARDIOVASCULAR: Heart: regular rate and rhythm GASTROINTESTINAL: Abdomen: soft, bowel sounds active HEMATOLOGIC: no pathological lymph nodes palpated MUSCULOSKELETAL: Extremities: no edema, pulse 1+ INTEGUMENT: No unusual rashes or suspicious skin lesions noted. Nails appear normal. 
NEUROLOGIC: non-focal exam  
MENTAL STATUS: alert and oriented, appropriate affect ASSESSMENT: 
1. Essential hypertension 2. Lumbar radicular pain 3. Tachy-iron syndrome ([address] 4. Dyslipidemia 5. Anxiety neurosis PLAN: 
1. Her blood pressure is excellent today and no adjustments are made 2. She has periodic pain in her low back area radiating to her right buttock and leg. She uses analgesics quite sparingly. More importantly she needs to exercise more. 3.  She will follow with a cardiologist for paroxysmal atrial fibrillation. Her rhythm is regular today. 4.  She will continue her statin for primary cardiovascular risk prevention. 5.  Her anxiety is reasonably stable. She will follow with her psychiatrist. 
. 
Kamilah Pardo This Encounter  HYDROcodone-acetaminophen (NORCO) 5-325 mg per tablet  methocarbamol (ROBAXIN) 750 mg tablet  rosuvastatin (CRESTOR) 10 mg tablet Follow-up Disposition: 
Return in about 3 months (around 1/9/2019).  
 
 
Myra Lambert MD

## 2018-10-18 DIAGNOSIS — E78.5 DYSLIPIDEMIA: ICD-10-CM

## 2018-10-18 DIAGNOSIS — I49.5 TACHY-BRADY SYNDROME (HCC): ICD-10-CM

## 2018-10-18 RX ORDER — ROSUVASTATIN CALCIUM 10 MG/1
TABLET, COATED ORAL
Qty: 90 TAB | Refills: 3 | Status: SHIPPED | OUTPATIENT
Start: 2018-10-18 | End: 2018-10-25 | Stop reason: SDUPTHER

## 2018-10-18 RX ORDER — METHOCARBAMOL 750 MG/1
750 TABLET, FILM COATED ORAL
Qty: 270 TAB | Refills: 3 | Status: SHIPPED | OUTPATIENT
Start: 2018-10-18 | End: 2019-10-03

## 2018-10-25 ENCOUNTER — TELEPHONE (OUTPATIENT)
Dept: CARDIOLOGY CLINIC | Age: 68
End: 2018-10-25

## 2018-10-25 DIAGNOSIS — I48.0 PAROXYSMAL ATRIAL FIBRILLATION (HCC): ICD-10-CM

## 2018-10-25 DIAGNOSIS — E78.5 DYSLIPIDEMIA: ICD-10-CM

## 2018-10-25 DIAGNOSIS — I49.5 TACHY-BRADY SYNDROME (HCC): ICD-10-CM

## 2018-10-25 RX ORDER — ROSUVASTATIN CALCIUM 10 MG/1
TABLET, COATED ORAL
Qty: 30 TAB | Refills: 0 | Status: SHIPPED | OUTPATIENT
Start: 2018-10-25 | End: 2019-10-03 | Stop reason: SDUPTHER

## 2018-10-25 NOTE — TELEPHONE ENCOUNTER
Patient walked into office stating she needs Xarelto 20 mg for 5 days because there will be a lag with pharmacy for availability. VO per Dr. Tom Sawyer one week supply given.

## 2018-10-25 NOTE — TELEPHONE ENCOUNTER
Patient states that she is also out of her Rosuvastatin due to the delay in her [facility] mail delivery. Notified patient that we do no have samples of Rosuvastatin but that we could send in a 30 day supply to local pharmacy. Verified pharmacy. VO per Dr. Michael Dumas.

## 2018-11-01 ENCOUNTER — OFFICE VISIT (OUTPATIENT)
Dept: INTERNAL MEDICINE CLINIC | Age: 68
End: 2018-11-01

## 2018-11-01 VITALS
WEIGHT: 134.9 LBS | BODY MASS INDEX: 24.82 KG/M2 | HEIGHT: 62 IN | HEART RATE: 63 BPM | TEMPERATURE: 98.1 F | RESPIRATION RATE: 16 BRPM | OXYGEN SATURATION: 95 % | DIASTOLIC BLOOD PRESSURE: 84 MMHG | SYSTOLIC BLOOD PRESSURE: 137 MMHG

## 2018-11-01 DIAGNOSIS — I10 ESSENTIAL HYPERTENSION: ICD-10-CM

## 2018-11-01 DIAGNOSIS — I48.0 PAROXYSMAL ATRIAL FIBRILLATION (HCC): ICD-10-CM

## 2018-11-01 DIAGNOSIS — M65.9 SYNOVITIS: Primary | ICD-10-CM

## 2018-11-01 DIAGNOSIS — J45.20 MILD INTERMITTENT ASTHMA WITHOUT COMPLICATION: ICD-10-CM

## 2018-11-01 RX ORDER — TRIAMCINOLONE ACETONIDE 40 MG/ML
40 INJECTION, SUSPENSION INTRA-ARTICULAR; INTRAMUSCULAR ONCE
Qty: 1 ML | Refills: 0
Start: 2018-11-01 | End: 2018-11-01

## 2018-11-01 RX ORDER — LIDOCAINE HYDROCHLORIDE 10 MG/ML
5 INJECTION, SOLUTION EPIDURAL; INFILTRATION; INTRACAUDAL; PERINEURAL ONCE
Qty: 5 ML | Refills: 0
Start: 2018-11-01 | End: 2018-11-01

## 2018-11-01 NOTE — PROGRESS NOTES
Chief Complaint Patient presents with  Knee Pain  
  right 1. Have you been to the ER, urgent care clinic since your last visit? Hospitalized since your last visit? No 
 
2. Have you seen or consulted any other health care providers outside of the [address] [place] [address] since your last visit? Include any pap smears or colon screening. No  
 
A steroid injection was performed at right knee using 1% plain Lidocaine and 40 mg of Kenalog. This was well tolerated. [facility]GRESS NOTE Chart reviewed for the following: 
 Shereen BOLANOS, have reviewed the History, Physical and updated the Allergic reactions for April Sadler TIME OUT performed immediately prior to start of procedure: 
 Shereen BOLANOS, have performed the following reviews on April Asdler prior to the start of the procedure: 
         
* Patient was identified by name and date of birth * Agreement on procedure being performed was verified * Risks and Benefits explained to the patient * Procedure site verified and marked as necessary * Patient was positioned for comfort * Consent was signed and verified Time: 4:40 pm 
 
 
Date of procedure: 11/1/2018 Procedure performed by:  Edwin Sorensen MD 
 
Provider assisted by: Radha Mi LPN Patient assisted by: N/A How tolerated by patient: tolerated the procedure well with no complications Post Procedural Pain Scale: 2 - Hurts Little Bit Comments: none

## 2018-11-01 NOTE — PROGRESS NOTES
Chief Complaint Patient presents with  Knee Pain  
  right Johndecai Ralls SUBECTIVE: 
 
Manohar Lee is a 79 y.o. female Comes in for return visit concerned about pain in her right knee. This has gotten progressively worse. It has been present now for the last two to three weeks. There has been no history of trauma or unaccustomed physical activity. She has a past history of paroxysmal atrial fibrillation, and has a result takes Xarelto. She also has a past history of primary hypertension and dyslipidemia. Current Outpatient Medications Medication Sig Dispense Refill  albuterol (VENTOLIN HFA) 90 mcg/actuation inhaler Take 2 Puffs by inhalation every four (4) hours as needed for Wheezing. 1 Inhaler 11  
 rivaroxaban (XARELTO) 20 mg tab tablet Take 1 Tab by mouth daily. 7 Tab 0  
 rosuvastatin (CRESTOR) 10 mg tablet Take 1 tablet every day 30 Tab 0  
 methocarbamol (ROBAXIN) 750 mg tablet Take 1 Tab by mouth three (3) times daily as needed. 270 Tab 3  
 HYDROcodone-acetaminophen (NORCO) 5-325 mg per tablet Take 1 Tab by mouth two (2) times daily as needed for Pain. Max Daily Amount: 2 Tabs. 30 Tab 0  
 metoprolol tartrate (LOPRESSOR) 25 mg tablet 1/2 tablet twice a day 90 Tab 3  
 hydroCHLOROthiazide (HYDRODIURIL) 12.5 mg tablet Take once daily 90 Tab 3  
 hydroquinone (ESOTERICA, MELQUIN) 4 % topical cream Apply  to affected area two (2) times a day. 30 g 6  
 desonide (TRIDESILON) 0.05 % topical lotion Apply  to affected area two (2) times a day. 118 mL 11  
 potassium chloride (K-DUR, KLOR-CON) 20 mEq tablet Take 1 Tab by mouth daily. 90 Tab 3  
 magnesium oxide (MAG-OX) 400 mg tablet Take 1 Tab by mouth daily. 90 Tab 3  
 salsalate (DISALCID) 750 mg tablet Take 1 Tab by mouth three (3) times daily. 90 Tab 0  ALPRAZolam (XANAX) 1 mg tablet Take 1 Tab by mouth three (3) times daily as needed for Anxiety.  Max Daily Amount: 3 mg. 10 Tab 0  
  fluticasone-salmeterol (ADVAIR) 250-50 mcg/dose diskus inhaler Take 1 Puff by inhalation every twelve (12) hours. 1 Inhaler 11  
 lidocaine (LIDODERM) 5 % 1 Patch by TransDERmal route every twenty-four (24) hours. 3 Package 3  
 QUEtiapine (SEROQUEL) 50 mg tablet Take 50 mg by mouth three (3) times daily.  FLUoxetine (PROZAC) 20 mg capsule Take 20 mg by mouth daily. Past Medical History:  
Diagnosis Date  Asthma  B[facility]  CAD (coronary artery disease) ablation  [facility]  Hypercholesteremia  Hypertension  Tachycardia Past Surgical History:  
Procedure Laterality Date  ABLATION OF SVT  3/30/2015  EP STUDY W/INDUCTION  3/30/2015  HX BACK SURGERY    
 HX COLONOSCOPY  9-  HX HEART CATHETERIZATION    
 HX HYSTERECTOMY Allergies Allergen Reactions  Ampicillin Hives and Nausea and Vomiting REVIEW OF SYSTEMS: 
Review of Systems - Negative except ENT ROS: negative for - headaches, hearing change, nasal congestion, oral lesions, tinnitus, visual changes or Respiratory ROS: no cough, shortness of breath, or wheezing Cardiovascular ROS: no chest pain or dyspnea on exertion Gastrointestinal ROS: no abdominal pain, change in bowel habits, or black or blood Genito-Urinary ROS: no dysuria, trouble voiding, or hematuria Musculoskeletal ROS: negative Neurological ROS: no TIA or stroke symptoms Social History Socioeconomic History  Marital status:  Spouse name: Not on file  Number of children: 1  Years of education: Not on file  Highest education level: Not on file Social Needs  Financial resource strain: Not on file  Food insecurity - worry: Not on file  Food insecurity - inability: Not on file  Transportation needs - medical: Not on file  Transportation needs - non-medical: Not on file Occupational History  Occupation: retired--[employer]---disabled Tobacco Use  Smoking status: Never Smoker  Smokeless tobacco: Never Used Substance and Sexual Activity  Alcohol use: No  
  Alcohol/week: 0.0 oz  
  Comment: rarely  Drug use: No  
 Sexual activity: Not Currently Partners: Male Other Topics Concern  Not on file Social History Narrative  Not on file  
r Family History Problem Relation Age of Onset  Heart Disease Father  Stroke Father OBJECTIVE: 
Visit Vitals /84 Pulse 63 Temp 98.1 °F (36.7 °C) (Oral) Resp 16 Ht 5' 2\" (1.575 m) Wt 134 lb 14.4 oz (61.2 kg) SpO2 95% BMI 24.67 kg/m² ENT: perrla,  eom intact NECK: supple. Thyroid normal, no JVD CHEST: clear to ascultation and percussion HEART: regular rate and rhythm ABD: soft, bowel sounds active, EXTREMITIES: no edema, pulse 1+, pain elicited to range of motion right knee, mild effusion right knee INTEGUMENT: clear ASSESSMENT: 
1. Synovitis 2. Paroxysmal atrial fibrillation (HCC) 3. Essential hypertension 4. Mild intermittent asthma without complication PLAN: 
 
1. The patient has synovitis of the right knee. Under sterile technique I inject Kenalog 40 mg and Xylocaine 1% 5 cc into the lateral aspect of the right knee. She tolerates the procedure well. She is not a good candidate for NSAIDs because of her Xarelto. 2. She will continue Xarelto for paroxysmal atrial fibrillation. 3. Her blood pressure is acceptable today. No adjustments are made. Follow-up Disposition: 
Return keep old apt.  
 
 
Margot Guzman MD

## 2018-11-03 RX ORDER — ALBUTEROL SULFATE 90 UG/1
2 AEROSOL, METERED RESPIRATORY (INHALATION)
Qty: 1 INHALER | Refills: 11 | Status: SHIPPED | OUTPATIENT
Start: 2018-11-03 | End: 2019-10-03 | Stop reason: SDUPTHER

## 2018-11-09 ENCOUNTER — TELEPHONE (OUTPATIENT)
Dept: CARDIOLOGY CLINIC | Age: 68
End: 2018-11-09

## 2018-11-09 ENCOUNTER — HOSPITAL ENCOUNTER (OUTPATIENT)
Dept: MAMMOGRAPHY | Age: 68
Discharge: HOME | End: 2018-11-09
Attending: INTERNAL MEDICINE
Payer: MEDICARE

## 2018-11-09 ENCOUNTER — APPOINTMENT (OUTPATIENT)
Dept: GENERAL RADIOLOGY | Age: 68
End: 2018-11-09
Attending: EMERGENCY MEDICINE
Payer: MEDICARE

## 2018-11-09 ENCOUNTER — HOSPITAL ENCOUNTER (EMERGENCY)
Age: 68
Discharge: HOME | End: 2018-11-09
Attending: EMERGENCY MEDICINE
Payer: MEDICARE

## 2018-11-09 VITALS
WEIGHT: 139.11 LBS | OXYGEN SATURATION: 94 % | RESPIRATION RATE: 27 BRPM | HEIGHT: 63 IN | DIASTOLIC BLOOD PRESSURE: 72 MMHG | BODY MASS INDEX: 24.65 KG/M2 | SYSTOLIC BLOOD PRESSURE: 138 MMHG | TEMPERATURE: 97.3 F | HEART RATE: 63 BPM

## 2018-11-09 DIAGNOSIS — Z12.31 ENCOUNTER FOR SCREENING MAMMOGRAM FOR MALIGNANT NEOPLASM OF BREAST: ICD-10-CM

## 2018-11-09 DIAGNOSIS — R07.9 CHEST PAIN, UNSPECIFIED TYPE: Primary | ICD-10-CM

## 2018-11-09 LAB
ALBUMIN SERPL-MCNC: 3.5 G/DL (ref 3.5–5)
ALBUMIN/GLOB SERPL: 0.8 {RATIO} (ref 1.1–2.2)
ALP SERPL-CCNC: 44 U/L (ref 45–117)
ALT SERPL-CCNC: 28 U/L (ref 12–78)
ANION GAP SERPL CALC-SCNC: 10 MMOL/L (ref 5–15)
APPEARANCE UR: CLEAR
AST SERPL-CCNC: 16 U/L (ref 15–37)
BACTERIA URNS QL MICRO: NEGATIVE /HPF
BASOPHILS # BLD: 0 K/UL (ref 0–0.1)
BASOPHILS NFR BLD: 1 % (ref 0–1)
BILIRUB SERPL-MCNC: 0.8 MG/DL (ref 0.2–1)
BILIRUB UR QL: NEGATIVE
BUN SERPL-MCNC: 14 MG/DL (ref 6–20)
BUN/CREAT SERPL: 13 (ref 12–20)
CALCIUM SERPL-MCNC: 9.2 MG/DL (ref 8.5–10.1)
CHLORIDE SERPL-SCNC: 108 MMOL/L (ref 97–108)
CO2 SERPL-SCNC: 24 MMOL/L (ref 21–32)
COLOR UR: ABNORMAL
CREAT SERPL-MCNC: 1.1 MG/DL (ref 0.55–1.02)
D DIMER PPP FEU-MCNC: <0.19 MG/L FEU (ref 0–0.65)
DIFFERENTIAL METHOD BLD: NORMAL
EOSINOPHIL # BLD: 0 K/UL (ref 0–0.4)
EOSINOPHIL NFR BLD: 1 % (ref 0–7)
EPITH CASTS URNS QL MICRO: ABNORMAL /LPF
ERYTHROCYTE [DISTWIDTH] IN BLOOD BY AUTOMATED COUNT: 14 % (ref 11.5–14.5)
GLOBULIN SER CALC-MCNC: 4.6 G/DL (ref 2–4)
GLUCOSE SERPL-MCNC: 88 MG/DL (ref 65–100)
GLUCOSE UR STRIP.AUTO-MCNC: NEGATIVE MG/DL
HCT VFR BLD AUTO: 43.4 % (ref 35–47)
HGB BLD-MCNC: 14.3 G/DL (ref 11.5–16)
HGB UR QL STRIP: NEGATIVE
IMM GRANULOCYTES # BLD: 0 K/UL (ref 0–0.04)
IMM GRANULOCYTES NFR BLD AUTO: 0 % (ref 0–0.5)
KETONES UR QL STRIP.AUTO: NEGATIVE MG/DL
LEUKOCYTE ESTERASE UR QL STRIP.AUTO: ABNORMAL
LIPASE SERPL-CCNC: 315 U/L (ref 73–393)
LYMPHOCYTES # BLD: 2.6 K/UL (ref 0.8–3.5)
LYMPHOCYTES NFR BLD: 47 % (ref 12–49)
MCH RBC QN AUTO: 30.3 PG (ref 26–34)
MCHC RBC AUTO-ENTMCNC: 32.9 G/DL (ref 30–36.5)
MCV RBC AUTO: 91.9 FL (ref 80–99)
MONOCYTES # BLD: 0.6 K/UL (ref 0–1)
MONOCYTES NFR BLD: 10 % (ref 5–13)
NEUTS SEG # BLD: 2.3 K/UL (ref 1.8–8)
NEUTS SEG NFR BLD: 42 % (ref 32–75)
NITRITE UR QL STRIP.AUTO: NEGATIVE
NRBC # BLD: 0 K/UL (ref 0–0.01)
NRBC BLD-RTO: 0 PER 100 WBC
PH UR STRIP: 6 [PH] (ref 5–8)
PLATELET # BLD AUTO: 214 K/UL (ref 150–400)
PMV BLD AUTO: 10.3 FL (ref 8.9–12.9)
POTASSIUM SERPL-SCNC: 3.7 MMOL/L (ref 3.5–5.1)
PROT SERPL-MCNC: 8.1 G/DL (ref 6.4–8.2)
PROT UR STRIP-MCNC: NEGATIVE MG/DL
RBC # BLD AUTO: 4.72 M/UL (ref 3.8–5.2)
RBC #/AREA URNS HPF: ABNORMAL /HPF (ref 0–5)
SODIUM SERPL-SCNC: 142 MMOL/L (ref 136–145)
SP GR UR REFRACTOMETRY: 1.02 (ref 1–1.03)
TROPONIN I SERPL-MCNC: <0.05 NG/ML
UA: UC IF INDICATED,UAUC: ABNORMAL
UROBILINOGEN UR QL STRIP.AUTO: 0.2 EU/DL (ref 0.2–1)
WBC # BLD AUTO: 5.6 K/UL (ref 3.6–11)
WBC URNS QL MICRO: ABNORMAL /HPF (ref 0–4)

## 2018-11-09 PROCEDURE — 80053 COMPREHEN METABOLIC PANEL: CPT

## 2018-11-09 PROCEDURE — 93005 ELECTROCARDIOGRAM TRACING: CPT

## 2018-11-09 PROCEDURE — 96374 THER/PROPH/DIAG INJ IV PUSH: CPT

## 2018-11-09 PROCEDURE — 85379 FIBRIN DEGRADATION QUANT: CPT

## 2018-11-09 PROCEDURE — 36415 COLL VENOUS BLD VENIPUNCTURE: CPT

## 2018-11-09 PROCEDURE — 83690 ASSAY OF LIPASE: CPT

## 2018-11-09 PROCEDURE — 84484 ASSAY OF TROPONIN QUANT: CPT

## 2018-11-09 PROCEDURE — 99284 EMERGENCY DEPT VISIT MOD MDM: CPT

## 2018-11-09 PROCEDURE — 85025 COMPLETE CBC W/AUTO DIFF WBC: CPT

## 2018-11-09 PROCEDURE — 96375 TX/PRO/DX INJ NEW DRUG ADDON: CPT

## 2018-11-09 PROCEDURE — 71046 X-RAY EXAM CHEST 2 VIEWS: CPT

## 2018-11-09 PROCEDURE — 81001 URINALYSIS AUTO W/SCOPE: CPT

## 2018-11-09 PROCEDURE — 77063 BREAST TOMOSYNTHESIS BI: CPT

## 2018-11-09 RX ORDER — KETOROLAC TROMETHAMINE 30 MG/ML
15 INJECTION, SOLUTION INTRAMUSCULAR; INTRAVENOUS
Status: DISCONTINUED | OUTPATIENT
Start: 2018-11-09 | End: 2018-11-09 | Stop reason: HOSPADM

## 2018-11-09 NOTE — ED PROVIDER NOTES
HPI Pt states that she has had intermittent midsternal chest pain for 2 weeks. Denies fever, cough, cold symptoms,headache,  neck pain, visual changes, focal weakness or rash. Denies any  difficulty breathing, difficulty swallowing, SOB or chest pain. Denies any nausea, vomiting or diarrhea. Pt. Reports that she has not had any medications Old charts reviewed Past Medical History:  
Diagnosis Date  Asthma  Bipolar 2 disorder ([facility]  CAD (coronary artery disease) ablation  DVT (deep venous thrombosis) ([facility]  Hypercholesteremia  Hypertension  Menopause  Tachycardia Past Surgical History:  
Procedure Laterality Date  ABLATION OF SVT  3/30/2015  EP STUDY W/INDUCTION  3/30/2015  HX BACK SURGERY    
 HX COLONOSCOPY  9-  HX HEART CATHETERIZATION    
 HX HYSTERECTOMY Family History:  
Problem Relation Age of Onset  Heart Disease Father  Stroke Father Social History Socioeconomic History  Marital status:  Spouse name: Not on file  Number of children: 1  Years of education: Not on file  Highest education level: Not on file Social Needs  Financial resource strain: Not on file  Food insecurity - worry: Not on file  Food insecurity - inability: Not on file  Transportation needs - medical: Not on file  Transportation needs - non-medical: Not on file Occupational History  Occupation: retired--[employer]---disabled Tobacco Use  Smoking status: Never Smoker  Smokeless tobacco: Never Used Substance and Sexual Activity  Alcohol use: No  
  Alcohol/week: 0.0 oz  
  Comment: rarely  Drug use: No  
 Sexual activity: Not Currently Partners: Male Other Topics Concern  Not on file Social History Narrative  Not on file ALLERGIES: Ampicillin Review of Systems Constitutional: Negative for activity change, appetite change, fatigue and fever. HENT: Negative for congestion. Respiratory: Negative for cough and shortness of breath. Cardiovascular: Positive for chest pain. Gastrointestinal: Negative for abdominal distention and abdominal pain. Genitourinary: Negative for dysuria. Musculoskeletal: Negative for arthralgias, back pain, myalgias and neck pain. All other systems reviewed and are negative. Vitals:  
 11/09/18 1448 11/09/18 1509 BP:  141/83 Pulse: 93 71 Resp:  16 Temp:  97.8 °F (36.6 °C) SpO2: 97% 97% Weight:  63.1 kg (139 lb 1.8 oz) Height:  5' 2.75\" (1.594 m) Physical Exam  
Constitutional: She is oriented to person, place, and time. She appears well-developed and well-nourished. Black female; non smoker HENT:  
Head: Normocephalic and atraumatic. Eyes: Pupils are equal, round, and reactive to light. Neck: Normal range of motion. Neck supple. Cardiovascular: Normal rate and regular rhythm. Pulmonary/Chest: Effort normal and breath sounds normal.  
Abdominal: Soft. Bowel sounds are normal. There is no tenderness. Musculoskeletal:  
     Right lower leg: Normal. She exhibits no tenderness and no edema. Left lower leg: Normal. She exhibits no tenderness and no edema. Lymphadenopathy:  
  She has no cervical adenopathy. Neurological: She is alert and oriented to person, place, and time. Skin: Skin is warm and dry. No rash noted. Psychiatric: She has a normal mood and affect. Nursing note and vitals reviewed. MDM Procedures Pt has been re-examined and denies any c/o pain; denies SOB or nausea. 4:44 PM 
Patient's results and planhave been reviewed with her. Patient  has verbally conveyed her understanding and agreement of her signs, symptoms, diagnosis, treatment and prognosis and additionally agrees to follow up as recommended or return to the Emergency Room should her condition change prior to follow-up.   Discharge instructions have also been provided to the patient with some educational information regarding her diagnosis as well a list of reasons why she would want to return to the ER prior to her follow-up appointment should her condition change. Discussed plan of care with Dr. Abran Long.  Miles Singer NP

## 2018-11-09 NOTE — ED NOTES
Discharge instructions given to patient by PA and nurse. Pt to follow up with PCP and cardiology and  verbalizes understanding. IV d/c. Pt ambulated off of unit in no signs of distress.

## 2018-11-09 NOTE — DISCHARGE INSTRUCTIONS
Chest Pain: Care Instructions  Your Care Instructions    There are many things that can cause chest pain. Some are not serious and will get better on their own in a few days. But some kinds of chest pain need more testing and treatment. Your doctor may have recommended a follow-up visit in the next 8 to 12 hours. If you are not getting better, you may need more tests or treatment. Even though your doctor has released you, you still need to watch for any problems. The doctor carefully checked you, but sometimes problems can develop later. If you have new symptoms or if your symptoms do not get better, get medical care right away. If you have worse or different chest pain or pressure that lasts more than 5 minutes or you passed out (lost consciousness), call 911 or seek other emergency help right away. A medical visit is only one step in your treatment. Even if you feel better, you still need to do what your doctor recommends, such as going to all suggested follow-up appointments and taking medicines exactly as directed. This will help you recover and help prevent future problems. How can you care for yourself at home? · Rest until you feel better. · Take your medicine exactly as prescribed. Call your doctor if you think you are having a problem with your medicine. · Do not drive after taking a prescription pain medicine. When should you call for help? Call 911 if:    · You passed out (lost consciousness).     · You have severe difficulty breathing.     · You have symptoms of a heart attack. These may include:  ? Chest pain or pressure, or a strange feeling in your chest.  ? Sweating. ? Shortness of breath. ? Nausea or vomiting. ? Pain, pressure, or a strange feeling in your back, neck, jaw, or upper belly or in one or both shoulders or arms. ? Lightheadedness or sudden weakness. ? A fast or irregular heartbeat.   After you call 911, the  may tell you to chew 1 adult-strength or 2 to 4 low-dose aspirin. Wait for an ambulance. Do not try to drive yourself.    Call your doctor today if:    · You have any trouble breathing.     · Your chest pain gets worse.     · You are dizzy or lightheaded, or you feel like you may faint.     · You are not getting better as expected.     · You are having new or different chest pain. Where can you learn more? Go to http://ana-barbara.info/. Enter A120 in the search box to learn more about \"Chest Pain: Care Instructions. \"  Current as of: November 20, 2017  Content Version: 11.8  © 0166-2654 [employer]. Care instructions adapted under license by [employer] (which disclaims liability or warranty for this information). If you have questions about a medical condition or this instruction, always ask your healthcare professional. [address] any warranty or liability for your use of this information. Anxiety Disorder: Care Instructions  Your Care Instructions    Anxiety is a normal reaction to stress. Difficult situations can cause you to have symptoms such as sweaty palms and a nervous feeling. In an anxiety disorder, the symptoms are far more severe. Constant worry, muscle tension, trouble sleeping, nausea and diarrhea, and other symptoms can make normal daily activities difficult or impossible. These symptoms may occur for no reason, and they can affect your work, school, or social life. Medicines, counseling, and self-care can all help. Follow-up care is a key part of your treatment and safety. Be sure to make and go to all appointments, and call your doctor if you are having problems. It's also a good idea to know your test results and keep a list of the medicines you take. How can you care for yourself at home? · Take medicines exactly as directed. Call your doctor if you think you are having a problem with your medicine.   · Go to your counseling sessions and follow-up appointments. · Recognize and accept your anxiety. Then, when you are in a situation that makes you anxious, say to yourself, \"This is not an emergency. I feel uncomfortable, but I am not in danger. I can keep going even if I feel anxious. \"  · Be kind to your body:  ? Relieve tension with exercise or a massage. ? Get enough rest.  ? Avoid alcohol, caffeine, nicotine, and illegal drugs. They can increase your anxiety level and cause sleep problems. ? Learn and do relaxation techniques. See below for more about these techniques. · Engage your mind. Get out and do something you enjoy. Go to a Ka[place] movie, or take a walk or hike. Plan your day. Having too much or too little to do can make you anxious. · Keep a record of your symptoms. Discuss your fears with a good friend or family member, or join a support group for people with similar problems. Talking to others sometimes relieves stress. · Get involved in social groups, or volunteer to help others. Being alone sometimes makes things seem worse than they are. · Get at least 30 minutes of exercise on most days of the week to relieve stress. Walking is a good choice. You also may want to do other activities, such as running, swimming, cycling, or playing tennis or team sports. Relaxation techniques  Do relaxation exercises 10 to 20 minutes a day. You can play soothing, relaxing music while you do them, if you wish. · Tell others in your house that you are going to do your relaxation exercises. Ask them not to disturb you. · Find a comfortable place, away from all distractions and noise. · Lie down on your back, or sit with your back straight. · Focus on your breathing. Make it slow and steady. · Breathe in through your nose. Breathe out through either your nose or mouth. · Breathe deeply, filling up the area between your navel and your rib cage. Breathe so that your belly goes up and down. · Do not hold your breath.   · Breathe like this for 5 to 10 minutes. Notice the feeling of calmness throughout your whole body. As you continue to breathe slowly and deeply, relax by doing the following for another 5 to 10 minutes:  · Tighten and relax each muscle group in your body. You can begin at your toes and work your way up to your head. · Imagine your muscle groups relaxing and becoming heavy. · Empty your mind of all thoughts. · Let yourself relax more and more deeply. · Become aware of the state of calmness that surrounds you. · When your relaxation time is over, you can bring yourself back to alertness by moving your fingers and toes and then your hands and feet and then stretching and moving your entire body. Sometimes people fall asleep during relaxation, but they usually wake up shortly afterward. · Always give yourself time to return to full alertness before you drive a car or do anything that might cause an accident if you are not fully alert. Never play a relaxation tape while you drive a car. When should you call for help? Call 911 anytime you think you may need emergency care. For example, call if:    · You feel you cannot stop from hurting yourself or someone else.   Radha Drake the numbers for these national suicide hotlines: 5-709-666-TALK (5-958.793.2366) and 3-696-JBNUYBL (9-444.791.2983). If you or someone you know talks about suicide or feeling hopeless, get help right away.   Watch closely for changes in your health, and be sure to contact your doctor if:    · You have anxiety or fear that affects your life.     · You have symptoms of anxiety that are new or different from those you had before. Where can you learn more? Go to http://ana-barbara.info/. Enter P754 in the search box to learn more about \"Anxiety Disorder: Care Instructions. \"  Current as of: December 7, 2017  Content Version: 11.8  © 2322-5357 [employer].  Care instructions adapted under license by [employer] (which disclaims liability or warranty for this information). If you have questions about a medical condition or this instruction, always ask your healthcare professional. [place] [address] any warranty or liability for your use of this information.

## 2018-11-09 NOTE — ED TRIAGE NOTES
Pt states that she is having chest pressure to her mid chest which she developed at rest off and on for the past 2 weeks.

## 2018-11-09 NOTE — TELEPHONE ENCOUNTER
Patient states she needs to be seen sooner due to having a \"feeling\" I tried to obtain more information about it but she stated she cant explain it   Phone: [phone]

## 2018-11-11 LAB
ATRIAL RATE: 79 BPM
CALCULATED P AXIS, ECG09: 49 DEGREES
CALCULATED R AXIS, ECG10: 2 DEGREES
CALCULATED T AXIS, ECG11: -15 DEGREES
DIAGNOSIS, 93000: NORMAL
P-R INTERVAL, ECG05: 142 MS
Q-T INTERVAL, ECG07: 400 MS
QRS DURATION, ECG06: 66 MS
QTC CALCULATION (BEZET), ECG08: 458 MS
VENTRICULAR RATE, ECG03: 79 BPM

## 2018-11-27 NOTE — TELEPHONE ENCOUNTER
Patient is calling back again to see if she can get something sooner than the 21 of December she said the ER told her to be seen within 2 weeks from 11/9   Phone: [phone]

## 2018-11-28 NOTE — TELEPHONE ENCOUNTER
Benjamin Nash spoke with patient regarding patient's appointment. Patient wanted to be seen sooner. Patient scheduled to see Silvia Mcgrath on Dec.10

## 2018-11-29 ENCOUNTER — OFFICE VISIT (OUTPATIENT)
Dept: INTERNAL MEDICINE CLINIC | Age: 68
End: 2018-11-29

## 2018-11-29 VITALS
DIASTOLIC BLOOD PRESSURE: 79 MMHG | HEIGHT: 62 IN | TEMPERATURE: 97.8 F | RESPIRATION RATE: 16 BRPM | SYSTOLIC BLOOD PRESSURE: 127 MMHG | OXYGEN SATURATION: 91 % | HEART RATE: 60 BPM | WEIGHT: 139.2 LBS | BODY MASS INDEX: 25.62 KG/M2

## 2018-11-29 DIAGNOSIS — I10 ESSENTIAL HYPERTENSION: ICD-10-CM

## 2018-11-29 DIAGNOSIS — F41.1 ANXIETY NEUROSIS: ICD-10-CM

## 2018-11-29 DIAGNOSIS — I25.9 CHEST PAIN DUE TO MYOCARDIAL ISCHEMIA, UNSPECIFIED ISCHEMIC CHEST PAIN TYPE: Primary | ICD-10-CM

## 2018-11-29 DIAGNOSIS — I48.0 PAROXYSMAL ATRIAL FIBRILLATION (HCC): ICD-10-CM

## 2018-11-29 DIAGNOSIS — E78.5 DYSLIPIDEMIA: ICD-10-CM

## 2018-11-29 NOTE — PROGRESS NOTES
Chief Complaint Patient presents with  
[facility] Follow Up  
  pt states she went to ED for chest pain. 1. Have you been to the ER, urgent care clinic since your last visit? Hospitalized since your last visit? Yes When: November 9th 2018 Where: [facility] Reason for visit: Chest pain(pressure) 2. Have you seen or consulted any other health care providers outside of the [address] [place] [address] since your last visit? Include any pap smears or colon screening.  No

## 2018-11-30 NOTE — PROGRESS NOTES
[address] [place] [address] and Primary Care 
[place] [address] 
[address] [place] [address] Phone:  [phone]  Fax: [phone] Chief Complaint Patient presents with  
[facility] Follow Up  
  pt states she went to ED for chest pain. .   
 
SUBJECTIVE: 
  Marissa Alves is a 79 y.o. female who comes in for a return visit having gone to the emergency room for chest pain. It was suggested that she go to see a cardiologist.  The workup in the emergency room was essentially negative. For the last several weeks, she has had a vague pressure sensation in her substernal region. There are no precipitating or exacerbating factors and they generally last for maybe five minutes and lul. She denies similar symptoms previously. I strongly suspect that she has had a cardiac catheterization in the past, which was normal, but again this is not well documented. The patient could not remember. She does remember having her ablation studies. She has a past history of primary hypertension, dyslipidemia and paroxysmal atrial fibrillation. She has an appointment to see her cardiologist in the next week and a half. Current Outpatient Medications Medication Sig Dispense Refill  albuterol (VENTOLIN HFA) 90 mcg/actuation inhaler Take 2 Puffs by inhalation every four (4) hours as needed for Wheezing. 1 Inhaler 11  
 rivaroxaban (XARELTO) 20 mg tab tablet Take 1 Tab by mouth daily. 7 Tab 0  
 rosuvastatin (CRESTOR) 10 mg tablet Take 1 tablet every day 30 Tab 0  
 methocarbamol (ROBAXIN) 750 mg tablet Take 1 Tab by mouth three (3) times daily as needed. 270 Tab 3  
 HYDROcodone-acetaminophen (NORCO) 5-325 mg per tablet Take 1 Tab by mouth two (2) times daily as needed for Pain. Max Daily Amount: 2 Tabs.  30 Tab 0  
 metoprolol tartrate (LOPRESSOR) 25 mg tablet 1/2 tablet twice a day 90 Tab 3  
 hydroCHLOROthiazide (HYDRODIURIL) 12.5 mg tablet Take once daily 90 Tab 3  
  hydroquinone (ESOTERICA, MELQUIN) 4 % topical cream Apply  to affected area two (2) times a day. 30 g 6  
 desonide (TRIDESILON) 0.05 % topical lotion Apply  to affected area two (2) times a day. 118 mL 11  
 potassium chloride (K-DUR, KLOR-CON) 20 mEq tablet Take 1 Tab by mouth daily. 90 Tab 3  
 magnesium oxide (MAG-OX) 400 mg tablet Take 1 Tab by mouth daily. 90 Tab 3  ALPRAZolam (XANAX) 1 mg tablet Take 1 Tab by mouth three (3) times daily as needed for Anxiety. Max Daily Amount: 3 mg. 10 Tab 0  
 fluticasone-salmeterol (ADVAIR) 250-50 mcg/dose diskus inhaler Take 1 Puff by inhalation every twelve (12) hours. 1 Inhaler 11  
 lidocaine (LIDODERM) 5 % 1 Patch by TransDERmal route every twenty-four (24) hours. 3 Package 3  
 QUEtiapine (SEROQUEL) 50 mg tablet Take 50 mg by mouth three (3) times daily.  FLUoxetine (PROZAC) 20 mg capsule Take 20 mg by mouth daily.  salsalate (DISALCID) 750 mg tablet Take 1 Tab by mouth three (3) times daily. 90 Tab 0 Past Medical History:  
Diagnosis Date  Asthma  B[facility] 2 disorder ([facility] [address]  CAD (coronary artery disease) ablation  DVT (deep venous thrombosis) ([facility] [address]  Hypercholesteremia  Hypertension  Menopause  Tachycardia Past Surgical History:  
Procedure Laterality Date  ABLATION OF SVT  3/30/2015  EP STUDY W/INDUCTION  3/30/2015  HX BACK SURGERY    
 HX COLONOSCOPY  9-  HX HEART CATHETERIZATION    
 HX HYSTERECTOMY Allergies Allergen Reactions  Ampicillin Hives and Nausea and Vomiting REVIEW OF SYSTEMS: 
General: negative for - chills or fever ENT: negative for - headaches, nasal congestion or tinnitus Respiratory: negative for - cough, hemoptysis, shortness of breath or wheezing Cardiovascular : negative for - chest pain, edema, palpitations or shortness of breath Gastrointestinal: negative for - abdominal pain, blood in stools, heartburn or nausea/vomiting Genito-Urinary: no dysuria, trouble voiding, or hematuria Musculoskeletal: negative for - gait disturbance, joint pain, joint stiffness or joint swelling Neurological: no TIA or stroke symptoms Hematologic: no bruises, no bleeding, no swollen glands Integument: no lumps, mole changes, nail changes or rash Endocrine: no malaise/lethargy or unexpected weight changes Social History Socioeconomic History  Marital status:  Spouse name: Not on file  Number of children: 1  Years of education: Not on file  Highest education level: Not on file Occupational History  Occupation: retired--[employer]---disabled Tobacco Use  Smoking status: Never Smoker  Smokeless tobacco: Never Used Substance and Sexual Activity  Alcohol use: No  
  Alcohol/week: 0.0 oz  
  Comment: rarely  Drug use: No  
 Sexual activity: Not Currently Partners: Male Family History Problem Relation Age of Onset  Heart Disease Father  Stroke Father OBJECTIVE: 
 
Visit Vitals /79 Pulse 60 Temp 97.8 °F (36.6 °C) (Oral) Resp 16 Ht 5' 2\" (1.575 m) Wt 139 lb 3.2 oz (63.1 kg) SpO2 91% BMI 25.46 kg/m² CONSTITUTIONAL: well , well nourished, appears age appropriate EYES: perrla, eom intact ENMT:moist mucous membranes, pharynx clear NECK: supple. Thyroid normal 
RESPIRATORY: Chest: clear to ascultation and percussion CARDIOVASCULAR: Heart: regular rate and rhythm GASTROINTESTINAL: Abdomen: soft, bowel sounds active HEMATOLOGIC: no pathological lymph nodes palpated MUSCULOSKELETAL: Extremities: no edema, pulse 1+ INTEGUMENT: No unusual rashes or suspicious skin lesions noted. Nails appear normal. 
NEUROLOGIC: non-focal exam  
MENTAL STATUS: alert and oriented, appropriate affect ASSESSMENT: 
1. Chest pain due to myocardial ischemia, unspecified ischemic chest pain type 2. Paroxysmal atrial fibrillation (HCC) 3. Essential hypertension 4. Dyslipidemia 5. Anxiety neurosis PLAN: 
 
1. The patient has atypical chest pain. Wild Ramirez will be ordered in preparation for her seeing a cardiologist in the future. 2. Her rhythm is quite regular today. She will continue her Xarelto. 3. Her blood pressure is excellent and no adjustments are made. 4. She will continue her statin as prescribed. 5. Her anxiety level is quite low. Follow-up Disposition: Not on File Margot Guzman MD

## 2018-12-04 ENCOUNTER — TELEPHONE (OUTPATIENT)
Dept: CARDIOLOGY CLINIC | Age: 68
End: 2018-12-04

## 2018-12-04 DIAGNOSIS — I48.0 PAROXYSMAL ATRIAL FIBRILLATION (HCC): ICD-10-CM

## 2018-12-04 NOTE — TELEPHONE ENCOUNTER
Walked into office and stated that she was in the donut hole and that she can not afford her xarelto for this month and needs samples. Advised by the  that the nurse was not available and that she would need to contact her tomorrow. She states that she can not wait till tomorrow and needs to speak to a nurse. Spoke to patient and advised her that I would check for samples in the AM and contact her when they are ready to be picked up. Advised her that she will need to call in the future prior to needing samples. Patient verbalizes understanding. And will call with any questions or concerns.

## 2018-12-05 NOTE — TELEPHONE ENCOUNTER
VO per Dr Domenico Neely ok to give samples. Gave enough for her to carry her over till January. She states that she will be able to afford the xarelto then. She states that in January it will be $47 for the month. Left message notifying patient that samples were ready to be picked up. Advised patient yesterday that if she can not afford the xarelto next month we will need to switch to coumadin.

## 2019-01-07 DIAGNOSIS — I48.20 CHRONIC ATRIAL FIBRILLATION (HCC): ICD-10-CM

## 2019-01-08 RX ORDER — LANOLIN ALCOHOL/MO/W.PET/CERES
400 CREAM (GRAM) TOPICAL DAILY
Qty: 90 TAB | Refills: 3 | Status: SHIPPED | OUTPATIENT
Start: 2019-01-08 | End: 2020-01-20 | Stop reason: SDUPTHER

## 2019-01-08 RX ORDER — POTASSIUM CHLORIDE 20 MEQ/1
20 TABLET, EXTENDED RELEASE ORAL DAILY
Qty: 90 TAB | Refills: 3 | Status: SHIPPED | OUTPATIENT
Start: 2019-01-08 | End: 2019-01-10 | Stop reason: SDUPTHER

## 2019-01-10 ENCOUNTER — OFFICE VISIT (OUTPATIENT)
Dept: INTERNAL MEDICINE CLINIC | Age: 69
End: 2019-01-10

## 2019-01-10 DIAGNOSIS — E04.9 NODULAR GOITER: ICD-10-CM

## 2019-01-10 DIAGNOSIS — I10 ESSENTIAL HYPERTENSION: ICD-10-CM

## 2019-01-10 DIAGNOSIS — J06.9 UPPER RESPIRATORY TRACT INFECTION, UNSPECIFIED TYPE: Primary | ICD-10-CM

## 2019-01-10 DIAGNOSIS — F41.1 ANXIETY NEUROSIS: ICD-10-CM

## 2019-01-10 DIAGNOSIS — I48.20 CHRONIC ATRIAL FIBRILLATION (HCC): ICD-10-CM

## 2019-01-10 RX ORDER — LANOLIN ALCOHOL/MO/W.PET/CERES
400 CREAM (GRAM) TOPICAL DAILY
Qty: 10 TAB | Refills: 0 | Status: CANCELLED | OUTPATIENT
Start: 2019-01-10

## 2019-01-10 NOTE — PROGRESS NOTES
Chief Complaint Patient presents with  Chest Pain  
  3 month follow up 1. Have you been to the ER, urgent care clinic since your last visit? Hospitalized since your last visit? No 
 
2. Have you seen or consulted any other health care providers outside of the [address] [place] [address] since your last visit? Include any pap smears or colon screening.  No

## 2019-01-10 NOTE — LETTER
1/13/2019 1:15 PM 
 
Ms. April Harris [address] [facility] [address] [id number] The above-named patient is presently under my care for lumbar spondylosis leading to chronic low back pain occasional flare manifesting itself is lumbar radiculopathy. Probably benefit from the Safe Step shower system. Sincerely, Harleen Koch MD

## 2019-01-13 VITALS
TEMPERATURE: 97.5 F | BODY MASS INDEX: 25.36 KG/M2 | OXYGEN SATURATION: 97 % | WEIGHT: 137.8 LBS | SYSTOLIC BLOOD PRESSURE: 142 MMHG | DIASTOLIC BLOOD PRESSURE: 80 MMHG | HEART RATE: 76 BPM | HEIGHT: 62 IN | RESPIRATION RATE: 14 BRPM

## 2019-01-13 PROBLEM — E04.9 NODULAR GOITER: Status: ACTIVE | Noted: 2019-01-13

## 2019-01-13 RX ORDER — POTASSIUM CHLORIDE 20 MEQ/1
20 TABLET, EXTENDED RELEASE ORAL DAILY
Qty: 90 TAB | Refills: 3 | Status: SHIPPED | OUTPATIENT
Start: 2019-01-13 | End: 2019-12-23 | Stop reason: SDUPTHER

## 2019-01-13 NOTE — PROGRESS NOTES
[address] [place] [address] and Primary Care 
[place] [address] 
[address] [facility] [address] Phone:  [phone]  Fax: [phone] Chief Complaint Patient presents with  Chest Pain  
  3 month follow up Karina Ruiz SUBJECTIVE: 
  Sergey Martínez is a 76 y.o. female Comes in for return visit complaining of upper respiratory symptoms. She has nasal congestion, coughing, sore throat and malaise. This has been present now for the last two days. She is also concerned about her blood pressure being slightly elevated. She does need a refill on her KCl. She has not had any palpitations of late. She has a past history of dyslipidemia and anxiety. Current Outpatient Medications Medication Sig Dispense Refill  potassium chloride (K-DUR, KLOR-CON) 20 mEq tablet Take 1 Tab by mouth daily. 90 Tab 3  
 magnesium oxide (MAG-OX) 400 mg tablet Take 1 Tab by mouth daily. 90 Tab 3  
 rivaroxaban (XARELTO) 20 mg tab tablet Take 1 Tab by mouth daily. 28 Tab 0  
 albuterol (VENTOLIN HFA) 90 mcg/actuation inhaler Take 2 Puffs by inhalation every four (4) hours as needed for Wheezing. 1 Inhaler 11  
 rosuvastatin (CRESTOR) 10 mg tablet Take 1 tablet every day 30 Tab 0  
 methocarbamol (ROBAXIN) 750 mg tablet Take 1 Tab by mouth three (3) times daily as needed. 270 Tab 3  
 HYDROcodone-acetaminophen (NORCO) 5-325 mg per tablet Take 1 Tab by mouth two (2) times daily as needed for Pain. Max Daily Amount: 2 Tabs. 30 Tab 0  
 metoprolol tartrate (LOPRESSOR) 25 mg tablet 1/2 tablet twice a day 90 Tab 3  
 hydroCHLOROthiazide (HYDRODIURIL) 12.5 mg tablet Take once daily 90 Tab 3  
 hydroquinone (ESOTERICA, MELQUIN) 4 % topical cream Apply  to affected area two (2) times a day. 30 g 6  
 desonide (TRIDESILON) 0.05 % topical lotion Apply  to affected area two (2) times a day. 118 mL 11  
 salsalate (DISALCID) 750 mg tablet Take 1 Tab by mouth three (3) times daily.  90 Tab 0  
  ALPRAZolam (XANAX) 1 mg tablet Take 1 Tab by mouth three (3) times daily as needed for Anxiety. Max Daily Amount: 3 mg. 10 Tab 0  
 fluticasone-salmeterol (ADVAIR) 250-50 mcg/dose diskus inhaler Take 1 Puff by inhalation every twelve (12) hours. 1 Inhaler 11  
 lidocaine (LIDODERM) 5 % 1 Patch by TransDERmal route every twenty-four (24) hours. 3 Package 3  
 QUEtiapine (SEROQUEL) 50 mg tablet Take 50 mg by mouth three (3) times daily.  FLUoxetine (PROZAC) 20 mg capsule Take 20 mg by mouth daily. Past Medical History:  
Diagnosis Date  Asthma  Bipolar 2 disorder ([facility] [address]  CAD (coronary artery disease) ablation  [facility]  Hypercholesteremia  Hypertension  Menopause  Tachycardia Past Surgical History:  
Procedure Laterality Date  ABLATION OF SVT  3/30/2015  EP STUDY W/INDUCTION  3/30/2015  HX BACK SURGERY    
 HX COLONOSCOPY  9-  HX HEART CATHETERIZATION    
 HX HYSTERECTOMY Allergies Allergen Reactions  Ampicillin Hives and Nausea and Vomiting REVIEW OF SYSTEMS: 
General: negative for - chills or fever ENT: negative for - headaches, nasal congestion or tinnitus Respiratory: negative for - cough, hemoptysis, shortness of breath or wheezing Cardiovascular : negative for - chest pain, edema, palpitations or shortness of breath Gastrointestinal: negative for - abdominal pain, blood in stools, heartburn or nausea/vomiting Genito-Urinary: no dysuria, trouble voiding, or hematuria Musculoskeletal: negative for - gait disturbance, joint pain, joint stiffness or joint swelling Neurological: no TIA or stroke symptoms Hematologic: no bruises, no bleeding, no swollen glands Integument: no lumps, mole changes, nail changes or rash Endocrine: no malaise/lethargy or unexpected weight changes Social History Socioeconomic History  Marital status:  Spouse name: Not on file  Number of children: 1  Years of education: Not on file  Highest education level: Not on file Occupational History  Occupation: retired--[employer]---disabled Tobacco Use  Smoking status: Never Smoker  Smokeless tobacco: Never Used Substance and Sexual Activity  Alcohol use: No  
  Alcohol/week: 0.0 oz  
  Comment: rarely  Drug use: No  
 Sexual activity: Not Currently Partners: Male Family History Problem Relation Age of Onset  Heart Disease Father  Stroke Father OBJECTIVE: 
 
Visit Vitals /80 Pulse 76 Temp 97.5 °F (36.4 °C) (Oral) Resp 14 Ht 5' 2\" (1.575 m) Wt 137 lb 12.8 oz (62.5 kg) SpO2 97% BMI 25.20 kg/m² CONSTITUTIONAL: well , well nourished, appears age appropriate EYES: perrla, eom intact ENMT:moist mucous membranes, pharynx clear NECK: supple. Nodular goiter RESPIRATORY: Chest: clear to ascultation and percussion CARDIOVASCULAR: Heart: regular rate and rhythm GASTROINTESTINAL: Abdomen: soft, bowel sounds active HEMATOLOGIC: no pathological lymph nodes palpated MUSCULOSKELETAL: Extremities: no edema, pulse 1+ INTEGUMENT: No unusual rashes or suspicious skin lesions noted. Nails appear normal. 
NEUROLOGIC: non-focal exam  
MENTAL STATUS: alert and oriented, appropriate affect ASSESSMENT: 
1. Upper respiratory tract infection, unspecified type 2. Chronic atrial fibrillation (HCC) 3. Essential hypertension 4. Anxiety neurosis 5. Nodular goiter PLAN: 
 
1. She has an uncomplicated upper respiratory infection currently. Symptomatic treatment only. 2. Her rhythm is quite regular today. She has a history of paroxysmal atrial fibrillation and remains on her DOAC. 3. Repeat blood pressure was 142/70. No adjustments for now. 4. Her anxiety is quite stable. 5. She does have a nodular goiter. This will be worked up on her return visit. Follow-up Disposition: Return in about 3 months (around 4/10/2019).  
 
 
Bret Corral MD

## 2019-01-18 ENCOUNTER — OFFICE VISIT (OUTPATIENT)
Dept: INTERNAL MEDICINE CLINIC | Age: 69
End: 2019-01-18

## 2019-01-18 VITALS
HEART RATE: 67 BPM | BODY MASS INDEX: 25.95 KG/M2 | HEIGHT: 62 IN | OXYGEN SATURATION: 98 % | RESPIRATION RATE: 14 BRPM | TEMPERATURE: 97.9 F | WEIGHT: 141 LBS | DIASTOLIC BLOOD PRESSURE: 81 MMHG | SYSTOLIC BLOOD PRESSURE: 135 MMHG

## 2019-01-18 DIAGNOSIS — J40 BRONCHITIS: ICD-10-CM

## 2019-01-18 DIAGNOSIS — J00 ACUTE RHINITIS: Primary | ICD-10-CM

## 2019-01-18 RX ORDER — AZITHROMYCIN 250 MG/1
TABLET, FILM COATED ORAL
Qty: 6 TAB | Refills: 0 | Status: SHIPPED | OUTPATIENT
Start: 2019-01-18 | End: 2019-01-23

## 2019-01-18 NOTE — PROGRESS NOTES
Chief Complaint Patient presents with  Cold Patient states that she has been experiencing cold symptoms x 2 weeks. She complains of sore throat and green mucus with cough. Patient states that she has been taking Coricidin HBP and Tylenol with no relief. .   
 
SUBECTIVE: 
 
Missy Parish is a 76 y.o. female Comes in for return visit stating that she continues to have nasal congestion and she is now coughing up greenish mucus. Her cough is no worse nocturnally and she has not heard any wheezing. Current Outpatient Medications Medication Sig Dispense Refill  azithromycin (ZITHROMAX) 250 mg tablet Take 2 tablets today, then take 1 tablet daily 6 Tab 0  
 potassium chloride (K-DUR, KLOR-CON) 20 mEq tablet Take 1 Tab by mouth daily. 90 Tab 3  
 magnesium oxide (MAG-OX) 400 mg tablet Take 1 Tab by mouth daily. 90 Tab 3  
 rivaroxaban (XARELTO) 20 mg tab tablet Take 1 Tab by mouth daily. 28 Tab 0  
 albuterol (VENTOLIN HFA) 90 mcg/actuation inhaler Take 2 Puffs by inhalation every four (4) hours as needed for Wheezing. 1 Inhaler 11  
 rosuvastatin (CRESTOR) 10 mg tablet Take 1 tablet every day 30 Tab 0  
 methocarbamol (ROBAXIN) 750 mg tablet Take 1 Tab by mouth three (3) times daily as needed. 270 Tab 3  
 HYDROcodone-acetaminophen (NORCO) 5-325 mg per tablet Take 1 Tab by mouth two (2) times daily as needed for Pain. Max Daily Amount: 2 Tabs. 30 Tab 0  
 metoprolol tartrate (LOPRESSOR) 25 mg tablet 1/2 tablet twice a day 90 Tab 3  
 hydroCHLOROthiazide (HYDRODIURIL) 12.5 mg tablet Take once daily 90 Tab 3  
 hydroquinone (ESOTERICA, MELQUIN) 4 % topical cream Apply  to affected area two (2) times a day. 30 g 6  
 desonide (TRIDESILON) 0.05 % topical lotion Apply  to affected area two (2) times a day. 118 mL 11  
 salsalate (DISALCID) 750 mg tablet Take 1 Tab by mouth three (3) times daily.  90 Tab 0  
  ALPRAZolam (XANAX) 1 mg tablet Take 1 Tab by mouth three (3) times daily as needed for Anxiety. Max Daily Amount: 3 mg. 10 Tab 0  
 fluticasone-salmeterol (ADVAIR) 250-50 mcg/dose diskus inhaler Take 1 Puff by inhalation every twelve (12) hours. 1 Inhaler 11  
 lidocaine (LIDODERM) 5 % 1 Patch by TransDERmal route every twenty-four (24) hours. 3 Package 3  
 QUEtiapine (SEROQUEL) 50 mg tablet Take 50 mg by mouth three (3) times daily.  FLUoxetine (PROZAC) 20 mg capsule Take 20 mg by mouth daily. Past Medical History:  
Diagnosis Date  Asthma  B[facility] disorder ([facility]  CAD (coronary artery disease) ablation  [facility]  Hypercholesteremia  Hypertension  Menopause  Tachycardia Past Surgical History:  
Procedure Laterality Date  ABLATION OF SVT  3/30/2015  EP STUDY W/INDUCTION  3/30/2015  HX BACK SURGERY    
 HX COLONOSCOPY  9-  HX HEART CATHETERIZATION    
 HX HYSTERECTOMY Allergies Allergen Reactions  Ampicillin Hives and Nausea and Vomiting REVIEW OF SYSTEMS: 
Review of Systems - Negative except ENT ROS: negative for - headaches, hearing change, nasal congestion, oral lesions, tinnitus, visual changes or Respiratory ROS: no cough, shortness of breath, or wheezing Cardiovascular ROS: no chest pain or dyspnea on exertion Gastrointestinal ROS: no abdominal pain, change in bowel habits, or black or blood Genito-Urinary ROS: no dysuria, trouble voiding, or hematuria Musculoskeletal ROS: negative Neurological ROS: no TIA or stroke symptoms Social History Socioeconomic History  Marital status:  Spouse name: Not on file  Number of children: 1  Years of education: Not on file  Highest education level: Not on file Occupational History  Occupation: retired--[employer]---disabled Tobacco Use  Smoking status: Never Smoker  Smokeless tobacco: Never Used Substance and Sexual Activity  Alcohol use: No  
  Alcohol/week: 0.0 oz  
  Comment: rarely  Drug use: No  
 Sexual activity: Not Currently Partners: Male  
r Family History Problem Relation Age of Onset  Heart Disease Father  Stroke Father OBJECTIVE: 
Visit Vitals /81 Pulse 67 Temp 97.9 °F (36.6 °C) (Oral) Resp 14 Ht 5' 2\" (1.575 m) Wt 141 lb (64 kg) SpO2 98% BMI 25.79 kg/m² ENT: perrla,  eom intact NECK: supple. Thyroid normal, no JVD CHEST: clear to ascultation and percussion HEART: regular rate and rhythm ABD: soft, bowel sounds active, EXTREMITIES: no edema, pulse 1+ INTEGUMENT: clear ASSESSMENT: 
1. Acute rhinitis 2. Bronchitis PLAN: 
 
1. The patient has a persistent rhinitis. I will give her Claritin-D 12, (#10), one tablet twice a day 7 AM and 3 PM. 
2. I explained to her that it might raise her pressure, but should not be a problem. She needs to check her blood pressures at least daily for now. 3. There may be a bronchitic component and antibiotic will be sent in for her. Follow-up Disposition: 
Return keep old apt.  
 
 
Moe Romero MD

## 2019-01-18 NOTE — PROGRESS NOTES
Chief Complaint Patient presents with  Cold Patient states that she has been experiencing cold symptoms x 2 weeks. She complains of sore throat and green mucus with cough. Patient states that she has been taking Coricidin HBP and Tylenol with no relief. 1. Have you been to the ER, urgent care clinic since your last visit? Hospitalized since your last visit? No 
 
2. Have you seen or consulted any other health care providers outside of the [address] [place] [address] since your last visit? Include any pap smears or colon screening.  No

## 2019-02-04 ENCOUNTER — OFFICE VISIT (OUTPATIENT)
Dept: CARDIOLOGY CLINIC | Age: 69
End: 2019-02-04

## 2019-02-04 VITALS
OXYGEN SATURATION: 98 % | RESPIRATION RATE: 16 BRPM | SYSTOLIC BLOOD PRESSURE: 110 MMHG | DIASTOLIC BLOOD PRESSURE: 70 MMHG | HEART RATE: 66 BPM | HEIGHT: 62 IN | WEIGHT: 141 LBS | BODY MASS INDEX: 25.95 KG/M2

## 2019-02-04 DIAGNOSIS — I10 ESSENTIAL HYPERTENSION: ICD-10-CM

## 2019-02-04 DIAGNOSIS — E78.5 DYSLIPIDEMIA: ICD-10-CM

## 2019-02-04 DIAGNOSIS — R06.00 DYSPNEA, UNSPECIFIED TYPE: ICD-10-CM

## 2019-02-04 DIAGNOSIS — I48.0 PAROXYSMAL ATRIAL FIBRILLATION (HCC): Primary | ICD-10-CM

## 2019-02-04 NOTE — PROGRESS NOTES
HISTORY OF PRESENT ILLNESS  Missy Parish is a 76 y.o. female. Patient with h/o PSVT and  Tachy iron followed in [facility], admitted on 5/13 at [facility] with chest pain, at that time nuclear stress test showed no ischemia or MI and EF of 69%, patient remained in NSR during hospital stay. Here for follow up  Echo on 7/12 EF 55% mild TR and MR  Also h/o PAF as per her cardiologist in [place]. 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 Ejection fraction was estimated to be 63 %. No obvious wall motion  abnormalities identified in the views obtained. Right ventricle: The size was at the upper limits of normal.    Left atrium: The atrium was moderately dilated. Right atrium: The atrium was moderately dilated. Mitral valve: There was mild regurgitation. Tricuspid valve: There was moderate annular dilatation.  There was moderate   Recurrent atrial flutter in 10/ 2017 seen by Ep, ablation offered she decided to wait                Past Medical History                         Past Medical History        Diagnosis      Date              Tachycardia                 Bradycardia                 Bipolar 2 disorder                 Hypercholesteremia                 Hypertension                 DVT (deep venous thrombosis)                 Tachycardia                 Asthma                 Psychiatric disorder                                Basil   Past Surgical History      Procedure    Laterality    Date          Hx back surgery                Hx hysterectomy                Ep study w/induction       3/30/2015                     Ablation of svt       3/30/2015                     Hx heart catheterization                Hx colonoscopy       9-          History                        Social History          Marital Status:                Spouse Name:    N/A            Number of Children:    1          Years of Education:    N/A                     Occupational History          retired--[employer]---disabled                        Social History Main Topics          Smoking status:    Never Smoker           Smokeless tobacco:    Never Used          Alcohol Use:    No          Drug Use:    No          Sexual Activity:    Not on file           HPI  No cp or sob very rare palpitations  Review of Systems   Respiratory: Negative. Cardiovascular: Negative. Visit Vitals  /70 (BP 1 Location: Left arm, BP Patient Position: Sitting)   Pulse 66   Resp 16   Ht 5' 2\" (1.575 m)   Wt 64 kg (141 lb)   SpO2 98%   BMI 25.79 kg/m²       Physical Exam   Neck: No JVD present. Carotid bruit is not present. Cardiovascular: Normal rate and regular rhythm. Occasional extrasystoles are present. Pulmonary/Chest: Effort normal and breath sounds normal.   Abdominal: Soft. Musculoskeletal: She exhibits no edema. Psychiatric: She has a normal mood and affect. Current Outpatient Medications on File Prior to Visit   Medication Sig Dispense Refill    potassium chloride (K-DUR, KLOR-CON) 20 mEq tablet Take 1 Tab by mouth daily. 90 Tab 3    magnesium oxide (MAG-OX) 400 mg tablet Take 1 Tab by mouth daily. 90 Tab 3    rivaroxaban (XARELTO) 20 mg tab tablet Take 1 Tab by mouth daily. 28 Tab 0    albuterol (VENTOLIN HFA) 90 mcg/actuation inhaler Take 2 Puffs by inhalation every four (4) hours as needed for Wheezing. 1 Inhaler 11    rosuvastatin (CRESTOR) 10 mg tablet Take 1 tablet every day 30 Tab 0    methocarbamol (ROBAXIN) 750 mg tablet Take 1 Tab by mouth three (3) times daily as needed.  270 Tab 3    HYDROcodone-acetaminophen (NORCO) 5-325 mg per tablet Take 1 Tab by mouth two (2) times daily as needed for Pain. Max Daily Amount: 2 Tabs. 30 Tab 0    metoprolol tartrate (LOPRESSOR) 25 mg tablet 1/2 tablet twice a day 90 Tab 3    hydroCHLOROthiazide (HYDRODIURIL) 12.5 mg tablet Take once daily 90 Tab 3    hydroquinone (ESOTERICA, MELQUIN) 4 % topical cream Apply  to affected area two (2) times a day. 30 g 6    desonide (TRIDESILON) 0.05 % topical lotion Apply  to affected area two (2) times a day. 118 mL 11    salsalate (DISALCID) 750 mg tablet Take 1 Tab by mouth three (3) times daily. 90 Tab 0    ALPRAZolam (XANAX) 1 mg tablet Take 1 Tab by mouth three (3) times daily as needed for Anxiety. Max Daily Amount: 3 mg. 10 Tab 0    fluticasone-salmeterol (ADVAIR) 250-50 mcg/dose diskus inhaler Take 1 Puff by inhalation every twelve (12) hours. 1 Inhaler 11    lidocaine (LIDODERM) 5 % 1 Patch by TransDERmal route every twenty-four (24) hours. 3 Package 3    QUEtiapine (SEROQUEL) 50 mg tablet Take 50 mg by mouth three (3) times daily.  FLUoxetine (PROZAC) 20 mg capsule Take 20 mg by mouth daily. No current facility-administered medications on file prior to visit. 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 Continue observation for now and metoprolol     PAF:previous Atrial flutter now back in NSR ECG today with NSR and no significant st t changes and 1 pac    Occasional palpitations reported but in my opinion too short and infrequent to change medications at this time or consider ablation  She will let me know if frequency increases  Already on [address] for DVT, this will need to be continued long term continue small dose of lopressor.    Hold off ablation unless of recurrent more frequent atrial fluter  No untoward effects with oacs     HTN: controlled     DVT on xarelto long term no untoward effects thus far     HLD: on statin ,obtain labs     CP:  Not recurrent!     See her back in 6 months otherwise

## 2019-02-04 NOTE — PROGRESS NOTES
Visit Vitals  /70 (BP 1 Location: Left arm, BP Patient Position: Sitting)   Pulse 70   Resp 16   Ht 5' 2\" (1.575 m)   Wt 141 lb (64 kg)   SpO2 98%   BMI 25.79 kg/m²

## 2019-02-08 ENCOUNTER — OFFICE VISIT (OUTPATIENT)
Dept: CARDIOLOGY CLINIC | Age: 69
End: 2019-02-08

## 2019-02-08 VITALS — SYSTOLIC BLOOD PRESSURE: 130 MMHG | HEART RATE: 84 BPM | DIASTOLIC BLOOD PRESSURE: 90 MMHG

## 2019-02-08 DIAGNOSIS — I48.0 PAROXYSMAL ATRIAL FIBRILLATION (HCC): Primary | ICD-10-CM

## 2019-02-09 LAB
ALBUMIN SERPL-MCNC: 4.5 G/DL (ref 3.6–4.8)
ALBUMIN/GLOB SERPL: 1.2 {RATIO} (ref 1.2–2.2)
ALP SERPL-CCNC: 54 IU/L (ref 39–117)
ALT SERPL-CCNC: 21 IU/L (ref 0–32)
AST SERPL-CCNC: 24 IU/L (ref 0–40)
BILIRUB SERPL-MCNC: 1.8 MG/DL (ref 0–1.2)
BUN SERPL-MCNC: 14 MG/DL (ref 8–27)
BUN/CREAT SERPL: 12 (ref 12–28)
CALCIUM SERPL-MCNC: 10 MG/DL (ref 8.7–10.3)
CHLORIDE SERPL-SCNC: 103 MMOL/L (ref 96–106)
CHOLEST SERPL-MCNC: 167 MG/DL (ref 100–199)
CO2 SERPL-SCNC: 19 MMOL/L (ref 20–29)
CREAT SERPL-MCNC: 1.16 MG/DL (ref 0.57–1)
GLOBULIN SER CALC-MCNC: 3.7 G/DL (ref 1.5–4.5)
GLUCOSE SERPL-MCNC: 79 MG/DL (ref 65–99)
HDLC SERPL-MCNC: 94 MG/DL
INTERPRETATION, 910389: NORMAL
INTERPRETATION: NORMAL
LDLC SERPL CALC-MCNC: 61 MG/DL (ref 0–99)
MAGNESIUM SERPL-MCNC: 1.9 MG/DL (ref 1.6–2.3)
PDF IMAGE, 910387: NORMAL
POTASSIUM SERPL-SCNC: 3.8 MMOL/L (ref 3.5–5.2)
PROT SERPL-MCNC: 8.2 G/DL (ref 6–8.5)
SODIUM SERPL-SCNC: 144 MMOL/L (ref 134–144)
TRIGL SERPL-MCNC: 61 MG/DL (ref 0–149)
TSH SERPL DL<=0.005 MIU/L-ACNC: 1.9 UIU/ML (ref 0.45–4.5)
VLDLC SERPL CALC-MCNC: 12 MG/DL (ref 5–40)

## 2019-03-01 DIAGNOSIS — J45.20 MILD INTERMITTENT ASTHMA WITHOUT COMPLICATION: ICD-10-CM

## 2019-03-01 RX ORDER — ALBUTEROL SULFATE 90 UG/1
2 AEROSOL, METERED RESPIRATORY (INHALATION)
Qty: 1 INHALER | Refills: 11 | Status: CANCELLED | OUTPATIENT
Start: 2019-03-01

## 2019-03-01 NOTE — TELEPHONE ENCOUNTER
----- Message from Mary Ellen Miranda MD sent at 2/27/2019  3:07 PM EST -----   All good   For total bili increase follow up with pcp  ----- Message -----  From: Cindy Chirinos Lab Results In  Sent: 2/9/2019   7:43 AM  To: Mary Ellen Miranda MD    Verified patient with two patient identifiers. Spoke with patient and gave lab result and told her bilirubin was up and to follow up with PCP. I called 's office and spoke with Alethea Lesch and discussed her lab result. She will call the patient for appointment. I will also mail her a copy of her lab result per her request.

## 2019-03-05 ENCOUNTER — OFFICE VISIT (OUTPATIENT)
Dept: INTERNAL MEDICINE CLINIC | Age: 69
End: 2019-03-05

## 2019-03-05 VITALS
OXYGEN SATURATION: 95 % | RESPIRATION RATE: 16 BRPM | DIASTOLIC BLOOD PRESSURE: 87 MMHG | HEART RATE: 102 BPM | SYSTOLIC BLOOD PRESSURE: 128 MMHG | HEIGHT: 62 IN | TEMPERATURE: 97.9 F | BODY MASS INDEX: 25.32 KG/M2 | WEIGHT: 137.6 LBS

## 2019-03-05 DIAGNOSIS — G47.00 INSOMNIA, UNSPECIFIED TYPE: ICD-10-CM

## 2019-03-05 DIAGNOSIS — J40 BRONCHITIS: ICD-10-CM

## 2019-03-05 DIAGNOSIS — L81.9 HYPERPIGMENTATION: ICD-10-CM

## 2019-03-05 DIAGNOSIS — I48.0 PAROXYSMAL ATRIAL FIBRILLATION (HCC): ICD-10-CM

## 2019-03-05 DIAGNOSIS — E80.6 HYPERBILIRUBINEMIA: Primary | ICD-10-CM

## 2019-03-05 RX ORDER — HYDROQUINONE 40 MG/G
CREAM TOPICAL 2 TIMES DAILY
Qty: 30 G | Refills: 6 | Status: SHIPPED | OUTPATIENT
Start: 2019-03-05 | End: 2021-11-17 | Stop reason: SDUPTHER

## 2019-03-05 RX ORDER — AZITHROMYCIN 250 MG/1
TABLET, FILM COATED ORAL
Qty: 6 TAB | Refills: 0 | Status: SHIPPED | OUTPATIENT
Start: 2019-03-05 | End: 2019-03-10

## 2019-03-05 NOTE — PROGRESS NOTES
[address] [place] [address] and Primary Care  [facility]  [address] [place] [address]  Phone:  [phone]  Fax: [phone]       Chief Complaint   Patient presents with    Results     Patient in office to discuss lab results done by another physician.  Cold   . SUBJECTIVE:    Kong Rajan is a 76 y.o. female Comes in for return visit concerned because her bilirubin was elevated. This is followed by her cardiologist.  Previous values have been entirely normal.  She is not taking any new medication either. She complains of a cough productive of yellowish mucus, present for the last two days. She is having problems with sleep, which she has had for years. Unfortunately she is taking Xanax for this and fortunately she wants to get off Xanax now because of suggestion that it might induce dementia. She has been taking this now for the last seven years. I explained to her that it would be extremely difficult for her to withdraw from this without pharmacologic assistance. She has had a fairly long run of atrial fibrillation lasting about three hours this weekend. Episodes are usually short lived. Current Outpatient Medications   Medication Sig Dispense Refill    azithromycin (ZITHROMAX) 250 mg tablet Take 2 tablets today, then take 1 tablet daily 6 Tab 0    hydroquinone (ESOTERICA, MELQUIN) 4 % topical cream Apply  to affected area two (2) times a day. 30 g 6    potassium chloride (K-DUR, KLOR-CON) 20 mEq tablet Take 1 Tab by mouth daily. 90 Tab 3    magnesium oxide (MAG-OX) 400 mg tablet Take 1 Tab by mouth daily. 90 Tab 3    rivaroxaban (XARELTO) 20 mg tab tablet Take 1 Tab by mouth daily. 28 Tab 0    albuterol (VENTOLIN HFA) 90 mcg/actuation inhaler Take 2 Puffs by inhalation every four (4) hours as needed for Wheezing.  1 Inhaler 11    rosuvastatin (CRESTOR) 10 mg tablet Take 1 tablet every day 30 Tab 0    methocarbamol (ROBAXIN) 750 mg tablet Take 1 Tab by mouth three (3) times daily as needed. 270 Tab 3    HYDROcodone-acetaminophen (NORCO) 5-325 mg per tablet Take 1 Tab by mouth two (2) times daily as needed for Pain. Max Daily Amount: 2 Tabs. 30 Tab 0    metoprolol tartrate (LOPRESSOR) 25 mg tablet 1/2 tablet twice a day 90 Tab 3    hydroCHLOROthiazide (HYDRODIURIL) 12.5 mg tablet Take once daily 90 Tab 3    desonide (TRIDESILON) 0.05 % topical lotion Apply  to affected area two (2) times a day. 118 mL 11    salsalate (DISALCID) 750 mg tablet Take 1 Tab by mouth three (3) times daily. 90 Tab 0    ALPRAZolam (XANAX) 1 mg tablet Take 1 Tab by mouth three (3) times daily as needed for Anxiety. Max Daily Amount: 3 mg. 10 Tab 0    fluticasone-salmeterol (ADVAIR) 250-50 mcg/dose diskus inhaler Take 1 Puff by inhalation every twelve (12) hours. 1 Inhaler 11    lidocaine (LIDODERM) 5 % 1 Patch by TransDERmal route every twenty-four (24) hours. 3 Package 3    QUEtiapine (SEROQUEL) 50 mg tablet Take 50 mg by mouth three (3) times daily.  FLUoxetine (PROZAC) 20 mg capsule Take 20 mg by mouth daily.        Past Medical History:   Diagnosis Date    Asthma     B[facility] disorder ([facility]     CAD (coronary artery disease)     ablation    [facility]     Hypercholesteremia     Hypertension     Menopause     Tachycardia      Past Surgical History:   Procedure Laterality Date    ABLATION OF SVT  3/30/2015         EP STUDY W/INDUCTION  3/30/2015         HX BACK SURGERY      HX COLONOSCOPY  9-    HX HEART CATHETERIZATION      HX HYSTERECTOMY       Allergies   Allergen Reactions    Ampicillin Hives and Nausea and Vomiting         REVIEW OF SYSTEMS:  General: negative for - chills or fever  ENT: negative for - headaches, nasal congestion or tinnitus  Respiratory: negative for - cough, hemoptysis, shortness of breath or wheezing  Cardiovascular : negative for - chest pain, edema, palpitations or shortness of breath  Gastrointestinal: negative for - abdominal pain, blood in stools, heartburn or nausea/vomiting  Genito-Urinary: no dysuria, trouble voiding, or hematuria  Musculoskeletal: negative for - gait disturbance, joint pain, joint stiffness or joint swelling  Neurological: no TIA or stroke symptoms  Hematologic: no bruises, no bleeding, no swollen glands  Integument: no lumps, mole changes, nail changes or rash  Endocrine: no malaise/lethargy or unexpected weight changes      Social History     Socioeconomic History    Marital status:      Spouse name: Not on file    Number of children: 1    Years of education: Not on file    Highest education level: Not on file   Occupational History    Occupation: retired--[employer]---disabled   Tobacco Use    Smoking status: Never Smoker    Smokeless tobacco: Never Used   Substance and Sexual Activity    Alcohol use: No     Alcohol/week: 0.0 oz     Comment: rarely    Drug use: No    Sexual activity: Not Currently     Partners: Male     Family History   Problem Relation Age of Onset    Heart Disease Father     Stroke Father        OBJECTIVE:    Visit Vitals  /87   Pulse (!) 102   Temp 97.9 °F (36.6 °C) (Oral)   Resp 16   Ht 5' 2\" (1.575 m)   Wt 137 lb 9.6 oz (62.4 kg)   SpO2 95%   BMI 25.17 kg/m²     CONSTITUTIONAL: well , well nourished, appears age appropriate  EYES: perrla, eom intact  ENMT:moist mucous membranes, pharynx clear  NECK: supple. Thyroid normal  RESPIRATORY: Chest: clear to ascultation and percussion   CARDIOVASCULAR: Heart: regular rate and rhythm  GASTROINTESTINAL: Abdomen: soft, bowel sounds active  HEMATOLOGIC: no pathological lymph nodes palpated  MUSCULOSKELETAL: Extremities: no edema, pulse 1+   INTEGUMENT: No unusual rashes or suspicious skin lesions noted. Nails appear normal.  NEUROLOGIC: non-focal exam   MENTAL STATUS: alert and oriented, appropriate affect      ASSESSMENT:  1. Hyperbilirubinemia    2. Bronchitis    3. Insomnia, unspecified type    4. Paroxysmal atrial fibrillation (HCC)    5. Hyperpigmentation        PLAN:    1. I am not entirely sure of the origin of the patient's hyperbilirubinemia. I repeat the value today. 2. She has bronchitis and empiric treatment with Z-José Miguel will occur. 3. For insomnia I suggest Benadryl 50 mg q.h.s.  4. She will follow up with her cardiologist regarding her paroxysmal atrial fibrillation. 5. A refill is given for hydroquinone is given in view of pigmented blemishes that are occurring. .  Orders Placed This Encounter    HEPATIC FUNCTION PANEL    HEPATITIS PANEL, ACUTE    azithromycin (ZITHROMAX) 250 mg tablet    hydroquinone (ESOTERICA, MELQUIN) 4 % topical cream         Follow-up Disposition:  Return in about 4 weeks (around 4/2/2019).       Wily Queen MD

## 2019-03-05 NOTE — PROGRESS NOTES
Chief Complaint   Patient presents with    Results     Patient in office to discuss lab results done by another physician.  Cold     1. Have you been to the ER, urgent care clinic since your last visit? Hospitalized since your last visit? No    2. Have you seen or consulted any other health care providers outside of the [address] [place] [address] since your last visit? Include any pap smears or colon screening.  No

## 2019-03-06 LAB
ALBUMIN SERPL-MCNC: 4.4 G/DL (ref 3.6–4.8)
ALP SERPL-CCNC: 46 IU/L (ref 39–117)
ALT SERPL-CCNC: 18 IU/L (ref 0–32)
AST SERPL-CCNC: 28 IU/L (ref 0–40)
BILIRUB DIRECT SERPL-MCNC: 0.21 MG/DL (ref 0–0.4)
BILIRUB SERPL-MCNC: 0.6 MG/DL (ref 0–1.2)
HAV IGM SERPL QL IA: NEGATIVE
HBV CORE IGM SERPL QL IA: NEGATIVE
HBV SURFACE AG SERPL QL IA: NEGATIVE
HCV AB S/CO SERPL IA: <0.1 S/CO RATIO (ref 0–0.9)
PROT SERPL-MCNC: 8.2 G/DL (ref 6–8.5)

## 2019-03-26 RX ORDER — HYDROCHLOROTHIAZIDE 12.5 MG/1
TABLET ORAL
Qty: 90 TAB | Refills: 3 | Status: SHIPPED | OUTPATIENT
Start: 2019-03-26 | End: 2020-01-20 | Stop reason: SDUPTHER

## 2019-04-04 DIAGNOSIS — I48.0 PAROXYSMAL ATRIAL FIBRILLATION (HCC): ICD-10-CM

## 2019-06-06 ENCOUNTER — OFFICE VISIT (OUTPATIENT)
Dept: INTERNAL MEDICINE CLINIC | Age: 69
End: 2019-06-06

## 2019-06-06 VITALS
OXYGEN SATURATION: 98 % | DIASTOLIC BLOOD PRESSURE: 82 MMHG | HEART RATE: 69 BPM | TEMPERATURE: 97.6 F | BODY MASS INDEX: 25.16 KG/M2 | SYSTOLIC BLOOD PRESSURE: 154 MMHG | WEIGHT: 136.7 LBS | HEIGHT: 62 IN | RESPIRATION RATE: 18 BRPM

## 2019-06-06 DIAGNOSIS — R10.11 RIGHT UPPER QUADRANT ABDOMINAL PAIN: ICD-10-CM

## 2019-06-06 DIAGNOSIS — R79.9 ABNORMAL FINDING OF BLOOD CHEMISTRY: ICD-10-CM

## 2019-06-06 DIAGNOSIS — I10 ESSENTIAL HYPERTENSION: ICD-10-CM

## 2019-06-06 DIAGNOSIS — Z95.828 GREENFIELD FILTER IN PLACE: ICD-10-CM

## 2019-06-06 DIAGNOSIS — F41.1 ANXIETY NEUROSIS: ICD-10-CM

## 2019-06-06 DIAGNOSIS — E04.9 NODULAR GOITER: ICD-10-CM

## 2019-06-06 DIAGNOSIS — Z78.0 POST-MENOPAUSAL: ICD-10-CM

## 2019-06-06 DIAGNOSIS — I48.0 PAROXYSMAL ATRIAL FIBRILLATION (HCC): Primary | ICD-10-CM

## 2019-06-06 DIAGNOSIS — E78.5 DYSLIPIDEMIA: ICD-10-CM

## 2019-06-06 NOTE — PROGRESS NOTES
1. Have you been to the ER, urgent care clinic since your last visit? Hospitalized since your last visit? No    2. Have you seen or consulted any other health care providers outside of the [address] [place] [address] since your last visit? Include any pap smears or colon screening.  No

## 2019-06-06 NOTE — PROGRESS NOTES
SPORTS MEDICINE AND PRIMARY CARE  Taylor Haywood MD, [place] [address]  Phone:  [phone]  Fax: [phone]       Chief Complaint   Patient presents with    Groin Pain     patient complain of having tingling in her feet    Flank Pain   . SUBJECTIVE:    Tato Gil is a 76 y.o. female Patient returns today and is seen in the absence of Dr. Chase Delvalle. She has a known history of paroxysmal atrial fibrillation, followed by Dr. Amy Prado, her cardiologist, anxiety neurosis, dyslipidemia, S/P Holton filter placement, primary hypertension, nodular goiter, and is seen for evaluation. Over the past weekend patient was having pain in the right flank area. It is a sharp pain. Patient had the discomfort for the past week or so. Since Friday or Saturday she has been having it once or twice a day, lasting 5-10 seconds and then it subsequently subsides. She has had no associated change in bowel habits, no nausea, vomiting. Her last colonoscopy was about five years ago. Patient is also concerned that her grandmother had three holes in her liver from cancer and wonders if she should be checking herself for that disease. She recalls that she had a colonoscopy by Dr. Alla Live on 09/11/15 and had a polyp removed. The polyp was found to be a hyperplastic polyp. He had recommended repeat colonoscopy in five years if it was adenomatous, and then since it was not we presume she wants to repeat it in ten years. However she is having rectal pain and wonders if she could have another colonoscopy just to be on the safe side. Complains of tingling of her feet. Her mother had diabetes. She wonders if she has that disease.   We advise her that both the chemistry from Dr. Chase Delvalle, as well as from cardiologist, did not show significant elevations of her blood sugar and in fact lab studies over the past five years blood sugars have been consistently normal.           Current Outpatient Medications   Medication Sig Dispense Refill    rivaroxaban (XARELTO) 20 mg tab tablet Take 1 Tab by mouth daily. 30 Tab 11    hydroCHLOROthiazide (HYDRODIURIL) 12.5 mg tablet Take once daily 90 Tab 3    hydroquinone (ESOTERICA, MELQUIN) 4 % topical cream Apply  to affected area two (2) times a day. 30 g 6    potassium chloride (K-DUR, KLOR-CON) 20 mEq tablet Take 1 Tab by mouth daily. 90 Tab 3    magnesium oxide (MAG-OX) 400 mg tablet Take 1 Tab by mouth daily. 90 Tab 3    albuterol (VENTOLIN HFA) 90 mcg/actuation inhaler Take 2 Puffs by inhalation every four (4) hours as needed for Wheezing. 1 Inhaler 11    rosuvastatin (CRESTOR) 10 mg tablet Take 1 tablet every day 30 Tab 0    methocarbamol (ROBAXIN) 750 mg tablet Take 1 Tab by mouth three (3) times daily as needed. 270 Tab 3    HYDROcodone-acetaminophen (NORCO) 5-325 mg per tablet Take 1 Tab by mouth two (2) times daily as needed for Pain. Max Daily Amount: 2 Tabs. 30 Tab 0    metoprolol tartrate (LOPRESSOR) 25 mg tablet 1/2 tablet twice a day 90 Tab 3    desonide (TRIDESILON) 0.05 % topical lotion Apply  to affected area two (2) times a day. 118 mL 11    ALPRAZolam (XANAX) 1 mg tablet Take 1 Tab by mouth three (3) times daily as needed for Anxiety. Max Daily Amount: 3 mg. 10 Tab 0    fluticasone-salmeterol (ADVAIR) 250-50 mcg/dose diskus inhaler Take 1 Puff by inhalation every twelve (12) hours. 1 Inhaler 11    lidocaine (LIDODERM) 5 % 1 Patch by TransDERmal route every twenty-four (24) hours. 3 Package 3    QUEtiapine (SEROQUEL) 50 mg tablet Take 50 mg by mouth three (3) times daily.  FLUoxetine (PROZAC) 20 mg capsule Take 20 mg by mouth daily.  salsalate (DISALCID) 750 mg tablet Take 1 Tab by mouth three (3) times daily.  80 Tab 0     Past Medical History:   Diagnosis Date    Abdominal pain     Asthma     [facility] 2 disorder ([facility] [address]     CAD (coronary artery disease)     ablation    [facility]VT [facility] venous thrombosis) ([facility])     Hypercholesteremia     Hypertension     Menopause     Tachycardia      Past Surgical History:   Procedure Laterality Date    ABLATION OF SVT  3/30/2015         EP STUDY W/INDUCTION  3/30/2015         HX BACK SURGERY      HX COLONOSCOPY  9-    HX HEART CATHETERIZATION      HX HYSTERECTOMY       Allergies   Allergen Reactions    Ampicillin Hives and Nausea and Vomiting         REVIEW OF SYSTEMS:  General: negative for - chills or fever  ENT: negative for - headaches, nasal congestion or tinnitus  Respiratory: negative for - cough, hemoptysis, shortness of breath or wheezing  Cardiovascular : negative for - chest pain, edema, palpitations or shortness of breath  Gastrointestinal: negative for - abdominal pain, blood in stools, heartburn or nausea/vomiting  Genito-Urinary: no dysuria, trouble voiding, or hematuria  Musculoskeletal: negative for - gait disturbance, joint pain, joint stiffness or joint swelling  Neurological: no TIA or stroke symptoms  Hematologic: no bruises, no bleeding, no swollen glands  Integument: no lumps, mole changes, nail changes or rash  Endocrine: no malaise/lethargy or unexpected weight changes      Social History     Socioeconomic History    Marital status:      Spouse name: Not on file    Number of children: 1    Years of education: Not on file    Highest education level: Not on file   Occupational History    Occupation: retired--[employer]---disabled   Tobacco Use    Smoking status: Never Smoker    Smokeless tobacco: Never Used   Substance and Sexual Activity    Alcohol use: No     Alcohol/week: 0.0 oz     Comment: rarely    Drug use: No    Sexual activity: Not Currently     Partners: Male     Family History   Problem Relation Age of Onset    Hypertension Mother     Heart Disease Mother     Heart Disease Father     Stroke Father        OBJECTIVE:    Visit Vitals  /82 (BP 1 Location: Left arm, BP Patient Position: Sitting)   Pulse 69   Temp 97.6 °F (36.4 °C) (Oral)   Resp 18   Ht 5' 2\" (1.575 m)   Wt 136 lb 11.2 oz (62 kg)   SpO2 98%   BMI 25.00 kg/m²     CONSTITUTIONAL: well , well nourished, appears age appropriate  EYES: perrla, eom intact  ENMT:moist mucous membranes, pharynx clear  NECK: supple. Thyroid normal  RESPIRATORY: Chest: clear bilaterally   CARDIOVASCULAR: Heart: regular rate and rhythm  GASTROINTESTINAL: Abdomen: soft, bowel sounds active  HEMATOLOGIC: no pathological lymph nodes palpated  MUSCULOSKELETAL: Extremities: no edema, pulse 1+   INTEGUMENT: No unusual rashes or suspicious skin lesions noted. Nails appear normal.  NEUROLOGIC: non-focal exam   MENTAL STATUS: alert and oriented, appropriate affect           ASSESSMENT:  1. Paroxysmal atrial fibrillation (HCC)    2. Anxiety neurosis    3. Dyslipidemia    4. Starke filter in place    5. Essential hypertension    6. Nodular goiter    7. Right upper quadrant abdominal pain    8. Abnormal finding of blood chemistry       Patient has right flank pain, for which we will ask for a CT scan of the abdomen and pelvis, which will also resolve some of the other concerns she has regarding cancer, although I advise her with her normal LFTs it is highly unlikely. We will also ask Dr. Selvin Jansen to repeat the colonoscopy, particularly in view of rectal pain, as well as right flank pain. We reassure her she has no evidence of diabetes as a cause for numbness in her feet. She will return to see Dr. Kenzie So after the above are completed. BMI is at her ideal body weight. Repeat BP is ______________. Patient is also concerned about Xanax causing Alzheimer's disease. Dr. Nataly Choi, her psychiatrist, told her it has that potential.  We advised we cannot confirm that, but I certainly encourage her to try to wean herself off the drug if possible. She wonders about hydrocodone. She only has 2-3 left, but rarely takes the medication.   We suggest she speak with Dr. Kenzie So regarding refills of that particular medication, particularly since she is not having problems right now requiring that drug. Lab studies will be checked and sent to her in the mail. She will be seeing Dr. Yocasta Vinson after the CAT scan is completed. In addition to the blood work she would like a urine sample to exclude the possibility of a UTI. Repeat BP today is 132/84, which is completely acceptable. No adjustments were made in medications at this time. I have discussed the diagnosis with the patient and the intended plan as seen in the  orders above. The patient understands and agees with the plan. The patient has   received an after visit summary and questions were answered concerning  future plans  Patient labs and/or xrays were reviewed  Past records were reviewed. PLAN:  .  Orders Placed This Encounter    CT ABD PELV W CONT    CBC WITH AUTOMATED DIFF    HEMOGLOBIN A1C WITH EAG    RENAL FUNCTION PANEL    UA/M W/RFLX CULTURE, ROUTINE    Jilda Bodily [facility]       Follow-up and Dispositions    · Return in about 1 month (around 7/4/2019). ATTENTION:   This medical record was transcribed using an electronic medical records system. Although proofread, it may and can contain electronic and spelling errors. Other human spelling and other errors may be present. Corrections may be executed at a later time. Please feel free to contact us for any clarifications as needed.

## 2019-06-07 LAB
ALBUMIN SERPL-MCNC: 4.4 G/DL (ref 3.6–4.8)
APPEARANCE UR: CLEAR
BACTERIA #/AREA URNS HPF: NORMAL /[HPF]
BASOPHILS # BLD AUTO: 0 X10E3/UL (ref 0–0.2)
BASOPHILS NFR BLD AUTO: 1 %
BILIRUB UR QL STRIP: NEGATIVE
BUN SERPL-MCNC: 18 MG/DL (ref 8–27)
BUN/CREAT SERPL: 16 (ref 12–28)
CALCIUM SERPL-MCNC: 9.8 MG/DL (ref 8.7–10.3)
CASTS URNS QL MICRO: NORMAL /LPF
CHLORIDE SERPL-SCNC: 100 MMOL/L (ref 96–106)
CO2 SERPL-SCNC: 24 MMOL/L (ref 20–29)
COLOR UR: YELLOW
CREAT SERPL-MCNC: 1.13 MG/DL (ref 0.57–1)
EOSINOPHIL # BLD AUTO: 0.1 X10E3/UL (ref 0–0.4)
EOSINOPHIL NFR BLD AUTO: 1 %
EPI CELLS #/AREA URNS HPF: NORMAL /HPF (ref 0–10)
ERYTHROCYTE [DISTWIDTH] IN BLOOD BY AUTOMATED COUNT: 14.6 % (ref 12.3–15.4)
EST. AVERAGE GLUCOSE BLD GHB EST-MCNC: 108 MG/DL
GLUCOSE SERPL-MCNC: 75 MG/DL (ref 65–99)
GLUCOSE UR QL: NEGATIVE
HBA1C MFR BLD: 5.4 % (ref 4.8–5.6)
HCT VFR BLD AUTO: 41.8 % (ref 34–46.6)
HGB BLD-MCNC: 14.1 G/DL (ref 11.1–15.9)
HGB UR QL STRIP: NEGATIVE
IMM GRANULOCYTES # BLD AUTO: 0 X10E3/UL (ref 0–0.1)
IMM GRANULOCYTES NFR BLD AUTO: 0 %
KETONES UR QL STRIP: NEGATIVE
LEUKOCYTE ESTERASE UR QL STRIP: NEGATIVE
LYMPHOCYTES # BLD AUTO: 3 X10E3/UL (ref 0.7–3.1)
LYMPHOCYTES NFR BLD AUTO: 54 %
MCH RBC QN AUTO: 30.6 PG (ref 26.6–33)
MCHC RBC AUTO-ENTMCNC: 33.7 G/DL (ref 31.5–35.7)
MCV RBC AUTO: 91 FL (ref 79–97)
MICRO URNS: NORMAL
MICRO URNS: NORMAL
MONOCYTES # BLD AUTO: 0.6 X10E3/UL (ref 0.1–0.9)
MONOCYTES NFR BLD AUTO: 10 %
MUCOUS THREADS URNS QL MICRO: PRESENT
NEUTROPHILS # BLD AUTO: 1.9 X10E3/UL (ref 1.4–7)
NEUTROPHILS NFR BLD AUTO: 34 %
NITRITE UR QL STRIP: NEGATIVE
PH UR STRIP: 5.5 [PH] (ref 5–7.5)
PHOSPHATE SERPL-MCNC: 3.4 MG/DL (ref 2.5–4.5)
PLATELET # BLD AUTO: 205 X10E3/UL (ref 150–450)
POTASSIUM SERPL-SCNC: 4 MMOL/L (ref 3.5–5.2)
PROT UR QL STRIP: NEGATIVE
RBC # BLD AUTO: 4.61 X10E6/UL (ref 3.77–5.28)
RBC #/AREA URNS HPF: NORMAL /HPF (ref 0–2)
SODIUM SERPL-SCNC: 141 MMOL/L (ref 134–144)
SP GR UR: 1.01 (ref 1–1.03)
URINALYSIS REFLEX, 377202: NORMAL
UROBILINOGEN UR STRIP-MCNC: 0.2 MG/DL (ref 0.2–1)
WBC # BLD AUTO: 5.6 X10E3/UL (ref 3.4–10.8)
WBC #/AREA URNS HPF: NORMAL /HPF (ref 0–5)

## 2019-06-14 ENCOUNTER — HOSPITAL ENCOUNTER (OUTPATIENT)
Dept: CT IMAGING | Age: 69
Discharge: HOME | End: 2019-06-14
Attending: INTERNAL MEDICINE
Payer: MEDICARE

## 2019-06-14 DIAGNOSIS — R10.11 RIGHT UPPER QUADRANT ABDOMINAL PAIN: ICD-10-CM

## 2019-06-14 PROCEDURE — 74177 CT ABD & PELVIS W/CONTRAST: CPT

## 2019-06-14 PROCEDURE — 74011000258 HC RX REV CODE- 258: Performed by: RADIOLOGY

## 2019-06-14 PROCEDURE — 74011636320 HC RX REV CODE- 636/320: Performed by: RADIOLOGY

## 2019-06-14 RX ORDER — SODIUM CHLORIDE 0.9 % (FLUSH) 0.9 %
10 SYRINGE (ML) INJECTION
Status: COMPLETED | OUTPATIENT
Start: 2019-06-14 | End: 2019-06-14

## 2019-06-14 RX ADMIN — Medication 10 ML: at 14:04

## 2019-06-14 RX ADMIN — SODIUM CHLORIDE 100 ML: 900 INJECTION, SOLUTION INTRAVENOUS at 14:04

## 2019-06-14 RX ADMIN — IOPAMIDOL 100 ML: 755 INJECTION, SOLUTION INTRAVENOUS at 14:04

## 2019-06-20 ENCOUNTER — OFFICE VISIT (OUTPATIENT)
Dept: INTERNAL MEDICINE CLINIC | Age: 69
End: 2019-06-20

## 2019-06-20 VITALS
DIASTOLIC BLOOD PRESSURE: 75 MMHG | SYSTOLIC BLOOD PRESSURE: 128 MMHG | HEART RATE: 66 BPM | WEIGHT: 138.2 LBS | TEMPERATURE: 98.2 F | RESPIRATION RATE: 16 BRPM | OXYGEN SATURATION: 97 % | BODY MASS INDEX: 25.43 KG/M2 | HEIGHT: 62 IN

## 2019-06-20 DIAGNOSIS — G89.29 CHRONIC MIDLINE LOW BACK PAIN WITHOUT SCIATICA: ICD-10-CM

## 2019-06-20 DIAGNOSIS — K62.89 RECTAL PAIN: ICD-10-CM

## 2019-06-20 DIAGNOSIS — R10.30 LOWER ABDOMINAL PAIN: Primary | ICD-10-CM

## 2019-06-20 DIAGNOSIS — K76.89 HEPATIC CYST: ICD-10-CM

## 2019-06-20 DIAGNOSIS — I10 ESSENTIAL HYPERTENSION: ICD-10-CM

## 2019-06-20 DIAGNOSIS — E78.5 DYSLIPIDEMIA: ICD-10-CM

## 2019-06-20 DIAGNOSIS — I48.0 PAROXYSMAL ATRIAL FIBRILLATION (HCC): ICD-10-CM

## 2019-06-20 DIAGNOSIS — M54.50 CHRONIC MIDLINE LOW BACK PAIN WITHOUT SCIATICA: ICD-10-CM

## 2019-06-20 DIAGNOSIS — M54.16 LUMBAR RADICULAR PAIN: ICD-10-CM

## 2019-06-20 RX ORDER — HYDROCODONE BITARTRATE AND ACETAMINOPHEN 5; 325 MG/1; MG/1
1 TABLET ORAL
Qty: 30 TAB | Refills: 0 | Status: SHIPPED | OUTPATIENT
Start: 2019-06-20 | End: 2019-07-20

## 2019-06-20 NOTE — PROGRESS NOTES
Chief Complaint   Patient presents with    Results     Patient states that she is in the office to discuss CT test results. 1. Have you been to the ER, urgent care clinic since your last visit? Hospitalized since your last visit? No    2. Have you seen or consulted any other health care providers outside of the [address] [place] [address] since your last visit? Include any pap smears or colon screening.  No

## 2019-06-20 NOTE — PROGRESS NOTES
[address] [facility]  [place] [address]  [address] [place], [place] [id number]  Phone:  [phone]  Fax: [phone]       Chief Complaint   Patient presents with    Results     Patient states that she is in the office to discuss CT test results. .      SUBJECTIVE:    Ever Mishra is a 76 y.o. female Comes in complaining of vague abdominal pain. She saw my partner earlier and he ordered a CT scan, which did not reveal any abnormalities other than benign cyst in the liver. We talk about this at length. She most recently saw her gynecologist and no pathology was found at that time. Pain that she is having now only lasts for 1-2 minutes and abates. It occasionally starts in the left side, then may go to the left pelvic area and right pelvic area intermittently. She has a past history of atrial fibrillation, primary hypertension, dyslipidemia and anxiety. Current Outpatient Medications   Medication Sig Dispense Refill    HYDROcodone-acetaminophen (NORCO) 5-325 mg per tablet Take 1 Tab by mouth two (2) times daily as needed for Pain for up to 30 days. Max Daily Amount: 2 Tabs. 30 Tab 0    rivaroxaban (XARELTO) 20 mg tab tablet Take 1 Tab by mouth daily. 30 Tab 11    hydroCHLOROthiazide (HYDRODIURIL) 12.5 mg tablet Take once daily 90 Tab 3    hydroquinone (ESOTERICA, MELQUIN) 4 % topical cream Apply  to affected area two (2) times a day. 30 g 6    potassium chloride (K-DUR, KLOR-CON) 20 mEq tablet Take 1 Tab by mouth daily. 90 Tab 3    magnesium oxide (MAG-OX) 400 mg tablet Take 1 Tab by mouth daily. 90 Tab 3    albuterol (VENTOLIN HFA) 90 mcg/actuation inhaler Take 2 Puffs by inhalation every four (4) hours as needed for Wheezing. 1 Inhaler 11    rosuvastatin (CRESTOR) 10 mg tablet Take 1 tablet every day 30 Tab 0    methocarbamol (ROBAXIN) 750 mg tablet Take 1 Tab by mouth three (3) times daily as needed.  270 Tab 3    metoprolol tartrate (LOPRESSOR) 25 mg tablet 1/2 tablet twice a day 90 Tab 3    desonide (TRIDESILON) 0.05 % topical lotion Apply  to affected area two (2) times a day. 118 mL 11    salsalate (DISALCID) 750 mg tablet Take 1 Tab by mouth three (3) times daily. 90 Tab 0    ALPRAZolam (XANAX) 1 mg tablet Take 1 Tab by mouth three (3) times daily as needed for Anxiety. Max Daily Amount: 3 mg. 10 Tab 0    fluticasone-salmeterol (ADVAIR) 250-50 mcg/dose diskus inhaler Take 1 Puff by inhalation every twelve (12) hours. 1 Inhaler 11    lidocaine (LIDODERM) 5 % 1 Patch by TransDERmal route every twenty-four (24) hours. 3 Package 3    QUEtiapine (SEROQUEL) 50 mg tablet Take 50 mg by mouth three (3) times daily.  FLUoxetine (PROZAC) 20 mg capsule Take 20 mg by mouth daily.        Past Medical History:   Diagnosis Date    Abdominal pain     Asthma     [facility] [address]     CAD (coronary artery disease)     ablation    [facility]     Hypercholesteremia     Hypertension     Menopause     Tachycardia      Past Surgical History:   Procedure Laterality Date    ABLATION OF SVT  3/30/2015         EP STUDY W/INDUCTION  3/30/2015         HX BACK SURGERY      HX COLONOSCOPY  9-    HX HEART CATHETERIZATION      HX HYSTERECTOMY       Allergies   Allergen Reactions    Ampicillin Hives and Nausea and Vomiting         REVIEW OF SYSTEMS:  General: negative for - chills or fever  ENT: negative for - headaches, nasal congestion or tinnitus  Respiratory: negative for - cough, hemoptysis, shortness of breath or wheezing  Cardiovascular : negative for - chest pain, edema, palpitations or shortness of breath  Gastrointestinal: negative for - abdominal pain, blood in stools, heartburn or nausea/vomiting  Genito-Urinary: no dysuria, trouble voiding, or hematuria  Musculoskeletal: negative for - gait disturbance, joint pain, joint stiffness or joint swelling  Neurological: no TIA or stroke symptoms  Hematologic: no bruises, no bleeding, no swollen glands  Integument: no lumps, mole changes, nail changes or rash  Endocrine: no malaise/lethargy or unexpected weight changes      Social History     Socioeconomic History    Marital status:      Spouse name: Not on file    Number of children: 1    Years of education: Not on file    Highest education level: Not on file   Occupational History    Occupation: retired--[employer]---disabled   Tobacco Use    Smoking status: Never Smoker    Smokeless tobacco: Never Used   Substance and Sexual Activity    Alcohol use: No     Alcohol/week: 0.0 oz     Comment: rarely    Drug use: No    Sexual activity: Not Currently     Partners: Male     Family History   Problem Relation Age of Onset    Hypertension Mother     Heart Disease Mother     Heart Disease Father     Stroke Father        OBJECTIVE:    Visit Vitals  /75   Pulse 66   Temp 98.2 °F (36.8 °C) (Oral)   Resp 16   Ht 5' 2\" (1.575 m)   Wt 138 lb 3.2 oz (62.7 kg)   SpO2 97%   BMI 25.28 kg/m²     CONSTITUTIONAL: well , well nourished, appears age appropriate  EYES: perrla, eom intact  ENMT:moist mucous membranes, pharynx clear  NECK: supple. Thyroid normal  RESPIRATORY: Chest: clear to ascultation and percussion   CARDIOVASCULAR: Heart: regular rate and rhythm  GASTROINTESTINAL: Abdomen: soft, bowel sounds active  HEMATOLOGIC: no pathological lymph nodes palpated  MUSCULOSKELETAL: Extremities: no edema, pulse 1+   INTEGUMENT: No unusual rashes or suspicious skin lesions noted. Nails appear normal.  NEUROLOGIC: non-focal exam   MENTAL STATUS: alert and oriented, appropriate affect      ASSESSMENT:  1. Lower abdominal pain    2. Hepatic cyst    3. Rectal pain    4. Chronic midline low back pain without sciatica    5. Essential hypertension    6. Dyslipidemia    7. Paroxysmal atrial fibrillation (HCC)    8. Lumbar radicular pain        PLAN:    1.  The patient's abdominal pain is quite vague at best.  The most likely thing that she has is related to her abdominal scarring from her hysterectomy. Nothing need be done for now. 2. I explained to her that the incidental finding on the CT scan does not need to be followed up on.  3. She is contemplating on repeating a colonoscopy because of rectal pain. I concur with the appointment. Her colonoscopy five years ago revealed an inflammatory polyp. 4. BP is excellent today, no adjustments are made. 5. She will continue statin as prescribed. 6. Her ventricular rate is quite stable, but she is regular today. She will continue her DOAC as prescribed. Follow-up and Dispositions    · Return keep old apt.            Jessica Mclaughlin MD

## 2019-06-22 PROBLEM — K76.89 HEPATIC CYST: Status: ACTIVE | Noted: 2019-06-22

## 2019-08-09 ENCOUNTER — TELEPHONE (OUTPATIENT)
Dept: CARDIOLOGY CLINIC | Age: 69
End: 2019-08-09

## 2019-08-09 DIAGNOSIS — I48.0 PAROXYSMAL ATRIAL FIBRILLATION (HCC): ICD-10-CM

## 2019-08-09 NOTE — TELEPHONE ENCOUNTER
Patient would like to know if there is any way she can get assistance with Xarelto until she can get her insurance figured out. She states that she is out of medication. Please advise.     Phone #: [phone]  Thanks

## 2019-08-09 NOTE — TELEPHONE ENCOUNTER
Verified patient with two types of identifiers. Called local [facility] on patient's behalf and spoke to pharmacist who agrees to give her a few more pills to hold her over until next week. He states she doesn't use them for her Xarelto she used the [facility] order pharmacy but he will still give her the pills. Verified patient with two types of identifiers. Let patient know pharmacist's message.  Also sent refill to [facility] per patient request and per VO from MD.

## 2019-08-09 NOTE — TELEPHONE ENCOUNTER
Patient states that she needs to speak with you before you speak with [facility]. She has a question. .    Phone: [phone]

## 2019-08-09 NOTE — TELEPHONE ENCOUNTER
Verified patient with two types of identifiers. Patient reports she takes 20mg of Xarelto every evening and has been out since yesterday. She states her pharmacy gave her 3 pills yesterday but she cannot afford a 30 day supply which is $47 at this time. Let her know that unfortunately the only dosage of samples we have at this time is 15mg. She is requesting help with the cost. Gave her the [facility] numbers for a free 30 day supply or a $10 a month copay or to try [facility] however patient reports she has tried all these resources with no success. Patient is requesting I call her pharmacy to get her one more pill for Sunday evening. Will attempt.

## 2019-08-14 ENCOUNTER — TELEPHONE (OUTPATIENT)
Dept: CARDIOLOGY CLINIC | Age: 69
End: 2019-08-14

## 2019-08-14 DIAGNOSIS — I48.0 PAROXYSMAL ATRIAL FIBRILLATION (HCC): ICD-10-CM

## 2019-08-14 NOTE — TELEPHONE ENCOUNTER
Pt looking for 25mg samples of Xeralto. She states she was disconnected earlier. I informed the pt that the nurse has 24hrs to return the call and that the nurse will get in touch as soon as she can.      Call back #[phone]

## 2019-08-14 NOTE — TELEPHONE ENCOUNTER
Pt calling to see if we have samples of Xarelto.     She can be reached at [phone]    [facility]

## 2019-08-14 NOTE — TELEPHONE ENCOUNTER
Verified patient with two types of identifiers. Let patient know she can come  samples at her convenience. She is requesting that I again call her local [facility] on her behalf to ask them to give her one tablet for tonight. Will attempt. Also attempted to let patient know that she needs to try other avenues such as the [facility] or [facility] in the future to get her medications as we do not always have samples.

## 2019-08-15 ENCOUNTER — OFFICE VISIT (OUTPATIENT)
Dept: INTERNAL MEDICINE CLINIC | Age: 69
End: 2019-08-15

## 2019-08-15 ENCOUNTER — CLINICAL SUPPORT (OUTPATIENT)
Dept: CARDIOLOGY CLINIC | Age: 69
End: 2019-08-15

## 2019-08-15 VITALS — SYSTOLIC BLOOD PRESSURE: 138 MMHG | HEART RATE: 77 BPM | DIASTOLIC BLOOD PRESSURE: 84 MMHG

## 2019-08-15 DIAGNOSIS — Z01.30 BP CHECK: Primary | ICD-10-CM

## 2019-08-15 DIAGNOSIS — I48.0 PAROXYSMAL ATRIAL FIBRILLATION (HCC): ICD-10-CM

## 2019-08-15 DIAGNOSIS — I10 ESSENTIAL HYPERTENSION: Primary | ICD-10-CM

## 2019-08-15 DIAGNOSIS — E78.5 DYSLIPIDEMIA: ICD-10-CM

## 2019-08-15 DIAGNOSIS — F41.1 ANXIETY NEUROSIS: ICD-10-CM

## 2019-08-15 NOTE — PROGRESS NOTES
Chief Complaint   Patient presents with    Hypertension     follow up      1. Have you been to the ER, urgent care clinic since your last visit? Hospitalized since your last visit? No    2. Have you seen or consulted any other health care providers outside of the [address] [place] [address] since your last visit? Include any pap smears or colon screening.  No

## 2019-08-16 ENCOUNTER — OFFICE VISIT (OUTPATIENT)
Dept: CARDIOLOGY CLINIC | Age: 69
End: 2019-08-16

## 2019-08-16 ENCOUNTER — CLINICAL SUPPORT (OUTPATIENT)
Dept: CARDIOLOGY CLINIC | Age: 69
End: 2019-08-16

## 2019-08-16 VITALS
WEIGHT: 133 LBS | SYSTOLIC BLOOD PRESSURE: 120 MMHG | DIASTOLIC BLOOD PRESSURE: 78 MMHG | RESPIRATION RATE: 12 BRPM | HEART RATE: 76 BPM | OXYGEN SATURATION: 96 % | HEIGHT: 62 IN | BODY MASS INDEX: 24.48 KG/M2

## 2019-08-16 VITALS
TEMPERATURE: 97.4 F | OXYGEN SATURATION: 96 % | HEART RATE: 76 BPM | WEIGHT: 134.5 LBS | HEIGHT: 62 IN | SYSTOLIC BLOOD PRESSURE: 130 MMHG | BODY MASS INDEX: 24.75 KG/M2 | RESPIRATION RATE: 16 BRPM | DIASTOLIC BLOOD PRESSURE: 100 MMHG

## 2019-08-16 DIAGNOSIS — E78.5 DYSLIPIDEMIA: ICD-10-CM

## 2019-08-16 DIAGNOSIS — I48.0 PAROXYSMAL ATRIAL FIBRILLATION (HCC): ICD-10-CM

## 2019-08-16 DIAGNOSIS — I48.91 ATRIAL FIBRILLATION, UNSPECIFIED TYPE (HCC): ICD-10-CM

## 2019-08-16 DIAGNOSIS — I48.0 PAROXYSMAL ATRIAL FIBRILLATION (HCC): Primary | ICD-10-CM

## 2019-08-16 DIAGNOSIS — I10 ESSENTIAL HYPERTENSION: ICD-10-CM

## 2019-08-16 RX ORDER — METOPROLOL SUCCINATE 25 MG/1
25 TABLET, EXTENDED RELEASE ORAL DAILY
Qty: 90 TAB | Refills: 3 | Status: SHIPPED | OUTPATIENT
Start: 2019-08-16 | End: 2019-08-16 | Stop reason: SDUPTHER

## 2019-08-16 RX ORDER — METOPROLOL SUCCINATE 25 MG/1
25 TABLET, EXTENDED RELEASE ORAL DAILY
Qty: 30 TAB | Refills: 3 | Status: SHIPPED | OUTPATIENT
Start: 2019-08-16 | End: 2019-09-18 | Stop reason: DRUGHIGH

## 2019-08-16 RX ORDER — METOPROLOL SUCCINATE 25 MG/1
TABLET, EXTENDED RELEASE ORAL DAILY
COMMUNITY
End: 2019-08-16 | Stop reason: SDUPTHER

## 2019-08-16 RX ORDER — HYDROCODONE BITARTRATE AND ACETAMINOPHEN 5; 325 MG/1; MG/1
TABLET ORAL
Refills: 0 | COMMUNITY
Start: 2019-08-03 | End: 2019-10-03

## 2019-08-16 NOTE — PROGRESS NOTES
HISTORY OF PRESENT ILLNESS  Edvin Birch is a 76 y.o. female. Patient with h/o PSVT and  Tachy iron followed in [facility], admitted on 5/13 at [facility] with chest pain, at that time nuclear stress test showed no ischemia or MI and EF of 69%, patient remained in NSR during hospital stay. Here for follow up  Echo on 7/12 EF 55% mild TR and MR  Also h/o PAF as per her cardiologist in [place]. 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 Ejection fraction was estimated to be 63 %. No obvious wall motion  abnormalities identified in the views obtained. Right ventricle: The size was at the upper limits of normal.    Left atrium: The atrium was moderately dilated. Right atrium: The atrium was moderately dilated. Mitral valve: There was mild regurgitation. Tricuspid valve: There was moderate annular dilatation.  There was moderate   Recurrent atrial flutter in 10/ 2017 seen by Ep, ablation offered she decided to wait                Past Medical History                         Past Medical History        Diagnosis      Date              Tachycardia                 Bradycardia                 Bipolar 2 disorder                 Hypercholesteremia                 Hypertension                 DVT (deep venous thrombosis)                 Tachycardia                 Asthma                 Psychiatric disorder                                Charlie   Past Surgical History      Procedure    Laterality    Date          Hx back surgery                Hx hysterectomy                Ep study w/induction       3/30/2015                     Ablation of svt       3/30/2015                     Hx heart catheterization                Hx colonoscopy       9-          History                        Social History          Marital Status:                Spouse Name:    N/A            Number of Children:    1          Years of Education:    N/A                     Occupational History          retired--[employer]---disabled                        Social History Main Topics          Smoking status:    Never Smoker           Smokeless tobacco:    Never [place]mira Vera       Alcohol Use:    No          Drug Use:    No          Sexual Activity:    Not on file           HPI  Doing ok htn  And ha few days ago but currently no palpitations sob or cp reported  Review of Systems   Constitutional: Negative. Respiratory: Negative. Cardiovascular: Negative. Physical Exam   Neck: No JVD present. Cardiovascular: Normal rate. An irregularly irregular rhythm present. Pulmonary/Chest: Effort normal and breath sounds normal.   Abdominal: Soft. Musculoskeletal: She exhibits no edema. Psychiatric: She has a normal mood and affect. Visit Vitals  /78 (BP 1 Location: Left arm, BP Patient Position: Sitting)   Pulse 76   Resp 12   Ht 5' 2\" (1.575 m)   Wt 133 lb (60.3 kg)   SpO2 96%   BMI 24.33 kg/m²       Lab Results   Component Value Date/Time    Cholesterol, total 167 02/08/2019 03:51 PM    HDL Cholesterol 94 02/08/2019 03:51 PM    LDL, calculated 61 02/08/2019 03:51 PM    VLDL, calculated 12 02/08/2019 03:51 PM    Triglyceride 61 02/08/2019 03:51 PM    CHOL/HDL Ratio 2.5 05/16/2013 02:30 AM     Lab Results   Component Value Date/Time    ALT (SGPT) 18 03/05/2019 11:46 AM    AST (SGOT) 28 03/05/2019 11:46 AM    Alk.  phosphatase 46 03/05/2019 11:46 AM    Bilirubin, direct 0.21 03/05/2019 11:46 AM    Bilirubin, total 0.6 03/05/2019 11:46 AM     Current Outpatient Medications on File Prior to Visit   Medication Sig Dispense Refill    HYDROcodone-acetaminophen (NORCO) 5-325 mg per tablet TAKE 1 TABLET BY MOUTH 2 TIMES DAILY AS NEEDED FOR PAIN FOR UP TO 30 DAYS MAX DAILY 2 TABS  0    rivaroxaban (XARELTO) 20 mg tab tablet Take 1 Tab by mouth daily (with dinner). 14 Tab 0    hydroCHLOROthiazide (HYDRODIURIL) 12.5 mg tablet Take once daily 90 Tab 3    hydroquinone (ESOTERICA, MELQUIN) 4 % topical cream Apply  to affected area two (2) times a day. 30 g 6    potassium chloride (K-DUR, KLOR-CON) 20 mEq tablet Take 1 Tab by mouth daily. 90 Tab 3    magnesium oxide (MAG-OX) 400 mg tablet Take 1 Tab by mouth daily. 90 Tab 3    albuterol (VENTOLIN HFA) 90 mcg/actuation inhaler Take 2 Puffs by inhalation every four (4) hours as needed for Wheezing. 1 Inhaler 11    rosuvastatin (CRESTOR) 10 mg tablet Take 1 tablet every day 30 Tab 0    methocarbamol (ROBAXIN) 750 mg tablet Take 1 Tab by mouth three (3) times daily as needed. 270 Tab 3    metoprolol tartrate (LOPRESSOR) 25 mg tablet 1/2 tablet twice a day 90 Tab 3    desonide (TRIDESILON) 0.05 % topical lotion Apply  to affected area two (2) times a day. 118 mL 11    ALPRAZolam (XANAX) 1 mg tablet Take 1 Tab by mouth three (3) times daily as needed for Anxiety. Max Daily Amount: 3 mg. (Patient taking differently: Take 1 mg by mouth two (2) times a day.) 10 Tab 0    QUEtiapine (SEROQUEL) 50 mg tablet Take 50 mg by mouth three (3) times daily.  FLUoxetine (PROZAC) 20 mg capsule Take 20 mg by mouth daily.  fluticasone-salmeterol (ADVAIR) 250-50 mcg/dose diskus inhaler Take 1 Puff by inhalation every twelve (12) hours. 1 Inhaler 11    lidocaine (LIDODERM) 5 % 1 Patch by TransDERmal route every twenty-four (24) hours. 3 Package 3     No current facility-administered medications on file prior to visit. 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 Continue observation for now and metoprolol     PAF:back in AF .  ECG with AF nstt and 1 pvc  Continue xarelto obtain 24 hours holter and start toprol xl 25 mg qam side effects explained    AF ablation on hold for now  No untoward effects with oacs     HTN: controlled     DVT on xarelto long term no untoward effects thus far     HLD: on statin ,obtain labs     CP:  Not recurrent!     See her back after holter

## 2019-08-16 NOTE — PROGRESS NOTES
Chief Complaint   Patient presents with    Follow-up     per patient request.  elevated BP. Denies chest pain/dizziness/swelling/shortness of breath. Visit Vitals  /78 (BP 1 Location: Left arm, BP Patient Position: Sitting)   Pulse 76   Resp 12   Ht 5' 2\" (1.575 m)   Wt 133 lb (60.3 kg)   SpO2 96%   BMI 24.33 kg/m²     States elevated BP of 177/99 on 8-11-19, 134/102 on 8-14-19. Seen by Dr. Jany Davila on 8-15-19. Patient with increased stress due to father's recent death.

## 2019-08-16 NOTE — PROGRESS NOTES
[address] [facility]  [place] [address]  [address]  [address]  Phone:  [phone]  Fax: [phone]       Chief Complaint   Patient presents with    Hypertension     follow up    . SUBJECTIVE:    Senait Ochoa is a 76 y.o. female Comes in concerned about headaches. She has made an association between the headaches and her blood pressure, which has been elevated on several different occasions. There are, however, wide swings in the readings, as would be expected. She is only taking Hydrochlorothiazide 12.5 mg daily for her BP. She has a past history of paroxysmal atrial fibrillation, dyslipidemia, and episodic depression. Current Outpatient Medications   Medication Sig Dispense Refill    rivaroxaban (XARELTO) 20 mg tab tablet Take 1 Tab by mouth daily (with dinner). 14 Tab 0    hydroCHLOROthiazide (HYDRODIURIL) 12.5 mg tablet Take once daily 90 Tab 3    hydroquinone (ESOTERICA, MELQUIN) 4 % topical cream Apply  to affected area two (2) times a day. 30 g 6    potassium chloride (K-DUR, KLOR-CON) 20 mEq tablet Take 1 Tab by mouth daily. 90 Tab 3    magnesium oxide (MAG-OX) 400 mg tablet Take 1 Tab by mouth daily. 90 Tab 3    albuterol (VENTOLIN HFA) 90 mcg/actuation inhaler Take 2 Puffs by inhalation every four (4) hours as needed for Wheezing. 1 Inhaler 11    rosuvastatin (CRESTOR) 10 mg tablet Take 1 tablet every day 30 Tab 0    methocarbamol (ROBAXIN) 750 mg tablet Take 1 Tab by mouth three (3) times daily as needed. 270 Tab 3    metoprolol tartrate (LOPRESSOR) 25 mg tablet 1/2 tablet twice a day 90 Tab 3    desonide (TRIDESILON) 0.05 % topical lotion Apply  to affected area two (2) times a day. 118 mL 11    salsalate (DISALCID) 750 mg tablet Take 1 Tab by mouth three (3) times daily. 90 Tab 0    ALPRAZolam (XANAX) 1 mg tablet Take 1 Tab by mouth three (3) times daily as needed for Anxiety.  Max Daily Amount: 3 mg. 10 Tab 0    fluticasone-salmeterol (ADVAIR) 250-50 mcg/dose diskus inhaler Take 1 Puff by inhalation every twelve (12) hours. 1 Inhaler 11    lidocaine (LIDODERM) 5 % 1 Patch by TransDERmal route every twenty-four (24) hours. 3 Package 3    QUEtiapine (SEROQUEL) 50 mg tablet Take 50 mg by mouth three (3) times daily.  FLUoxetine (PROZAC) 20 mg capsule Take 20 mg by mouth daily.        Past Medical History:   Diagnosis Date    Abdominal pain     Asthma     B[facility] 2 disorder ([facility] [address]     CAD (coronary artery disease)     ablation    [facility]     Hypercholesteremia     Hypertension     Menopause     Tachycardia      Past Surgical History:   Procedure Laterality Date    ABLATION OF SVT  3/30/2015         EP STUDY W/INDUCTION  3/30/2015         HX BACK SURGERY      HX COLONOSCOPY  9-    HX HEART CATHETERIZATION      HX HYSTERECTOMY       Allergies   Allergen Reactions    Ampicillin Hives and Nausea and Vomiting         REVIEW OF SYSTEMS:  General: negative for - chills or fever  ENT: negative for - headaches, nasal congestion or tinnitus  Respiratory: negative for - cough, hemoptysis, shortness of breath or wheezing  Cardiovascular : negative for - chest pain, edema, palpitations or shortness of breath  Gastrointestinal: negative for - abdominal pain, blood in stools, heartburn or nausea/vomiting  Genito-Urinary: no dysuria, trouble voiding, or hematuria  Musculoskeletal: negative for - gait disturbance, joint pain, joint stiffness or joint swelling  Neurological: no TIA or stroke symptoms  Hematologic: no bruises, no bleeding, no swollen glands  Integument: no lumps, mole changes, nail changes or rash  Endocrine: no malaise/lethargy or unexpected weight changes      Social History     Socioeconomic History    Marital status:      Spouse name: Not on file    Number of children: 1    Years of education: Not on file    Highest education level: Not on file   Occupational History    Occupation: retired--[employer]---disabled   Tobacco Use    Smoking status: Never Smoker    Smokeless tobacco: Never Used   Substance and Sexual Activity    Alcohol use: No     Alcohol/week: 0.0 standard drinks     Comment: rarely    Drug use: No    Sexual activity: Not Currently     Partners: Male     Family History   Problem Relation Age of Onset    Hypertension Mother     Heart Disease Mother     Heart Disease Father     Stroke Father        OBJECTIVE:    Visit Vitals  BP (!) 130/100   Pulse 76   Temp 97.4 °F (36.3 °C) (Oral)   Resp 16   Ht 5' 2\" (1.575 m)   Wt 134 lb 8 oz (61 kg)   SpO2 96%   BMI 24.60 kg/m²     CONSTITUTIONAL: well , well nourished, appears age appropriate  EYES: perrla, eom intact  ENMT:moist mucous membranes, pharynx clear  NECK: supple. Thyroid normal  RESPIRATORY: Chest: clear to ascultation and percussion   CARDIOVASCULAR: Heart: regular rate and rhythm  GASTROINTESTINAL: Abdomen: soft, bowel sounds active  HEMATOLOGIC: no pathological lymph nodes palpated  MUSCULOSKELETAL: Extremities: no edema, pulse 1+   INTEGUMENT: No unusual rashes or suspicious skin lesions noted. Nails appear normal.  NEUROLOGIC: non-focal exam   MENTAL STATUS: alert and oriented, appropriate affect      ASSESSMENT:  1. Essential hypertension    2. Paroxysmal atrial fibrillation (HCC)    3. Anxiety neurosis    4. Dyslipidemia        PLAN:    1. Her BP is actually normal except for mild elevation of her diastolic. I will make no adjustments for now. I will see her back in the office in one month and if her diastolic remains elevated, I will add another antihypertensive medication to her regimen. 2. Her rhythm was perfectly regular today. She has a history of paroxysmal atrial fibrillation, for which she is anticoagulated and follows up with her cardiologist.  Her anxiety is reasonably stable. She does follow with psychiatry also. 3. She will continue statin as prescribed.   Her last ApoB level was excellent. 4. I encouraged her to increase her physical activity, walking on a more consistent basis. Follow-up and Dispositions    · Return in about 1 month (around 9/12/2019).            Radha Arias MD

## 2019-08-16 NOTE — PATIENT INSTRUCTIONS
Stop metoprol and start Toprol XL 25 mg   Labs needed to be drawn slip included  24 hour monitor   Follow up in 2 weeks

## 2019-08-17 LAB
BUN SERPL-MCNC: 17 MG/DL (ref 8–27)
BUN/CREAT SERPL: 16 (ref 12–28)
CALCIUM SERPL-MCNC: 10 MG/DL (ref 8.7–10.3)
CHLORIDE SERPL-SCNC: 104 MMOL/L (ref 96–106)
CO2 SERPL-SCNC: 21 MMOL/L (ref 20–29)
CREAT SERPL-MCNC: 1.09 MG/DL (ref 0.57–1)
GLUCOSE SERPL-MCNC: 85 MG/DL (ref 65–99)
INTERPRETATION: NORMAL
MAGNESIUM SERPL-MCNC: 2.2 MG/DL (ref 1.6–2.3)
POTASSIUM SERPL-SCNC: 4.2 MMOL/L (ref 3.5–5.2)
SODIUM SERPL-SCNC: 142 MMOL/L (ref 134–144)
TSH SERPL DL<=0.005 MIU/L-ACNC: 1.78 UIU/ML (ref 0.45–4.5)

## 2019-08-19 ENCOUNTER — TELEPHONE (OUTPATIENT)
Dept: CARDIOLOGY CLINIC | Age: 69
End: 2019-08-19

## 2019-08-19 DIAGNOSIS — I48.0 PAROXYSMAL ATRIAL FIBRILLATION (HCC): ICD-10-CM

## 2019-08-19 NOTE — TELEPHONE ENCOUNTER
Pt walked into office requesting samples of Xarelto. Pt states that she is leaving for [facility] tomorrow night and the samples that she received on Friday will only last through the 28th and she does not return until the first. I informed the pt that the nurses were in clinic and would could back as soon as they could.     Call back #[phone]

## 2019-08-20 NOTE — TELEPHONE ENCOUNTER
Patient walked into the clinic today, 2 pt identifiers used    She needs another bottle of Xarelto because she is going out of town and will not be back until the first of the month. Gave patient another 1 week sample. Requested Prescriptions     Signed Prescriptions Disp Refills    rivaroxaban (XARELTO) 20 mg tab tablet 7 Tab 0     Sig: Take 1 Tab by mouth daily (with dinner).      Authorizing Provider: Rut Johnston     Ordering User: Dilan Jo     Per verbal orders

## 2019-08-27 ENCOUNTER — TELEPHONE (OUTPATIENT)
Dept: CARDIOLOGY CLINIC | Age: 69
End: 2019-08-27

## 2019-09-12 ENCOUNTER — OFFICE VISIT (OUTPATIENT)
Dept: INTERNAL MEDICINE CLINIC | Age: 69
End: 2019-09-12

## 2019-09-12 VITALS
HEART RATE: 83 BPM | BODY MASS INDEX: 25.08 KG/M2 | TEMPERATURE: 98.1 F | OXYGEN SATURATION: 98 % | RESPIRATION RATE: 16 BRPM | SYSTOLIC BLOOD PRESSURE: 159 MMHG | DIASTOLIC BLOOD PRESSURE: 77 MMHG | WEIGHT: 136.3 LBS | HEIGHT: 62 IN

## 2019-09-12 DIAGNOSIS — F41.1 ANXIETY NEUROSIS: ICD-10-CM

## 2019-09-12 DIAGNOSIS — I48.91 ATRIAL FIBRILLATION, UNSPECIFIED TYPE (HCC): Primary | ICD-10-CM

## 2019-09-12 DIAGNOSIS — I10 ESSENTIAL HYPERTENSION: ICD-10-CM

## 2019-09-12 DIAGNOSIS — I48.0 PAROXYSMAL ATRIAL FIBRILLATION (HCC): ICD-10-CM

## 2019-09-12 DIAGNOSIS — E78.5 DYSLIPIDEMIA: ICD-10-CM

## 2019-09-12 NOTE — PROGRESS NOTES
Chief Complaint   Patient presents with    Irregular Heart Beat    Ankle swelling     Patient also complains of lower abdominal pain. 1. Have you been to the ER, urgent care clinic since your last visit? Hospitalized since your last visit? No    2. Have you seen or consulted any other health care providers outside of the [address] [place] [address] since your last visit? Include any pap smears or colon screening.  No

## 2019-09-12 NOTE — PROGRESS NOTES
[address] [facility]  [place] [address]  [address] [place] [address]  Phone:  [phone]  Fax: [phone]       Chief Complaint   Patient presents with    Irregular Heart Beat    Ankle swelling   . SUBJECTIVE:    Sarkis Watson is a 76 y.o. female Comes in for return visit stating that today she noted her heart rate was rather rapid and irregular. She therefore assumes she was back in a-fib. This lasted for approximately three hours and by the time she reached the office the rate had returned to baseline. She had no other accompanying symptoms other than the palpitations. She had episodic right lower quadrant abdominal pain lasting less than a minute and abating completely. She has a past history of primary hypertension, dyslipidemia and depression. She is not that physically active. Current Outpatient Medications   Medication Sig Dispense Refill    HYDROcodone-acetaminophen (NORCO) 5-325 mg per tablet TAKE 1 TABLET BY MOUTH 2 TIMES DAILY AS NEEDED FOR PAIN FOR UP TO 30 DAYS MAX DAILY 2 TABS  0    metoprolol succinate (TOPROL XL) 25 mg XL tablet Take 1 Tab by mouth daily. 30 Tab 3    hydroCHLOROthiazide (HYDRODIURIL) 12.5 mg tablet Take once daily 90 Tab 3    hydroquinone (ESOTERICA, MELQUIN) 4 % topical cream Apply  to affected area two (2) times a day. 30 g 6    potassium chloride (K-DUR, KLOR-CON) 20 mEq tablet Take 1 Tab by mouth daily. 90 Tab 3    magnesium oxide (MAG-OX) 400 mg tablet Take 1 Tab by mouth daily. 90 Tab 3    albuterol (VENTOLIN HFA) 90 mcg/actuation inhaler Take 2 Puffs by inhalation every four (4) hours as needed for Wheezing. 1 Inhaler 11    rosuvastatin (CRESTOR) 10 mg tablet Take 1 tablet every day 30 Tab 0    methocarbamol (ROBAXIN) 750 mg tablet Take 1 Tab by mouth three (3) times daily as needed. 270 Tab 3    desonide (TRIDESILON) 0.05 % topical lotion Apply  to affected area two (2) times a day.  118 mL 11    ALPRAZolam (XANAX) 1 mg tablet Take 1 Tab by mouth three (3) times daily as needed for Anxiety. Max Daily Amount: 3 mg. (Patient taking differently: Take 1 mg by mouth two (2) times a day.) 10 Tab 0    fluticasone-salmeterol (ADVAIR) 250-50 mcg/dose diskus inhaler Take 1 Puff by inhalation every twelve (12) hours. 1 Inhaler 11    lidocaine (LIDODERM) 5 % 1 Patch by TransDERmal route every twenty-four (24) hours. 3 Package 3    QUEtiapine (SEROQUEL) 50 mg tablet Take 50 mg by mouth three (3) times daily.  FLUoxetine (PROZAC) 20 mg capsule Take 20 mg by mouth daily.  rivaroxaban (XARELTO) 20 mg tab tablet Take 1 Tab by mouth daily (with dinner).  7 Tab 0     Past Medical History:   Diagnosis Date    Abdominal pain     Asthma     [facility] 2 disorder ([facility] [address]     CAD (coronary artery disease)     ablation    [facility]     Hypercholesteremia     Hypertension     Menopause     Tachycardia      Past Surgical History:   Procedure Laterality Date    ABLATION OF SVT  3/30/2015         EP STUDY W/INDUCTION  3/30/2015         HX BACK SURGERY      HX COLONOSCOPY  9-    HX HEART CATHETERIZATION      HX HYSTERECTOMY       Allergies   Allergen Reactions    Ampicillin Hives and Nausea and Vomiting         REVIEW OF SYSTEMS:  General: negative for - chills or fever  ENT: negative for - headaches, nasal congestion or tinnitus  Respiratory: negative for - cough, hemoptysis, shortness of breath or wheezing  Cardiovascular : negative for - chest pain, edema, palpitations or shortness of breath  Gastrointestinal: negative for - abdominal pain, blood in stools, heartburn or nausea/vomiting  Genito-Urinary: no dysuria, trouble voiding, or hematuria  Musculoskeletal: negative for - gait disturbance, joint pain, joint stiffness or joint swelling  Neurological: no TIA or stroke symptoms  Hematologic: no bruises, no bleeding, no swollen glands  Integument: no lumps, mole changes, nail changes or rash  Endocrine: no malaise/lethargy or unexpected weight changes      Social History     Socioeconomic History    Marital status:      Spouse name: Not on file    Number of children: 1    Years of education: Not on file    Highest education level: Not on file   Occupational History    Occupation: retired--[employer]---disabled   Tobacco Use    Smoking status: Never Smoker    Smokeless tobacco: Never Used   Substance and Sexual Activity    Alcohol use: No     Alcohol/week: 0.0 standard drinks     Comment: rarely    Drug use: No    Sexual activity: Not Currently     Partners: Male     Family History   Problem Relation Age of Onset    Hypertension Mother     Heart Disease Mother     Heart Disease Father     Stroke Father        OBJECTIVE:    Visit Vitals  /77   Pulse 83   Temp 98.1 °F (36.7 °C) (Oral)   Resp 16   Ht 5' 2\" (1.575 m)   Wt 136 lb 4.8 oz (61.8 kg)   SpO2 98%   BMI 24.93 kg/m²     CONSTITUTIONAL: well , well nourished, appears age appropriate  EYES: perrla, eom intact  ENMT:moist mucous membranes, pharynx clear  NECK: supple. Thyroid normal  RESPIRATORY: Chest: clear to ascultation and percussion   CARDIOVASCULAR: Heart: regular rate and rhythm  GASTROINTESTINAL: Abdomen: soft, bowel sounds active  HEMATOLOGIC: no pathological lymph nodes palpated  MUSCULOSKELETAL: Extremities: no edema, pulse 1+   INTEGUMENT: No unusual rashes or suspicious skin lesions noted. Nails appear normal.  NEUROLOGIC: non-focal exam   MENTAL STATUS: alert and oriented, appropriate affect      ASSESSMENT:  1. Atrial fibrillation, unspecified type ([address]    2. Essential hypertension    3. Dyslipidemia    4. Anxiety neurosis        PLAN:    1. The patient's rhythm is regular with a normal rate. She did have ablation with the multiple tracts removed, but obviously she continues to have several tracts that are persistent, which is leading to the recurrent atrial fib.   2. BP is excellent today, no adjustments are made. 3. She will continue statin as prescribed in view of her increased cardiovascular risk. 4. She remains quite anxious, but she currently follows up with psychiatry and has no persistent flare ups. Follow-up and Dispositions    · Return in about 3 months (around 12/12/2019).            Doloris Phalen, MD

## 2019-09-12 NOTE — TELEPHONE ENCOUNTER
Cardiologist: Dr. Tammi Smith    Last appt: 8/16/2019  Future Appointments   Date Time Provider Carina Peñaloza   9/17/2019  3:30 PM Beth Greenfield MD [place] [id number] [facility]   10/3/2019  1:00 PM Mindy Dennis  [address]   10/21/2019 11:00 AM Zaina Valentino  [address]       Requested Prescriptions     Signed Prescriptions Disp Refills    rivaroxaban (XARELTO) 20 mg tab tablet 7 Tab 0     Sig: Take 1 Tab by mouth daily (with dinner). Authorizing Provider: [employer]     Ordering User: Usha Dixon         Refills VO per Dr. Tammi Smith.

## 2019-09-17 ENCOUNTER — TELEPHONE (OUTPATIENT)
Dept: CARDIOLOGY CLINIC | Age: 69
End: 2019-09-17

## 2019-09-17 ENCOUNTER — OFFICE VISIT (OUTPATIENT)
Dept: INTERNAL MEDICINE CLINIC | Age: 69
End: 2019-09-17

## 2019-09-17 VITALS
SYSTOLIC BLOOD PRESSURE: 138 MMHG | OXYGEN SATURATION: 94 % | HEART RATE: 73 BPM | HEIGHT: 62 IN | WEIGHT: 130.7 LBS | RESPIRATION RATE: 16 BRPM | BODY MASS INDEX: 24.05 KG/M2 | TEMPERATURE: 98.5 F | DIASTOLIC BLOOD PRESSURE: 88 MMHG

## 2019-09-17 DIAGNOSIS — I10 ESSENTIAL HYPERTENSION: ICD-10-CM

## 2019-09-17 DIAGNOSIS — Z00.00 WELLNESS EXAMINATION: ICD-10-CM

## 2019-09-17 DIAGNOSIS — I48.91 ATRIAL FIBRILLATION, UNSPECIFIED TYPE (HCC): Primary | ICD-10-CM

## 2019-09-17 DIAGNOSIS — Z13.31 SCREENING FOR DEPRESSION: ICD-10-CM

## 2019-09-17 DIAGNOSIS — E78.5 DYSLIPIDEMIA: ICD-10-CM

## 2019-09-17 DIAGNOSIS — Z13.39 SCREENING FOR ALCOHOLISM: ICD-10-CM

## 2019-09-17 DIAGNOSIS — F41.1 ANXIETY NEUROSIS: ICD-10-CM

## 2019-09-17 NOTE — TELEPHONE ENCOUNTER
Patient would like to know if she can  samples of Xarelto. She states that the price continues to go up for this medication and she can not afford it. She would like to know if our office received paperwork for Mayra Oliver regarding assistance with this medication. Please advise.      Phone: [phone]

## 2019-09-17 NOTE — PROGRESS NOTES
Chief Complaint   Patient presents with   [facility] Annual Wellness Visit     1. Have you been to the ER, urgent care clinic since your last visit? Hospitalized since your last visit? No    2. Have you seen or consulted any other health care providers outside of the [address] [place] [address] since your last visit? Include any pap smears or colon screening.  No

## 2019-09-18 RX ORDER — METOPROLOL SUCCINATE 50 MG/1
50 TABLET, EXTENDED RELEASE ORAL DAILY
COMMUNITY
End: 2019-10-21 | Stop reason: DRUGHIGH

## 2019-09-18 NOTE — TELEPHONE ENCOUNTER
Verified patient with two patient identifiers. Spoke with patient yesterday evening and was told that we do not have any samples of Xarelto. I have told her that Vineet Ross is the one that prescribed the Xarelto and was encourage her to call his office to help her out for Patient Assistance Program.  Upon talking to her I also told her the result of her holter monitor,per  to increase Toprol 50 mg daily and refer to  for AF ablation and has appointment to see Sherif Pearson on October 3 @ 1 PM.Patient verbalized understanding.

## 2019-09-18 NOTE — PATIENT INSTRUCTIONS
Medicare Wellness Visit, Female     The best way to live healthy is to have a lifestyle where you eat a well-balanced diet, exercise regularly, limit alcohol use, and quit all forms of tobacco/nicotine, if applicable. Regular preventive services are another way to keep healthy. Preventive services (vaccines, screening tests, monitoring & exams) can help personalize your care plan, which helps you manage your own care. Screening tests can find health problems at the earliest stages, when they are easiest to treat. [facility] follows the current, evidence-based guidelines published by the [facility] Tushar Annabella ([facility]) when recommending preventive services for our patients. Because we follow these guidelines, sometimes recommendations change over time as research supports it. (For example, mammograms used to be recommended annually. Even though Medicare will still pay for an annual mammogram, the newer guidelines recommend a mammogram every two years for women of average risk.)  Of course, you and your doctor may decide to screen more often for some diseases, based on your risk and your health status. Preventive services for you include:  - Medicare offers their members a free annual wellness visit, which is time for you and your primary care provider to discuss and plan for your preventive service needs. Take advantage of this benefit every year!  -All adults over the age of 72 should receive the recommended pneumonia vaccines. Current USPSTF guidelines recommend a series of two vaccines for the best pneumonia protection.   -All adults should have a flu vaccine yearly and a tetanus vaccine every 10 years. All adults age 61 and older should receive a shingles vaccine once in their lifetime.    -A bone mass density test is recommended when a woman turns 65 to screen for osteoporosis. This test is only recommended one time, as a screening.  Some providers will use this same test as a disease monitoring tool if you already have osteoporosis. -All adults age 38-68 who are overweight should have a diabetes screening test once every three years.   -Other screening tests and preventive services for persons with diabetes include: an eye exam to screen for diabetic retinopathy, a kidney function test, a foot exam, and stricter control over your cholesterol.   -Cardiovascular screening for adults with routine risk involves an electrocardiogram (ECG) at intervals determined by your doctor.   -Colorectal cancer screenings should be done for adults age 54-65 with no increased risk factors for colorectal cancer. There are a number of acceptable methods of screening for this type of cancer. Each test has its own benefits and drawbacks. Discuss with your doctor what is most appropriate for you during your annual wellness visit. The different tests include: colonoscopy (considered the best screening method), a fecal occult blood test, a fecal DNA test, and sigmoidoscopy. -Breast cancer screenings are recommended every other year for women of normal risk, age 54-69.  -Cervical cancer screenings for women over age 72 are only recommended with certain risk factors.   -All adults born between [facility] should be screened once for Hepatitis C.      Here is a list of your current Health Maintenance items (your personalized list of preventive services) with a due date:  Health Maintenance Due   Topic Date Due    Colonoscopy  12/24/1968    Shingles Vaccine (1 of 2) 12/24/2000    Glaucoma Screening   12/24/2015    Pneumococcal Vaccine (1 of 2 - PCV13) 12/24/2015    Annual Well Visit  06/29/2019    Flu Vaccine  08/01/2019

## 2019-09-18 NOTE — PROGRESS NOTES
[facility]  [facility]  [address] [facility] [address]  Phone:  [phone]  Fax: [phone]       Chief Complaint   Patient presents with   [facility] Wellness Visit   . SUBJECTIVE:    Gifty Norris is a 76 y.o. female Comes in for return visit stating that she has done reasonably well. I saw her most recently for swelling of her lower extremities, which has indeed improved significantly. I explained to her that this was indeed related to venous insufficiency. Her cardiac status appears to be stable, she follows up with cardiology on a regular basis. She has a history of atrial fibrillation. She has a past history of primary hypertension and dyslipidemia, as well as depression. She follows up with psychiatry as far as depression is concerned, but this has been quite stable. Current Outpatient Medications   Medication Sig Dispense Refill    rivaroxaban (XARELTO) 20 mg tab tablet Take 1 Tab by mouth daily (with dinner). 7 Tab 0    HYDROcodone-acetaminophen (NORCO) 5-325 mg per tablet TAKE 1 TABLET BY MOUTH 2 TIMES DAILY AS NEEDED FOR PAIN FOR UP TO 30 DAYS MAX DAILY 2 TABS  0    metoprolol succinate (TOPROL XL) 25 mg XL tablet Take 1 Tab by mouth daily. 30 Tab 3    hydroCHLOROthiazide (HYDRODIURIL) 12.5 mg tablet Take once daily 90 Tab 3    hydroquinone (ESOTERICA, MELQUIN) 4 % topical cream Apply  to affected area two (2) times a day. 30 g 6    potassium chloride (K-DUR, KLOR-CON) 20 mEq tablet Take 1 Tab by mouth daily. 90 Tab 3    magnesium oxide (MAG-OX) 400 mg tablet Take 1 Tab by mouth daily. 90 Tab 3    albuterol (VENTOLIN HFA) 90 mcg/actuation inhaler Take 2 Puffs by inhalation every four (4) hours as needed for Wheezing. 1 Inhaler 11    rosuvastatin (CRESTOR) 10 mg tablet Take 1 tablet every day 30 Tab 0    methocarbamol (ROBAXIN) 750 mg tablet Take 1 Tab by mouth three (3) times daily as needed.  270 Tab 3    desonide (TRIDESILON) 0.05 % topical lotion Apply  to affected area two (2) times a day. 118 mL 11    ALPRAZolam (XANAX) 1 mg tablet Take 1 Tab by mouth three (3) times daily as needed for Anxiety. Max Daily Amount: 3 mg. (Patient taking differently: Take 1 mg by mouth two (2) times a day.) 10 Tab 0    fluticasone-salmeterol (ADVAIR) 250-50 mcg/dose diskus inhaler Take 1 Puff by inhalation every twelve (12) hours. 1 Inhaler 11    lidocaine (LIDODERM) 5 % 1 Patch by TransDERmal route every twenty-four (24) hours. 3 Package 3    QUEtiapine (SEROQUEL) 50 mg tablet Take 50 mg by mouth three (3) times daily.  FLUoxetine (PROZAC) 20 mg capsule Take 20 mg by mouth daily.        Past Medical History:   Diagnosis Date    Abdominal pain     Asthma     Bipolar 2 disorder ([facility] [address]     CAD (coronary artery disease)     ablation    DVT [facility]deep venous thrombosis) ([facility])     Hypercholesteremia     Hypertension     Menopause     Tachycardia      Past Surgical History:   Procedure Laterality Date    ABLATION OF SVT  3/30/2015         EP STUDY W/INDUCTION  3/30/2015         HX BACK SURGERY      HX COLONOSCOPY  9-    HX HEART CATHETERIZATION      HX HYSTERECTOMY       Allergies   Allergen Reactions    Ampicillin Hives and Nausea and Vomiting         REVIEW OF SYSTEMS:  General: negative for - chills or fever  ENT: negative for - headaches, nasal congestion or tinnitus  Respiratory: negative for - cough, hemoptysis, shortness of breath or wheezing  Cardiovascular : negative for - chest pain, edema, palpitations or shortness of breath  Gastrointestinal: negative for - abdominal pain, blood in stools, heartburn or nausea/vomiting  Genito-Urinary: no dysuria, trouble voiding, or hematuria  Musculoskeletal: negative for - gait disturbance, joint pain, joint stiffness or joint swelling  Neurological: no TIA or stroke symptoms  Hematologic: no bruises, no bleeding, no swollen glands  Integument: no lumps, mole changes, nail changes or rash  Endocrine: no malaise/lethargy or unexpected weight changes      Social History     Socioeconomic History    Marital status:      Spouse name: Not on file    Number of children: 1    Years of education: Not on file    Highest education level: Not on file   Occupational History    Occupation: retired--[employer]---disabled   Tobacco Use    Smoking status: Never Smoker    Smokeless tobacco: Never Used   Substance and Sexual Activity    Alcohol use: No     Alcohol/week: 0.0 standard drinks     Comment: rarely    Drug use: No    Sexual activity: Not Currently     Partners: Male     Family History   Problem Relation Age of Onset    Hypertension Mother     Heart Disease Mother     Heart Disease Father     Stroke Father        OBJECTIVE:    Visit Vitals  /88   Pulse 73   Temp 98.5 °F (36.9 °C) (Oral)   Resp 16   Ht 5' 2\" (1.575 m)   Wt 130 lb 11.2 oz (59.3 kg)   SpO2 94%   BMI 23.91 kg/m²     CONSTITUTIONAL: well , well nourished, appears age appropriate  EYES: perrla, eom intact  ENMT:moist mucous membranes, pharynx clear  NECK: supple. Thyroid normal  RESPIRATORY: Chest: clear to ascultation and percussion   CARDIOVASCULAR: Heart: regular rate and rhythm  GASTROINTESTINAL: Abdomen: soft, bowel sounds active  HEMATOLOGIC: no pathological lymph nodes palpated  MUSCULOSKELETAL: Extremities: no edema, pulse 1+   INTEGUMENT: No unusual rashes or suspicious skin lesions noted. Nails appear normal.  NEUROLOGIC: non-focal exam   MENTAL STATUS: alert and oriented, appropriate affect      ASSESSMENT:  1. Atrial fibrillation, unspecified type ([address]    2. Essential hypertension    3. Anxiety neurosis    4. Dyslipidemia    5. Screening for alcoholism    6. Screening for depression        PLAN:    1. Her rhythm is regular today. She remains on her DOAC. 2. BP is excellent today, no adjustments are made. 3. Her anxiety level is stable.   She has had a slight amount of increased anxiety related to an incident that happened a week ago, but she is improving. 4. She will continue statin as prescribed in view of her primary cardiovascular risk prevention. 5. I encouraged her to remain as physically active as possible. Follow-up and Dispositions    · Return in about 4 months (around 1/17/2020). Harini Cook MD  This is the Subsequent Medicare Annual Wellness Exam, performed 12 months or more after the Initial AWV or the last Subsequent AWV    I have reviewed the patient's medical history in detail and updated the computerized patient record. History     Past Medical History:   Diagnosis Date    Abdominal pain     Asthma     Bipolar 2 disorder ([address]     CAD (coronary artery disease)     ablation    DVT (deep venous thrombosis) (HCC)     Hypercholesteremia     Hypertension     Menopause     Tachycardia       Past Surgical History:   Procedure Laterality Date    ABLATION OF SVT  3/30/2015         EP STUDY W/INDUCTION  3/30/2015         HX BACK SURGERY      HX COLONOSCOPY  9-    HX HEART CATHETERIZATION      HX HYSTERECTOMY       Current Outpatient Medications   Medication Sig Dispense Refill    rivaroxaban (XARELTO) 20 mg tab tablet Take 1 Tab by mouth daily (with dinner). 7 Tab 0    HYDROcodone-acetaminophen (NORCO) 5-325 mg per tablet TAKE 1 TABLET BY MOUTH 2 TIMES DAILY AS NEEDED FOR PAIN FOR UP TO 30 DAYS MAX DAILY 2 TABS  0    metoprolol succinate (TOPROL XL) 25 mg XL tablet Take 1 Tab by mouth daily. 30 Tab 3    hydroCHLOROthiazide (HYDRODIURIL) 12.5 mg tablet Take once daily 90 Tab 3    hydroquinone (ESOTERICA, MELQUIN) 4 % topical cream Apply  to affected area two (2) times a day. 30 g 6    potassium chloride (K-DUR, KLOR-CON) 20 mEq tablet Take 1 Tab by mouth daily. 90 Tab 3    magnesium oxide (MAG-OX) 400 mg tablet Take 1 Tab by mouth daily.  90 Tab 3    albuterol (VENTOLIN HFA) 90 mcg/actuation inhaler Take 2 Puffs by inhalation every four (4) hours as needed for Wheezing. 1 Inhaler 11    rosuvastatin (CRESTOR) 10 mg tablet Take 1 tablet every day 30 Tab 0    methocarbamol (ROBAXIN) 750 mg tablet Take 1 Tab by mouth three (3) times daily as needed. 270 Tab 3    desonide (TRIDESILON) 0.05 % topical lotion Apply  to affected area two (2) times a day. 118 mL 11    ALPRAZolam (XANAX) 1 mg tablet Take 1 Tab by mouth three (3) times daily as needed for Anxiety. Max Daily Amount: 3 mg. (Patient taking differently: Take 1 mg by mouth two (2) times a day.) 10 Tab 0    fluticasone-salmeterol (ADVAIR) 250-50 mcg/dose diskus inhaler Take 1 Puff by inhalation every twelve (12) hours. 1 Inhaler 11    lidocaine (LIDODERM) 5 % 1 Patch by TransDERmal route every twenty-four (24) hours. 3 Package 3    QUEtiapine (SEROQUEL) 50 mg tablet Take 50 mg by mouth three (3) times daily.  FLUoxetine (PROZAC) 20 mg capsule Take 20 mg by mouth daily.        Allergies   Allergen Reactions    Ampicillin Hives and Nausea and Vomiting     Family History   Problem Relation Age of Onset    Hypertension Mother     Heart Disease Mother     Heart Disease Father     Stroke Father      Social History     Tobacco Use    Smoking status: Never Smoker    Smokeless tobacco: Never Used   Substance Use Topics    Alcohol use: No     Alcohol/week: 0.0 standard drinks     Comment: rarely     Patient Active Problem List   Diagnosis Code    Atrial fibrillation (HCC) I48.91    HTN (hypertension) I10    Lumbar radicular pain M54.16    Hematest positive stools R19.5    Anxiety neurosis F41.1    Dyspnea R06.00    Dyslipidemia E78.5    [place] filter in place Z95.828    Dermatitis L30.9    Asthma J45.909    Episodic tension-type headache, not intractable G44.219    Synovitis of knee M65.9    Nodular goiter E04.9    Abdominal pain R10.9    Hepatic cyst K76.89       Depression Risk Factor Screening:     3 most recent PHQ Screens 8/15/2019   PHQ Not Done -   Little interest or pleasure in doing things Not at all   Feeling down, depressed, irritable, or hopeless Not at all   Total Score PHQ 2 0     Alcohol Risk Factor Screening: You do not drink alcohol or very rarely. Functional Ability and Level of Safety:   Hearing Loss  Hearing is good. Activities of Daily Living  The home contains: no safety equipment. Patient does total self care    Fall Risk  Fall Risk Assessment, last 12 mths 8/15/2019   Able to walk? Yes   Fall in past 12 months? No       Abuse Screen  Patient is not abused    Cognitive Screening   Evaluation of Cognitive Function:  Has your family/caregiver stated any concerns about your memory: no  Normal    Patient Care Team   Patient Care Team:  Gogo Davila MD as PCP - General (Internal Medicine)  Herb Gardner MD (Cardiology)  Katheen Pallas, MD (Cardiology)    Assessment/Plan   Education and counseling provided:  Are appropriate based on today's review and evaluation    Diagnoses and all orders for this visit:    1. Atrial fibrillation, unspecified type ([facility] [address]    2. Essential hypertension    3. Anxiety neurosis    4. Dyslipidemia    5.  Screening for alcoholism  -     AK ANNUAL ALCOHOL SCREEN 15 MIN    6. Screening for depression  -     [place] Maintenance Due   Topic Date Due    COLONOSCOPY  12/24/1968    Shingrix Vaccine Age 50> (1 of 2) 12/24/2000    GLAUCOMA SCREENING Q2Y  12/24/2015    Pneumococcal 65+ years (1 of 2 - PCV13) 12/24/2015    MEDICARE YEARLY EXAM  06/29/2019    Influenza Age 9 to Adult  08/01/2019

## 2019-10-03 ENCOUNTER — OFFICE VISIT (OUTPATIENT)
Dept: CARDIOLOGY CLINIC | Age: 69
End: 2019-10-03

## 2019-10-03 VITALS
SYSTOLIC BLOOD PRESSURE: 132 MMHG | HEART RATE: 70 BPM | HEIGHT: 62 IN | WEIGHT: 138 LBS | BODY MASS INDEX: 25.4 KG/M2 | DIASTOLIC BLOOD PRESSURE: 70 MMHG

## 2019-10-03 DIAGNOSIS — J45.20 MILD INTERMITTENT ASTHMA WITHOUT COMPLICATION: ICD-10-CM

## 2019-10-03 DIAGNOSIS — I10 ESSENTIAL HYPERTENSION: ICD-10-CM

## 2019-10-03 DIAGNOSIS — I49.5 TACHY-BRADY SYNDROME (HCC): ICD-10-CM

## 2019-10-03 DIAGNOSIS — E78.5 DYSLIPIDEMIA: ICD-10-CM

## 2019-10-03 DIAGNOSIS — F41.9 ANXIETY: ICD-10-CM

## 2019-10-03 DIAGNOSIS — I48.19 PERSISTENT ATRIAL FIBRILLATION (HCC): Primary | ICD-10-CM

## 2019-10-03 RX ORDER — ROSUVASTATIN CALCIUM 10 MG/1
TABLET, COATED ORAL
Qty: 30 TAB | Refills: 0 | Status: CANCELLED | OUTPATIENT
Start: 2019-10-03

## 2019-10-03 RX ORDER — ALBUTEROL SULFATE 90 UG/1
2 AEROSOL, METERED RESPIRATORY (INHALATION)
Qty: 1 INHALER | Refills: 11 | Status: CANCELLED | OUTPATIENT
Start: 2019-10-03

## 2019-10-03 NOTE — PROGRESS NOTES
Cardiac Electrophysiology Office Note     Subjective:      Brooklynn Monday is a 76 y.o. female patient who has atrial fibrillation when she saw Dr Tyshawn Bolanos and holter with AFIB RVR  Today ECG atrial fibrillation   + ECHOLS  She thinks it is her asthma     Echo 10/2017 LVEF 63%, moderate ROMAN, mild MR, moderate TR    In the past:  She had palpitations since 1998 and she used to live in [facility]  She had seen Dr Tyshawn Bolanos in 2013 and this is the first long 45 min of SVT so she came to ER  ECG showed short RP SVT  She was given adenosine and ECG showed sinus rhythm with second degree AV block, intermittent LBBB but she has recovered with inferolateral TWI but AV node conduction is normal when I saw her  She said she has IVC filter and 2 DVT before so she is on xarelto  She had h/o psvt tachy iron followed in [facility]  Then she had EP study done and 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 She did well and last year 7/2016 when she did stress test she developed AFIB so this has made her really anxious. She wore a holter 7/28/2016 no AF, one  bpm and rare 2:1 av block, so no pacer yet per Dr Tyshawn Bloanos. She is on xarelto for hx of DVT and now persistent atrial fibrillation     Cardiographics  Echo 7/12 EF 55% mild TR and MR  nuc 5/13 no ischemia or MI and EF of 69%  holter 9/13 SR, few PAC, PVC  h/o ? PAF as per her cardiologist in [place] [address] on 5/14: 0-9% b/l  TTE 1/15 LVEF 55 % to 60 %. No WMA. Mild MR, TR. Current Outpatient Medications   Medication Sig Dispense Refill    metoprolol succinate (TOPROL XL) 50 mg XL tablet Take 50 mg by mouth daily.  rivaroxaban (XARELTO) 20 mg tab tablet Take 1 Tab by mouth daily (with dinner). 7 Tab 0    hydroCHLOROthiazide (HYDRODIURIL) 12.5 mg tablet Take once daily 90 Tab 3    hydroquinone (ESOTERICA, MELQUIN) 4 % topical cream Apply  to affected area two (2) times a day.  30 g 6    potassium chloride (K-DUR, KLOR-CON) 20 mEq tablet Take 1 Tab by mouth daily. 90 Tab 3    magnesium oxide (MAG-OX) 400 mg tablet Take 1 Tab by mouth daily. 90 Tab 3    albuterol (VENTOLIN HFA) 90 mcg/actuation inhaler Take 2 Puffs by inhalation every four (4) hours as needed for Wheezing. 1 Inhaler 11    rosuvastatin (CRESTOR) 10 mg tablet Take 1 tablet every day 30 Tab 0    ALPRAZolam (XANAX) 1 mg tablet Take 1 Tab by mouth three (3) times daily as needed for Anxiety. Max Daily Amount: 3 mg. (Patient taking differently: Take 1 mg by mouth two (2) times a day.) 10 Tab 0    fluticasone-salmeterol (ADVAIR) 250-50 mcg/dose diskus inhaler Take 1 Puff by inhalation every twelve (12) hours. 1 Inhaler 11    QUEtiapine (SEROQUEL) 50 mg tablet Take 50 mg by mouth three (3) times daily.  FLUoxetine (PROZAC) 20 mg capsule Take 20 mg by mouth daily.  rivaroxaban (XARELTO) 20 mg tab tablet Take 20 mg by mouth daily. PRESCRIBED BY       HYDROcodone-acetaminophen (NORCO) 5-325 mg per tablet TAKE 1 TABLET BY MOUTH 2 TIMES DAILY AS NEEDED FOR PAIN FOR UP TO 30 DAYS MAX DAILY 2 TABS  0    methocarbamol (ROBAXIN) 750 mg tablet Take 1 Tab by mouth three (3) times daily as needed. 270 Tab 3    desonide (TRIDESILON) 0.05 % topical lotion Apply  to affected area two (2) times a day. 118 mL 11    lidocaine (LIDODERM) 5 % 1 Patch by TransDERmal route every twenty-four (24) hours.  3 Package 3     Allergies   Allergen Reactions    Ampicillin Hives and Nausea and Vomiting     Past Medical History:   Diagnosis Date    Abdominal pain     Asthma     Bipolar 2 disorder (HCC)     CAD (coronary artery disease)     ablation    DVT (deep venous thrombosis) (HCC)     Hypercholesteremia     Hypertension     Menopause     Tachycardia      Past Surgical History:   Procedure Laterality Date    ABLATION OF SVT  3/30/2015         EP STUDY W/INDUCTION  3/30/2015         HX BACK SURGERY      HX COLONOSCOPY  9-    HX HEART CATHETERIZATION      HX HYSTERECTOMY       No SVT in her family history. Social History     Tobacco Use    Smoking status: Never Smoker    Smokeless tobacco: Never Used   Substance Use Topics    Alcohol use: No     Alcohol/week: 0.0 standard drinks     Comment: rarely        Review of Systems:   Constitutional: Negative for fever, chills, weight loss  HEENT: Negative for nosebleeds, vision changes. Respiratory: Negative for cough, hemoptysis, sputum production, and wheezing. Cardiovascular: Negative for chest pain, + palpitations, no orthopnea, claudication, leg swelling, syncope, and PND. Gastrointestinal: Negative for nausea, vomiting, diarrhea, constipation, blood in stool and melena. Genitourinary: Negative for dysuria, and hematuria. Musculoskeletal: Negative for myalgias, arthralgia. Skin: Negative for rash. Heme: Does not bleed or bruise easily. Neurological: Negative for speech change and focal weakness     Objective:     Visit Vitals  /70   Pulse 70   Ht 5' 2\" (1.575 m)   Wt 138 lb (62.6 kg)   BMI 25.24 kg/m²      Physical Exam:   Constitutional: Well-nourished. No distress. Head: Normocephalic and atraumatic. Eyes: Pupils are equal, round  Neck: supple. No JVD present. Cardiovascular: Normal rate, irregular rhythm and normal heart sounds. Exam reveals no gallop and no friction rub. Pulmonary/Chest: Effort normal and breath sounds normal. No wheezes. Abdominal:  obesity  Musculoskeletal: no edema. Neurological: alert,oriented. Skin: Skin is warm and dry  Psychiatric: normal mood and affect. Behavior is normal. Judgment and thought content normal.      ECG atrial fibrillation with ventricular rate controlled    Assessment/Plan:       ICD-10-CM ICD-9-CM    1. Persistent atrial fibrillation I48.19 427.31 AMB POC EKG ROUTINE W/ 12 LEADS, INTER & REP   2. Essential hypertension I10 401.9    3.  Anxiety F41.9 300.00      She had problem with bradycardia and may not tolerate cardioversion and drugs  She is ok with rate control now but has asthma and beta blocker is not ideal  She wants ablation since she had it in 2015 with AVNRT. She wants her son to come in to discuss again  She will call back after speaking to him  Risks include but are not limited to bleeding in the groin, infection in the groin and/or heart valves, fistula between the groin arteries and veins, valvular damage, diaphragmatic paralysis, aortic dissection, heart attack, stroke, blood clot in the leg, pulmonary embolism, lung collapse (pneumothorax or hemothorax), heart collapse (pericardial tamponade), death. The added risks for left atrial ablation may be atrial-esophageal fistula, pulmonary vein stenoses, kidney failure (from contrast injection), higher risk of bleeding, stroke and heart attack. Elective or emergency surgery may be required to repair some of these complications. Prolonged hospitalization would be required. General anesthesia and transeptal catheterization may be required for the procedure      Thank you for involving me in this patient's care and please call with further concerns or questions. Tre Lr M.D.   Electrophysiology/Cardiology  [address] [facility] [place]  [address], [facility] [address] [place] [address] 91  [address]  [phone]

## 2019-10-04 ENCOUNTER — OFFICE VISIT (OUTPATIENT)
Dept: INTERNAL MEDICINE CLINIC | Age: 69
End: 2019-10-04

## 2019-10-04 VITALS
WEIGHT: 136 LBS | BODY MASS INDEX: 25.03 KG/M2 | HEIGHT: 62 IN | TEMPERATURE: 98 F | HEART RATE: 67 BPM | OXYGEN SATURATION: 96 % | DIASTOLIC BLOOD PRESSURE: 83 MMHG | SYSTOLIC BLOOD PRESSURE: 138 MMHG | RESPIRATION RATE: 16 BRPM

## 2019-10-04 DIAGNOSIS — J45.20 MILD INTERMITTENT REACTIVE AIRWAY DISEASE WITHOUT COMPLICATION: ICD-10-CM

## 2019-10-04 DIAGNOSIS — I10 ESSENTIAL HYPERTENSION: ICD-10-CM

## 2019-10-04 DIAGNOSIS — E78.5 DYSLIPIDEMIA: ICD-10-CM

## 2019-10-04 DIAGNOSIS — F41.1 ANXIETY NEUROSIS: ICD-10-CM

## 2019-10-04 DIAGNOSIS — I48.0 PAROXYSMAL ATRIAL FIBRILLATION (HCC): Primary | ICD-10-CM

## 2019-10-04 NOTE — PROGRESS NOTES
Chief Complaint   Patient presents with    Other     Patient would like to discuss visit with cardiologist.      1. Have you been to the ER, urgent care clinic since your last visit? Hospitalized since your last visit? No    2. Have you seen or consulted any other health care providers outside of the [address] [place] [address] since your last visit? Include any pap smears or colon screening.  No

## 2019-10-05 RX ORDER — ALBUTEROL SULFATE 90 UG/1
2 AEROSOL, METERED RESPIRATORY (INHALATION)
Qty: 1 INHALER | Refills: 11 | Status: SHIPPED | OUTPATIENT
Start: 2019-10-05 | End: 2020-12-06 | Stop reason: SDUPTHER

## 2019-10-05 RX ORDER — FLUTICASONE PROPIONATE AND SALMETEROL 250; 50 UG/1; UG/1
1 POWDER RESPIRATORY (INHALATION) EVERY 12 HOURS
Qty: 1 INHALER | Refills: 11 | Status: SHIPPED | OUTPATIENT
Start: 2019-10-05 | End: 2020-12-06 | Stop reason: SDUPTHER

## 2019-10-05 NOTE — PROGRESS NOTES
[address] [place] [address] and Primary Care  [place] [address]  [address]  [place] [address]  Phone:  [phone]  Fax: [phone]       Chief Complaint   Patient presents with    Other     Patient would like to discuss visit with cardiologist.    .      SUBJECTIVE:    Zeus Larkin is a 76 y.o. female Comes in for return visit quite concerned about atrial fibrillation. She saw her cardiologist, who suggested to her the possibility of ablation therapy to alleviate the problem. She is totally asymptomatic from her atrial fib, which is becoming more persistent rather than paroxysmal.  Rate control is excellent and she is taking her anticoagulant. She has a past history of primary hypertension, as well as dyslipidemia and anxiety. Current Outpatient Medications   Medication Sig Dispense Refill    fluticasone propion-salmeterol (ADVAIR/WIXELA) 250-50 mcg/dose diskus inhaler Take 1 Puff by inhalation every twelve (12) hours. 1 Inhaler 11    metoprolol succinate (TOPROL XL) 50 mg XL tablet Take 50 mg by mouth daily.  rivaroxaban (XARELTO) 20 mg tab tablet Take 1 Tab by mouth daily (with dinner). 7 Tab 0    hydroCHLOROthiazide (HYDRODIURIL) 12.5 mg tablet Take once daily 90 Tab 3    hydroquinone (ESOTERICA, MELQUIN) 4 % topical cream Apply  to affected area two (2) times a day. 30 g 6    potassium chloride (K-DUR, KLOR-CON) 20 mEq tablet Take 1 Tab by mouth daily. 90 Tab 3    magnesium oxide (MAG-OX) 400 mg tablet Take 1 Tab by mouth daily. 90 Tab 3    ALPRAZolam (XANAX) 1 mg tablet Take 1 Tab by mouth three (3) times daily as needed for Anxiety. Max Daily Amount: 3 mg. (Patient taking differently: Take 1 mg by mouth two (2) times a day.) 10 Tab 0    QUEtiapine (SEROQUEL) 50 mg tablet Take 50 mg by mouth three (3) times daily.  FLUoxetine (PROZAC) 20 mg capsule Take 20 mg by mouth daily.       rosuvastatin (CRESTOR) 10 mg tablet Take 1 tablet every day 90 Tab 33    albuterol (VENTOLIN HFA) 90 mcg/actuation inhaler Take 2 Puffs by inhalation every four (4) hours as needed for Wheezing.  1 Inhaler 11     Past Medical History:   Diagnosis Date    Abdominal pain     Asthma     [facility] [address]     CAD (coronary artery disease)     ablation    [facility]     Hypercholesteremia     Hypertension     Menopause     Tachycardia      Past Surgical History:   Procedure Laterality Date    ABLATION OF SVT  3/30/2015         EP STUDY W/INDUCTION  3/30/2015         HX BACK SURGERY      HX COLONOSCOPY  9-    HX HEART CATHETERIZATION      HX HYSTERECTOMY       Allergies   Allergen Reactions    Ampicillin Hives and Nausea and Vomiting         REVIEW OF SYSTEMS:  General: negative for - chills or fever  ENT: negative for - headaches, nasal congestion or tinnitus  Respiratory: negative for - cough, hemoptysis, shortness of breath or wheezing  Cardiovascular : negative for - chest pain, edema, palpitations or shortness of breath  Gastrointestinal: negative for - abdominal pain, blood in stools, heartburn or nausea/vomiting  Genito-Urinary: no dysuria, trouble voiding, or hematuria  Musculoskeletal: negative for - gait disturbance, joint pain, joint stiffness or joint swelling  Neurological: no TIA or stroke symptoms  Hematologic: no bruises, no bleeding, no swollen glands  Integument: no lumps, mole changes, nail changes or rash  Endocrine: no malaise/lethargy or unexpected weight changes      Social History     Socioeconomic History    Marital status:      Spouse name: Not on file    Number of children: 1    Years of education: Not on file    Highest education level: Not on file   Occupational History    Occupation: retired--[employer]---disabled   Tobacco Use    Smoking status: Never Smoker    Smokeless tobacco: Never Used   Substance and Sexual Activity    Alcohol use: No     Alcohol/week: 0.0 standard drinks     Comment: rarely    Drug use: No    Sexual activity: Not Currently     Partners: Male     Family History   Problem Relation Age of Onset    Hypertension Mother     Heart Disease Mother     Heart Disease Father     Stroke Father        OBJECTIVE:    Visit Vitals  /83   Pulse 67   Temp 98 °F (36.7 °C) (Oral)   Resp 16   Ht 5' 2\" (1.575 m)   Wt 136 lb (61.7 kg)   SpO2 96%   BMI 24.87 kg/m²     CONSTITUTIONAL: well , well nourished, appears age appropriate  EYES: perrla, eom intact  ENMT:moist mucous membranes, pharynx clear  NECK: supple. Thyroid normal  RESPIRATORY: Chest: clear to ascultation and percussion   CARDIOVASCULAR: Heart: regular rate and rhythm  GASTROINTESTINAL: Abdomen: soft, bowel sounds active  HEMATOLOGIC: no pathological lymph nodes palpated  MUSCULOSKELETAL: Extremities: no edema, pulse 1+   INTEGUMENT: No unusual rashes or suspicious skin lesions noted. Nails appear normal.  NEUROLOGIC: non-focal exam   MENTAL STATUS: alert and oriented, appropriate affect      ASSESSMENT:  1. Paroxysmal atrial fibrillation (HCC)    2. Essential hypertension    3. Anxiety neurosis    4. Dyslipidemia    5. Mild intermittent reactive airway disease without complication        PLAN:    1. The patient has excellent rate control and she does have more persistent atrial fibrillation. I explained to her that she needs to talk to the cardiologist to determine if there are any adverse effects if nothing is done. To my knowledge, I do not think there are any significant adverse effects, however this needs to be corroborated by the EP doctor. If there are no adverse effects, I see no reason to pursue the procedure at this point. There is always the possibility that this procedure would lead to another procedure. 2. BP is excellent today. 3. She will continue statin as prescribed. 4. Her anxiety is reasonably stable.   5. I encouraged her to remain as physically active as possible and walk on a more consistent basis. Follow-up and Dispositions    · Return in about 2 months (around 12/4/2019).            Alberto Leventhal, MD

## 2019-10-21 ENCOUNTER — OFFICE VISIT (OUTPATIENT)
Dept: CARDIOLOGY CLINIC | Age: 69
End: 2019-10-21

## 2019-10-21 VITALS
RESPIRATION RATE: 12 BRPM | WEIGHT: 126.6 LBS | SYSTOLIC BLOOD PRESSURE: 138 MMHG | BODY MASS INDEX: 23.3 KG/M2 | HEIGHT: 62 IN | HEART RATE: 60 BPM | DIASTOLIC BLOOD PRESSURE: 88 MMHG | OXYGEN SATURATION: 99 %

## 2019-10-21 DIAGNOSIS — E78.5 DYSLIPIDEMIA: ICD-10-CM

## 2019-10-21 DIAGNOSIS — F41.9 ANXIETY: ICD-10-CM

## 2019-10-21 DIAGNOSIS — I10 ESSENTIAL HYPERTENSION: ICD-10-CM

## 2019-10-21 DIAGNOSIS — I48.19 PERSISTENT ATRIAL FIBRILLATION (HCC): Primary | ICD-10-CM

## 2019-10-21 RX ORDER — METOPROLOL SUCCINATE 25 MG/1
25 TABLET, EXTENDED RELEASE ORAL 2 TIMES DAILY
COMMUNITY
End: 2019-10-21 | Stop reason: SDUPTHER

## 2019-10-21 RX ORDER — METOPROLOL SUCCINATE 25 MG/1
25 TABLET, EXTENDED RELEASE ORAL 2 TIMES DAILY
Qty: 60 TAB | Refills: 6 | Status: SHIPPED | OUTPATIENT
Start: 2019-10-21 | End: 2020-01-20 | Stop reason: SDUPTHER

## 2019-10-21 NOTE — PROGRESS NOTES
Chief Complaint   Patient presents with    Follow-up     after Dr. Tio Miguel appointment. Denies chest pain/shortnes of breath/dizziness. Continued swelling.       Visit Vitals  /88 (BP 1 Location: Left arm, BP Patient Position: Sitting)   Pulse 60   Resp 12   Ht 5' 2\" (1.575 m)   Wt 126 lb 9.6 oz (57.4 kg)   SpO2 99%   BMI 23.16 kg/m²

## 2019-10-21 NOTE — PROGRESS NOTES
HISTORY OF PRESENT ILLNESS  Nicko Hood is a 76 y.o. female. Patient with h/o PSVT and  Tachy iron followed in [facility], admitted on 5/13 at [facility] with chest pain, at that time nuclear stress test showed no ischemia or MI and EF of 69%, patient remained in NSR during hospital stay. Here for follow up  Echo on 7/12 EF 55% mild TR and MR  Also h/o PAF as per her cardiologist in [place]. 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 Ejection fraction was estimated to be 63 %. No obvious wall motion  abnormalities identified in the views obtained. Right ventricle: The size was at the upper limits of normal.    Left atrium: The atrium was moderately dilated. Right atrium: The atrium was moderately dilated. Mitral valve: There was mild regurgitation. Tricuspid valve: There was moderate annular dilatation.  There was moderate   Recurrent atrial flutter in 10/ 2017 seen by Ep, ablation offered she decided to wait                       Past Medical History                          Past Medical History        Diagnosis      Date              Tachycardia                 Bradycardia                 Bipolar 2 disorder                 Hypercholesteremia                 Hypertension                 DVT (deep venous thrombosis)                 Tachycardia                 Asthma                 Psychiatric disorder                                Funmi   Past Surgical History      Procedure    Laterality    Date          Hx back surgery                Hx hysterectomy                Ep study w/induction       3/30/2015                     Ablation of svt       3/30/2015                     Hx heart catheterization                Hx colonoscopy       9-          History                        Social History          Marital Status:                Spouse Name:    N/A            Number of Children:    1          Years of Education:    N/A                     Occupational History          retired--[employer]---disabled                        Social History Main Topics          Smoking status:    Never Smoker           Smokeless tobacco:    Never Used          Alcohol Use:    No          Drug Use:    No          Sexual Activity:    Not on file           HPI  No cp or sob or palpitations  Review of Systems   Constitutional: Negative. Respiratory: Negative. Cardiovascular: Negative. Visit Vitals  /88 (BP 1 Location: Left arm, BP Patient Position: Sitting)   Pulse 60 Comment: irregular   Resp 12   Ht 5' 2\" (1.575 m)   Wt 126 lb 9.6 oz (57.4 kg)   SpO2 99%   BMI 23.16 kg/m²       Physical Exam   Neck: No JVD present. Carotid bruit is not present. Cardiovascular: Normal rate. An irregularly irregular rhythm present. Pulmonary/Chest: Effort normal and breath sounds normal.   Abdominal: Soft. Musculoskeletal: She exhibits no edema. Psychiatric: She has a normal mood and affect. Current Outpatient Medications on File Prior to Visit   Medication Sig Dispense Refill    folic acid/multivit-min/lutein (CENTRUM SILVER PO) Take 1 Tab by mouth daily.  rosuvastatin (CRESTOR) 10 mg tablet Take 1 tablet every day 90 Tab 33    albuterol (VENTOLIN HFA) 90 mcg/actuation inhaler Take 2 Puffs by inhalation every four (4) hours as needed for Wheezing. 1 Inhaler 11    fluticasone propion-salmeterol (ADVAIR/WIXELA) 250-50 mcg/dose diskus inhaler Take 1 Puff by inhalation every twelve (12) hours. 1 Inhaler 11    metoprolol succinate (TOPROL XL) 50 mg XL tablet Take 50 mg by mouth daily. Taking 25 mg in am and 25 mg in pm.      rivaroxaban (XARELTO) 20 mg tab tablet Take 1 Tab by mouth daily (with dinner). 7 Tab 0    hydroCHLOROthiazide (HYDRODIURIL) 12.5 mg tablet Take once daily 90 Tab 3    hydroquinone (ESOTERICA, MELQUIN) 4 % topical cream Apply  to affected area two (2) times a day. 30 g 6    potassium chloride (K-DUR, KLOR-CON) 20 mEq tablet Take 1 Tab by mouth daily. 90 Tab 3    magnesium oxide (MAG-OX) 400 mg tablet Take 1 Tab by mouth daily. 90 Tab 3    ALPRAZolam (XANAX) 1 mg tablet Take 1 Tab by mouth three (3) times daily as needed for Anxiety. Max Daily Amount: 3 mg. (Patient taking differently: Take 1 mg by mouth two (2) times a day.) 10 Tab 0    QUEtiapine (SEROQUEL) 50 mg tablet Take 50 mg by mouth three (3) times daily.  FLUoxetine (PROZAC) 20 mg capsule Take 20 mg by mouth daily. No current facility-administered medications on file prior to visit. 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 Continue observation for now and metoprolol     AF:back in AF , now chronic .  ECG with AF nstt   Continue xarelto   Continue metoprolol 25 mg bid   AF ablation on hold for now, she erik Ovalles who suggested to proceed but she wants to wait understanding though that AF begets itself    No untoward effects with oacs     HTN: controlled     DVT on xarelto long term no untoward effects thus far     HLD: on statin ,obtain labs     CP:  Not recurrent!     See her back in 6 months or sooner if any issues

## 2019-10-24 ENCOUNTER — APPOINTMENT (OUTPATIENT)
Dept: GENERAL RADIOLOGY | Age: 69
End: 2019-10-24
Attending: EMERGENCY MEDICINE
Payer: MEDICARE

## 2019-10-24 ENCOUNTER — HOSPITAL ENCOUNTER (EMERGENCY)
Age: 69
Discharge: HOME | End: 2019-10-24
Attending: EMERGENCY MEDICINE | Admitting: EMERGENCY MEDICINE
Payer: MEDICARE

## 2019-10-24 VITALS
SYSTOLIC BLOOD PRESSURE: 118 MMHG | DIASTOLIC BLOOD PRESSURE: 75 MMHG | OXYGEN SATURATION: 99 % | TEMPERATURE: 98 F | RESPIRATION RATE: 18 BRPM | HEART RATE: 77 BPM | HEIGHT: 62 IN | BODY MASS INDEX: 24.91 KG/M2 | WEIGHT: 135.36 LBS

## 2019-10-24 DIAGNOSIS — R07.89 ATYPICAL CHEST PAIN: Primary | ICD-10-CM

## 2019-10-24 LAB
ANION GAP SERPL CALC-SCNC: 8 MMOL/L (ref 5–15)
BASOPHILS # BLD: 0 K/UL (ref 0–0.1)
BASOPHILS NFR BLD: 0 % (ref 0–1)
BUN SERPL-MCNC: 16 MG/DL (ref 6–20)
BUN/CREAT SERPL: 14 (ref 12–20)
CALCIUM SERPL-MCNC: 9.4 MG/DL (ref 8.5–10.1)
CHLORIDE SERPL-SCNC: 108 MMOL/L (ref 97–108)
CO2 SERPL-SCNC: 24 MMOL/L (ref 21–32)
COMMENT, HOLDF: NORMAL
CREAT SERPL-MCNC: 1.18 MG/DL (ref 0.55–1.02)
DIFFERENTIAL METHOD BLD: NORMAL
EOSINOPHIL # BLD: 0.1 K/UL (ref 0–0.4)
EOSINOPHIL NFR BLD: 1 % (ref 0–7)
ERYTHROCYTE [DISTWIDTH] IN BLOOD BY AUTOMATED COUNT: 14.2 % (ref 11.5–14.5)
GLUCOSE SERPL-MCNC: 95 MG/DL (ref 65–100)
HCT VFR BLD AUTO: 42.1 % (ref 35–47)
HGB BLD-MCNC: 13.6 G/DL (ref 11.5–16)
IMM GRANULOCYTES # BLD AUTO: 0 K/UL (ref 0–0.04)
IMM GRANULOCYTES NFR BLD AUTO: 0 % (ref 0–0.5)
LYMPHOCYTES # BLD: 1.4 K/UL (ref 0.8–3.5)
LYMPHOCYTES NFR BLD: 17 % (ref 12–49)
MAGNESIUM SERPL-MCNC: 1.8 MG/DL (ref 1.6–2.4)
MCH RBC QN AUTO: 30.2 PG (ref 26–34)
MCHC RBC AUTO-ENTMCNC: 32.3 G/DL (ref 30–36.5)
MCV RBC AUTO: 93.6 FL (ref 80–99)
MONOCYTES # BLD: 0.8 K/UL (ref 0–1)
MONOCYTES NFR BLD: 9 % (ref 5–13)
NEUTS SEG # BLD: 6 K/UL (ref 1.8–8)
NEUTS SEG NFR BLD: 73 % (ref 32–75)
NRBC # BLD: 0 K/UL (ref 0–0.01)
NRBC BLD-RTO: 0 PER 100 WBC
PLATELET # BLD AUTO: 151 K/UL (ref 150–400)
PMV BLD AUTO: 10.4 FL (ref 8.9–12.9)
POTASSIUM SERPL-SCNC: 3.6 MMOL/L (ref 3.5–5.1)
RBC # BLD AUTO: 4.5 M/UL (ref 3.8–5.2)
SAMPLES BEING HELD,HOLD: NORMAL
SODIUM SERPL-SCNC: 140 MMOL/L (ref 136–145)
TROPONIN I SERPL-MCNC: <0.05 NG/ML
WBC # BLD AUTO: 8.4 K/UL (ref 3.6–11)

## 2019-10-24 PROCEDURE — 36415 COLL VENOUS BLD VENIPUNCTURE: CPT

## 2019-10-24 PROCEDURE — 99284 EMERGENCY DEPT VISIT MOD MDM: CPT

## 2019-10-24 PROCEDURE — 74011000250 HC RX REV CODE- 250: Performed by: EMERGENCY MEDICINE

## 2019-10-24 PROCEDURE — 83735 ASSAY OF MAGNESIUM: CPT

## 2019-10-24 PROCEDURE — 80048 BASIC METABOLIC PNL TOTAL CA: CPT

## 2019-10-24 PROCEDURE — 84484 ASSAY OF TROPONIN QUANT: CPT

## 2019-10-24 PROCEDURE — 71045 X-RAY EXAM CHEST 1 VIEW: CPT

## 2019-10-24 PROCEDURE — 74011250637 HC RX REV CODE- 250/637: Performed by: EMERGENCY MEDICINE

## 2019-10-24 PROCEDURE — 85025 COMPLETE CBC W/AUTO DIFF WBC: CPT

## 2019-10-24 PROCEDURE — 93005 ELECTROCARDIOGRAM TRACING: CPT

## 2019-10-24 RX ORDER — GUAIFENESIN 100 MG/5ML
324 LIQUID (ML) ORAL
Status: DISCONTINUED | OUTPATIENT
Start: 2019-10-24 | End: 2019-10-25 | Stop reason: HOSPADM

## 2019-10-24 RX ORDER — FAMOTIDINE 20 MG/1
20 TABLET, FILM COATED ORAL 2 TIMES DAILY
Qty: 20 TAB | Refills: 0 | Status: SHIPPED | OUTPATIENT
Start: 2019-10-24 | End: 2019-11-03

## 2019-10-24 RX ADMIN — LIDOCAINE HYDROCHLORIDE 40 ML: 20 SOLUTION ORAL; TOPICAL at 21:59

## 2019-10-25 ENCOUNTER — DOCUMENTATION ONLY (OUTPATIENT)
Dept: CARDIOLOGY CLINIC | Age: 69
End: 2019-10-25

## 2019-10-25 LAB
ATRIAL RATE: 72 BPM
CALCULATED R AXIS, ECG10: 14 DEGREES
CALCULATED T AXIS, ECG11: -90 DEGREES
DIAGNOSIS, 93000: NORMAL
Q-T INTERVAL, ECG07: 366 MS
QRS DURATION, ECG06: 78 MS
QTC CALCULATION (BEZET), ECG08: 425 MS
VENTRICULAR RATE, ECG03: 81 BPM

## 2019-10-25 NOTE — ED PROVIDER NOTES
76 y.o. female with past medical history significant for CAD s/p cardiac ablation, HTN, and hypercholestermia who presents via private vehicle from home with chief complaint of chest pain. Patient reports awakening at ~0400 Wednesday morning with \"sharp\" pains under her bilateral breasts radiating around to her back with associated mid-sternal chest \"pressure/heaviness\" and pain with inspiration. Patient reports staying awake for several hours with relief of pain, but persistent mid-sternal \"pressure/heaviness\" and reports going back to sleep at that time. Patient arrives c/o continued intermittent mid-sternal chest \"pressure/heaviness\" since then described as \"an uncomfortable feeling\" with mild SOB. Patient endorses Hx of a-fib followed by Dr. Karyle Lawman - was last evaluated by him on Monday and notes she was \"fine\" at that time. No fever, chills, cough, dizziness/lightheadedness, diaphoresis, or n/v/d. Of note, patient is anticoagulated on xarelto. There are no other acute medical concerns at this time. Social hx: Never tobacco smoker;  Endorses rare EtOH use; Denies illicit drug use  PCP: Tanmay Verduzco MD    Note written by Gely Ronquillo, as dictated by Jada Lopez MD 9:39 PM           Past Medical History:   Diagnosis Date    Abdominal pain     Asthma     [facility] [address]     CAD (coronary artery disease)     ablation    DVT (deep venous thrombosis) ([facility] [address]     Hypercholesteremia     Hypertension     Menopause     Tachycardia        Past Surgical History:   Procedure Laterality Date    ABLATION OF SVT  3/30/2015         EP STUDY W/INDUCTION  3/30/2015         HX BACK SURGERY      HX COLONOSCOPY  9-    HX HEART CATHETERIZATION      HX HYSTERECTOMY           Family History:   Problem Relation Age of Onset    Hypertension Mother     Heart Disease Mother     Heart Disease Father     Stroke Father        Social History     Socioeconomic History    Marital status:      Spouse name: Not on file    Number of children: 1    Years of education: Not on file    Highest education level: Not on file   Occupational History    Occupation: retired--[employer]---disabled   Social Needs    Financial resource strain: Not on file    Food insecurity:     Worry: Not on file     Inability: Not on file   [employer] needs:     Medical: Not on file     Non-medical: Not on file   Tobacco Use    Smoking status: Never Smoker    Smokeless tobacco: Never Used   Substance and Sexual Activity    Alcohol use: No     Alcohol/week: 0.0 standard drinks     Comment: rarely    Drug use: No    Sexual activity: Not Currently     Partners: Male   Lifestyle    Physical activity:     Days per week: Not on file     Minutes per session: Not on file    Stress: Not on file   Relationships    Social connections:     Talks on phone: Not on file     Gets together: Not on file     Attends [employer] service: Not on file     Active member of club or organization: Not on file     Attends meetings of clubs or organizations: Not on file     Relationship status: Not on file    Intimate partner violence:     Fear of current or ex partner: Not on file     Emotionally abused: Not on file     Physically abused: Not on file     Forced sexual activity: Not on file   Other Topics Concern    Not on file   Social History Narrative    Not on file         ALLERGIES: Ampicillin    Review of Systems   Constitutional: Negative for chills, diaphoresis and fever. Respiratory: Positive for shortness of breath (mild). Negative for cough. Cardiovascular: Positive for chest pain (mid-sternal, pressure/heaviness). Gastrointestinal: Negative for abdominal pain, constipation, diarrhea and vomiting. Musculoskeletal:        +sharp pain under bilateral breasts radiating around to back   Neurological: Negative for dizziness and light-headedness. All other systems reviewed and are negative.       Vitals:    10/24/19 2032   BP: 146/90   Pulse: 99   Resp: 18   Temp: 98.4 °F (36.9 °C)   SpO2: 98%   Weight: 61.4 kg (135 lb 5.8 oz)   Height: 5' 2\" (1.575 m)            Physical Exam   Constitutional: She is oriented to person, place, and time. She appears well-developed and well-nourished. HENT:   Head: Normocephalic and atraumatic. Eyes: Pupils are equal, round, and reactive to light. Neck: Normal range of motion. Neck supple. Cardiovascular: Normal rate and regular rhythm. Pulmonary/Chest: Effort normal and breath sounds normal.   Abdominal: Soft. She exhibits no distension. There is no tenderness. Neurological: She is alert and oriented to person, place, and time. Skin: Skin is warm and dry. Capillary refill takes less than 2 seconds. Psychiatric: Her behavior is normal. Her mood appears anxious. Nursing note and vitals reviewed. Note written by Gely Claudio, as dictated by Zan Sarmiento MD 9:39 PM     MDM  Number of Diagnoses or Management Options  Atypical chest pain:   Diagnosis management comments: The patient is resting comfortably and feels better, is alert and in no distress. The repeat examination is unremarkable and benign. The electrocardiogram shows no signs of acute ischemia and the history, exam, diagnostic testing and current condition do not suggest that this patient is having an acute myocardial infarction, significant arrhythmia, unstable angina, esophageal perforation, pulmonary embolism, aortic dissection, pneumothorax, severe pneumonia, sepsis or other significant pathology that would warrant further testing, continued ED treatment, admission, or cardiology or other specialist consultation at this point. The vital signs have been stable. The patient's condition is stable and appropriate for discharge.  The patient will pursue further outpatient evaluation with the primary care physician, other designated physician or cardiologist. The patient and/or caregivers have expressed a clear and thorough understanding and agree to follow up as instructed. Procedures    ED EKG interpretation:  Rhythm: a-fib; and regular . Rate (approx.): 81; Axis: normal; ST/T wave: Non-specific T-wave inversions. No ST changes. Note written by Gely Macedo, as dictated by Robles Irizarry MD 8:44 PM      10:40 PM  Discussed patient with Dr. Kriss Delvalle who agrees with outpatient management. She can follow-up with their office. The patient's results have been reviewed with them and/or available family. Patient and/or family verbally conveyed their understanding and agreement of the patient's signs, symptoms, diagnosis, treatment and prognosis and additionally agree to follow up as recommended in the discharge instructions or to return to the Emergency Room should their condition change prior to their follow-up appointment. The patient/family verbally agrees with the care-plan and verbally conveys that all of their questions have been answered. The discharge instructions have also been provided to the patient and/or family with some educational information regarding the patient's diagnosis as well a list of reasons why the patient would want to return to the ER prior to their follow-up appointment, should their condition change.

## 2019-10-25 NOTE — DISCHARGE INSTRUCTIONS

## 2019-11-05 ENCOUNTER — HOSPITAL ENCOUNTER (OUTPATIENT)
Dept: MAMMOGRAPHY | Age: 69
Discharge: HOME | End: 2019-11-05
Attending: INTERNAL MEDICINE
Payer: MEDICARE

## 2019-11-05 DIAGNOSIS — Z12.31 VISIT FOR SCREENING MAMMOGRAM: ICD-10-CM

## 2019-11-05 PROCEDURE — 77063 BREAST TOMOSYNTHESIS BI: CPT

## 2019-11-18 ENCOUNTER — HOSPITAL ENCOUNTER (OUTPATIENT)
Dept: ULTRASOUND IMAGING | Age: 69
Discharge: HOME | End: 2019-11-18
Attending: INTERNAL MEDICINE
Payer: MEDICARE

## 2019-11-18 DIAGNOSIS — K76.89 LIVER CYST: ICD-10-CM

## 2019-11-18 PROCEDURE — 76700 US EXAM ABDOM COMPLETE: CPT

## 2019-12-31 ENCOUNTER — OFFICE VISIT (OUTPATIENT)
Dept: INTERNAL MEDICINE CLINIC | Age: 69
End: 2019-12-31

## 2019-12-31 VITALS
RESPIRATION RATE: 16 BRPM | WEIGHT: 140.7 LBS | HEART RATE: 68 BPM | TEMPERATURE: 98 F | DIASTOLIC BLOOD PRESSURE: 90 MMHG | SYSTOLIC BLOOD PRESSURE: 163 MMHG | OXYGEN SATURATION: 96 % | HEIGHT: 62 IN | BODY MASS INDEX: 25.89 KG/M2

## 2019-12-31 DIAGNOSIS — I48.0 PAROXYSMAL ATRIAL FIBRILLATION (HCC): ICD-10-CM

## 2019-12-31 DIAGNOSIS — E78.5 DYSLIPIDEMIA: ICD-10-CM

## 2019-12-31 DIAGNOSIS — I10 ESSENTIAL HYPERTENSION: ICD-10-CM

## 2019-12-31 DIAGNOSIS — J40 BRONCHITIS: Primary | ICD-10-CM

## 2019-12-31 DIAGNOSIS — J06.9 UPPER RESPIRATORY TRACT INFECTION, UNSPECIFIED TYPE: ICD-10-CM

## 2019-12-31 RX ORDER — CEFUROXIME AXETIL 500 MG/1
500 TABLET ORAL 2 TIMES DAILY
Qty: 14 TAB | Refills: 0 | Status: SHIPPED | OUTPATIENT
Start: 2019-12-31 | End: 2020-09-08

## 2019-12-31 NOTE — PROGRESS NOTES
Chief Complaint   Patient presents with    Cold Symptoms     Patient is here for a cold. Patient stated that she has a cough and body aches. 1. Have you been to the ER, urgent care clinic since your last visit? Hospitalized since your last visit? No    2. Have you seen or consulted any other health care providers outside of the [address] [place] [address] since your last visit? Include any pap smears or colon screening.  No

## 2020-01-01 NOTE — PROGRESS NOTES
[address] [place] [address] and Primary Care  [place] [address]  [address] [place] [address]  Phone:  [phone]  Fax: [phone]       Chief Complaint   Patient presents with    Cold Symptoms     Patient is here for a cold. Patient stated that she has a cough and body aches. .      SUBJECTIVE:    Gail Vázquez is a 71 y.o. female comes in for return visit complaining of upper respiratory symptoms for the last 2 weeks. Predominant symptom currently is coughing coughing occasionally productive of yellowish mucus. He has frequent clearing of her throat as well as generalized malaise and fatigue. She has a past history of paroxysmal atrial fibrillation, primary hypertension, and dyslipidemia. She is in a primary cardiovascular risk prevention mode. Current Outpatient Medications   Medication Sig Dispense Refill    cefUROXime (CEFTIN) 500 mg tablet Take 1 Tab by mouth two (2) times a day. 14 Tab 0    potassium chloride (K-DUR, KLOR-CON) 20 mEq tablet Take 1 Tab by mouth daily. 30 Tab 11    folic acid/multivit-min/lutein (CENTRUM SILVER PO) Take 1 Tab by mouth daily.  metoprolol succinate (TOPROL-XL) 25 mg XL tablet Take 1 Tab by mouth two (2) times a day. 60 Tab 6    rosuvastatin (CRESTOR) 10 mg tablet Take 1 tablet every day 90 Tab 33    albuterol (VENTOLIN HFA) 90 mcg/actuation inhaler Take 2 Puffs by inhalation every four (4) hours as needed for Wheezing. 1 Inhaler 11    fluticasone propion-salmeterol (ADVAIR/WIXELA) 250-50 mcg/dose diskus inhaler Take 1 Puff by inhalation every twelve (12) hours. 1 Inhaler 11    rivaroxaban (XARELTO) 20 mg tab tablet Take 1 Tab by mouth daily (with dinner). 7 Tab 0    hydroCHLOROthiazide (HYDRODIURIL) 12.5 mg tablet Take once daily 90 Tab 3    hydroquinone (ESOTERICA, MELQUIN) 4 % topical cream Apply  to affected area two (2) times a day. 30 g 6    magnesium oxide (MAG-OX) 400 mg tablet Take 1 Tab by mouth daily.  90 Tab 3    ALPRAZolam Brigid Frater) 1 mg tablet Take 1 Tab by mouth three (3) times daily as needed for Anxiety. Max Daily Amount: 3 mg. (Patient taking differently: Take 1 mg by mouth two (2) times a day.) 10 Tab 0    QUEtiapine (SEROQUEL) 50 mg tablet Take 50 mg by mouth three (3) times daily.  FLUoxetine (PROZAC) 20 mg capsule Take 20 mg by mouth daily.        Past Medical History:   Diagnosis Date    Abdominal pain     Asthma     [facility] ([facility] [address]     CAD (coronary artery disease)     ablation    [facility]     Hypercholesteremia     Hypertension     Menopause     Tachycardia      Past Surgical History:   Procedure Laterality Date    ABLATION OF SVT  3/30/2015         EP STUDY W/INDUCTION  3/30/2015         HX BACK SURGERY      HX COLONOSCOPY  9-    HX HEART CATHETERIZATION      HX HYSTERECTOMY  1992     Allergies   Allergen Reactions    Ampicillin Nausea Only         REVIEW OF SYSTEMS:  General: negative for - chills or fever  ENT: negative for - headaches, nasal congestion or tinnitus  Respiratory: negative for - cough, hemoptysis, shortness of breath or wheezing  Cardiovascular : negative for - chest pain, edema, palpitations or shortness of breath  Gastrointestinal: negative for - abdominal pain, blood in stools, heartburn or nausea/vomiting  Genito-Urinary: no dysuria, trouble voiding, or hematuria  Musculoskeletal: negative for - gait disturbance, joint pain, joint stiffness or joint swelling  Neurological: no TIA or stroke symptoms  Hematologic: no bruises, no bleeding, no swollen glands  Integument: no lumps, mole changes, nail changes or rash  Endocrine: no malaise/lethargy or unexpected weight changes      Social History     Socioeconomic History    Marital status:      Spouse name: Not on file    Number of children: 1    Years of education: Not on file    Highest education level: Not on file   Occupational History    Occupation: retired--[employer]---disabled Tobacco Use    Smoking status: Never Smoker    Smokeless tobacco: Never Used   Substance and Sexual Activity    Alcohol use: No     Alcohol/week: 0.0 standard drinks     Comment: rarely    Drug use: No    Sexual activity: Not Currently     Partners: Male     Family History   Problem Relation Age of Onset    Hypertension Mother     Heart Disease Mother     Heart Disease Father     Stroke Father        OBJECTIVE:    Visit Vitals  /90   Pulse 68   Temp 98 °F (36.7 °C)   Resp 16   Ht 5' 2\" (1.575 m)   Wt 140 lb 11.2 oz (63.8 kg)   SpO2 96%   BMI 25.73 kg/m²     CONSTITUTIONAL: well , well nourished, appears age appropriate  EYES: perrla, eom intact  ENMT:moist mucous membranes, pharynx clear  NECK: supple. Thyroid normal  RESPIRATORY: Chest: clear to ascultation and percussion   CARDIOVASCULAR: Heart: regular rate and rhythm  GASTROINTESTINAL: Abdomen: soft, bowel sounds active  HEMATOLOGIC: no pathological lymph nodes palpated  MUSCULOSKELETAL: Extremities: no edema, pulse 1+   INTEGUMENT: No unusual rashes or suspicious skin lesions noted. Nails appear normal.  NEUROLOGIC: non-focal exam   MENTAL STATUS: alert and oriented, appropriate affect      ASSESSMENT:  1. Bronchitis    2. Upper respiratory tract infection, unspecified type    3. Essential hypertension    4. Dyslipidemia    5. Paroxysmal atrial fibrillation (HCC)        PLAN:  1. She appears to have mild bronchitis superimposed on an upper respiratory infection. She will be given an antibiotic and I suggest Claritin-D 12. More importantly however she is told that complete resolution will occur in the next 5 days. 2.  Her blood pressure is excellent today no adjustments are made. 3.  She will continue her statin as prescribed. Her last lipid values were excellent. 4.  Her rhythm is regular today but she has a history of paroxysmal atrial fibrillation. She is protected with a DOAC.   .  Orders Placed This Encounter    cefUROXime (CEFTIN) 500 mg tablet         Follow-up and Dispositions    · Return in about 3 months (around 3/31/2020).            Sondra Pappas MD

## 2020-01-20 DIAGNOSIS — I49.5 TACHY-BRADY SYNDROME (HCC): ICD-10-CM

## 2020-01-20 DIAGNOSIS — I48.20 CHRONIC ATRIAL FIBRILLATION (HCC): ICD-10-CM

## 2020-01-20 DIAGNOSIS — I10 ESSENTIAL HYPERTENSION: ICD-10-CM

## 2020-01-20 DIAGNOSIS — E78.5 DYSLIPIDEMIA: ICD-10-CM

## 2020-01-21 RX ORDER — ROSUVASTATIN CALCIUM 10 MG/1
TABLET, COATED ORAL
Qty: 90 TAB | Refills: 3 | Status: SHIPPED | OUTPATIENT
Start: 2020-01-21 | End: 2021-01-11 | Stop reason: SDUPTHER

## 2020-01-21 RX ORDER — LANOLIN ALCOHOL/MO/W.PET/CERES
400 CREAM (GRAM) TOPICAL DAILY
Qty: 90 TAB | Refills: 3 | Status: SHIPPED | OUTPATIENT
Start: 2020-01-21 | End: 2021-01-11 | Stop reason: SDUPTHER

## 2020-01-21 RX ORDER — HYDROCHLOROTHIAZIDE 12.5 MG/1
TABLET ORAL
Qty: 90 TAB | Refills: 3 | Status: SHIPPED | OUTPATIENT
Start: 2020-01-21 | End: 2020-10-21 | Stop reason: SDUPTHER

## 2020-01-21 RX ORDER — METOPROLOL SUCCINATE 25 MG/1
25 TABLET, EXTENDED RELEASE ORAL 2 TIMES DAILY
Qty: 180 TAB | Refills: 3 | Status: SHIPPED | OUTPATIENT
Start: 2020-01-21 | End: 2021-01-11 | Stop reason: SDUPTHER

## 2020-01-21 RX ORDER — POTASSIUM CHLORIDE 20 MEQ/1
20 TABLET, EXTENDED RELEASE ORAL DAILY
Qty: 90 TAB | Refills: 3 | Status: SHIPPED | OUTPATIENT
Start: 2020-01-21 | End: 2021-01-11 | Stop reason: SDUPTHER

## 2020-03-09 PROBLEM — M65.9 SYNOVITIS OF KNEE: Status: RESOLVED | Noted: 2018-01-28 | Resolved: 2020-03-09

## 2020-03-09 PROBLEM — K76.89 HEPATIC CYST: Status: RESOLVED | Noted: 2019-06-22 | Resolved: 2020-03-09

## 2020-04-23 DIAGNOSIS — I48.0 PAROXYSMAL ATRIAL FIBRILLATION (HCC): ICD-10-CM

## 2020-05-22 ENCOUNTER — VIRTUAL VISIT (OUTPATIENT)
Dept: INTERNAL MEDICINE CLINIC | Age: 70
End: 2020-05-22

## 2020-05-22 DIAGNOSIS — F41.1 ANXIETY NEUROSIS: ICD-10-CM

## 2020-05-22 DIAGNOSIS — E78.5 DYSLIPIDEMIA: ICD-10-CM

## 2020-05-22 DIAGNOSIS — I10 ESSENTIAL HYPERTENSION: ICD-10-CM

## 2020-05-22 DIAGNOSIS — R19.7 DIARRHEA, UNSPECIFIED TYPE: Primary | ICD-10-CM

## 2020-05-22 DIAGNOSIS — I48.0 PAROXYSMAL ATRIAL FIBRILLATION (HCC): ICD-10-CM

## 2020-05-22 RX ORDER — DIPHENOXYLATE HYDROCHLORIDE AND ATROPINE SULFATE 2.5; .025 MG/1; MG/1
2 TABLET ORAL
Qty: 30 TAB | Refills: 0 | Status: SHIPPED | OUTPATIENT
Start: 2020-05-22 | End: 2020-08-11

## 2020-05-22 NOTE — PROGRESS NOTES
Chief Complaint   Patient presents with    Diarrhea     x 1 week      1. Have you been to the ER, urgent care clinic since your last visit? Hospitalized since your last visit? No    2. Have you seen or consulted any other health care providers outside of the [address] [place] [address] since your last visit? Include any pap smears or colon screening.  No

## 2020-05-22 NOTE — PROGRESS NOTES
Kyree Monte is a 71 y.o. female who was seen by synchronous (real-time) audio-video technology on 5/22/2020. Consent: Kyree Monte, who was seen by synchronous (real-time) audio-video technology, and/or her healthcare decision maker, is aware that this patient-initiated, Telehealth encounter on 5/22/2020 is a billable service, with coverage as determined by her insurance carrier. She is aware that she may receive a bill and has provided verbal consent to proceed: Yes. Assessment & Plan:   Diagnoses and all orders for this visit:    1. Diarrhea, unspecified type    2. Paroxysmal atrial fibrillation (HCC)    3. Essential hypertension    4. Dyslipidemia    5. Anxiety neurosis    1. The patient has had recalcitrant diarrhea for the last week to 10 days. She has had no other GI symptoms. Symptomatic treatment with Lomotil in view of the fact that Imodium has not helped. She has not had any antibiotics in the last 6 months. Additionally she is not taking any new medication. 2.  She has chronic atrial fibrillation which has been reasonably stable. She remains on her DOAC. 3.  The patient is compliant with her antihypertensive medication. 4.  She remains on a statin in view of increased cardiovascular risk. .  5.  Her anxiety level is quite stable for now. She remains on her neuroleptics. Subjective:   Kyree Monte is a 71 y.o. female who was seen for Diarrhea (x 1 week )  Patient states that she has had diarrhea for the last 7 to 10 days. It is not improving. She was given Imodium but is noted no meaningful improvement even with this. She is has no upper tract GI symptoms and has not noted any blood in the bowel movements. She has had no contact with antibiotics in the last 6 months. She has chronic atrial fibrillation and remains on her Xarelto. She she has a past history of primary hypertension dyslipidemia and moderate anxiety neuroses.     Prior to Admission medications    Medication Sig Start Date End Date Taking? Authorizing Provider   rivaroxaban (Xarelto) 20 mg tab tablet Take 1 tablet daily. 4/23/20  Yes Ab Began, MD   magnesium oxide (MAG-OX) 400 mg tablet Take 1 Tab by mouth daily. 1/21/20  Yes Ab Began, MD   potassium chloride (K-DUR, KLOR-CON) 20 mEq tablet Take 1 Tab by mouth daily. 1/21/20  Yes Ab Began, MD   hydroCHLOROthiazide (HYDRODIURIL) 12.5 mg tablet Take once daily 1/21/20  Yes Ab Began, MD   rosuvastatin (CRESTOR) 10 mg tablet Take 1 tablet every day 1/21/20  Yes Ab Began, MD   metoprolol succinate (TOPROL-XL) 25 mg XL tablet Take 1 Tab by mouth two (2) times a day. 1/21/20  Yes Ab Began, MD   cefUROXime (CEFTIN) 500 mg tablet Take 1 Tab by mouth two (2) times a day. 12/31/19  Yes Ab Began, MD   folic acid/multivit-min/lutein (CENTRUM SILVER PO) Take 1 Tab by mouth daily. Yes Provider, Historical   albuterol (VENTOLIN HFA) 90 mcg/actuation inhaler Take 2 Puffs by inhalation every four (4) hours as needed for Wheezing. 10/5/19  Yes Ab Began, MD   fluticasone propion-salmeterol (ADVAIR/WIXELA) 250-50 mcg/dose diskus inhaler Take 1 Puff by inhalation every twelve (12) hours. 10/5/19  Yes Ab Began, MD   hydroquinone (ESOTERICA, MELQUIN) 4 % topical cream Apply  to affected area two (2) times a day. 3/5/19  Yes Ab Began, MD   ALPRAZolam Unice Nigh) 1 mg tablet Take 1 Tab by mouth three (3) times daily as needed for Anxiety. Max Daily Amount: 3 mg. Patient taking differently: Take 1 mg by mouth two (2) times a day. 10/27/17  Yes Ab Began, MD   QUEtiapine (SEROQUEL) 50 mg tablet Take 50 mg by mouth three (3) times daily. Yes Provider, Historical   FLUoxetine (PROZAC) 20 mg capsule Take 20 mg by mouth daily.    Yes Provider, Historical     Allergies   Allergen Reactions    Ampicillin Nausea Only       Current Outpatient Medications   Medication Sig Dispense Refill    rivaroxaban (Xarelto) 20 mg tab tablet Take 1 tablet daily. 30 Tab 11    magnesium oxide (MAG-OX) 400 mg tablet Take 1 Tab by mouth daily. 90 Tab 3    potassium chloride (K-DUR, KLOR-CON) 20 mEq tablet Take 1 Tab by mouth daily. 90 Tab 3    hydroCHLOROthiazide (HYDRODIURIL) 12.5 mg tablet Take once daily 90 Tab 3    rosuvastatin (CRESTOR) 10 mg tablet Take 1 tablet every day 90 Tab 3    metoprolol succinate (TOPROL-XL) 25 mg XL tablet Take 1 Tab by mouth two (2) times a day. 180 Tab 3    cefUROXime (CEFTIN) 500 mg tablet Take 1 Tab by mouth two (2) times a day. 14 Tab 0    folic acid/multivit-min/lutein (CENTRUM SILVER PO) Take 1 Tab by mouth daily.  albuterol (VENTOLIN HFA) 90 mcg/actuation inhaler Take 2 Puffs by inhalation every four (4) hours as needed for Wheezing. 1 Inhaler 11    fluticasone propion-salmeterol (ADVAIR/WIXELA) 250-50 mcg/dose diskus inhaler Take 1 Puff by inhalation every twelve (12) hours. 1 Inhaler 11    hydroquinone (ESOTERICA, MELQUIN) 4 % topical cream Apply  to affected area two (2) times a day. 30 g 6    ALPRAZolam (XANAX) 1 mg tablet Take 1 Tab by mouth three (3) times daily as needed for Anxiety. Max Daily Amount: 3 mg. (Patient taking differently: Take 1 mg by mouth two (2) times a day.) 10 Tab 0    QUEtiapine (SEROQUEL) 50 mg tablet Take 50 mg by mouth three (3) times daily.  FLUoxetine (PROZAC) 20 mg capsule Take 20 mg by mouth daily.        Allergies   Allergen Reactions    Ampicillin Nausea Only     Past Medical History:   Diagnosis Date    Abdominal pain     Asthma     [facility] disorder ([facility] [address]     CAD (coronary artery disease)     ablation    DVT (deep venous thrombosis) (HCC)     Dyspnea 9/19/2014    Hepatic cyst 6/22/2019    Hypercholesteremia     Hypertension     Lumbar radicular pain 8/21/2014    Menopause     Synovitis of knee 1/28/2018    Tachycardia      Past Surgical History:   Procedure Laterality Date    ABLATION OF SVT  3/30/2015  EP STUDY W/INDUCTION  3/30/2015         HX BACK SURGERY      HX COLONOSCOPY  9-    HX HEART CATHETERIZATION      HX HYSTERECTOMY  1992     Family History   Problem Relation Age of Onset    Hypertension Mother     Heart Disease Mother     Heart Disease Father     Stroke Father        ROS:14 point ROS neg    Objective:   Vital Signs: (As obtained by patient/caregiver at home)  There were no vitals taken for this visit. [INSTRUCTIONS:  \"[x]\" Indicates a positive item  \"[]\" Indicates a negative item  -- DELETE ALL ITEMS NOT EXAMINED]    Constitutional: [x] Appears well-developed and well-nourished [x] No apparent distress      [] Abnormal -     Mental status: [x] Alert and awake  [x] Oriented to person/place/time [x] Able to follow commands    [] Abnormal -     Eyes:   EOM    [x]  Normal    [] Abnormal -   Sclera  [x]  Normal    [] Abnormal -          Discharge [x]  None visible   [] Abnormal -     HENT: [x] Normocephalic, atraumatic  [] Abnormal -   [x] Mouth/Throat: Mucous membranes are moist    External Ears [x] Normal  [] Abnormal -    Neck: [x] No visualized mass [] Abnormal -     Pulmonary/Chest: [x] Respiratory effort normal   [x] No visualized signs of difficulty breathing or respiratory distress        [] Abnormal -      Musculoskeletal:   [x] Normal gait with no signs of ataxia         [x] Normal range of motion of neck        [] Abnormal -     Neurological:        [x] No Facial Asymmetry (Cranial nerve 7 motor function) (limited exam due to video visit)          [x] No gaze palsy        [] Abnormal -          Skin:        [x] No significant exanthematous lesions or discoloration noted on facial skin         [] Abnormal -            Psychiatric:       [x] Normal Affect [] Abnormal -        [x] No Hallucinations    Other pertinent observable physical exam findings:-        We discussed the expected course, resolution and complications of the diagnosis(es) in detail.   Medication risks, benefits, costs, interactions, and alternatives were discussed as indicated. I advised her to contact the office if her condition worsens, changes or fails to improve as anticipated. She expressed understanding with the diagnosis(es) and plan. Lashonda Bolden is a 71 y.o. female who was evaluated by a video visit encounter for concerns as above. Patient identification was verified prior to start of the visit. A caregiver was present when appropriate. Due to this being a TeleHealth encounter (During [facility] public health emergency), evaluation of the following organ systems was limited: Vitals/Constitutional/EENT/Resp/CV/GI//MS/Neuro/Skin/Heme-Lymph-Imm. Pursuant to the emergency declaration under the [facility], [facility] waiver authority and the [employer] Act, this Virtual  Visit was conducted, with patient's (and/or legal guardian's) consent, to reduce the patient's risk of exposure to COVID-19 and provide necessary medical care. Services were provided through a video synchronous discussion virtually to substitute for in-person clinic visit. Patient and provider were located at their individual homes. Please note that this dictation was completed with [employer], the computer voice recognition software. Quite often unanticipated grammatical, syntax, homophones, and other interpretive errors are inadvertently transcribed by the computer software. Please disregard these errors. Please excuse any errors that have escaped final proofreading. Thank you.     Leah Apgar, MD

## 2020-08-06 ENCOUNTER — OFFICE VISIT (OUTPATIENT)
Dept: INTERNAL MEDICINE CLINIC | Age: 70
End: 2020-08-06
Payer: MEDICARE

## 2020-08-06 VITALS
WEIGHT: 137.7 LBS | RESPIRATION RATE: 16 BRPM | OXYGEN SATURATION: 94 % | HEART RATE: 67 BPM | DIASTOLIC BLOOD PRESSURE: 80 MMHG | BODY MASS INDEX: 25.34 KG/M2 | HEIGHT: 62 IN | SYSTOLIC BLOOD PRESSURE: 136 MMHG

## 2020-08-06 DIAGNOSIS — M54.50 CHRONIC MIDLINE LOW BACK PAIN WITHOUT SCIATICA: ICD-10-CM

## 2020-08-06 DIAGNOSIS — R07.9 CHEST PAIN, UNSPECIFIED TYPE: Primary | ICD-10-CM

## 2020-08-06 DIAGNOSIS — G89.29 CHRONIC MIDLINE LOW BACK PAIN WITHOUT SCIATICA: ICD-10-CM

## 2020-08-06 DIAGNOSIS — R21 RASH: ICD-10-CM

## 2020-08-06 DIAGNOSIS — F41.1 ANXIETY NEUROSIS: ICD-10-CM

## 2020-08-06 DIAGNOSIS — I48.0 PAROXYSMAL ATRIAL FIBRILLATION (HCC): ICD-10-CM

## 2020-08-06 DIAGNOSIS — E78.5 DYSLIPIDEMIA: ICD-10-CM

## 2020-08-06 DIAGNOSIS — Z12.9 CANCER SCREENING: ICD-10-CM

## 2020-08-06 DIAGNOSIS — I10 ESSENTIAL HYPERTENSION: ICD-10-CM

## 2020-08-06 PROCEDURE — 99215 OFFICE O/P EST HI 40 MIN: CPT | Performed by: INTERNAL MEDICINE

## 2020-08-06 RX ORDER — HYDROCODONE BITARTRATE AND ACETAMINOPHEN 5; 325 MG/1; MG/1
1 TABLET ORAL
Qty: 30 TAB | Refills: 0 | Status: SHIPPED | OUTPATIENT
Start: 2020-08-06 | End: 2020-09-05

## 2020-08-06 RX ORDER — DESONIDE 0.5 MG/ML
LOTION TOPICAL 2 TIMES DAILY
Qty: 118 ML | Refills: 4 | Status: SHIPPED | OUTPATIENT
Start: 2020-08-06 | End: 2021-02-09 | Stop reason: SDUPTHER

## 2020-08-06 NOTE — PROGRESS NOTES
Chief Complaint   Patient presents with    Irregular Heart Beat       1. Have you been to the ER, urgent care clinic since your last visit? Hospitalized since your last visit? No    2. Have you seen or consulted any other health care providers outside of the [address] [place] [address] since your last visit? Include any pap smears or colon screening.  No

## 2020-08-07 NOTE — PROGRESS NOTES
[facility] and Primary Care  [address]   [address] [place] [address]  Phone:  [phone]  Fax: [phone]       Chief Complaint   Patient presents with    Irregular Heart Beat   . SUBJECTIVE:    Vidhya Mak is a 71 y.o. female Comes in for follow up. She would like to have a breast examination today. She does complain of this vague chest discomfort localized to the epigastric and lower substernal region. There are no precipitating or exacerbating factors and the episodes generally last 3-5 minutes and lul. She will be seen by cardiologist again in the next three to four days. She denies similar symptoms in the past.    She has had a cardiac cath before and has normal coronary arteries. She has been taking her antihypertensive medication as prescribed. She continues with intermittent low back pain. She has a past history of depression, as well as dyslipidemia. She is able to maintain her weight nicely. Current Outpatient Medications   Medication Sig Dispense Refill    desonide (TRIDESILON) 0.05 % topical lotion Apply  to affected area two (2) times a day. 118 mL 4    HYDROcodone-acetaminophen (NORCO) 5-325 mg per tablet Take 1 Tab by mouth two (2) times daily as needed for Pain for up to 30 days. Max Daily Amount: 2 Tabs. 30 Tab 0    diphenoxylate-atropine (LomotiL) 2.5-0.025 mg per tablet Take 2 Tabs by mouth four (4) times daily as needed for Diarrhea. Max Daily Amount: 8 Tabs. 30 Tab 0    rivaroxaban (Xarelto) 20 mg tab tablet Take 1 tablet daily. 30 Tab 11    magnesium oxide (MAG-OX) 400 mg tablet Take 1 Tab by mouth daily. 90 Tab 3    potassium chloride (K-DUR, KLOR-CON) 20 mEq tablet Take 1 Tab by mouth daily.  90 Tab 3    hydroCHLOROthiazide (HYDRODIURIL) 12.5 mg tablet Take once daily 90 Tab 3    rosuvastatin (CRESTOR) 10 mg tablet Take 1 tablet every day 90 Tab 3    metoprolol succinate (TOPROL-XL) 25 mg XL tablet Take 1 Tab by mouth two (2) times a day. 180 Tab 3    cefUROXime (CEFTIN) 500 mg tablet Take 1 Tab by mouth two (2) times a day. 14 Tab 0    folic acid/multivit-min/lutein (CENTRUM SILVER PO) Take 1 Tab by mouth daily.  albuterol (VENTOLIN HFA) 90 mcg/actuation inhaler Take 2 Puffs by inhalation every four (4) hours as needed for Wheezing. 1 Inhaler 11    fluticasone propion-salmeterol (ADVAIR/WIXELA) 250-50 mcg/dose diskus inhaler Take 1 Puff by inhalation every twelve (12) hours. 1 Inhaler 11    hydroquinone (ESOTERICA, MELQUIN) 4 % topical cream Apply  to affected area two (2) times a day. 30 g 6    ALPRAZolam (XANAX) 1 mg tablet Take 1 Tab by mouth three (3) times daily as needed for Anxiety. Max Daily Amount: 3 mg. (Patient taking differently: Take 1 mg by mouth two (2) times a day.) 10 Tab 0    QUEtiapine (SEROQUEL) 50 mg tablet Take 50 mg by mouth three (3) times daily.  FLUoxetine (PROZAC) 20 mg capsule Take 20 mg by mouth daily.        Past Medical History:   Diagnosis Date    Abdominal pain     Asthma     [facility] ([facility] [address]     CAD (coronary artery disease)     ablation    DVT (deep venous thrombosis) (HCC)     Dyspnea 9/19/2014    Hepatic cyst 6/22/2019    Hypercholesteremia     Hypertension     Lumbar radicular pain 8/21/2014    Menopause     Synovitis of knee 1/28/2018    Tachycardia      Past Surgical History:   Procedure Laterality Date    ABLATION OF SVT  3/30/2015         EP STUDY W/INDUCTION  3/30/2015         HX BACK SURGERY      HX COLONOSCOPY  9-    HX HEART CATHETERIZATION      HX HYSTERECTOMY  1992     Allergies   Allergen Reactions    Ampicillin Nausea Only         REVIEW OF SYSTEMS:  General: negative for - chills or fever  ENT: negative for - headaches, nasal congestion or tinnitus  Respiratory: negative for - cough, hemoptysis, shortness of breath or wheezing  Cardiovascular : negative for - chest pain, edema, palpitations or shortness of breath  Gastrointestinal: negative for - abdominal pain, blood in stools, heartburn or nausea/vomiting  Genito-Urinary: no dysuria, trouble voiding, or hematuria  Musculoskeletal: negative for - gait disturbance, joint pain, joint stiffness or joint swelling  Neurological: no TIA or stroke symptoms  Hematologic: no bruises, no bleeding, no swollen glands  Integument: no lumps, mole changes, nail changes or rash  Endocrine: no malaise/lethargy or unexpected weight changes      Social History     Socioeconomic History    Marital status:      Spouse name: Not on file    Number of children: 1    Years of education: Not on file    Highest education level: Not on file   Occupational History    Occupation: retired--[employer]---disabled   Tobacco Use    Smoking status: Never Smoker    Smokeless tobacco: Never Used   Substance and Sexual Activity    Alcohol use: No     Alcohol/week: 0.0 standard drinks     Comment: rarely    Drug use: No    Sexual activity: Not Currently     Partners: Male     Family History   Problem Relation Age of Onset    Hypertension Mother     Heart Disease Mother     Heart Disease Father     Stroke Father        OBJECTIVE:    Visit Vitals  /80   Pulse 67   Resp 16   Ht 5' 2\" (1.575 m)   Wt 137 lb 11.2 oz (62.5 kg)   SpO2 94%   BMI 25.19 kg/m²     CONSTITUTIONAL: well , well nourished, appears age appropriate  EYES: perrla, eom intact  ENMT:moist mucous membranes, pharynx clear  NECK: supple. Thyroid normal  RESPIRATORY: Chest: clear to ascultation and percussion   CARDIOVASCULAR: Heart: regular rate and rhythm  Breast:no masses  GASTROINTESTINAL: Abdomen: soft, bowel sounds active  HEMATOLOGIC: no pathological lymph nodes palpated  MUSCULOSKELETAL: Extremities: no edema, pulse 1+   INTEGUMENT: No unusual rashes or suspicious skin lesions noted.  Nails appear normal.  NEUROLOGIC: non-focal exam, negative straight leg raising  MENTAL STATUS: alert and oriented, appropriate affect      ASSESSMENT:  1. Chest pain, unspecified type    2. Essential hypertension    3. Paroxysmal atrial fibrillation (HCC)    4. Anxiety neurosis    5. Dyslipidemia    6. Chronic midline low back pain without sciatica    7. Rash    8. Cancer screening        PLAN:    1. As far as the chest pain is concerned, I suspect this is probably GI in origin. I will leave the formal diagnosis to the cardiologist as it relates to any contribution from her heart. I doubt this very seriously. 2. Blood pressure is excellent, no adjustments are made. 3. She does have a history of paroxysmal atrial fibrillation. Her rhythm is regular today with occasional extrasystoles. She is anticoagulated. 4. She has chronic anxiety, but she seems to be quite stable today. 5. She continues on her statin as prescribed and efficacy and compliance will be assessed by checking a particle count. 6. She has chronic low back pain and I will give her a refill on Hydrocodone, which she gets quite sparingly, specifically, 30 tablets every six months at best.    .  Orders Placed This Encounter    METABOLIC PANEL, BASIC    APOLIPOPROTEIN B    OCCULT BLOOD IMMUNOASSAY,DIAGNOSTIC    desonide (TRIDESILON) 0.05 % topical lotion    HYDROcodone-acetaminophen (NORCO) 5-325 mg per tablet         Follow-up and Dispositions    · Return in about 3 months (around 11/6/2020).            Gutsavo Garza MD

## 2020-08-08 LAB
APO B SERPL-MCNC: 62 MG/DL
BUN SERPL-MCNC: 17 MG/DL (ref 8–27)
BUN/CREAT SERPL: 15 (ref 12–28)
CALCIUM SERPL-MCNC: 10.4 MG/DL (ref 8.7–10.3)
CHLORIDE SERPL-SCNC: 105 MMOL/L (ref 96–106)
CO2 SERPL-SCNC: 22 MMOL/L (ref 20–29)
CREAT SERPL-MCNC: 1.11 MG/DL (ref 0.57–1)
GLUCOSE SERPL-MCNC: 93 MG/DL (ref 65–99)
POTASSIUM SERPL-SCNC: 4.3 MMOL/L (ref 3.5–5.2)
SODIUM SERPL-SCNC: 144 MMOL/L (ref 134–144)

## 2020-08-11 ENCOUNTER — OFFICE VISIT (OUTPATIENT)
Dept: CARDIOLOGY CLINIC | Age: 70
End: 2020-08-11
Payer: MEDICARE

## 2020-08-11 VITALS
BODY MASS INDEX: 25.4 KG/M2 | SYSTOLIC BLOOD PRESSURE: 132 MMHG | HEART RATE: 75 BPM | DIASTOLIC BLOOD PRESSURE: 80 MMHG | WEIGHT: 138 LBS | HEIGHT: 62 IN | RESPIRATION RATE: 15 BRPM | OXYGEN SATURATION: 99 %

## 2020-08-11 DIAGNOSIS — R07.9 CHEST PAIN, UNSPECIFIED TYPE: ICD-10-CM

## 2020-08-11 DIAGNOSIS — I48.0 PAF (PAROXYSMAL ATRIAL FIBRILLATION) (HCC): Primary | ICD-10-CM

## 2020-08-11 PROCEDURE — 99214 OFFICE O/P EST MOD 30 MIN: CPT | Performed by: SPECIALIST

## 2020-08-11 PROCEDURE — 93000 ELECTROCARDIOGRAM COMPLETE: CPT | Performed by: SPECIALIST

## 2020-08-11 RX ORDER — ASCORBIC ACID 500 MG
TABLET ORAL
COMMUNITY

## 2020-08-11 RX ORDER — CHOLECALCIFEROL (VITAMIN D3) 125 MCG
CAPSULE ORAL
COMMUNITY

## 2020-08-11 RX ORDER — FLAXSEED OIL 1000 MG
CAPSULE ORAL
COMMUNITY
End: 2020-09-08

## 2020-08-11 NOTE — PATIENT INSTRUCTIONS
You will be scheduled for lexiscan nuclear stress testing after your appointment today. Wear comfortable clothing (shorts or pants with a shirt or blouse- no underwire bras) and walking or athletic shoes. Do not eat or drink anything, except water, for at least 2 hours prior to your appointment. Avoid tobacco products for at least 6 hours prior to your test. 
 
Do not eat or drink anything containing caffeine, including but not limited to the following: chocolate, regular and decaffeinated coffee, soft drinks, or tea for at least 24 hours prior to your test. 
 
Do not hold your scheduled medications prior to your test. If you are a diabetic, please ask your physician how to adjust your food and insulin prior to your test. Please bring all medications you are currently taking. You will need to inform our office if you are pregnant, nursing, or think you may be pregnant. Your test will be performed on a 1 day protocol. This is determined by your height, weight, and other risk factors. For a 1 day test, please allow for 4 hours in the office that day. The radioactive isotope used for your testing is different from any of the dyes that are commonly used in x-ray procedures, and is ordered specially for your test. Please call to cancel or reschedule your appointment at least 24 hours prior to your scheduled appointment to avoid being billed for the expensive isotope. Follow up with Dr. Jose Vaughan after the above.

## 2020-08-11 NOTE — PROGRESS NOTES
[address] [facility]                                                            OFFICE NOTE        Ezra Villalba M.D.,PRUDENCE Iona Bhagat   1950  [id number]    Date/Time:  8/11/202010:28 AM        ICD-10-CM ICD-9-CM    1. 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 Continue observation for now and metoprolol     AF:back in AF , now chronic . ECG with AF nstt   Continue xarelto   Continue metoprolol 25 mg bid   AF ablation on hold for now, she has een Dr. Petros Dias who suggested to proceed but she still wants to wait understanding though that AF begets itself     No untoward effects with oacs, hold off fish oil as discussed with patient    Echo on the back burner for now but we will need to consider repeat as well even if no sob reported at this time     HTN: controlled     DVT on xarelto long term no untoward effects thus far(also for chronic af)     HLD: on statin ,closely followed by her pcp she tells me     CP:  recurrent cp discussed options  Proceed with nuclear stress test     See her back after test                      HPI   71 y.o. female. Patient with h/o PSVT and  Tachy iron followed in [facility], admitted on 5/13 at [facility] with chest pain, at that time nuclear stress test showed no ischemia or MI and EF of 69%, patient remained in NSR during hospital stay. Here for follow up  Echo on 7/12 EF 55% mild TR and MR  Also h/o PAF as per her cardiologist in [place].   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 Ejection fraction was estimated to be 63 %. No obvious wall motion  abnormalities identified in the views obtained. Right ventricle: The size was at the upper limits of normal.    Left atrium: The atrium was moderately dilated. Right atrium: The atrium was moderately dilated. Mitral valve: There was mild regurgitation. Tricuspid valve: There was moderate annular dilatation. There was moderate   Recurrent atrial flutter in 10/ 2017 seen by Ep, ablation offered she decided to wait              CARDIAC STUDIES                                            EKG Results     Procedure 720 Value Units Date/Time    AMB POC EKG ROUTINE W/ 12 LEADS, INTER & REP [[id number]] Resulted:  08/11/20 1005    Order Status:  Completed Updated:  08/11/20 1007              IMAGING      MRI Results (most recent):  Results from [place] encounter on 02/15/13   MRI LUMB SPINE WO CONT    Narrative **Final Report**       ICD Codes / Adm. Diagnosis: V76.12   / Thoracic or lumbosacral neurit    Examination:  MR L SPINE WO CON  - [id number] - Feb 15 2013  1:58PM  Accession No:  [id number]  Reason:  lumbar radiculopathy      REPORT:  INDICATION: lumbar radiculopathy , fell down stairs at home December 2012   724.4    COMPARISON: Radiographs November 5, 2011    EXAM: Sagittal T1-weighted spin-echo, sagittal T2-weighted fast spin-echo,   sagittal inversion recovery, axial T1-weighted spin-echo and axial   T2-weighted fast spin-echo MR images of the lumbar spine are obtained. FINDINGS: Conus position, morphology and signal normal. There is normal   vertebral body height and bone signal. There is no substantial listhesis   though there is a mild levoconvex lumbar curve centered at L3.  No paraspinal   soft tissue mass is shown. There is no substantial disc or facet abnormality at T11-12 and T12-L1. L1-2 shows mild disc space narrowing with small left lateral disc   protrusion. There is mild bilateral facet arthropathy. There is no canal or   foraminal stenosis. L2-3 shows mild disc space narrowing with mild leftward lateralizing diffuse   disc bulging. There is mild bilateral facet arthropathy. There is no canal   or foraminal stenosis. L3-4 shows mild disc space narrowing with mild leftward lateralizing diffuse   disc bulging and moderate bilateral facet arthropathy. There is a mild   appearance of canal stenosis with mild right foraminal stenosis. L4-5 shows mild disc space narrowing with mild central disc bulging and   moderate bilateral facet arthropathy. There is a borderline appearance of   canal stenosis and mild right foraminal narrowing. L5-S1 shows minimal disc space narrowing with a moderate-sized left   paracentral and lateral disc herniation measuring 9 mm craniocaudal, 1.2 cm   transverse and 0.7 cm anterior posterior. There is indentation of the   central and left anterolateral thecal sac as well as posterior displacement   of the traversing left S1 nerve. There is no substantial foraminal narrowing. IMPRESSION:  1. Small left lateral disc protrusion at L1-2.  2. Mild L3-4 and borderline L4-5 canal stenosis with mild right foraminal   narrowing. 3. Moderate-sized left paracentral and lateral disc herniation at L5-S1 with   posterior displacement of traversing left S1 nerve. Signing/Reading Doctor: Genoveva Plunkett ([id number])    Approved: ROB Plunkett ([id number])  Feb 15 2013  2:39PM                                    CT Results (most recent):  Results from Hospital Encounter encounter on 06/14/19   CT ABD PELV W CONT    Narrative EXAM: CT ABD PELV W CONT    INDICATION: Abdominal pain    COMPARISON: None     CONTRAST: 100 mL of Isovue-370.     TECHNIQUE:   Following the uneventful intravenous administration of contrast, thin axial  images were obtained through the abdomen and pelvis. Coronal and sagittal  reconstructions were generated. Oral contrast was administered. CT dose  reduction was achieved through use of a standardized protocol tailored for this  examination and automatic exposure control for dose modulation. Adaptive  statistical iterative reconstruction (ASIR) was utilized. FINDINGS:   LUNG BASES: Clear. INCIDENTALLY IMAGED HEART AND MEDIASTINUM: Unremarkable. LIVER: 10 mm hypodensity in the left hepatic lobe, likely a cyst. Punctate  hypodensity at the dome of the liver and in the caudate lobe, too small to  characterize. Main portal vein is patent. GALLBLADDER: Unremarkable. SPLEEN: No mass. PANCREAS: No mass or ductal dilatation. ADRENALS: Unremarkable. KIDNEYS: Cortical thinning at the lower pole of the kidneys bilaterally. No  hydronephrosis or lesions. STOMACH: Unremarkable. SMALL BOWEL: No dilatation or wall thickening. COLON: No dilatation or wall thickening. APPENDIX: Nonvisualized. PERITONEUM: No ascites or pneumoperitoneum. RETROPERITONEUM: Freddie IVC filter in place. REPRODUCTIVE ORGANS: Surgically absent uterus. No adnexal masses. No pelvic  lymphadenopathy or free fluid. URINARY BLADDER: No mass or calculus. BONES: Levoconvex scoliosis of the lumbar spine. Degenerative changes at L5-S1. Bony defect in the right iliac bone either developmental or from prior surgery. ADDITIONAL COMMENTS: There is early venous opacification of the right common  femoral vein which may suggest an arteriovenous fistula or other reason for  early venous return from the right lower extremity. Impression IMPRESSION:  1. No acute intra-abdominal pathology. 2.  Incidental findings including a 10 mm left lobe liver cyst, a Freddie IVC  filter, scoliosis of the spine, and early venous return from the right lower  extremity.        XR Results (most recent):  Results from Hospital Encounter encounter on 10/24/19   XR CHEST PORT    Narrative EXAM: XR CHEST PORT    INDICATION: Upper abdominal and epigastric pain x3 days exacerbated by  inspiration with associated shortness of breath. COMPARISON: Chest x-ray 11/9/2018. FINDINGS: A portable AP radiograph of the chest was obtained at 22:26 hours. The  lungs are clear. The cardiac and mediastinal contours and pulmonary vascularity  are normal.  The bones and soft tissues are grossly within normal limits. Visualized upper abdomen appears unremarkable. Impression IMPRESSION: No acute findings.            Past Medical History:   Diagnosis Date    Abdominal pain     Asthma     Bipolar 2 disorder ([address]     CAD (coronary artery disease)     ablation    DVT (deep venous thrombosis) (HCC)     Dyspnea 9/19/2014    Hepatic cyst 6/22/2019    Hypercholesteremia     Hypertension     Lumbar radicular pain 8/21/2014    Menopause     Synovitis of knee 1/28/2018    Tachycardia      Past Surgical History:   Procedure Laterality Date    ABLATION OF SVT  3/30/2015         EP STUDY W/INDUCTION  3/30/2015         HX BACK SURGERY      HX COLONOSCOPY  9-    HX HEART CATHETERIZATION      HX HYSTERECTOMY  1992     Social History     Tobacco Use    Smoking status: Never Smoker    Smokeless tobacco: Never Used   Substance Use Topics    Alcohol use: No     Alcohol/week: 0.0 standard drinks     Comment: rarely    Drug use: No     Family History   Problem Relation Age of Onset    Hypertension Mother     Heart Disease Mother     Heart Disease Father     Stroke Father      Allergies   Allergen Reactions    Ampicillin Nausea Only         Visit Vitals  /80 (BP 1 Location: Right arm, BP Patient Position: Sitting)   Pulse 75   Resp 15   Ht 5' 2\" (1.575 m)   Wt 138 lb (62.6 kg)   SpO2 99%   BMI 25.24 kg/m²         Last 3 Recorded Weights in this Encounter    08/11/20 0952   Weight: 138 lb (62.6 kg) Review of Systems:   Pertinent items are noted in the History of Present Illness. Neck: no JVD  Heart: irregularly irregular rhythm  Lungs: clear to auscultation bilaterally  Abdomen: soft, non-tender. Bowel sounds normal. No masses,  no organomegaly  Extremities: no edema      Current Outpatient Medications on File Prior to Visit   Medication Sig Dispense Refill    ascorbic acid, vitamin C, (Vitamin C) 500 mg tablet Take  by mouth.  cholecalciferol, vitamin D3, (Vitamin D3) 50 mcg (2,000 unit) tab Take  by mouth.  elderberry fruit (ELDERBERRY PO) Take  by mouth.  COLLAGEN by Does Not Apply route.  flaxseed oil 1,000 mg cap Take  by mouth.  desonide (TRIDESILON) 0.05 % topical lotion Apply  to affected area two (2) times a day. 118 mL 4    HYDROcodone-acetaminophen (NORCO) 5-325 mg per tablet Take 1 Tab by mouth two (2) times daily as needed for Pain for up to 30 days. Max Daily Amount: 2 Tabs. 30 Tab 0    rivaroxaban (Xarelto) 20 mg tab tablet Take 1 tablet daily. 30 Tab 11    magnesium oxide (MAG-OX) 400 mg tablet Take 1 Tab by mouth daily. 90 Tab 3    potassium chloride (K-DUR, KLOR-CON) 20 mEq tablet Take 1 Tab by mouth daily. 90 Tab 3    hydroCHLOROthiazide (HYDRODIURIL) 12.5 mg tablet Take once daily 90 Tab 3    rosuvastatin (CRESTOR) 10 mg tablet Take 1 tablet every day 90 Tab 3    metoprolol succinate (TOPROL-XL) 25 mg XL tablet Take 1 Tab by mouth two (2) times a day. 180 Tab 3    cefUROXime (CEFTIN) 500 mg tablet Take 1 Tab by mouth two (2) times a day. 14 Tab 0    folic acid/multivit-min/lutein (CENTRUM SILVER PO) Take 1 Tab by mouth daily.  albuterol (VENTOLIN HFA) 90 mcg/actuation inhaler Take 2 Puffs by inhalation every four (4) hours as needed for Wheezing. 1 Inhaler 11    fluticasone propion-salmeterol (ADVAIR/WIXELA) 250-50 mcg/dose diskus inhaler Take 1 Puff by inhalation every twelve (12) hours.  1 Inhaler 11    hydroquinone (ESOTERICA, MELQUIN) 4 % topical cream Apply  to affected area two (2) times a day. 30 g 6    ALPRAZolam (XANAX) 1 mg tablet Take 1 Tab by mouth three (3) times daily as needed for Anxiety. Max Daily Amount: 3 mg. (Patient taking differently: Take 1 mg by mouth two (2) times a day.) 10 Tab 0    QUEtiapine (SEROQUEL) 50 mg tablet Take 50 mg by mouth three (3) times daily.  FLUoxetine (PROZAC) 20 mg capsule Take 20 mg by mouth daily. No current facility-administered medications on file prior to visit. Azalia Gonzalez. Bull Villalpando had no medications administered during this visit. Current Outpatient Medications   Medication Sig    ascorbic acid, vitamin C, (Vitamin C) 500 mg tablet Take  by mouth.  cholecalciferol, vitamin D3, (Vitamin D3) 50 mcg (2,000 unit) tab Take  by mouth.  elderberry fruit (ELDERBERRY PO) Take  by mouth.  COLLAGEN by Does Not Apply route.  flaxseed oil 1,000 mg cap Take  by mouth.  desonide (TRIDESILON) 0.05 % topical lotion Apply  to affected area two (2) times a day.  HYDROcodone-acetaminophen (NORCO) 5-325 mg per tablet Take 1 Tab by mouth two (2) times daily as needed for Pain for up to 30 days. Max Daily Amount: 2 Tabs.  rivaroxaban (Xarelto) 20 mg tab tablet Take 1 tablet daily.  magnesium oxide (MAG-OX) 400 mg tablet Take 1 Tab by mouth daily.  potassium chloride (K-DUR, KLOR-CON) 20 mEq tablet Take 1 Tab by mouth daily.  hydroCHLOROthiazide (HYDRODIURIL) 12.5 mg tablet Take once daily    rosuvastatin (CRESTOR) 10 mg tablet Take 1 tablet every day    metoprolol succinate (TOPROL-XL) 25 mg XL tablet Take 1 Tab by mouth two (2) times a day.  cefUROXime (CEFTIN) 500 mg tablet Take 1 Tab by mouth two (2) times a day.  folic acid/multivit-min/lutein (CENTRUM SILVER PO) Take 1 Tab by mouth daily.  albuterol (VENTOLIN HFA) 90 mcg/actuation inhaler Take 2 Puffs by inhalation every four (4) hours as needed for Wheezing.     fluticasone propion-salmeterol (ADVAIR/WIXELA) 250-50 mcg/dose diskus inhaler Take 1 Puff by inhalation every twelve (12) hours.  hydroquinone (ESOTERICA, MELQUIN) 4 % topical cream Apply  to affected area two (2) times a day.  ALPRAZolam (XANAX) 1 mg tablet Take 1 Tab by mouth three (3) times daily as needed for Anxiety. Max Daily Amount: 3 mg. (Patient taking differently: Take 1 mg by mouth two (2) times a day.)    QUEtiapine (SEROQUEL) 50 mg tablet Take 50 mg by mouth three (3) times daily.  FLUoxetine (PROZAC) 20 mg capsule Take 20 mg by mouth daily. No current facility-administered medications for this visit. Lab Results   Component Value Date/Time    Cholesterol, total 167 02/08/2019 03:51 PM    HDL Cholesterol 94 02/08/2019 03:51 PM    LDL, calculated 61 02/08/2019 03:51 PM    VLDL, calculated 12 02/08/2019 03:51 PM    Triglyceride 61 02/08/2019 03:51 PM    CHOL/HDL Ratio 2.5 05/16/2013 02:30 AM       Lab Results   Component Value Date/Time    Sodium 144 08/06/2020 04:43 PM    Potassium 4.3 08/06/2020 04:43 PM    Chloride 105 08/06/2020 04:43 PM    CO2 22 08/06/2020 04:43 PM    Anion gap 8 10/24/2019 08:50 PM    Glucose 93 08/06/2020 04:43 PM    BUN 17 08/06/2020 04:43 PM    Creatinine 1.11 (H) 08/06/2020 04:43 PM    BUN/Creatinine ratio 15 08/06/2020 04:43 PM    GFR est AA 59 (L) 08/06/2020 04:43 PM    GFR est non-AA 51 (L) 08/06/2020 04:43 PM    Calcium 10.4 (H) 08/06/2020 04:43 PM       Lab Results   Component Value Date/Time    ALT (SGPT) 18 03/05/2019 11:46 AM    Alk.  phosphatase 46 03/05/2019 11:46 AM    Bilirubin, direct 0.21 03/05/2019 11:46 AM    Bilirubin, total 0.6 03/05/2019 11:46 AM       Lab Results   Component Value Date/Time    WBC 8.4 10/24/2019 08:50 PM    Hemoglobin (POC) 10.9 (L) 07/06/2013 01:06 AM    HGB 13.6 10/24/2019 08:50 PM    Hematocrit (POC) 32 (L) 07/06/2013 01:06 AM    HCT 42.1 10/24/2019 08:50 PM    PLATELET 437 38/36/1541 08:50 PM    MCV 93.6 10/24/2019 08:50 PM Lab Results   Component Value Date/Time    TSH 1.780 08/16/2019 05:03 PM         Lab Results   Component Value Date/Time    Cholesterol, total 167 02/08/2019 03:51 PM    Cholesterol, total 158 05/16/2017 01:17 PM    Cholesterol, total 167 01/11/2016 03:58 PM    Cholesterol, total 191 12/13/2013 04:43 PM    Cholesterol, total 261 (H) 07/16/2013 04:57 AM    HDL Cholesterol 94 02/08/2019 03:51 PM    HDL Cholesterol 80 05/16/2017 01:17 PM    HDL Cholesterol 99 01/11/2016 03:58 PM    HDL Cholesterol 104 12/13/2013 04:43 PM    HDL Cholesterol 81 07/16/2013 04:57 AM    LDL, calculated 61 02/08/2019 03:51 PM    LDL, calculated 60 05/16/2017 01:17 PM    LDL, calculated 49 01/11/2016 03:58 PM    LDL, calculated 70 12/13/2013 04:43 PM    LDL, calculated 163 (H) 07/16/2013 04:57 AM    Triglyceride 61 02/08/2019 03:51 PM    Triglyceride 89 05/16/2017 01:17 PM    Triglyceride 96 01/11/2016 03:58 PM    Triglyceride 87 12/13/2013 04:43 PM    Triglyceride 85 07/16/2013 04:57 AM    CHOL/HDL Ratio 2.5 05/16/2013 02:30 AM                Please note that this dictation was completed with [employer], the [employer] voice recognition software. Quite often unanticipated grammatical, syntax, homophones, and other interpretative errors are inadvertently transcribed by the computer software. Please disregard these errors. Please excuse any errors that have escaped final proofreading.

## 2020-08-11 NOTE — PROGRESS NOTES
Visit Vitals  /80 (BP 1 Location: Right arm, BP Patient Position: Sitting)   Pulse 75   Resp 15   Ht 5' 2\" (1.575 m)   Wt 138 lb (62.6 kg)   SpO2 99%   BMI 25.24 kg/m²

## 2020-08-13 LAB — HEMOCCULT STL QL IA: NEGATIVE

## 2020-08-21 ENCOUNTER — TELEPHONE (OUTPATIENT)
Dept: CARDIOLOGY CLINIC | Age: 70
End: 2020-08-21

## 2020-09-01 ENCOUNTER — ANCILLARY PROCEDURE (OUTPATIENT)
Dept: CARDIOLOGY CLINIC | Age: 70
End: 2020-09-01
Payer: MEDICARE

## 2020-09-01 VITALS
SYSTOLIC BLOOD PRESSURE: 132 MMHG | DIASTOLIC BLOOD PRESSURE: 70 MMHG | WEIGHT: 138 LBS | BODY MASS INDEX: 25.4 KG/M2 | HEIGHT: 62 IN

## 2020-09-01 DIAGNOSIS — I48.0 PAROXYSMAL ATRIAL FIBRILLATION (HCC): ICD-10-CM

## 2020-09-01 DIAGNOSIS — E78.5 DYSLIPIDEMIA: ICD-10-CM

## 2020-09-01 DIAGNOSIS — I10 ESSENTIAL HYPERTENSION: ICD-10-CM

## 2020-09-01 DIAGNOSIS — F41.1 ANXIETY NEUROSIS: ICD-10-CM

## 2020-09-01 DIAGNOSIS — R07.9 CHEST PAIN, UNSPECIFIED TYPE: ICD-10-CM

## 2020-09-01 PROCEDURE — A9500 TC99M SESTAMIBI: HCPCS

## 2020-09-01 PROCEDURE — 93015 CV STRESS TEST SUPVJ I&R: CPT | Performed by: SPECIALIST

## 2020-09-01 PROCEDURE — 78452 HT MUSCLE IMAGE SPECT MULT: CPT | Performed by: SPECIALIST

## 2020-09-01 RX ORDER — TETRAKIS(2-METHOXYISOBUTYLISOCYANIDE)COPPER(I) TETRAFLUOROBORATE 1 MG/ML
10 INJECTION, POWDER, LYOPHILIZED, FOR SOLUTION INTRAVENOUS ONCE
Status: COMPLETED | OUTPATIENT
Start: 2020-09-01 | End: 2020-09-01

## 2020-09-01 RX ORDER — TETRAKIS(2-METHOXYISOBUTYLISOCYANIDE)COPPER(I) TETRAFLUOROBORATE 1 MG/ML
30 INJECTION, POWDER, LYOPHILIZED, FOR SOLUTION INTRAVENOUS ONCE
Status: COMPLETED | OUTPATIENT
Start: 2020-09-01 | End: 2020-09-01

## 2020-09-01 RX ADMIN — TETRAKIS(2-METHOXYISOBUTYLISOCYANIDE)COPPER(I) TETRAFLUOROBORATE 26.3 MILLICURIE: 1 INJECTION, POWDER, LYOPHILIZED, FOR SOLUTION INTRAVENOUS at 12:00

## 2020-09-01 RX ADMIN — TETRAKIS(2-METHOXYISOBUTYLISOCYANIDE)COPPER(I) TETRAFLUOROBORATE 7.9 MILLICURIE: 1 INJECTION, POWDER, LYOPHILIZED, FOR SOLUTION INTRAVENOUS at 10:45

## 2020-09-02 LAB
STRESS BASELINE DIAS BP: 70 MMHG
STRESS BASELINE HR: 50 BPM
STRESS BASELINE SYS BP: 132 MMHG
STRESS O2 SAT PEAK: 98 %
STRESS O2 SAT REST: 98 %
STRESS PEAK DIAS BP: 70 MMHG
STRESS PEAK SYS BP: 154 MMHG
STRESS PERCENT HR ACHIEVED: 64 %
STRESS POST PEAK HR: 97 BPM
STRESS RATE PRESSURE PRODUCT: NORMAL BPM*MMHG
STRESS TARGET HR: 151 BPM

## 2020-09-08 ENCOUNTER — OFFICE VISIT (OUTPATIENT)
Dept: CARDIOLOGY CLINIC | Age: 70
End: 2020-09-08
Payer: MEDICARE

## 2020-09-08 VITALS
DIASTOLIC BLOOD PRESSURE: 82 MMHG | SYSTOLIC BLOOD PRESSURE: 138 MMHG | HEART RATE: 67 BPM | RESPIRATION RATE: 16 BRPM | WEIGHT: 137 LBS | OXYGEN SATURATION: 98 % | HEIGHT: 62 IN | BODY MASS INDEX: 25.21 KG/M2

## 2020-09-08 DIAGNOSIS — I10 ESSENTIAL HYPERTENSION: ICD-10-CM

## 2020-09-08 DIAGNOSIS — I65.29 STENOSIS OF CAROTID ARTERY, UNSPECIFIED LATERALITY: ICD-10-CM

## 2020-09-08 DIAGNOSIS — I49.5 TACHY-BRADY SYNDROME (HCC): ICD-10-CM

## 2020-09-08 DIAGNOSIS — F41.1 ANXIETY NEUROSIS: Primary | ICD-10-CM

## 2020-09-08 DIAGNOSIS — I48.19 PERSISTENT ATRIAL FIBRILLATION (HCC): ICD-10-CM

## 2020-09-08 PROCEDURE — 3017F COLORECTAL CA SCREEN DOC REV: CPT | Performed by: SPECIALIST

## 2020-09-08 PROCEDURE — 1101F PT FALLS ASSESS-DOCD LE1/YR: CPT | Performed by: SPECIALIST

## 2020-09-08 PROCEDURE — G8427 DOCREV CUR MEDS BY ELIG CLIN: HCPCS | Performed by: SPECIALIST

## 2020-09-08 PROCEDURE — G8400 PT W/DXA NO RESULTS DOC: HCPCS | Performed by: SPECIALIST

## 2020-09-08 PROCEDURE — G8752 SYS BP LESS 140: HCPCS | Performed by: SPECIALIST

## 2020-09-08 PROCEDURE — G8432 DEP SCR NOT DOC, RNG: HCPCS | Performed by: SPECIALIST

## 2020-09-08 PROCEDURE — 1090F PRES/ABSN URINE INCON ASSESS: CPT | Performed by: SPECIALIST

## 2020-09-08 PROCEDURE — G9899 SCRN MAM PERF RSLTS DOC: HCPCS | Performed by: SPECIALIST

## 2020-09-08 PROCEDURE — G8754 DIAS BP LESS 90: HCPCS | Performed by: SPECIALIST

## 2020-09-08 PROCEDURE — G8536 NO DOC ELDER MAL SCRN: HCPCS | Performed by: SPECIALIST

## 2020-09-08 PROCEDURE — 99214 OFFICE O/P EST MOD 30 MIN: CPT | Performed by: SPECIALIST

## 2020-09-08 PROCEDURE — G8419 CALC BMI OUT NRM PARAM NOF/U: HCPCS | Performed by: SPECIALIST

## 2020-09-08 NOTE — PROGRESS NOTES
[facility]                                                            OFFICE NOTE        Sonya Chambers M.D.,PRUDENCE Rhona Wilkins   1950  [id number]    Date/Time:  9/8/20203:55 PM      No diagnosis found. 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 Continue observation for now and metoprolol     AF:back in AF , now chronic . ECG with AF nstt   Continue xarelto   Continue metoprolol 25 mg bid     AF ablation on hold for now, she has seen Dr. Lizandro Peres who suggested to proceed but she still wants to wait understanding though that AF begets itself     No untoward effects with oacs, hold off fish oil as discussed with patient     Echo on the back burner for now but we will need to consider repeat as well even if no sob reported at this time     HTN: controlled     DVT on xarelto long term no untoward effects thus far(also for chronic af)     HLD: on statin ,closely followed by her pcp she tells me     CP:  recurrent cp  Discussed nuclear stress test results with no ischemia  No additional interventions for now    Carotids: proceed with US     See her back in 6 months if carotids are ok                      HPI   69 y.o. female. Patient with h/o PSVT and  Tachy iron followed in [place], admitted on 5/13 at [facility] with chest pain, at that time nuclear stress test showed no ischemia or MI and EF of 69%, patient remained in NSR during hospital stay. Here for follow up  Echo on 7/12 EF 55% mild TR and MR  Also h/o PAF as per her cardiologist in [place].   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 Ejection fraction was estimated to be 63 %. No obvious wall motion  abnormalities identified in the views obtained. Right ventricle: The size was at the upper limits of normal.    Left atrium: The atrium was moderately dilated. Right atrium: The atrium was moderately dilated. Mitral valve: There was mild regurgitation. Tricuspid valve: There was moderate annular dilatation. There was moderate     Recurrent atrial flutter in 10/ 2017 seen by Ep, ablation offered she decided to wait        CARDIAC STUDIES                   08/11/20   NUCLEAR CARDIAC STRESS TEST 09/02/2020 9/3/2020    Narrative · Gated SPECT: Left ventricular function post-stress was normal.   Calculated ejection fraction is 62%. There is no evidence of transient   ischemic dilation (TID). The TID ratio is 0.98. · Baseline ECG: Atrial fibrillation, non-specific ST-T wave abnormalities. · Myocardial perfusion imaging defect 1: There is a defect that is small   in size present in the basal-mid anterior location(s) that is   predominantly fixed. There is normal wall motion in the defect area. The   defect appears to probably be artifact caused by breast attenuation. · Left ventricular perfusion is probably normal.  · Myocardial perfusion imaging supports a low risk stress test.        Signed by: Araseli Garcia MD                      EKG Results     None              IMAGING      MRI Results (most recent):  Results from Hospital Encounter encounter on 02/15/13   MRI LUMB SPINE WO CONT    Narrative **Final Report**       ICD Codes / Adm. Diagnosis: V76.12   / Thoracic or lumbosacral neurit    Examination:  MR GARNER SPINE WO CON  - [id number] - Feb 15 2013  1:58PM  Accession No:  [id number]  Reason:  lumbar radiculopathy      REPORT:  INDICATION: lumbar radiculopathy , fell down stairs at home December 2012   724.4    COMPARISON: Radiographs November 5, 2011    EXAM: Sagittal T1-weighted spin-echo, sagittal T2-weighted fast spin-echo,   sagittal inversion recovery, axial T1-weighted spin-echo and axial   T2-weighted fast spin-echo MR images of the lumbar spine are obtained. FINDINGS: Conus position, morphology and signal normal. There is normal   vertebral body height and bone signal. There is no substantial listhesis   though there is a mild levoconvex lumbar curve centered at L3. No paraspinal   soft tissue mass is shown. There is no substantial disc or facet abnormality at T11-12 and T12-L1. L1-2 shows mild disc space narrowing with small left lateral disc   protrusion. There is mild bilateral facet arthropathy. There is no canal or   foraminal stenosis. L2-3 shows mild disc space narrowing with mild leftward lateralizing diffuse   disc bulging. There is mild bilateral facet arthropathy. There is no canal   or foraminal stenosis. L3-4 shows mild disc space narrowing with mild leftward lateralizing diffuse   disc bulging and moderate bilateral facet arthropathy. There is a mild   appearance of canal stenosis with mild right foraminal stenosis. L4-5 shows mild disc space narrowing with mild central disc bulging and   moderate bilateral facet arthropathy. There is a borderline appearance of   canal stenosis and mild right foraminal narrowing. L5-S1 shows minimal disc space narrowing with a moderate-sized left   paracentral and lateral disc herniation measuring 9 mm craniocaudal, 1.2 cm   transverse and 0.7 cm anterior posterior. There is indentation of the   central and left anterolateral thecal sac as well as posterior displacement   of the traversing left S1 nerve. There is no substantial foraminal narrowing. IMPRESSION:  1.  Small left lateral disc protrusion at L1-2.  2. Mild L3-4 and borderline L4-5 canal stenosis with mild right foraminal narrowing. 3. Moderate-sized left paracentral and lateral disc herniation at L5-S1 with   posterior displacement of traversing left S1 nerve. Signing/Reading Doctor: Abe Kirk ([id number])    Approved: ROB Kirk ([id number])  Feb 15 2013  2:39PM                                    CT Results (most recent):  Results from Hospital Encounter encounter on 06/14/19   CT ABD PELV W CONT    Narrative EXAM: CT ABD PELV W CONT    INDICATION: Abdominal pain    COMPARISON: None     CONTRAST: 100 mL of Isovue-370. TECHNIQUE:   Following the uneventful intravenous administration of contrast, thin axial  images were obtained through the abdomen and pelvis. Coronal and sagittal  reconstructions were generated. Oral contrast was administered. CT dose  reduction was achieved through use of a standardized protocol tailored for this  examination and automatic exposure control for dose modulation. Adaptive  statistical iterative reconstruction (ASIR) was utilized. FINDINGS:   LUNG BASES: Clear. INCIDENTALLY IMAGED HEART AND MEDIASTINUM: Unremarkable. LIVER: 10 mm hypodensity in the left hepatic lobe, likely a cyst. Punctate  hypodensity at the dome of the liver and in the caudate lobe, too small to  characterize. Main portal vein is patent. GALLBLADDER: Unremarkable. SPLEEN: No mass. PANCREAS: No mass or ductal dilatation. ADRENALS: Unremarkable. KIDNEYS: Cortical thinning at the lower pole of the kidneys bilaterally. No  hydronephrosis or lesions. STOMACH: Unremarkable. SMALL BOWEL: No dilatation or wall thickening. COLON: No dilatation or wall thickening. APPENDIX: Nonvisualized. PERITONEUM: No ascites or pneumoperitoneum. RETROPERITONEUM: Freddie IVC filter in place. REPRODUCTIVE ORGANS: Surgically absent uterus. No adnexal masses. No pelvic  lymphadenopathy or free fluid. URINARY BLADDER: No mass or calculus. BONES: Levoconvex scoliosis of the lumbar spine.  Degenerative changes at L5-S1. Bony defect in the right iliac bone either developmental or from prior surgery. ADDITIONAL COMMENTS: There is early venous opacification of the right common  femoral vein which may suggest an arteriovenous fistula or other reason for  early venous return from the right lower extremity. Impression IMPRESSION:  1. No acute intra-abdominal pathology. 2.  Incidental findings including a 10 mm left lobe liver cyst, a Opdyke IVC  filter, scoliosis of the spine, and early venous return from the right lower  extremity. XR Results (most recent):  Results from Hospital Encounter encounter on 10/24/19   XR CHEST PORT    Narrative EXAM: XR CHEST PORT    INDICATION: Upper abdominal and epigastric pain x3 days exacerbated by  inspiration with associated shortness of breath. COMPARISON: Chest x-ray 11/9/2018. FINDINGS: A portable AP radiograph of the chest was obtained at 22:26 hours. The  lungs are clear. The cardiac and mediastinal contours and pulmonary vascularity  are normal.  The bones and soft tissues are grossly within normal limits. Visualized upper abdomen appears unremarkable. Impression IMPRESSION: No acute findings. Past Medical History:   Diagnosis Date    Abdominal pain     Asthma     Bipolar 2 disorder ([facility] [address]     CAD (coronary artery disease)     ablation    DVT (deep venous thrombosis) (HCC)     Dyspnea 9/19/2014    Hepatic cyst 6/22/2019    Hypercholesteremia     Hypertension     Lumbar radicular pain 8/21/2014    Menopause     Synovitis of knee 1/28/2018    Tachycardia      Past Surgical History:   Procedure Laterality Date    ABLATION OF SVT  3/30/2015         EP STUDY W/INDUCTION  3/30/2015         HX BACK SURGERY      HX COLONOSCOPY  9-    HX HEART CATHETERIZATION      HX HYSTERECTOMY  1992     Social History     Tobacco Use    Smoking status: Never Smoker    Smokeless tobacco: Never Used   Substance Use Topics    Alcohol use:  No Alcohol/week: 0.0 standard drinks     Comment: rarely    Drug use: No     Family History   Problem Relation Age of Onset    Hypertension Mother     Heart Disease Mother     Heart Disease Father     Stroke Father      Allergies   Allergen Reactions    Ampicillin Nausea Only     Allergy was 30 years ago         Visit Vitals  /82 (BP 1 Location: Left arm, BP Patient Position: Sitting)   Pulse 67   Resp 16   Ht 5' 2\" (1.575 m)   Wt 137 lb (62.1 kg)   SpO2 98%   BMI 25.06 kg/m²         Last 3 Recorded Weights in this Encounter    09/08/20 1523   Weight: 137 lb (62.1 kg)            Review of Systems:   Pertinent items are noted in the History of Present Illness. Neck: no JVD  Heart: irregularly irregular rhythm  Lungs: clear to auscultation bilaterally  Abdomen: soft, non-tender. Bowel sounds normal. No masses,  no organomegaly  Extremities: no edema      Current Outpatient Medications on File Prior to Visit   Medication Sig Dispense Refill    ascorbic acid, vitamin C, (Vitamin C) 500 mg tablet Take  by mouth.  cholecalciferol, vitamin D3, (Vitamin D3) 50 mcg (2,000 unit) tab Take  by mouth.  elderberry fruit (ELDERBERRY PO) Take  by mouth.  desonide (TRIDESILON) 0.05 % topical lotion Apply  to affected area two (2) times a day. 118 mL 4    rivaroxaban (Xarelto) 20 mg tab tablet Take 1 tablet daily. 30 Tab 11    magnesium oxide (MAG-OX) 400 mg tablet Take 1 Tab by mouth daily. 90 Tab 3    potassium chloride (K-DUR, KLOR-CON) 20 mEq tablet Take 1 Tab by mouth daily. 90 Tab 3    hydroCHLOROthiazide (HYDRODIURIL) 12.5 mg tablet Take once daily 90 Tab 3    rosuvastatin (CRESTOR) 10 mg tablet Take 1 tablet every day 90 Tab 3    metoprolol succinate (TOPROL-XL) 25 mg XL tablet Take 1 Tab by mouth two (2) times a day. 280 Tab 3    folic acid/multivit-min/lutein (CENTRUM SILVER PO) Take 1 Tab by mouth daily.       albuterol (VENTOLIN HFA) 90 mcg/actuation inhaler Take 2 Puffs by inhalation every four (4) hours as needed for Wheezing. 1 Inhaler 11    fluticasone propion-salmeterol (ADVAIR/WIXELA) 250-50 mcg/dose diskus inhaler Take 1 Puff by inhalation every twelve (12) hours. 1 Inhaler 11    hydroquinone (ESOTERICA, MELQUIN) 4 % topical cream Apply  to affected area two (2) times a day. 30 g 6    ALPRAZolam (XANAX) 1 mg tablet Take 1 Tab by mouth three (3) times daily as needed for Anxiety. Max Daily Amount: 3 mg. (Patient taking differently: Take 1 mg by mouth two (2) times a day.) 10 Tab 0    QUEtiapine (SEROQUEL) 50 mg tablet Take 50 mg by mouth three (3) times daily.  FLUoxetine (PROZAC) 20 mg capsule Take 20 mg by mouth daily.  COLLAGEN by Does Not Apply route.  flaxseed oil 1,000 mg cap Take  by mouth.  cefUROXime (CEFTIN) 500 mg tablet Take 1 Tab by mouth two (2) times a day. 14 Tab 0     No current facility-administered medications on file prior to visit. March Gun. Karen Lamb had no medications administered during this visit. Current Outpatient Medications   Medication Sig    ascorbic acid, vitamin C, (Vitamin C) 500 mg tablet Take  by mouth.  cholecalciferol, vitamin D3, (Vitamin D3) 50 mcg (2,000 unit) tab Take  by mouth.  elderberry fruit (ELDERBERRY PO) Take  by mouth.  desonide (TRIDESILON) 0.05 % topical lotion Apply  to affected area two (2) times a day.  rivaroxaban (Xarelto) 20 mg tab tablet Take 1 tablet daily.  magnesium oxide (MAG-OX) 400 mg tablet Take 1 Tab by mouth daily.  potassium chloride (K-DUR, KLOR-CON) 20 mEq tablet Take 1 Tab by mouth daily.  hydroCHLOROthiazide (HYDRODIURIL) 12.5 mg tablet Take once daily    rosuvastatin (CRESTOR) 10 mg tablet Take 1 tablet every day    metoprolol succinate (TOPROL-XL) 25 mg XL tablet Take 1 Tab by mouth two (2) times a day.  folic acid/multivit-min/lutein (CENTRUM SILVER PO) Take 1 Tab by mouth daily.     albuterol (VENTOLIN HFA) 90 mcg/actuation inhaler Take 2 Puffs by inhalation every four (4) hours as needed for Wheezing.  fluticasone propion-salmeterol (ADVAIR/WIXELA) 250-50 mcg/dose diskus inhaler Take 1 Puff by inhalation every twelve (12) hours.  hydroquinone (ESOTERICA, MELQUIN) 4 % topical cream Apply  to affected area two (2) times a day.  ALPRAZolam (XANAX) 1 mg tablet Take 1 Tab by mouth three (3) times daily as needed for Anxiety. Max Daily Amount: 3 mg. (Patient taking differently: Take 1 mg by mouth two (2) times a day.)    QUEtiapine (SEROQUEL) 50 mg tablet Take 50 mg by mouth three (3) times daily.  FLUoxetine (PROZAC) 20 mg capsule Take 20 mg by mouth daily.  COLLAGEN by Does Not Apply route.  flaxseed oil 1,000 mg cap Take  by mouth.  cefUROXime (CEFTIN) 500 mg tablet Take 1 Tab by mouth two (2) times a day. No current facility-administered medications for this visit. Lab Results   Component Value Date/Time    Cholesterol, total 167 02/08/2019 03:51 PM    HDL Cholesterol 94 02/08/2019 03:51 PM    LDL, calculated 61 02/08/2019 03:51 PM    VLDL, calculated 12 02/08/2019 03:51 PM    Triglyceride 61 02/08/2019 03:51 PM    CHOL/HDL Ratio 2.5 05/16/2013 02:30 AM       Lab Results   Component Value Date/Time    Sodium 144 08/06/2020 04:43 PM    Potassium 4.3 08/06/2020 04:43 PM    Chloride 105 08/06/2020 04:43 PM    CO2 22 08/06/2020 04:43 PM    Anion gap 8 10/24/2019 08:50 PM    Glucose 93 08/06/2020 04:43 PM    BUN 17 08/06/2020 04:43 PM    Creatinine 1.11 (H) 08/06/2020 04:43 PM    BUN/Creatinine ratio 15 08/06/2020 04:43 PM    GFR est AA 59 (L) 08/06/2020 04:43 PM    GFR est non-AA 51 (L) 08/06/2020 04:43 PM    Calcium 10.4 (H) 08/06/2020 04:43 PM       Lab Results   Component Value Date/Time    ALT (SGPT) 18 03/05/2019 11:46 AM    Alk.  phosphatase 46 03/05/2019 11:46 AM    Bilirubin, direct 0.21 03/05/2019 11:46 AM    Bilirubin, total 0.6 03/05/2019 11:46 AM       Lab Results   Component Value Date/Time    WBC 8.4 10/24/2019 08:50 PM    Hemoglobin (POC) 10.9 (L) 07/06/2013 01:06 AM    HGB 13.6 10/24/2019 08:50 PM    Hematocrit (POC) 32 (L) 07/06/2013 01:06 AM    HCT 42.1 10/24/2019 08:50 PM    PLATELET 850 90/77/5299 08:50 PM    MCV 93.6 10/24/2019 08:50 PM       Lab Results   Component Value Date/Time    TSH 1.780 08/16/2019 05:03 PM         Lab Results   Component Value Date/Time    Cholesterol, total 167 02/08/2019 03:51 PM    Cholesterol, total 158 05/16/2017 01:17 PM    Cholesterol, total 167 01/11/2016 03:58 PM    Cholesterol, total 191 12/13/2013 04:43 PM    Cholesterol, total 261 (H) 07/16/2013 04:57 AM    HDL Cholesterol 94 02/08/2019 03:51 PM    HDL Cholesterol 80 05/16/2017 01:17 PM    HDL Cholesterol 99 01/11/2016 03:58 PM    HDL Cholesterol 104 12/13/2013 04:43 PM    HDL Cholesterol 81 07/16/2013 04:57 AM    LDL, calculated 61 02/08/2019 03:51 PM    LDL, calculated 60 05/16/2017 01:17 PM    LDL, calculated 49 01/11/2016 03:58 PM    LDL, calculated 70 12/13/2013 04:43 PM    LDL, calculated 163 (H) 07/16/2013 04:57 AM    Triglyceride 61 02/08/2019 03:51 PM    Triglyceride 89 05/16/2017 01:17 PM    Triglyceride 96 01/11/2016 03:58 PM    Triglyceride 87 12/13/2013 04:43 PM    Triglyceride 85 07/16/2013 04:57 AM    CHOL/HDL Ratio 2.5 05/16/2013 02:30 AM                Please note that this dictation was completed with [employer], the [employer] voice recognition software. Quite often unanticipated grammatical, syntax, homophones, and other interpretative errors are inadvertently transcribed by the computer software. Please disregard these errors. Please excuse any errors that have escaped final proofreading.

## 2020-09-08 NOTE — PROGRESS NOTES
.  Visit Vitals  /82 (BP 1 Location: Left arm, BP Patient Position: Sitting)   Pulse 67   Resp 16   Ht 5' 2\" (1.575 m)   Wt 137 lb (62.1 kg)   SpO2 98%   BMI 25.06 kg/m²

## 2020-09-25 ENCOUNTER — ANCILLARY PROCEDURE (OUTPATIENT)
Dept: CARDIOLOGY CLINIC | Age: 70
End: 2020-09-25

## 2020-09-25 DIAGNOSIS — I65.29 STENOSIS OF CAROTID ARTERY, UNSPECIFIED LATERALITY: ICD-10-CM

## 2020-09-28 LAB
LEFT CCA DIST DIAS: 18.2 CENTIMETER/SECOND
LEFT CCA DIST SYS: 57.1 CENTIMETER/SECOND
LEFT CCA PROX DIAS: 18.6 CENTIMETER/SECOND
LEFT CCA PROX SYS: 69 CENTIMETER/SECOND
LEFT ECA DIAS: 6.7 CENTIMETER/SECOND
LEFT ECA SYS: 43.3 CENTIMETER/SECOND
LEFT ICA DIST DIAS: 27.4 CENTIMETER/SECOND
LEFT ICA DIST SYS: 67.9 CENTIMETER/SECOND
LEFT ICA MID DIAS: 21.9 CENTIMETER/SECOND
LEFT ICA MID SYS: 53.4 CENTIMETER/SECOND
LEFT ICA PROX DIAS: 14.7 CENTIMETER/SECOND
LEFT ICA PROX SYS: 40 CENTIMETER/SECOND
LEFT ICA/CCA SYS: 0.98
LEFT VERTEBRAL DIAS: 16.04 CENTIMETER/SECOND
LEFT VERTEBRAL SYS: 52.4 CENTIMETER/SECOND
RIGHT CCA DIST DIAS: 15.6 CENTIMETER/SECOND
RIGHT CCA DIST SYS: 63.3 CENTIMETER/SECOND
RIGHT CCA PROX DIAS: 12.6 CENTIMETER/SECOND
RIGHT CCA PROX SYS: 56 CENTIMETER/SECOND
RIGHT ECA DIAS: 7.77 CENTIMETER/SECOND
RIGHT ECA SYS: 38.1 CENTIMETER/SECOND
RIGHT ICA DIST DIAS: 34 CENTIMETER/SECOND
RIGHT ICA DIST SYS: 86.8 CENTIMETER/SECOND
RIGHT ICA MID DIAS: 21.2 CENTIMETER/SECOND
RIGHT ICA MID SYS: 55.1 CENTIMETER/SECOND
RIGHT ICA PROX DIAS: 20.6 CENTIMETER/SECOND
RIGHT ICA PROX SYS: 65.4 CENTIMETER/SECOND
RIGHT ICA/CCA SYS: 1.4
RIGHT VERTEBRAL DIAS: 10.22 CENTIMETER/SECOND
RIGHT VERTEBRAL SYS: 34.9 CENTIMETER/SECOND

## 2020-10-19 ENCOUNTER — CLINICAL SUPPORT (OUTPATIENT)
Dept: INTERNAL MEDICINE CLINIC | Age: 70
End: 2020-10-19

## 2020-10-21 RX ORDER — HYDROCHLOROTHIAZIDE 12.5 MG/1
TABLET ORAL
Qty: 90 TAB | Refills: 3 | Status: SHIPPED | OUTPATIENT
Start: 2020-10-21 | End: 2020-10-29 | Stop reason: SDUPTHER

## 2020-10-23 ENCOUNTER — TELEPHONE (OUTPATIENT)
Dept: CARDIOLOGY CLINIC | Age: 70
End: 2020-10-23

## 2020-10-27 NOTE — TELEPHONE ENCOUNTER
Per Dr. Castro How:    Normal nuc and normal carotids     Identifiers x 2. Informed of the above. Verbalized understanding. Confirmed follow up.      Future Appointments   Date Time Provider Carina Peñaloza   11/5/2020  3:30 PM Bon Cortés MD [facility] MAIN BS AMB   3/12/2021  3:40 PM MD FLOR Barahona BS AMB

## 2020-10-29 RX ORDER — HYDROCHLOROTHIAZIDE 12.5 MG/1
TABLET ORAL
Qty: 90 TAB | Refills: 3 | Status: SHIPPED | OUTPATIENT
Start: 2020-10-29 | End: 2021-12-27

## 2020-11-05 ENCOUNTER — OFFICE VISIT (OUTPATIENT)
Dept: INTERNAL MEDICINE CLINIC | Age: 70
End: 2020-11-05
Payer: MEDICARE

## 2020-11-05 ENCOUNTER — TRANSCRIBE ORDER (OUTPATIENT)
Dept: SCHEDULING | Age: 70
End: 2020-11-05

## 2020-11-05 DIAGNOSIS — Z12.31 VISIT FOR SCREENING MAMMOGRAM: Primary | ICD-10-CM

## 2020-11-05 DIAGNOSIS — F41.1 ANXIETY NEUROSIS: ICD-10-CM

## 2020-11-05 DIAGNOSIS — N64.4 MASTALGIA: ICD-10-CM

## 2020-11-05 DIAGNOSIS — Z00.00 WELLNESS EXAMINATION: ICD-10-CM

## 2020-11-05 DIAGNOSIS — I10 ESSENTIAL HYPERTENSION: Primary | ICD-10-CM

## 2020-11-05 DIAGNOSIS — Z23 NEEDS FLU SHOT: ICD-10-CM

## 2020-11-05 DIAGNOSIS — E78.5 DYSLIPIDEMIA: ICD-10-CM

## 2020-11-05 DIAGNOSIS — E04.9 NODULAR GOITER: ICD-10-CM

## 2020-11-05 PROCEDURE — G8400 PT W/DXA NO RESULTS DOC: HCPCS | Performed by: INTERNAL MEDICINE

## 2020-11-05 PROCEDURE — 3017F COLORECTAL CA SCREEN DOC REV: CPT | Performed by: INTERNAL MEDICINE

## 2020-11-05 PROCEDURE — G8754 DIAS BP LESS 90: HCPCS | Performed by: INTERNAL MEDICINE

## 2020-11-05 PROCEDURE — G0008 ADMIN INFLUENZA VIRUS VAC: HCPCS

## 2020-11-05 PROCEDURE — 1101F PT FALLS ASSESS-DOCD LE1/YR: CPT | Performed by: INTERNAL MEDICINE

## 2020-11-05 PROCEDURE — 36415 COLL VENOUS BLD VENIPUNCTURE: CPT | Performed by: INTERNAL MEDICINE

## 2020-11-05 PROCEDURE — 99215 OFFICE O/P EST HI 40 MIN: CPT | Performed by: INTERNAL MEDICINE

## 2020-11-05 PROCEDURE — 1090F PRES/ABSN URINE INCON ASSESS: CPT | Performed by: INTERNAL MEDICINE

## 2020-11-05 PROCEDURE — G8427 DOCREV CUR MEDS BY ELIG CLIN: HCPCS | Performed by: INTERNAL MEDICINE

## 2020-11-05 PROCEDURE — G0439 PPPS, SUBSEQ VISIT: HCPCS | Performed by: INTERNAL MEDICINE

## 2020-11-05 PROCEDURE — G8753 SYS BP > OR = 140: HCPCS | Performed by: INTERNAL MEDICINE

## 2020-11-05 PROCEDURE — G8419 CALC BMI OUT NRM PARAM NOF/U: HCPCS | Performed by: INTERNAL MEDICINE

## 2020-11-05 PROCEDURE — G9899 SCRN MAM PERF RSLTS DOC: HCPCS | Performed by: INTERNAL MEDICINE

## 2020-11-05 PROCEDURE — 90694 VACC AIIV4 NO PRSRV 0.5ML IM: CPT

## 2020-11-05 PROCEDURE — G8432 DEP SCR NOT DOC, RNG: HCPCS | Performed by: INTERNAL MEDICINE

## 2020-11-05 PROCEDURE — G8536 NO DOC ELDER MAL SCRN: HCPCS | Performed by: INTERNAL MEDICINE

## 2020-11-05 NOTE — PROGRESS NOTES
Chief Complaint   Patient presents with   [facility] Annual Wellness Visit       1. Have you been to the ER, urgent care clinic since your last visit? Hospitalized since your last visit? No    2. Have you seen or consulted any other health care providers outside of the [address] [place] [address] since your last visit? Include any pap smears or colon screening.  No

## 2020-11-07 LAB
ALBUMIN SERPL-MCNC: 4.3 G/DL (ref 3.8–4.8)
ALBUMIN/GLOB SERPL: 1.2 {RATIO} (ref 1.2–2.2)
ALP SERPL-CCNC: 65 IU/L (ref 39–117)
ALT SERPL-CCNC: 29 IU/L (ref 0–32)
APO B SERPL-MCNC: 48 MG/DL
APPEARANCE UR: CLEAR
AST SERPL-CCNC: 32 IU/L (ref 0–40)
BACTERIA #/AREA URNS HPF: NORMAL /[HPF]
BASOPHILS # BLD AUTO: 0.1 X10E3/UL (ref 0–0.2)
BASOPHILS NFR BLD AUTO: 2 %
BILIRUB SERPL-MCNC: 1.1 MG/DL (ref 0–1.2)
BILIRUB UR QL STRIP: NEGATIVE
BUN SERPL-MCNC: 11 MG/DL (ref 8–27)
BUN/CREAT SERPL: 10 (ref 12–28)
CALCIUM SERPL-MCNC: 10.2 MG/DL (ref 8.7–10.3)
CASTS URNS QL MICRO: NORMAL /LPF
CHLORIDE SERPL-SCNC: 104 MMOL/L (ref 96–106)
CHOLEST SERPL-MCNC: 139 MG/DL (ref 100–199)
CO2 SERPL-SCNC: 21 MMOL/L (ref 20–29)
COLOR UR: YELLOW
CREAT SERPL-MCNC: 1.05 MG/DL (ref 0.57–1)
EOSINOPHIL # BLD AUTO: 0.1 X10E3/UL (ref 0–0.4)
EOSINOPHIL NFR BLD AUTO: 2 %
EPI CELLS #/AREA URNS HPF: NORMAL /HPF (ref 0–10)
ERYTHROCYTE [DISTWIDTH] IN BLOOD BY AUTOMATED COUNT: 13.2 % (ref 11.7–15.4)
GLOBULIN SER CALC-MCNC: 3.5 G/DL (ref 1.5–4.5)
GLUCOSE SERPL-MCNC: 89 MG/DL (ref 65–99)
GLUCOSE UR QL: NEGATIVE
HCT VFR BLD AUTO: 40.6 % (ref 34–46.6)
HDLC SERPL-MCNC: 83 MG/DL
HGB BLD-MCNC: 14 G/DL (ref 11.1–15.9)
HGB UR QL STRIP: NEGATIVE
IMM GRANULOCYTES # BLD AUTO: 0 X10E3/UL (ref 0–0.1)
IMM GRANULOCYTES NFR BLD AUTO: 0 %
KETONES UR QL STRIP: NEGATIVE
LDLC SERPL CALC-MCNC: 45 MG/DL (ref 0–99)
LEUKOCYTE ESTERASE UR QL STRIP: ABNORMAL
LYMPHOCYTES # BLD AUTO: 2.2 X10E3/UL (ref 0.7–3.1)
LYMPHOCYTES NFR BLD AUTO: 55 %
MCH RBC QN AUTO: 30.6 PG (ref 26.6–33)
MCHC RBC AUTO-ENTMCNC: 34.5 G/DL (ref 31.5–35.7)
MCV RBC AUTO: 89 FL (ref 79–97)
MICRO URNS: ABNORMAL
MONOCYTES # BLD AUTO: 0.4 X10E3/UL (ref 0.1–0.9)
MONOCYTES NFR BLD AUTO: 10 %
NEUTROPHILS # BLD AUTO: 1.2 X10E3/UL (ref 1.4–7)
NEUTROPHILS NFR BLD AUTO: 31 %
NITRITE UR QL STRIP: NEGATIVE
PH UR STRIP: 7 [PH] (ref 5–7.5)
PLATELET # BLD AUTO: 185 X10E3/UL (ref 150–450)
POTASSIUM SERPL-SCNC: 4.1 MMOL/L (ref 3.5–5.2)
PROT SERPL-MCNC: 7.8 G/DL (ref 6–8.5)
PROT UR QL STRIP: NEGATIVE
RBC # BLD AUTO: 4.57 X10E6/UL (ref 3.77–5.28)
RBC #/AREA URNS HPF: NORMAL /HPF (ref 0–2)
SODIUM SERPL-SCNC: 143 MMOL/L (ref 134–144)
SP GR UR: 1.01 (ref 1–1.03)
TRIGL SERPL-MCNC: 49 MG/DL (ref 0–149)
TSH SERPL DL<=0.005 MIU/L-ACNC: 0.93 UIU/ML (ref 0.45–4.5)
UROBILINOGEN UR STRIP-MCNC: 0.2 MG/DL (ref 0.2–1)
VLDLC SERPL CALC-MCNC: 11 MG/DL (ref 5–40)
WBC # BLD AUTO: 3.9 X10E3/UL (ref 3.4–10.8)
WBC #/AREA URNS HPF: NORMAL /HPF (ref 0–5)

## 2020-11-08 VITALS
DIASTOLIC BLOOD PRESSURE: 70 MMHG | RESPIRATION RATE: 16 BRPM | SYSTOLIC BLOOD PRESSURE: 144 MMHG | TEMPERATURE: 98.1 F | WEIGHT: 141 LBS | HEART RATE: 68 BPM | BODY MASS INDEX: 25.95 KG/M2 | HEIGHT: 62 IN | OXYGEN SATURATION: 95 %

## 2020-11-08 NOTE — PATIENT INSTRUCTIONS
Medicare Wellness Visit, Female The best way to live healthy is to have a lifestyle where you eat a well-balanced diet, exercise regularly, limit alcohol use, and quit all forms of tobacco/nicotine, if applicable. Regular preventive services are another way to keep healthy. Preventive services (vaccines, screening tests, monitoring & exams) can help personalize your care plan, which helps you manage your own care. Screening tests can find health problems at the earliest stages, when they are easiest to treat. [address] follows the current, evidence-based guidelines published by the [facility] Tushar Annabella ([facility] when recommending preventive services for our patients. Because we follow these guidelines, sometimes recommendations change over time as research supports it. (For example, mammograms used to be recommended annually. Even though Medicare will still pay for an annual mammogram, the newer guidelines recommend a mammogram every two years for women of average risk.) Of course, you and your doctor may decide to screen more often for some diseases, based on your risk and your health status. Preventive services for you include: - Medicare offers their members a free annual wellness visit, which is time for you and your primary care provider to discuss and plan for your preventive service needs. Take advantage of this benefit every year! 
-All adults over the age of 72 should receive the recommended pneumonia vaccines. Current USPSTF guidelines recommend a series of two vaccines for the best pneumonia protection.  
-All adults should have a flu vaccine yearly and a tetanus vaccine every 10 years. All adults age 48 and older should receive a shingles vaccine once in their lifetime.   
-A bone mass density test is recommended when a woman turns 65 to screen for osteoporosis. This test is only recommended one time, as a screening. Some providers will use this same test as a disease monitoring tool if you already have osteoporosis. -All adults age 38-68 who are overweight should have a diabetes screening test once every three years.  
-Other screening tests and preventive services for persons with diabetes include: an eye exam to screen for diabetic retinopathy, a kidney function test, a foot exam, and stricter control over your cholesterol.  
-Cardiovascular screening for adults with routine risk involves an electrocardiogram (ECG) at intervals determined by your doctor.  
-Colorectal cancer screenings should be done for adults age 54-65 with no increased risk factors for colorectal cancer. There are a number of acceptable methods of screening for this type of cancer. Each test has its own benefits and drawbacks. Discuss with your doctor what is most appropriate for you during your annual wellness visit. The different tests include: colonoscopy (considered the best screening method), a fecal occult blood test, a fecal DNA test, and sigmoidoscopy. -Breast cancer screenings are recommended every other year for women of normal risk, age 54-69. 
-Cervical cancer screenings for women over age 72 are only recommended with certain risk factors.  
-All adults born between [facility] should be screened once for Hepatitis C. Here is a list of your current Health Maintenance items (your personalized list of preventive services) with a due date: 
Health Maintenance Due Topic Date Due  Glaucoma Screening   12/24/2015 Medicare Wellness Visit, Female The best way to live healthy is to have a lifestyle where you eat a well-balanced diet, exercise regularly, limit alcohol use, and quit all forms of tobacco/nicotine, if applicable. Regular preventive services are another way to keep healthy.  Preventive services (vaccines, screening tests, monitoring & exams) can help personalize your care plan, which helps you manage your own care. Screening tests can find health problems at the earliest stages, when they are easiest to treat. [facility] follows the current, evidence-based guidelines published by the [facility] Tushar W[facility]ster ([facility]) when recommending preventive services for our patients. Because we follow these guidelines, sometimes recommendations change over time as research supports it. (For example, mammograms used to be recommended annually. Even though Medicare will still pay for an annual mammogram, the newer guidelines recommend a mammogram every two years for women of average risk). Of course, you and your doctor may decide to screen more often for some diseases, based on your risk and your co-morbidities (chronic disease you are already diagnosed with). Preventive services for you include: - Medicare offers their members a free annual wellness visit, which is time for you and your primary care provider to discuss and plan for your preventive service needs. Take advantage of this benefit every year! 
-All adults over the age of 72 should receive the recommended pneumonia vaccines. Current USPSTF guidelines recommend a series of two vaccines for the best pneumonia protection.  
-All adults should have a flu vaccine yearly and a tetanus vaccine every 10 years.  
-All adults age 48 and older should receive the shingles vaccines (series of two vaccines).      
-All adults age 38-68 who are overweight should have a diabetes screening test once every three years.  
-All adults born between [birth year] and 1965 should be screened once for Hepatitis C. 
-Other screening tests and preventive services for persons with diabetes include: an eye exam to screen for diabetic retinopathy, a kidney function test, a foot exam, and stricter control over your cholesterol.  
-Cardiovascular screening for adults with routine risk involves an electrocardiogram (ECG) at intervals determined by your doctor.  
-Colorectal cancer screenings should be done for adults age 54-65 with no increased risk factors for colorectal cancer. There are a number of acceptable methods of screening for this type of cancer. Each test has its own benefits and drawbacks. Discuss with your doctor what is most appropriate for you during your annual wellness visit. The different tests include: colonoscopy (considered the best screening method), a fecal occult blood test, a fecal DNA test, and sigmoidoscopy. 
 
-A bone mass density test is recommended when a woman turns 65 to screen for osteoporosis. This test is only recommended one time, as a screening. Some providers will use this same test as a disease monitoring tool if you already have osteoporosis. -Breast cancer screenings are recommended every other year for women of normal risk, age 54-69. 
-Cervical cancer screenings for women over age 72 are only recommended with certain risk factors. Here is a list of your current Health Maintenance items (your personalized list of preventive services) with a due date: 
Health Maintenance Due Topic Date Due  Glaucoma Screening   12/24/2015

## 2020-11-08 NOTE — PROGRESS NOTES
[facility]  [facility]  [address] [facility] [address]  Phone:  [phone]  Fax: [phone]       Chief Complaint   Patient presents with   [facility] Wellness Visit   . SUBJECTIVE:    Tim Hensley is a 71 y.o. female Comes in for return visit stating that she has done reasonably well. She is concerned about pain in her left breast.  This has been present for the last several weeks intermittently. She follows with multiple physicians. She is having no cardiac issues currently. She is beginning to have again low back pain radiating to her right buttock. This occurred after a fall several years ago. I felt she probably had an element of radiculopathy, but this resolved, so nothing aggressive was done. She reminds me that she had an abdominal ultrasound, which revealed a hepatic cyst.  I explained to her that nothing need be done as it relates to this. She has a past history of primary hypertension, dyslipidemia and chronic anxiety. Current Outpatient Medications   Medication Sig Dispense Refill    hydroCHLOROthiazide (HYDRODIURIL) 12.5 mg tablet Take once daily 90 Tab 3    ascorbic acid, vitamin C, (Vitamin C) 500 mg tablet Take  by mouth.  cholecalciferol, vitamin D3, (Vitamin D3) 50 mcg (2,000 unit) tab Take  by mouth.  elderberry fruit (ELDERBERRY PO) Take  by mouth.  desonide (TRIDESILON) 0.05 % topical lotion Apply  to affected area two (2) times a day. 118 mL 4    rivaroxaban (Xarelto) 20 mg tab tablet Take 1 tablet daily. 30 Tab 11    magnesium oxide (MAG-OX) 400 mg tablet Take 1 Tab by mouth daily. 90 Tab 3    potassium chloride (K-DUR, KLOR-CON) 20 mEq tablet Take 1 Tab by mouth daily. 90 Tab 3    rosuvastatin (CRESTOR) 10 mg tablet Take 1 tablet every day 90 Tab 3    metoprolol succinate (TOPROL-XL) 25 mg XL tablet Take 1 Tab by mouth two (2) times a day.  809 Tab 3    folic acid/multivit-min/lutein (CENTRUM SILVER PO) Take 1 Tab by mouth daily.  albuterol (VENTOLIN HFA) 90 mcg/actuation inhaler Take 2 Puffs by inhalation every four (4) hours as needed for Wheezing. 1 Inhaler 11    fluticasone propion-salmeterol (ADVAIR/WIXELA) 250-50 mcg/dose diskus inhaler Take 1 Puff by inhalation every twelve (12) hours. 1 Inhaler 11    hydroquinone (ESOTERICA, MELQUIN) 4 % topical cream Apply  to affected area two (2) times a day. 30 g 6    ALPRAZolam (XANAX) 1 mg tablet Take 1 Tab by mouth three (3) times daily as needed for Anxiety. Max Daily Amount: 3 mg. (Patient taking differently: Take 1 mg by mouth two (2) times a day.) 10 Tab 0    QUEtiapine (SEROQUEL) 50 mg tablet Take 50 mg by mouth three (3) times daily.  FLUoxetine (PROZAC) 20 mg capsule Take 20 mg by mouth daily.        Past Medical History:   Diagnosis Date    Abdominal pain     Asthma     Bipolar 2 disorder [facility] [address]     CAD (coronary artery disease)     ablation    DVT (deep venous thrombosis) (HCC)     Dyspnea 9/19/2014    Hepatic cyst 6/22/2019    Hypercholesteremia     Hypertension     Lumbar radicular pain 8/21/2014    Menopause     Synovitis of knee 1/28/2018    Tachycardia      Past Surgical History:   Procedure Laterality Date    ABLATION OF SVT  3/30/2015         EP STUDY W/INDUCTION  3/30/2015         HX BACK SURGERY      HX COLONOSCOPY  9-    HX HEART CATHETERIZATION      HX HYSTERECTOMY  1992     Allergies   Allergen Reactions    Ampicillin Nausea Only     Allergy was 30 years ago         REVIEW OF SYSTEMS:  General: negative for - chills or fever  ENT: negative for - headaches, nasal congestion or tinnitus  Respiratory: negative for - cough, hemoptysis, shortness of breath or wheezing  Cardiovascular : negative for - chest pain, edema, palpitations or shortness of breath  Gastrointestinal: negative for - abdominal pain, blood in stools, heartburn or nausea/vomiting  Genito-Urinary: no dysuria, trouble voiding, or hematuria  Musculoskeletal: negative for - gait disturbance, joint pain, joint stiffness or joint swelling  Neurological: no TIA or stroke symptoms  Hematologic: no bruises, no bleeding, no swollen glands  Integument: no lumps, mole changes, nail changes or rash  Endocrine: no malaise/lethargy or unexpected weight changes      Social History     Socioeconomic History    Marital status:      Spouse name: Not on file    Number of children: 1    Years of education: Not on file    Highest education level: Not on file   Occupational History    Occupation: retired--[employer]---disabled   Tobacco Use    Smoking status: Never Smoker    Smokeless tobacco: Never Used   Substance and Sexual Activity    Alcohol use: No     Alcohol/week: 0.0 standard drinks     Comment: rarely    Drug use: No    Sexual activity: Not Currently     Partners: Male     Family History   Problem Relation Age of Onset    Hypertension Mother     Heart Disease Mother     Heart Disease Father     Stroke Father        OBJECTIVE:    Visit Vitals  BP (!) 144/70   Pulse 68   Temp 98.1 °F (36.7 °C) (Oral)   Resp 16   Ht 5' 2\" (1.575 m)   Wt 141 lb (64 kg)   SpO2 95%   BMI 25.79 kg/m²     CONSTITUTIONAL: well , well nourished, appears age appropriate  EYES: perrla, eom intact  ENMT:moist mucous membranes, pharynx clear  NECK: supple. Thyroid normal  RESPIRATORY: Chest: clear to ascultation and percussion   CARDIOVASCULAR: Heart: regular rate and rhythm  GASTROINTESTINAL: Abdomen: soft, bowel sounds active  HEMATOLOGIC: no pathological lymph nodes palpated  MUSCULOSKELETAL: Extremities: no edema, pulse 1+   INTEGUMENT: No unusual rashes or suspicious skin lesions noted. Nails appear normal.  NEUROLOGIC: non-focal exam   MENTAL STATUS: alert and oriented, appropriate affect      ASSESSMENT:  1. Essential hypertension    2. Nodular goiter    3. Mastalgia    4. Anxiety neurosis    5. Dyslipidemia    6. Needs flu shot    7.  Wellness examination        PLAN:    1. Blood pressure is reasonable, no adjustments are made. 2. She does have a nodular goiter, but nothing need be done at this point. She gets worked up every three years for this as relates to thyroid ultrasound. 3. She does have mastalgia, but I do not find any obvious pathology in either breast.  She can take vitamin E for the next month, but no longer. 4. For her anxiety she will follow up with psychiatry, and remains on anxiolytics. 5. She is also on a statin in view of her increased cardiovascular risk. She is in a primary risk prevention mode. .  Orders Placed This Encounter    Influenza Vaccine, QUAD, 65 Yrs +  IM  (Fluad 74388 )    APOLIPOPROTEIN B    CBC WITH AUTOMATED DIFF    LIPID PANEL    METABOLIC PANEL, COMPREHENSIVE    TSH 3RD GENERATION    URINALYSIS W/ RFLX MICROSCOPIC    MICROSCOPIC EXAMINATION         Follow-up and Dispositions    · Return in about 3 months (around 2/5/2021). Lisandro Braden MD  This is the Subsequent Medicare Annual Wellness Exam, performed 12 months or more after the Initial AWV or the last Subsequent AWV    I have reviewed the patient's medical history in detail and updated the computerized patient record.      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    Dyslipidemia E78.5    Manter filter in place Z95.828    Dermatitis L30.9    Asthma J45.909    Episodic tension-type headache, not intractable G44.219    Nodular goiter E04.9     Past Medical History:   Diagnosis Date    Abdominal pain     Asthma     [facility] 2 [facility] ([facility] [address]     CAD (coronary artery disease)     ablation    [facility])     Dyspnea 9/19/2014    Hepatic cyst 6/22/2019    Hypercholesteremia     Hypertension     Lumbar radicular pain 8/21/2014    Menopause     Synovitis of knee 1/28/2018    Tachycardia       Past Surgical History:   Procedure Laterality Date    ABLATION OF SVT  3/30/2015         EP STUDY W/INDUCTION  3/30/2015         HX BACK SURGERY      HX COLONOSCOPY  9-    HX HEART CATHETERIZATION      HX HYSTERECTOMY  1992     Current Outpatient Medications   Medication Sig Dispense Refill    hydroCHLOROthiazide (HYDRODIURIL) 12.5 mg tablet Take once daily 90 Tab 3    ascorbic acid, vitamin C, (Vitamin C) 500 mg tablet Take  by mouth.  cholecalciferol, vitamin D3, (Vitamin D3) 50 mcg (2,000 unit) tab Take  by mouth.  elderberry fruit (ELDERBERRY PO) Take  by mouth.  desonide (TRIDESILON) 0.05 % topical lotion Apply  to affected area two (2) times a day. 118 mL 4    rivaroxaban (Xarelto) 20 mg tab tablet Take 1 tablet daily. 30 Tab 11    magnesium oxide (MAG-OX) 400 mg tablet Take 1 Tab by mouth daily. 90 Tab 3    potassium chloride (K-DUR, KLOR-CON) 20 mEq tablet Take 1 Tab by mouth daily. 90 Tab 3    rosuvastatin (CRESTOR) 10 mg tablet Take 1 tablet every day 90 Tab 3    metoprolol succinate (TOPROL-XL) 25 mg XL tablet Take 1 Tab by mouth two (2) times a day. 600 Tab 3    folic acid/multivit-min/lutein (CENTRUM SILVER PO) Take 1 Tab by mouth daily.  albuterol (VENTOLIN HFA) 90 mcg/actuation inhaler Take 2 Puffs by inhalation every four (4) hours as needed for Wheezing. 1 Inhaler 11    fluticasone propion-salmeterol (ADVAIR/WIXELA) 250-50 mcg/dose diskus inhaler Take 1 Puff by inhalation every twelve (12) hours. 1 Inhaler 11    hydroquinone (ESOTERICA, MELQUIN) 4 % topical cream Apply  to affected area two (2) times a day. 30 g 6    ALPRAZolam (XANAX) 1 mg tablet Take 1 Tab by mouth three (3) times daily as needed for Anxiety. Max Daily Amount: 3 mg. (Patient taking differently: Take 1 mg by mouth two (2) times a day.) 10 Tab 0    QUEtiapine (SEROQUEL) 50 mg tablet Take 50 mg by mouth three (3) times daily.       FLUoxetine (PROZAC) 20 mg capsule Take 20 mg by mouth daily. Allergies   Allergen Reactions    Ampicillin Nausea Only     Allergy was 30 years ago       Family History   Problem Relation Age of Onset    Hypertension Mother     Heart Disease Mother     Heart Disease Father     Stroke Father      Social History     Tobacco Use    Smoking status: Never Smoker    Smokeless tobacco: Never Used   Substance Use Topics    Alcohol use: No     Alcohol/week: 0.0 standard drinks     Comment: rarely       Depression Risk Factor Screening:     3 most recent PHQ Screens 5/22/2020   PHQ Not Done -   Little interest or pleasure in doing things Not at all   Feeling down, depressed, irritable, or hopeless Not at all   Total Score PHQ 2 0       Alcohol Risk Screen   Do you average more than 1 drink per night or more than 7 drinks a week:  No    On any one occasion in the past three months have you have had more than 3 drinks containing alcohol:  No        Functional Ability and Level of Safety:   Hearing: Hearing is good. Activities of Daily Living: The home contains: no safety equipment. Patient does total self care     Ambulation: with no difficulty     Fall Risk:  Fall Risk Assessment, last 12 mths 12/31/2019   Able to walk? Yes   Fall in past 12 months? No     Abuse Screen:  Patient is not abused       Cognitive Screening   Has your family/caregiver stated any concerns about your memory: no     Cognitive Screening: Not applicable    Patient Care Team   Patient Care Team:  Abiodun Ring MD as PCP - General (Internal Medicine)  Abiodun Ring MD as PCP - [address] [place] [address] Provider  Quinton Tirado MD (Cardiology)  Susan Dawn MD (Cardiology)    Assessment/Plan   Education and counseling provided:  Are appropriate based on today's review and evaluation    Diagnoses and all orders for this visit:    1.  Essential hypertension  -     CBC WITH AUTOMATED DIFF  -     COLLECTION VENOUS BLOOD,VENIPUNCTURE  -     METABOLIC PANEL, COMPREHENSIVE  - URINALYSIS W/ RFLX MICROSCOPIC    2. Nodular goiter  -     TSH 3RD GENERATION    3. Mastalgia    4. Anxiety neurosis    5. Dyslipidemia  -     APOLIPOPROTEIN B  -     LIPID PANEL    6. Needs flu shot  -     FLU (FLUAD QUAD INFLUENZA VACCINE,QUAD,ADJUVANTED)    7.  Wellness examination    Other orders  -     MICROSCOPIC EXAMINATION        Health Maintenance Due   Topic Date Due    GLAUCOMA SCREENING Q2Y  12/24/2015

## 2020-12-06 DIAGNOSIS — J45.20 MILD INTERMITTENT ASTHMA WITHOUT COMPLICATION: ICD-10-CM

## 2020-12-06 DIAGNOSIS — J45.20 MILD INTERMITTENT REACTIVE AIRWAY DISEASE WITHOUT COMPLICATION: ICD-10-CM

## 2020-12-06 RX ORDER — FLUTICASONE PROPIONATE AND SALMETEROL 250; 50 UG/1; UG/1
1 POWDER RESPIRATORY (INHALATION) EVERY 12 HOURS
Qty: 1 INHALER | Refills: 11 | Status: SHIPPED | OUTPATIENT
Start: 2020-12-06

## 2020-12-06 RX ORDER — ALBUTEROL SULFATE 90 UG/1
2 AEROSOL, METERED RESPIRATORY (INHALATION)
Qty: 1 INHALER | Refills: 11 | Status: SHIPPED | OUTPATIENT
Start: 2020-12-06 | End: 2022-02-02 | Stop reason: SDUPTHER

## 2020-12-10 ENCOUNTER — TRANSCRIBE ORDER (OUTPATIENT)
Dept: SCHEDULING | Age: 70
End: 2020-12-10

## 2020-12-10 DIAGNOSIS — Z12.31 ENCOUNTER FOR SCREENING MAMMOGRAM FOR BREAST CANCER: Primary | ICD-10-CM

## 2020-12-10 DIAGNOSIS — Z12.31 SCREENING MAMMOGRAM FOR HIGH-RISK PATIENT: Primary | ICD-10-CM

## 2020-12-18 ENCOUNTER — HOSPITAL ENCOUNTER (OUTPATIENT)
Dept: MAMMOGRAPHY | Age: 70
Discharge: HOME | End: 2020-12-18
Attending: INTERNAL MEDICINE
Payer: MEDICARE

## 2020-12-18 DIAGNOSIS — Z12.31 SCREENING MAMMOGRAM FOR HIGH-RISK PATIENT: ICD-10-CM

## 2020-12-18 PROCEDURE — 77063 BREAST TOMOSYNTHESIS BI: CPT

## 2021-01-11 DIAGNOSIS — E78.5 DYSLIPIDEMIA: ICD-10-CM

## 2021-01-11 DIAGNOSIS — I10 ESSENTIAL HYPERTENSION: ICD-10-CM

## 2021-01-11 DIAGNOSIS — I48.20 CHRONIC ATRIAL FIBRILLATION (HCC): ICD-10-CM

## 2021-01-11 DIAGNOSIS — I49.5 TACHY-BRADY SYNDROME (HCC): ICD-10-CM

## 2021-01-12 RX ORDER — ROSUVASTATIN CALCIUM 10 MG/1
TABLET, COATED ORAL
Qty: 90 TAB | Refills: 3 | Status: SHIPPED | OUTPATIENT
Start: 2021-01-12 | End: 2022-04-07 | Stop reason: SDUPTHER

## 2021-01-12 RX ORDER — POTASSIUM CHLORIDE 20 MEQ/1
20 TABLET, EXTENDED RELEASE ORAL DAILY
Qty: 90 TAB | Refills: 3 | Status: SHIPPED | OUTPATIENT
Start: 2021-01-12 | End: 2021-01-22 | Stop reason: SDUPTHER

## 2021-01-12 RX ORDER — LANOLIN ALCOHOL/MO/W.PET/CERES
400 CREAM (GRAM) TOPICAL DAILY
Qty: 90 TAB | Refills: 3 | Status: SHIPPED | OUTPATIENT
Start: 2021-01-12 | End: 2021-01-22 | Stop reason: SDUPTHER

## 2021-01-12 RX ORDER — METOPROLOL SUCCINATE 25 MG/1
25 TABLET, EXTENDED RELEASE ORAL 2 TIMES DAILY
Qty: 180 TAB | Refills: 3 | Status: SHIPPED | OUTPATIENT
Start: 2021-01-12 | End: 2021-12-27

## 2021-02-09 ENCOUNTER — OFFICE VISIT (OUTPATIENT)
Dept: INTERNAL MEDICINE CLINIC | Age: 71
End: 2021-02-09
Payer: MEDICARE

## 2021-02-09 VITALS
HEART RATE: 77 BPM | DIASTOLIC BLOOD PRESSURE: 83 MMHG | TEMPERATURE: 97.5 F | WEIGHT: 141.9 LBS | RESPIRATION RATE: 16 BRPM | BODY MASS INDEX: 26.11 KG/M2 | SYSTOLIC BLOOD PRESSURE: 140 MMHG | HEIGHT: 62 IN | OXYGEN SATURATION: 93 %

## 2021-02-09 DIAGNOSIS — E78.5 DYSLIPIDEMIA: ICD-10-CM

## 2021-02-09 DIAGNOSIS — I10 ESSENTIAL HYPERTENSION: ICD-10-CM

## 2021-02-09 DIAGNOSIS — E04.9 NODULAR GOITER: ICD-10-CM

## 2021-02-09 DIAGNOSIS — F41.1 ANXIETY NEUROSIS: ICD-10-CM

## 2021-02-09 DIAGNOSIS — I48.0 PAROXYSMAL ATRIAL FIBRILLATION (HCC): Primary | ICD-10-CM

## 2021-02-09 DIAGNOSIS — R21 RASH: ICD-10-CM

## 2021-02-09 DIAGNOSIS — M67.471 GANGLION CYST OF RIGHT FOOT: ICD-10-CM

## 2021-02-09 PROCEDURE — G9899 SCRN MAM PERF RSLTS DOC: HCPCS | Performed by: INTERNAL MEDICINE

## 2021-02-09 PROCEDURE — G8754 DIAS BP LESS 90: HCPCS | Performed by: INTERNAL MEDICINE

## 2021-02-09 PROCEDURE — G8419 CALC BMI OUT NRM PARAM NOF/U: HCPCS | Performed by: INTERNAL MEDICINE

## 2021-02-09 PROCEDURE — 1101F PT FALLS ASSESS-DOCD LE1/YR: CPT | Performed by: INTERNAL MEDICINE

## 2021-02-09 PROCEDURE — G8400 PT W/DXA NO RESULTS DOC: HCPCS | Performed by: INTERNAL MEDICINE

## 2021-02-09 PROCEDURE — G8510 SCR DEP NEG, NO PLAN REQD: HCPCS | Performed by: INTERNAL MEDICINE

## 2021-02-09 PROCEDURE — G8536 NO DOC ELDER MAL SCRN: HCPCS | Performed by: INTERNAL MEDICINE

## 2021-02-09 PROCEDURE — G8753 SYS BP > OR = 140: HCPCS | Performed by: INTERNAL MEDICINE

## 2021-02-09 PROCEDURE — 99215 OFFICE O/P EST HI 40 MIN: CPT | Performed by: INTERNAL MEDICINE

## 2021-02-09 PROCEDURE — G8427 DOCREV CUR MEDS BY ELIG CLIN: HCPCS | Performed by: INTERNAL MEDICINE

## 2021-02-09 PROCEDURE — 1090F PRES/ABSN URINE INCON ASSESS: CPT | Performed by: INTERNAL MEDICINE

## 2021-02-09 PROCEDURE — 3017F COLORECTAL CA SCREEN DOC REV: CPT | Performed by: INTERNAL MEDICINE

## 2021-02-09 NOTE — PROGRESS NOTES
Chief Complaint   Patient presents with    Hypertension     routine follow up            1. Have you been to the ER, urgent care clinic since your last visit? Hospitalized since your last visit? No    2. Have you seen or consulted any other health care providers outside of the [address] [place] [address] since your last visit? Include any pap smears or colon screening.  No

## 2021-02-14 PROBLEM — M67.471 GANGLION CYST OF RIGHT FOOT: Status: ACTIVE | Noted: 2021-02-14

## 2021-02-14 PROBLEM — R21 RASH: Status: ACTIVE | Noted: 2021-02-14

## 2021-02-14 RX ORDER — DESONIDE 0.5 MG/ML
LOTION TOPICAL 2 TIMES DAILY
Qty: 118 ML | Refills: 4 | Status: SHIPPED | OUTPATIENT
Start: 2021-02-14 | End: 2021-11-17 | Stop reason: SDUPTHER

## 2021-02-14 NOTE — PROGRESS NOTES
[address] [place] [address] and Primary Care  [place] [address]  [address] [place] [address]  Phone:  [phone]  Fax: [phone]       Chief Complaint   Patient presents with    Hypertension     routine follow up    . SUBJECTIVE:    Marco Bowen is a 79 y.o. female comes in for return visit stating that she has done reasonably well. We talked at length about the COVID vaccine and the need for her to get the injection. She is waffling. She sees her cardiologist on a regular basis. She continues to have paroxysmal atrial fibrillation. Rate control is being done currently. Has a past history of primary hypertension, dyslipidemia, and anxiety. She is not that physically active currently. She wishes to see a podiatrist.  She has a cyst on the dorsal aspect of her right foot, which she has had for years. Finally, she does have intermittent swelling of her lower extremities, which is orthostatic in nature. Current Outpatient Medications   Medication Sig Dispense Refill    desonide (TRIDESILON) 0.05 % topical lotion Apply  to affected area two (2) times a day. 118 mL 4    potassium chloride (K-DUR, KLOR-CON) 20 mEq tablet Take 1 Tab by mouth daily. 90 Tab 3    magnesium oxide (MAG-OX) 400 mg tablet Take 1 Tab by mouth daily. 90 Tab 3    metoprolol succinate (TOPROL-XL) 25 mg XL tablet Take 1 Tab by mouth two (2) times a day. 180 Tab 3    rosuvastatin (CRESTOR) 10 mg tablet Take 1 tablet every day 90 Tab 3    fluticasone propion-salmeteroL (ADVAIR/WIXELA) 250-50 mcg/dose diskus inhaler Take 1 Puff by inhalation every twelve (12) hours. 1 Inhaler 11    albuterol (Ventolin HFA) 90 mcg/actuation inhaler Take 2 Puffs by inhalation every four (4) hours as needed for Wheezing. 1 Inhaler 11    hydroCHLOROthiazide (HYDRODIURIL) 12.5 mg tablet Take once daily 90 Tab 3    ascorbic acid, vitamin C, (Vitamin C) 500 mg tablet Take  by mouth.       cholecalciferol, vitamin D3, (Vitamin D3) 50 mcg (2,000 unit) tab Take  by mouth.  elderberry fruit (ELDERBERRY PO) Take  by mouth.  rivaroxaban (Xarelto) 20 mg tab tablet Take 1 tablet daily. 30 Tab 11    folic acid/multivit-min/lutein (CENTRUM SILVER PO) Take 1 Tab by mouth daily.  hydroquinone (ESOTERICA, MELQUIN) 4 % topical cream Apply  to affected area two (2) times a day. 30 g 6    ALPRAZolam (XANAX) 1 mg tablet Take 1 Tab by mouth three (3) times daily as needed for Anxiety. Max Daily Amount: 3 mg. (Patient taking differently: Take 1 mg by mouth two (2) times a day.) 10 Tab 0    QUEtiapine (SEROQUEL) 50 mg tablet Take 50 mg by mouth three (3) times daily.  FLUoxetine (PROZAC) 20 mg capsule Take 20 mg by mouth daily.        Past Medical History:   Diagnosis Date    Abdominal pain     Asthma     Bipolar 2 disorder ([facility] [address]     CAD (coronary artery disease)     ablation    DVT (deep venous thrombosis) (HCC)     Dyspnea 9/19/2014    Hepatic cyst 6/22/2019    Hypercholesteremia     Hypertension     Lumbar radicular pain 8/21/2014    Menopause     Synovitis of knee 1/28/2018    Tachycardia      Past Surgical History:   Procedure Laterality Date    ABLATION OF SVT  3/30/2015         EP STUDY W/INDUCTION  3/30/2015         HX BACK SURGERY      HX COLONOSCOPY  9-    HX HEART CATHETERIZATION      HX HYSTERECTOMY  1992     Allergies   Allergen Reactions    Ampicillin Nausea Only     Allergy was 30 years ago         REVIEW OF SYSTEMS:  General: negative for - chills or fever  ENT: negative for - headaches, nasal congestion or tinnitus  Respiratory: negative for - cough, hemoptysis, shortness of breath or wheezing  Cardiovascular : negative for - chest pain, edema, palpitations or shortness of breath  Gastrointestinal: negative for - abdominal pain, blood in stools, heartburn or nausea/vomiting  Genito-Urinary: no dysuria, trouble voiding, or hematuria  Musculoskeletal: negative for - gait disturbance, joint pain, joint stiffness or joint swelling  Neurological: no TIA or stroke symptoms  Hematologic: no bruises, no bleeding, no swollen glands  Integument: no lumps, mole changes, nail changes or rash  Endocrine: no malaise/lethargy or unexpected weight changes      Social History     Socioeconomic History    Marital status:      Spouse name: Not on file    Number of children: 1    Years of education: Not on file    Highest education level: Not on file   Occupational History    Occupation: retired--[employer]---disabled   Tobacco Use    Smoking status: Never Smoker    Smokeless tobacco: Never Used   Substance and Sexual Activity    Alcohol use: No     Alcohol/week: 0.0 standard drinks     Comment: rarely    Drug use: No    Sexual activity: Not Currently     Partners: Male     Family History   Problem Relation Age of Onset    Hypertension Mother     Heart Disease Mother     Heart Disease Father     Stroke Father        OBJECTIVE:    Visit Vitals  BP (!) 140/83   Pulse 77   Temp 97.5 °F (36.4 °C) (Oral)   Resp 16   Ht 5' 2\" (1.575 m)   Wt 141 lb 14.4 oz (64.4 kg)   SpO2 93%   BMI 25.95 kg/m²     CONSTITUTIONAL: well , well nourished, appears age appropriate  EYES: perrla, eom intact  ENMT:moist mucous membranes, pharynx clear  NECK: supple. Nodular goiter  RESPIRATORY: Chest: clear to ascultation and percussion   CARDIOVASCULAR: Heart: regular rate and rhythm  GASTROINTESTINAL: Abdomen: soft, bowel sounds active  HEMATOLOGIC: no pathological lymph nodes palpated  MUSCULOSKELETAL: Extremities: no edema, pulse 1+   INTEGUMENT: No unusual rashes or suspicious skin lesions noted. Nails appear normal.  NEUROLOGIC: non-focal exam   MENTAL STATUS: alert and oriented, appropriate affect      ASSESSMENT:  1. Paroxysmal atrial fibrillation (HCC)    2. Essential hypertension    3. Nodular goiter    4. Anxiety neurosis    5. Dyslipidemia    6. Ganglion cyst of right foot    7. Rash        PLAN:  1.  The patient follows up with her cardiologist regarding her paroxysmal atrial fibrillation. Her rhythm is quite regular today. 2. Blood pressure is excellent. No adjustments are made. 3. She has a small nodular goiter, which has not changed. 4. Her anxiety level is reasonable today. She remains on her anxiolytic. She does follow up with Psychiatry. 5. She remains on a statin in view of her increased cardiovascular risk. She is in a primary risk prevention mode. 6. She has a small cyst on the dorsal aspect of her right foot. I label this being a ganglion cyst, but I am not entirely sure this is the case. In any event, she will be referred to a podiatrist for further evaluation. 7. I encouraged her to get the COVID vaccine. Follow-up and Dispositions    · Return in about 3 months (around 5/9/2021).            Janice Anglin MD

## 2021-05-11 ENCOUNTER — OFFICE VISIT (OUTPATIENT)
Dept: INTERNAL MEDICINE CLINIC | Age: 71
End: 2021-05-11
Payer: MEDICARE

## 2021-05-11 VITALS
OXYGEN SATURATION: 94 % | HEART RATE: 85 BPM | TEMPERATURE: 98.5 F | DIASTOLIC BLOOD PRESSURE: 84 MMHG | HEIGHT: 62 IN | RESPIRATION RATE: 16 BRPM | BODY MASS INDEX: 25.75 KG/M2 | WEIGHT: 139.9 LBS | SYSTOLIC BLOOD PRESSURE: 159 MMHG

## 2021-05-11 DIAGNOSIS — F41.1 ANXIETY NEUROSIS: ICD-10-CM

## 2021-05-11 DIAGNOSIS — E78.5 DYSLIPIDEMIA: ICD-10-CM

## 2021-05-11 DIAGNOSIS — I48.0 PAROXYSMAL ATRIAL FIBRILLATION (HCC): ICD-10-CM

## 2021-05-11 DIAGNOSIS — I10 ESSENTIAL HYPERTENSION: Primary | ICD-10-CM

## 2021-05-11 PROCEDURE — 1090F PRES/ABSN URINE INCON ASSESS: CPT | Performed by: INTERNAL MEDICINE

## 2021-05-11 PROCEDURE — G8432 DEP SCR NOT DOC, RNG: HCPCS | Performed by: INTERNAL MEDICINE

## 2021-05-11 PROCEDURE — G8427 DOCREV CUR MEDS BY ELIG CLIN: HCPCS | Performed by: INTERNAL MEDICINE

## 2021-05-11 PROCEDURE — G8419 CALC BMI OUT NRM PARAM NOF/U: HCPCS | Performed by: INTERNAL MEDICINE

## 2021-05-11 PROCEDURE — G8400 PT W/DXA NO RESULTS DOC: HCPCS | Performed by: INTERNAL MEDICINE

## 2021-05-11 PROCEDURE — 3017F COLORECTAL CA SCREEN DOC REV: CPT | Performed by: INTERNAL MEDICINE

## 2021-05-11 PROCEDURE — G8754 DIAS BP LESS 90: HCPCS | Performed by: INTERNAL MEDICINE

## 2021-05-11 PROCEDURE — G8536 NO DOC ELDER MAL SCRN: HCPCS | Performed by: INTERNAL MEDICINE

## 2021-05-11 PROCEDURE — G8753 SYS BP > OR = 140: HCPCS | Performed by: INTERNAL MEDICINE

## 2021-05-11 PROCEDURE — G9899 SCRN MAM PERF RSLTS DOC: HCPCS | Performed by: INTERNAL MEDICINE

## 2021-05-11 PROCEDURE — 99214 OFFICE O/P EST MOD 30 MIN: CPT | Performed by: INTERNAL MEDICINE

## 2021-05-11 PROCEDURE — 1101F PT FALLS ASSESS-DOCD LE1/YR: CPT | Performed by: INTERNAL MEDICINE

## 2021-05-16 NOTE — PROGRESS NOTES
[address] [facility] and Primary Care  [place] [address]  [address] [place] [address]  Phone:  [phone]  Fax: [phone]       Chief Complaint   Patient presents with    Hypertension     Patient is here for a follow up. .      SUBJECTIVE:    Gwen Weeks is a 79 y.o. female comes in for return visit stating that she has done well. We talked at length about the vaccine. She does not want to have it. There is no logical reason for not getting it. She follows with Cardiology on a regular basis and I have reviewed the note from them and she is reasonably stable. She has chronic atrial fibrillation with a controlled ventricular response. She remains on a DOAC. She also has a past history of primary hypertension, as well as dyslipidemia and anxiety. These are reasonably stable all things considered. Current Outpatient Medications   Medication Sig Dispense Refill    desonide (TRIDESILON) 0.05 % topical lotion Apply  to affected area two (2) times a day. 118 mL 4    potassium chloride (K-DUR, KLOR-CON) 20 mEq tablet Take 1 Tab by mouth daily. 90 Tab 3    magnesium oxide (MAG-OX) 400 mg tablet Take 1 Tab by mouth daily. 90 Tab 3    metoprolol succinate (TOPROL-XL) 25 mg XL tablet Take 1 Tab by mouth two (2) times a day. 180 Tab 3    rosuvastatin (CRESTOR) 10 mg tablet Take 1 tablet every day 90 Tab 3    fluticasone propion-salmeteroL (ADVAIR/WIXELA) 250-50 mcg/dose diskus inhaler Take 1 Puff by inhalation every twelve (12) hours. 1 Inhaler 11    albuterol (Ventolin HFA) 90 mcg/actuation inhaler Take 2 Puffs by inhalation every four (4) hours as needed for Wheezing. 1 Inhaler 11    hydroCHLOROthiazide (HYDRODIURIL) 12.5 mg tablet Take once daily 90 Tab 3    ascorbic acid, vitamin C, (Vitamin C) 500 mg tablet Take  by mouth.  cholecalciferol, vitamin D3, (Vitamin D3) 50 mcg (2,000 unit) tab Take  by mouth.  elderberry fruit (ELDERBERRY PO) Take  by mouth.  rivaroxaban (Xarelto) 20 mg tab tablet Take 1 tablet daily. 30 Tab 11    folic acid/multivit-min/lutein (CENTRUM SILVER PO) Take 1 Tab by mouth daily.  hydroquinone (ESOTERICA, MELQUIN) 4 % topical cream Apply  to affected area two (2) times a day. 30 g 6    ALPRAZolam (XANAX) 1 mg tablet Take 1 Tab by mouth three (3) times daily as needed for Anxiety. Max Daily Amount: 3 mg. (Patient taking differently: Take 1 mg by mouth two (2) times a day.) 10 Tab 0    QUEtiapine (SEROQUEL) 50 mg tablet Take 50 mg by mouth three (3) times daily.  FLUoxetine (PROZAC) 20 mg capsule Take 20 mg by mouth daily.        Past Medical History:   Diagnosis Date    Abdominal pain     Asthma     Bipolar 2 disorder ([facility] [address]     CAD (coronary artery disease)     ablation    DVT (deep venous thrombosis) (HCC)     Dyspnea 9/19/2014    Hepatic cyst 6/22/2019    Hypercholesteremia     Hypertension     Lumbar radicular pain 8/21/2014    Menopause     Synovitis of knee 1/28/2018    Tachycardia      Past Surgical History:   Procedure Laterality Date    ABLATION OF SVT  3/30/2015         EP STUDY W/INDUCTION  3/30/2015         HX BACK SURGERY      HX COLONOSCOPY  9-    HX HEART CATHETERIZATION      HX HYSTERECTOMY  1992     Allergies   Allergen Reactions    Ampicillin Nausea Only     Allergy was 30 years ago         REVIEW OF SYSTEMS:  General: negative for - chills or fever  ENT: negative for - headaches, nasal congestion or tinnitus  Respiratory: negative for - cough, hemoptysis, shortness of breath or wheezing  Cardiovascular : negative for - chest pain, edema, palpitations or shortness of breath  Gastrointestinal: negative for - abdominal pain, blood in stools, heartburn or nausea/vomiting  Genito-Urinary: no dysuria, trouble voiding, or hematuria  Musculoskeletal: negative for - gait disturbance, joint pain, joint stiffness or joint swelling  Neurological: no TIA or stroke symptoms  Hematologic: no bruises, no bleeding, no swollen glands  Integument: no lumps, mole changes, nail changes or rash  Endocrine: no malaise/lethargy or unexpected weight changes      Social History     Socioeconomic History    Marital status:      Spouse name: Not on file    Number of children: 1    Years of education: Not on file    Highest education level: Not on file   Occupational History    Occupation: retired--[employer]---disabled   Tobacco Use    Smoking status: Never Smoker    Smokeless tobacco: Never Used   Substance and Sexual Activity    Alcohol use: No     Alcohol/week: 0.0 standard drinks     Comment: rarely    Drug use: No    Sexual activity: Not Currently     Partners: Male     Family History   Problem Relation Age of Onset    Hypertension Mother     Heart Disease Mother     Heart Disease Father     Stroke Father        OBJECTIVE:    Visit Vitals  BP (!) 159/84   Pulse 85   Temp 98.5 °F (36.9 °C)   Resp 16   Ht 5' 2\" (1.575 m)   Wt 139 lb 14.4 oz (63.5 kg)   SpO2 94%   BMI 25.59 kg/m²     CONSTITUTIONAL: well , well nourished, appears age appropriate  EYES: perrla, eom intact  ENMT:moist mucous membranes, pharynx clear  NECK: supple. Thyroid normal  RESPIRATORY: Chest: clear to ascultation and percussion   CARDIOVASCULAR: Heart: regular rate and rhythm  GASTROINTESTINAL: Abdomen: soft, bowel sounds active  HEMATOLOGIC: no pathological lymph nodes palpated  MUSCULOSKELETAL: Extremities: no edema, pulse 1+   INTEGUMENT: No unusual rashes or suspicious skin lesions noted. Nails appear normal.  NEUROLOGIC: non-focal exam   MENTAL STATUS: alert and oriented, appropriate affect      ASSESSMENT:  1. Essential hypertension    2. Paroxysmal atrial fibrillation (HCC)    3. Dyslipidemia    4. Anxiety neurosis        PLAN:  1. The patient's blood pressure is slightly elevated, but no adjustments are made. I am doing this because invariably when she gets anxious and excited, blood pressure goes up.   Adjustments will be made if the trend continues. 2. _____ [dictation skipped] is regular for now. She will follow with her EP physician. 3. She remains on a statin. Frankly, she has occasional plaquing noted, but no chasity objective evidence of obstruction related to vascular disease. 4. Her anxiety is reasonably stable. She does follow up with Psychiatry. 5. We talked at length about the vaccine and I encouraged her to take the agent. It would be in her best interest as far as her overall health is concerned. Follow-up and Dispositions    · Return in about 3 months (around 8/11/2021).            Prince Chong MD

## 2021-05-28 ENCOUNTER — OFFICE VISIT (OUTPATIENT)
Dept: CARDIOLOGY CLINIC | Age: 71
End: 2021-05-28
Payer: MEDICARE

## 2021-05-28 VITALS
HEIGHT: 62 IN | HEART RATE: 60 BPM | BODY MASS INDEX: 25.4 KG/M2 | WEIGHT: 138 LBS | SYSTOLIC BLOOD PRESSURE: 120 MMHG | RESPIRATION RATE: 16 BRPM | OXYGEN SATURATION: 98 % | DIASTOLIC BLOOD PRESSURE: 60 MMHG

## 2021-05-28 DIAGNOSIS — E78.5 DYSLIPIDEMIA: ICD-10-CM

## 2021-05-28 DIAGNOSIS — I48.19 PERSISTENT ATRIAL FIBRILLATION (HCC): Primary | ICD-10-CM

## 2021-05-28 PROCEDURE — G8419 CALC BMI OUT NRM PARAM NOF/U: HCPCS | Performed by: SPECIALIST

## 2021-05-28 PROCEDURE — G8432 DEP SCR NOT DOC, RNG: HCPCS | Performed by: SPECIALIST

## 2021-05-28 PROCEDURE — 99214 OFFICE O/P EST MOD 30 MIN: CPT | Performed by: SPECIALIST

## 2021-05-28 PROCEDURE — G8754 DIAS BP LESS 90: HCPCS | Performed by: SPECIALIST

## 2021-05-28 PROCEDURE — 1101F PT FALLS ASSESS-DOCD LE1/YR: CPT | Performed by: SPECIALIST

## 2021-05-28 PROCEDURE — 1090F PRES/ABSN URINE INCON ASSESS: CPT | Performed by: SPECIALIST

## 2021-05-28 PROCEDURE — G8400 PT W/DXA NO RESULTS DOC: HCPCS | Performed by: SPECIALIST

## 2021-05-28 PROCEDURE — 3017F COLORECTAL CA SCREEN DOC REV: CPT | Performed by: SPECIALIST

## 2021-05-28 PROCEDURE — G8752 SYS BP LESS 140: HCPCS | Performed by: SPECIALIST

## 2021-05-28 PROCEDURE — G8427 DOCREV CUR MEDS BY ELIG CLIN: HCPCS | Performed by: SPECIALIST

## 2021-05-28 PROCEDURE — G9899 SCRN MAM PERF RSLTS DOC: HCPCS | Performed by: SPECIALIST

## 2021-05-28 PROCEDURE — G8536 NO DOC ELDER MAL SCRN: HCPCS | Performed by: SPECIALIST

## 2021-05-28 PROCEDURE — 93000 ELECTROCARDIOGRAM COMPLETE: CPT | Performed by: SPECIALIST

## 2021-05-28 NOTE — PROGRESS NOTES
Visit Vitals  /60 (BP 1 Location: Left arm, BP Patient Position: Sitting, BP Cuff Size: Adult)   Pulse 60   Resp 16   Ht 5' 2\" (1.575 m)   Wt 138 lb (62.6 kg)   SpO2 98%   BMI 25.24 kg/m²

## 2021-05-28 NOTE — PROGRESS NOTES
[address] [facility]                                                            OFFICE NOTE        Juliet Neal M.D.,F.A.C.C. [address] [facility]   1950  [id number]    Date/Time:  5/28/20213:09 PM        ICD-10-CM ICD-9-CM    1. 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 Continue observation for now and metoprolol     AF:chronic AF , now chronic . ECG with AF nstt   Continue xarelto   Continue metoprolol 25 mg bid     ecg with AF rate controlled and nstt no changes from previous     AF ablation on hold for now, she has seen Dr. Linares who suggested to proceed but she still wants to wait understanding though that AF begets itself     No untoward effects with oacs, hold off fish oil as discussed with patient     Echo on the back burner for now but we will need to consider repeat as well even if no sob reported at this time     HTN: controlled     DVT on xarelto long term no untoward effects thus far(also for chronic af)     HLD: on statin ,closely followed by her pcp she tells me     CP: No  recurrent cp  Last nuclear stress test results with no ischemia  No additional interventions for now     Carotids: carotids on 11/20 with <10% b/l   See her back in 6 months with lipids and lft        HPI   70 y.o. female. Patient with h/o PSVT and  Tachy iron followed in [facility], admitted on 5/13 at [facility] with chest pain, at that time nuclear stress test showed no ischemia or MI and EF of 69%, patient remained in NSR during hospital stay. Here for follow up  Echo on 7/12 EF 55% mild TR and MR  Also h/o PAF as per her cardiologist in [place].   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 Ejection fraction was estimated to be 63 %. No obvious wall motion  abnormalities identified in the views obtained. Right ventricle: The size was at the upper limits of normal.    Left atrium: The atrium was moderately dilated. Right atrium: The atrium was moderately dilated. Mitral valve: There was mild regurgitation. Tricuspid valve: There was moderate annular dilatation. There was moderate      Recurrent atrial flutter in 10/ 2017 seen by Ep, ablation offered she decided to wait                 CARDIAC STUDIES                  08/11/20    NUCLEAR CARDIAC STRESS TEST 09/02/2020 9/3/2020    Interpretation Summary  · Gated SPECT: Left ventricular function post-stress was normal. Calculated ejection fraction is 62%. There is no evidence of transient ischemic dilation (TID). The TID ratio is 0.98. · Baseline ECG: Atrial fibrillation, non-specific ST-T wave abnormalities. · Myocardial perfusion imaging defect 1: There is a defect that is small in size present in the basal-mid anterior location(s) that is predominantly fixed. There is normal wall motion in the defect area. The defect appears to probably be artifact caused by breast attenuation.   · Left ventricular perfusion is probably normal.  · Myocardial perfusion imaging supports a low risk stress test.    Signed by: Diamond Friend MD on 9/2/2020 12:05 PM                    EKG Results     Procedure 720 Value Units Date/Time    AMB POC EKG ROUTINE W/ 12 LEADS, INTER & REP [[id number]] Resulted: 05/28/21 1447    Order Status: Completed Updated: 05/28/21 1448              IMAGING      MRI Results (most recent):  Results from [place] encounter on 02/15/13    MRI LUMB SPINE WO CONT    Narrative  **Final Report**      ICD Codes / Adm. Diagnosis: V76.12   / Thoracic or lumbosacral neurit  Examination:  MR L SPINE WO CON  - [id number] - Feb 15 2013  1:58PM  Accession No:  [id number]  Reason:  lumbar radiculopathy      REPORT:  INDICATION: lumbar radiculopathy , fell down stairs at home December 2012  724.4    COMPARISON: Radiographs November 5, 2011    EXAM: Sagittal T1-weighted spin-echo, sagittal T2-weighted fast spin-echo,  sagittal inversion recovery, axial T1-weighted spin-echo and axial  T2-weighted fast spin-echo MR images of the lumbar spine are obtained. FINDINGS: Conus position, morphology and signal normal. There is normal  vertebral body height and bone signal. There is no substantial listhesis  though there is a mild levoconvex lumbar curve centered at L3. No paraspinal  soft tissue mass is shown. There is no substantial disc or facet abnormality at T11-12 and T12-L1. L1-2 shows mild disc space narrowing with small left lateral disc  protrusion. There is mild bilateral facet arthropathy. There is no canal or  foraminal stenosis. L2-3 shows mild disc space narrowing with mild leftward lateralizing diffuse  disc bulging. There is mild bilateral facet arthropathy. There is no canal  or foraminal stenosis. L3-4 shows mild disc space narrowing with mild leftward lateralizing diffuse  disc bulging and moderate bilateral facet arthropathy. There is a mild  appearance of canal stenosis with mild right foraminal stenosis. L4-5 shows mild disc space narrowing with mild central disc bulging and  moderate bilateral facet arthropathy. There is a borderline appearance of  canal stenosis and mild right foraminal narrowing.     L5-S1 shows minimal disc space narrowing with a moderate-sized left  paracentral and lateral disc herniation measuring 9 mm craniocaudal, 1.2 cm  transverse and 0.7 cm anterior posterior. There is indentation of the  central and left anterolateral thecal sac as well as posterior displacement  of the traversing left S1 nerve. There is no substantial foraminal narrowing. IMPRESSION:  1. Small left lateral disc protrusion at L1-2.  2. Mild L3-4 and borderline L4-5 canal stenosis with mild right foraminal  narrowing. 3. Moderate-sized left paracentral and lateral disc herniation at L5-S1 with  posterior displacement of traversing left S1 nerve. Signing/Reading Doctor: Bart Monaco ([id number])  Approved: ROB Monaco ([id number])  Feb 15 2013  2:39PM      CT Results (most recent):  Results from Hospital Encounter encounter on 06/14/19    CT ABD PELV W CONT    Narrative  EXAM: CT ABD PELV W CONT    INDICATION: Abdominal pain    COMPARISON: None    CONTRAST: 100 mL of Isovue-370. TECHNIQUE:  Following the uneventful intravenous administration of contrast, thin axial  images were obtained through the abdomen and pelvis. Coronal and sagittal  reconstructions were generated. Oral contrast was administered. CT dose  reduction was achieved through use of a standardized protocol tailored for this  examination and automatic exposure control for dose modulation. Adaptive  statistical iterative reconstruction (ASIR) was utilized. FINDINGS:  LUNG BASES: Clear. INCIDENTALLY IMAGED HEART AND MEDIASTINUM: Unremarkable. LIVER: 10 mm hypodensity in the left hepatic lobe, likely a cyst. Punctate  hypodensity at the dome of the liver and in the caudate lobe, too small to  characterize. Main portal vein is patent. GALLBLADDER: Unremarkable. SPLEEN: No mass. PANCREAS: No mass or ductal dilatation. ADRENALS: Unremarkable. KIDNEYS: Cortical thinning at the lower pole of the kidneys bilaterally. No  hydronephrosis or lesions. STOMACH: Unremarkable. SMALL BOWEL: No dilatation or wall thickening. COLON: No dilatation or wall thickening. APPENDIX: Nonvisualized.   PERITONEUM: No ascites or pneumoperitoneum. RETROPERITONEUM: [place] IVC filter in place. REPRODUCTIVE ORGANS: Surgically absent uterus. No adnexal masses. No pelvic  lymphadenopathy or free fluid. URINARY BLADDER: No mass or calculus. BONES: Levoconvex scoliosis of the lumbar spine. Degenerative changes at L5-S1. Bony defect in the right iliac bone either developmental or from prior surgery. ADDITIONAL COMMENTS: There is early venous opacification of the right common  femoral vein which may suggest an arteriovenous fistula or other reason for  early venous return from the right lower extremity. Impression  IMPRESSION:  1. No acute intra-abdominal pathology. 2.  Incidental findings including a 10 mm left lobe liver cyst, a [place] IVC  filter, scoliosis of the spine, and early venous return from the right lower  extremity. XR Results (most recent):  Results from Hospital Encounter encounter on 10/24/19    XR CHEST PORT    Narrative  EXAM: XR CHEST PORT    INDICATION: Upper abdominal and epigastric pain x3 days exacerbated by  inspiration with associated shortness of breath. COMPARISON: Chest x-ray 11/9/2018. FINDINGS: A portable AP radiograph of the chest was obtained at 22:26 hours. The  lungs are clear. The cardiac and mediastinal contours and pulmonary vascularity  are normal.  The bones and soft tissues are grossly within normal limits. Visualized upper abdomen appears unremarkable. Impression  IMPRESSION: No acute findings.           Past Medical History:   Diagnosis Date    Abdominal pain     Asthma     Bipolar 2 disorder ([facility]     CAD (coronary artery disease)     ablation    DVT (deep venous thrombosis) (HCC)     Dyspnea 9/19/2014    Hepatic cyst 6/22/2019    Hypercholesteremia     Hypertension     Lumbar radicular pain 8/21/2014    Menopause     Synovitis of knee 1/28/2018    Tachycardia      Past Surgical History:   Procedure Laterality Date    ABLATION OF SVT  3/30/2015  EP STUDY W/INDUCTION  3/30/2015         HX BACK SURGERY      HX COLONOSCOPY  9-    HX HEART CATHETERIZATION      HX HYSTERECTOMY  1992     Social History     Tobacco Use    Smoking status: Never Smoker    Smokeless tobacco: Never Used   Vaping Use    Vaping Use: Never used   Substance Use Topics    Alcohol use: No     Alcohol/week: 0.0 standard drinks     Comment: rarely    Drug use: No     Family History   Problem Relation Age of Onset    Hypertension Mother     Heart Disease Mother     Heart Disease Father     Stroke Father      Allergies   Allergen Reactions    Ampicillin Nausea Only     Allergy was 30 years ago         Visit Vitals  /60 (BP 1 Location: Left arm, BP Patient Position: Sitting, BP Cuff Size: Adult)   Pulse 60   Resp 16   Ht 5' 2\" (1.575 m)   Wt 138 lb (62.6 kg)   SpO2 98%   BMI 25.24 kg/m²         Last 3 Recorded Weights in this Encounter    05/28/21 1442   Weight: 138 lb (62.6 kg)            Review of Systems:   Pertinent items are noted in the History of Present Illness. Neck: no JVD  Heart: irregularly irregular rhythm  Lungs: clear to auscultation bilaterally  Abdomen: soft, non-tender. Bowel sounds normal. No masses,  no organomegaly  Extremities: extremities normal, atraumatic, no cyanosis or edema      Current Outpatient Medications on File Prior to Visit   Medication Sig Dispense Refill    desonide (TRIDESILON) 0.05 % topical lotion Apply  to affected area two (2) times a day. 118 mL 4    potassium chloride (K-DUR, KLOR-CON) 20 mEq tablet Take 1 Tab by mouth daily. 90 Tab 3    magnesium oxide (MAG-OX) 400 mg tablet Take 1 Tab by mouth daily. 90 Tab 3    metoprolol succinate (TOPROL-XL) 25 mg XL tablet Take 1 Tab by mouth two (2) times a day.  180 Tab 3    rosuvastatin (CRESTOR) 10 mg tablet Take 1 tablet every day 90 Tab 3    fluticasone propion-salmeteroL (ADVAIR/WIXELA) 250-50 mcg/dose diskus inhaler Take 1 Puff by inhalation every twelve (12) hours. 1 Inhaler 11    albuterol (Ventolin HFA) 90 mcg/actuation inhaler Take 2 Puffs by inhalation every four (4) hours as needed for Wheezing. 1 Inhaler 11    hydroCHLOROthiazide (HYDRODIURIL) 12.5 mg tablet Take once daily 90 Tab 3    ascorbic acid, vitamin C, (Vitamin C) 500 mg tablet Take  by mouth.  cholecalciferol, vitamin D3, (Vitamin D3) 50 mcg (2,000 unit) tab Take  by mouth.  elderberry fruit (ELDERBERRY PO) Take  by mouth.  rivaroxaban (Xarelto) 20 mg tab tablet Take 1 tablet daily. 30 Tab 11    folic acid/multivit-min/lutein (CENTRUM SILVER PO) Take 1 Tab by mouth daily.  hydroquinone (ESOTERICA, MELQUIN) 4 % topical cream Apply  to affected area two (2) times a day. 30 g 6    ALPRAZolam (XANAX) 1 mg tablet Take 1 Tab by mouth three (3) times daily as needed for Anxiety. Max Daily Amount: 3 mg. (Patient taking differently: Take 1 mg by mouth two (2) times a day.) 10 Tab 0    QUEtiapine (SEROQUEL) 50 mg tablet Take 50 mg by mouth three (3) times daily.  FLUoxetine (PROZAC) 20 mg capsule Take 20 mg by mouth daily. No current facility-administered medications on file prior to visit. Xander Jonestony had no medications administered during this visit. Current Outpatient Medications   Medication Sig    desonide (TRIDESILON) 0.05 % topical lotion Apply  to affected area two (2) times a day.  potassium chloride (K-DUR, KLOR-CON) 20 mEq tablet Take 1 Tab by mouth daily.  magnesium oxide (MAG-OX) 400 mg tablet Take 1 Tab by mouth daily.  metoprolol succinate (TOPROL-XL) 25 mg XL tablet Take 1 Tab by mouth two (2) times a day.  rosuvastatin (CRESTOR) 10 mg tablet Take 1 tablet every day    fluticasone propion-salmeteroL (ADVAIR/WIXELA) 250-50 mcg/dose diskus inhaler Take 1 Puff by inhalation every twelve (12) hours.  albuterol (Ventolin HFA) 90 mcg/actuation inhaler Take 2 Puffs by inhalation every four (4) hours as needed for Wheezing.     hydroCHLOROthiazide (HYDRODIURIL) 12.5 mg tablet Take once daily    ascorbic acid, vitamin C, (Vitamin C) 500 mg tablet Take  by mouth.  cholecalciferol, vitamin D3, (Vitamin D3) 50 mcg (2,000 unit) tab Take  by mouth.  elderberry fruit (ELDERBERRY PO) Take  by mouth.  rivaroxaban (Xarelto) 20 mg tab tablet Take 1 tablet daily.  folic acid/multivit-min/lutein (CENTRUM SILVER PO) Take 1 Tab by mouth daily.  hydroquinone (ESOTERICA, MELQUIN) 4 % topical cream Apply  to affected area two (2) times a day.  ALPRAZolam (XANAX) 1 mg tablet Take 1 Tab by mouth three (3) times daily as needed for Anxiety. Max Daily Amount: 3 mg. (Patient taking differently: Take 1 mg by mouth two (2) times a day.)    QUEtiapine (SEROQUEL) 50 mg tablet Take 50 mg by mouth three (3) times daily.  FLUoxetine (PROZAC) 20 mg capsule Take 20 mg by mouth daily. No current facility-administered medications for this visit. Lab Results   Component Value Date/Time    Cholesterol, total 139 11/05/2020 05:19 PM    HDL Cholesterol 83 11/05/2020 05:19 PM    LDL, calculated 45 11/05/2020 05:19 PM    LDL, calculated 61 02/08/2019 03:51 PM    VLDL, calculated 11 11/05/2020 05:19 PM    VLDL, calculated 12 02/08/2019 03:51 PM    Triglyceride 49 11/05/2020 05:19 PM    CHOL/HDL Ratio 2.5 05/16/2013 02:30 AM       Lab Results   Component Value Date/Time    Sodium 143 11/05/2020 05:19 PM    Potassium 4.1 11/05/2020 05:19 PM    Chloride 104 11/05/2020 05:19 PM    CO2 21 11/05/2020 05:19 PM    Anion gap 8 10/24/2019 08:50 PM    Glucose 89 11/05/2020 05:19 PM    BUN 11 11/05/2020 05:19 PM    Creatinine 1.05 (H) 11/05/2020 05:19 PM    BUN/Creatinine ratio 10 (L) 11/05/2020 05:19 PM    GFR est AA 63 11/05/2020 05:19 PM    GFR est non-AA 54 (L) 11/05/2020 05:19 PM    Calcium 10.2 11/05/2020 05:19 PM       Lab Results   Component Value Date/Time    ALT (SGPT) 29 11/05/2020 05:19 PM    Alk.  phosphatase 65 11/05/2020 05:19 PM    Bilirubin, direct 0.21 03/05/2019 11:46 AM    Bilirubin, total 1.1 11/05/2020 05:19 PM       Lab Results   Component Value Date/Time    WBC 3.9 11/05/2020 05:19 PM    Hemoglobin (POC) 10.9 (L) 07/06/2013 01:06 AM    HGB 14.0 11/05/2020 05:19 PM    Hematocrit (POC) 32 (L) 07/06/2013 01:06 AM    HCT 40.6 11/05/2020 05:19 PM    PLATELET 482 60/58/3067 05:19 PM    MCV 89 11/05/2020 05:19 PM       Lab Results   Component Value Date/Time    TSH 0.934 11/05/2020 05:19 PM         Lab Results   Component Value Date/Time    Cholesterol, total 139 11/05/2020 05:19 PM    Cholesterol, total 167 02/08/2019 03:51 PM    Cholesterol, total 158 05/16/2017 01:17 PM    Cholesterol, total 167 01/11/2016 03:58 PM    Cholesterol, total 191 12/13/2013 04:43 PM    HDL Cholesterol 83 11/05/2020 05:19 PM    HDL Cholesterol 94 02/08/2019 03:51 PM    HDL Cholesterol 80 05/16/2017 01:17 PM    HDL Cholesterol 99 01/11/2016 03:58 PM    HDL Cholesterol 104 12/13/2013 04:43 PM    LDL, calculated 45 11/05/2020 05:19 PM    LDL, calculated 61 02/08/2019 03:51 PM    LDL, calculated 60 05/16/2017 01:17 PM    LDL, calculated 49 01/11/2016 03:58 PM    LDL, calculated 70 12/13/2013 04:43 PM    LDL, calculated 163 (H) 07/16/2013 04:57 AM    Triglyceride 49 11/05/2020 05:19 PM    Triglyceride 61 02/08/2019 03:51 PM    Triglyceride 89 05/16/2017 01:17 PM    Triglyceride 96 01/11/2016 03:58 PM    Triglyceride 87 12/13/2013 04:43 PM    CHOL/HDL Ratio 2.5 05/16/2013 02:30 AM                Please note that this dictation was completed with Granular, the [employer] voice recognition software. Quite often unanticipated grammatical, syntax, homophones, and other interpretative errors are inadvertently transcribed by the computer software. Please disregard these errors. Please excuse any errors that have escaped final proofreading.

## 2021-05-28 NOTE — PATIENT INSTRUCTIONS
Follow up with Dr. Lexi Calabrese in 6 months. Have fasting labs (nothing to eat or drink past midnight) obtained 1 week prior to follow up.

## 2021-07-09 ENCOUNTER — TELEPHONE (OUTPATIENT)
Dept: CARDIOLOGY CLINIC | Age: 71
End: 2021-07-09

## 2021-07-09 NOTE — TELEPHONE ENCOUNTER
Patient requesting appointment with Dr. Brandon Butts. States she is not having chest pressure. States she does \"not know, it's a weird feeling, comes and goes. \"   Patient requesting to be seen and have another EKG. Offered patient first available 07/20 and informed that Dr. Brandon Butts will not be in the office next week. Patient requesting an earlier date and to have a call back today.     Phone:  [phone]

## 2021-07-12 NOTE — TELEPHONE ENCOUNTER
Identifiers x 2. Patient with complaints of sternal discomfort that comes and goes. No correlation with activity. Informed that it did not sound cardiac related. Requested follow up with Dr. Geni Baum.       Future Appointments   Date Time Provider Carina Peñaloza   7/20/2021  2:20 PM Bety Alan MD [facility] BS AMB   8/17/2021  3:30 PM Andie Reyes MD [facility] MAIN BS AMB   12/3/2021  2:20 PM MD [facility] Cottrell BS AMB

## 2021-07-27 ENCOUNTER — TELEPHONE (OUTPATIENT)
Dept: CARDIOLOGY CLINIC | Age: 71
End: 2021-07-27

## 2021-07-27 NOTE — TELEPHONE ENCOUNTER
Patient is requesting a sooner appointment than 8/9 for a chest discomfort she had 7/26th at night. Patient states she has not had the pain since then, but would like to see Dr. Marylin Lazar sooner.     PHONE: [phone]

## 2021-07-29 NOTE — TELEPHONE ENCOUNTER
TC to pt, ID verified. Pt states she has some mid chest discomfort, she would not describe it as pain or pressure, just uncomfortable. She is not having this feeling right now. She is concerned and would like to be seen sooner. Changed appt from 8/9 to 8/3. Advised she be seen in ER if the pain worsened or she became short of breath. Pt understands. No further questions.

## 2021-08-03 ENCOUNTER — OFFICE VISIT (OUTPATIENT)
Dept: CARDIOLOGY CLINIC | Age: 71
End: 2021-08-03
Payer: MEDICARE

## 2021-08-03 VITALS
RESPIRATION RATE: 16 BRPM | HEART RATE: 66 BPM | SYSTOLIC BLOOD PRESSURE: 130 MMHG | DIASTOLIC BLOOD PRESSURE: 90 MMHG | OXYGEN SATURATION: 98 % | HEIGHT: 62 IN | WEIGHT: 141 LBS | BODY MASS INDEX: 25.95 KG/M2

## 2021-08-03 DIAGNOSIS — R07.9 CHEST PAIN, UNSPECIFIED TYPE: ICD-10-CM

## 2021-08-03 DIAGNOSIS — I48.19 PERSISTENT ATRIAL FIBRILLATION (HCC): Primary | ICD-10-CM

## 2021-08-03 PROCEDURE — G8510 SCR DEP NEG, NO PLAN REQD: HCPCS | Performed by: SPECIALIST

## 2021-08-03 PROCEDURE — G8419 CALC BMI OUT NRM PARAM NOF/U: HCPCS | Performed by: SPECIALIST

## 2021-08-03 PROCEDURE — 99214 OFFICE O/P EST MOD 30 MIN: CPT | Performed by: SPECIALIST

## 2021-08-03 PROCEDURE — G9899 SCRN MAM PERF RSLTS DOC: HCPCS | Performed by: SPECIALIST

## 2021-08-03 PROCEDURE — 1090F PRES/ABSN URINE INCON ASSESS: CPT | Performed by: SPECIALIST

## 2021-08-03 PROCEDURE — 3017F COLORECTAL CA SCREEN DOC REV: CPT | Performed by: SPECIALIST

## 2021-08-03 PROCEDURE — G8755 DIAS BP > OR = 90: HCPCS | Performed by: SPECIALIST

## 2021-08-03 PROCEDURE — G8752 SYS BP LESS 140: HCPCS | Performed by: SPECIALIST

## 2021-08-03 PROCEDURE — 1101F PT FALLS ASSESS-DOCD LE1/YR: CPT | Performed by: SPECIALIST

## 2021-08-03 PROCEDURE — G8400 PT W/DXA NO RESULTS DOC: HCPCS | Performed by: SPECIALIST

## 2021-08-03 PROCEDURE — G8536 NO DOC ELDER MAL SCRN: HCPCS | Performed by: SPECIALIST

## 2021-08-03 PROCEDURE — G8427 DOCREV CUR MEDS BY ELIG CLIN: HCPCS | Performed by: SPECIALIST

## 2021-08-03 PROCEDURE — 93000 ELECTROCARDIOGRAM COMPLETE: CPT | Performed by: SPECIALIST

## 2021-08-03 RX ORDER — HYDROCODONE BITARTRATE AND ACETAMINOPHEN 5; 325 MG/1; MG/1
TABLET ORAL
COMMUNITY
Start: 2021-05-10 | End: 2021-11-17

## 2021-08-03 NOTE — PATIENT INSTRUCTIONS
Please have FASTING labs done this week    An order has been placed for you for Coronary artery CT . You will be called to schedule this through [employer]. If you do not hear from them in one week call them at [phone]. Follow up with Dr. Goldy Rose 1 week after testing.

## 2021-08-03 NOTE — PROGRESS NOTES
Visit Vitals  BP (!) 130/90   Pulse 66   Resp 16   Ht 5' 2\" (1.575 m)   Wt 141 lb (64 kg)   SpO2 98%   BMI 25.79 kg/m²

## 2021-08-03 NOTE — PROGRESS NOTES
[address] [facility]                                                            OFFICE NOTE        Caterina Catalan M.D.,PRUDENCE Jermain Senters   1950  [id number]    Date/Time:  8/3/849457:25 AM            SUBJECTIVE:  She has been experiencing the last few weeks occasional chest discomfort mostly substernal associated with some nausea. No vomiting reported. Some shortness of breath also reported. 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 Continue observation for now and metoprolol     AF:chronic AF , now chronic . ECG with AF nstt   Continue xarelto   Continue metoprolol 25 mg bid      ecg with AF rate controlled and nstt no changes from previous     AF ablation on hold for now, she has seen Dr. Linares who suggested to proceed but she still wants to wait understanding though that AF begets itself     No untoward effects with oacs, hold off fish oil as discussed with patient     Echo on the back burner for now but we will need to consider repeat as well even if no sob reported at this time     HTN: controlled     DVT on xarelto long term no untoward effects thus far(also for chronic af)     HLD: on statin ,closely followed by her pcp she tells me     CP:   Recurrent chest pain now her EKG today reveals atrial fibrillation rate controlled no significant ST-T changes. Discussed options. In my opinion repeating nuclear stress test may not be very helpful in her particular case. I feel that we need to ascertai ascertain if any significant coronary disease. We will proceed with a chest CT coronary angiography. I discussed this with the patient is agreeable to proceed and she is aware that if CT coronary angiography is abnormal then cardiac catheterization should be considered at that time.     Carotids: carotids on 11/20 with <10% b/l     Otherwise see her back in 2 to 3 weeks after the CTA. HPI   70 y.o. female. Patient with h/o PSVT and  Tachy iron followed in [facility], admitted on 5/13 at [facility] with chest pain, at that time nuclear stress test showed no ischemia or MI and EF of 69%, patient remained in NSR during hospital stay. Here for follow up  Echo on 7/12 EF 55% mild TR and MR  Also h/o PAF as per her cardiologist in [place]. 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 Ejection fraction was estimated to be 63 %. No obvious wall motion  abnormalities identified in the views obtained. Right ventricle: The size was at the upper limits of normal.    Left atrium: The atrium was moderately dilated. Right atrium: The atrium was moderately dilated. Mitral valve: There was mild regurgitation. Tricuspid valve: There was moderate annular dilatation. There was moderate      Recurrent atrial flutter in 10/ 2017 seen by Ep, ablation offered she decided to wait              CARDIAC STUDIES                  08/11/20    NUCLEAR CARDIAC STRESS TEST 09/02/2020 9/3/2020    Interpretation Summary  · Gated SPECT: Left ventricular function post-stress was normal. Calculated ejection fraction is 62%. There is no evidence of transient ischemic dilation (TID). The TID ratio is 0.98. · Baseline ECG: Atrial fibrillation, non-specific ST-T wave abnormalities. · Myocardial perfusion imaging defect 1: There is a defect that is small in size present in the basal-mid anterior location(s) that is predominantly fixed. There is normal wall motion in the defect area.  The defect appears to probably be artifact caused by breast attenuation. · Left ventricular perfusion is probably normal.  · Myocardial perfusion imaging supports a low risk stress test.    Signed by: Sadaf Nolasco MD on 9/2/2020 12:05 PM                    EKG Results     None              IMAGING      MRI Results (most recent):  Results from [place] encounter on 02/15/13    MRI LUMB SPINE WO CONT    Narrative  **Final Report**      ICD Codes / Adm. Diagnosis: V76.12   / Thoracic or lumbosacral neurit  Examination:  MR L SPINE WO CON  - [id number] - Feb 15 2013  1:58PM  Accession No:  [id number]  Reason:  lumbar radiculopathy      REPORT:  INDICATION: lumbar radiculopathy , fell down stairs at home December 2012  724.4    COMPARISON: Radiographs November 5, 2011    EXAM: Sagittal T1-weighted spin-echo, sagittal T2-weighted fast spin-echo,  sagittal inversion recovery, axial T1-weighted spin-echo and axial  T2-weighted fast spin-echo MR images of the lumbar spine are obtained. FINDINGS: Conus position, morphology and signal normal. There is normal  vertebral body height and bone signal. There is no substantial listhesis  though there is a mild levoconvex lumbar curve centered at L3. No paraspinal  soft tissue mass is shown. There is no substantial disc or facet abnormality at T11-12 and T12-L1. L1-2 shows mild disc space narrowing with small left lateral disc  protrusion. There is mild bilateral facet arthropathy. There is no canal or  foraminal stenosis. L2-3 shows mild disc space narrowing with mild leftward lateralizing diffuse  disc bulging. There is mild bilateral facet arthropathy. There is no canal  or foraminal stenosis. L3-4 shows mild disc space narrowing with mild leftward lateralizing diffuse  disc bulging and moderate bilateral facet arthropathy. There is a mild  appearance of canal stenosis with mild right foraminal stenosis.     L4-5 shows mild disc space narrowing with mild central disc bulging and  moderate bilateral facet arthropathy. There is a borderline appearance of  canal stenosis and mild right foraminal narrowing. L5-S1 shows minimal disc space narrowing with a moderate-sized left  paracentral and lateral disc herniation measuring 9 mm craniocaudal, 1.2 cm  transverse and 0.7 cm anterior posterior. There is indentation of the  central and left anterolateral thecal sac as well as posterior displacement  of the traversing left S1 nerve. There is no substantial foraminal narrowing. IMPRESSION:  1. Small left lateral disc protrusion at L1-2.  2. Mild L3-4 and borderline L4-5 canal stenosis with mild right foraminal  narrowing. 3. Moderate-sized left paracentral and lateral disc herniation at L5-S1 with  posterior displacement of traversing left S1 nerve. Signing/Reading Doctor: Ray Sanchez ([id number])  Approved: ROB Sanchez ([id number])  Feb 15 2013  2:39PM      CT Results (most recent):  Results from Hospital Encounter encounter on 06/14/19    CT ABD PELV W CONT    Narrative  EXAM: CT ABD PELV W CONT    INDICATION: Abdominal pain    COMPARISON: None    CONTRAST: 100 mL of Isovue-370. TECHNIQUE:  Following the uneventful intravenous administration of contrast, thin axial  images were obtained through the abdomen and pelvis. Coronal and sagittal  reconstructions were generated. Oral contrast was administered. CT dose  reduction was achieved through use of a standardized protocol tailored for this  examination and automatic exposure control for dose modulation. Adaptive  statistical iterative reconstruction (ASIR) was utilized. FINDINGS:  LUNG BASES: Clear. INCIDENTALLY IMAGED HEART AND MEDIASTINUM: Unremarkable. LIVER: 10 mm hypodensity in the left hepatic lobe, likely a cyst. Punctate  hypodensity at the dome of the liver and in the caudate lobe, too small to  characterize. Main portal vein is patent. GALLBLADDER: Unremarkable. SPLEEN: No mass.   PANCREAS: No mass or ductal dilatation. ADRENALS: Unremarkable. KIDNEYS: Cortical thinning at the lower pole of the kidneys bilaterally. No  hydronephrosis or lesions. STOMACH: Unremarkable. SMALL BOWEL: No dilatation or wall thickening. COLON: No dilatation or wall thickening. APPENDIX: Nonvisualized. PERITONEUM: No ascites or pneumoperitoneum. RETROPERITONEUM: [place] IVC filter in place. REPRODUCTIVE ORGANS: Surgically absent uterus. No adnexal masses. No pelvic  lymphadenopathy or free fluid. URINARY BLADDER: No mass or calculus. BONES: Levoconvex scoliosis of the lumbar spine. Degenerative changes at L5-S1. Bony defect in the right iliac bone either developmental or from prior surgery. ADDITIONAL COMMENTS: There is early venous opacification of the right common  femoral vein which may suggest an arteriovenous fistula or other reason for  early venous return from the right lower extremity. Impression  IMPRESSION:  1. No acute intra-abdominal pathology. 2.  Incidental findings including a 10 mm left lobe liver cyst, a [place] IVC  filter, scoliosis of the spine, and early venous return from the right lower  extremity. XR Results (most recent):  Results from Hospital Encounter encounter on 10/24/19    XR CHEST PORT    Narrative  EXAM: XR CHEST PORT    INDICATION: Upper abdominal and epigastric pain x3 days exacerbated by  inspiration with associated shortness of breath. COMPARISON: Chest x-ray 11/9/2018. FINDINGS: A portable AP radiograph of the chest was obtained at 22:26 hours. The  lungs are clear. The cardiac and mediastinal contours and pulmonary vascularity  are normal.  The bones and soft tissues are grossly within normal limits. Visualized upper abdomen appears unremarkable. Impression  IMPRESSION: No acute findings.           Past Medical History:   Diagnosis Date    Abdominal pain     Asthma     Bipolar 2 disorder ([facility] [address]     CAD (coronary artery disease)     ablation    DVT ([facility] thrombosis) ([facility] [address]     Dyspnea 9/19/2014    Hepatic cyst 6/22/2019    Hypercholesteremia     Hypertension     Lumbar radicular pain 8/21/2014    Menopause     Synovitis of knee 1/28/2018    Tachycardia      Past Surgical History:   Procedure Laterality Date    ABLATION OF SVT  3/30/2015         EP STUDY W/INDUCTION  3/30/2015         HX BACK SURGERY      HX COLONOSCOPY  9-    HX HEART CATHETERIZATION      HX HYSTERECTOMY  1992     Social History     Tobacco Use    Smoking status: Never Smoker    Smokeless tobacco: Never Used   Vaping Use    Vaping Use: Never used   Substance Use Topics    Alcohol use: No     Alcohol/week: 0.0 standard drinks     Comment: rarely    Drug use: No     Family History   Problem Relation Age of Onset    Hypertension Mother     Heart Disease Mother     Heart Disease Father     Stroke Father      Allergies   Allergen Reactions    Ampicillin Nausea Only     Allergy was 30 years ago         There were no vitals taken for this visit. There were no vitals filed for this visit. Review of Systems:   Pertinent items are noted in the History of Present Illness. Neck: no JVD  Heart: irregularly irregular rhythm  Lungs: clear to auscultation bilaterally  Abdomen: soft, non-tender. Bowel sounds normal. No masses,  no organomegaly  Extremities: no edema      Current Outpatient Medications on File Prior to Visit   Medication Sig Dispense Refill    desonide (TRIDESILON) 0.05 % topical lotion Apply  to affected area two (2) times a day. 118 mL 4    potassium chloride (K-DUR, KLOR-CON) 20 mEq tablet Take 1 Tab by mouth daily. 90 Tab 3    magnesium oxide (MAG-OX) 400 mg tablet Take 1 Tab by mouth daily. 90 Tab 3    metoprolol succinate (TOPROL-XL) 25 mg XL tablet Take 1 Tab by mouth two (2) times a day.  180 Tab 3    rosuvastatin (CRESTOR) 10 mg tablet Take 1 tablet every day 90 Tab 3    fluticasone propion-salmeteroL (ADVAIR/WIXELA) 250-50 mcg/dose diskus inhaler Take 1 Puff by inhalation every twelve (12) hours. 1 Inhaler 11    albuterol (Ventolin HFA) 90 mcg/actuation inhaler Take 2 Puffs by inhalation every four (4) hours as needed for Wheezing. 1 Inhaler 11    hydroCHLOROthiazide (HYDRODIURIL) 12.5 mg tablet Take once daily 90 Tab 3    ascorbic acid, vitamin C, (Vitamin C) 500 mg tablet Take  by mouth.  cholecalciferol, vitamin D3, (Vitamin D3) 50 mcg (2,000 unit) tab Take  by mouth.  elderberry fruit (ELDERBERRY PO) Take  by mouth.  rivaroxaban (Xarelto) 20 mg tab tablet Take 1 tablet daily. 30 Tab 11    folic acid/multivit-min/lutein (CENTRUM SILVER PO) Take 1 Tab by mouth daily.  hydroquinone (ESOTERICA, MELQUIN) 4 % topical cream Apply  to affected area two (2) times a day. 30 g 6    ALPRAZolam (XANAX) 1 mg tablet Take 1 Tab by mouth three (3) times daily as needed for Anxiety. Max Daily Amount: 3 mg. (Patient taking differently: Take 1 mg by mouth two (2) times a day.) 10 Tab 0    QUEtiapine (SEROQUEL) 50 mg tablet Take 50 mg by mouth three (3) times daily.  FLUoxetine (PROZAC) 20 mg capsule Take 20 mg by mouth daily. No current facility-administered medications on file prior to visit. Raulito Self had no medications administered during this visit. Current Outpatient Medications   Medication Sig    desonide (TRIDESILON) 0.05 % topical lotion Apply  to affected area two (2) times a day.  potassium chloride (K-DUR, KLOR-CON) 20 mEq tablet Take 1 Tab by mouth daily.  magnesium oxide (MAG-OX) 400 mg tablet Take 1 Tab by mouth daily.  metoprolol succinate (TOPROL-XL) 25 mg XL tablet Take 1 Tab by mouth two (2) times a day.  rosuvastatin (CRESTOR) 10 mg tablet Take 1 tablet every day    fluticasone propion-salmeteroL (ADVAIR/WIXELA) 250-50 mcg/dose diskus inhaler Take 1 Puff by inhalation every twelve (12) hours.     albuterol (Ventolin HFA) 90 mcg/actuation inhaler Take 2 Puffs by inhalation every four (4) hours as needed for Wheezing.  hydroCHLOROthiazide (HYDRODIURIL) 12.5 mg tablet Take once daily    ascorbic acid, vitamin C, (Vitamin C) 500 mg tablet Take  by mouth.  cholecalciferol, vitamin D3, (Vitamin D3) 50 mcg (2,000 unit) tab Take  by mouth.  elderberry fruit (ELDERBERRY PO) Take  by mouth.  rivaroxaban (Xarelto) 20 mg tab tablet Take 1 tablet daily.  folic acid/multivit-min/lutein (CENTRUM SILVER PO) Take 1 Tab by mouth daily.  hydroquinone (ESOTERICA, MELQUIN) 4 % topical cream Apply  to affected area two (2) times a day.  ALPRAZolam (XANAX) 1 mg tablet Take 1 Tab by mouth three (3) times daily as needed for Anxiety. Max Daily Amount: 3 mg. (Patient taking differently: Take 1 mg by mouth two (2) times a day.)    QUEtiapine (SEROQUEL) 50 mg tablet Take 50 mg by mouth three (3) times daily.  FLUoxetine (PROZAC) 20 mg capsule Take 20 mg by mouth daily. No current facility-administered medications for this visit.          Lab Results   Component Value Date/Time    Cholesterol, total 139 11/05/2020 05:19 PM    HDL Cholesterol 83 11/05/2020 05:19 PM    LDL, calculated 45 11/05/2020 05:19 PM    LDL, calculated 61 02/08/2019 03:51 PM    VLDL, calculated 11 11/05/2020 05:19 PM    VLDL, calculated 12 02/08/2019 03:51 PM    Triglyceride 49 11/05/2020 05:19 PM    CHOL/HDL Ratio 2.5 05/16/2013 02:30 AM       Lab Results   Component Value Date/Time    Sodium 143 11/05/2020 05:19 PM    Potassium 4.1 11/05/2020 05:19 PM    Chloride 104 11/05/2020 05:19 PM    CO2 21 11/05/2020 05:19 PM    Anion gap 8 10/24/2019 08:50 PM    Glucose 89 11/05/2020 05:19 PM    BUN 11 11/05/2020 05:19 PM    Creatinine 1.05 (H) 11/05/2020 05:19 PM    BUN/Creatinine ratio 10 (L) 11/05/2020 05:19 PM    GFR est AA 63 11/05/2020 05:19 PM    GFR est non-AA 54 (L) 11/05/2020 05:19 PM    Calcium 10.2 11/05/2020 05:19 PM       Lab Results   Component Value Date/Time    ALT (SGPT) 29 11/05/2020 05:19 PM    Alk. phosphatase 65 11/05/2020 05:19 PM    Bilirubin, direct 0.21 03/05/2019 11:46 AM    Bilirubin, total 1.1 11/05/2020 05:19 PM       Lab Results   Component Value Date/Time    WBC 3.9 11/05/2020 05:19 PM    Hemoglobin (POC) 10.9 (L) 07/06/2013 01:06 AM    HGB 14.0 11/05/2020 05:19 PM    Hematocrit (POC) 32 (L) 07/06/2013 01:06 AM    HCT 40.6 11/05/2020 05:19 PM    PLATELET 015 64/36/6585 05:19 PM    MCV 89 11/05/2020 05:19 PM       Lab Results   Component Value Date/Time    TSH 0.934 11/05/2020 05:19 PM         Lab Results   Component Value Date/Time    Cholesterol, total 139 11/05/2020 05:19 PM    Cholesterol, total 167 02/08/2019 03:51 PM    Cholesterol, total 158 05/16/2017 01:17 PM    Cholesterol, total 167 01/11/2016 03:58 PM    Cholesterol, total 191 12/13/2013 04:43 PM    HDL Cholesterol 83 11/05/2020 05:19 PM    HDL Cholesterol 94 02/08/2019 03:51 PM    HDL Cholesterol 80 05/16/2017 01:17 PM    HDL Cholesterol 99 01/11/2016 03:58 PM    HDL Cholesterol 104 12/13/2013 04:43 PM    LDL, calculated 45 11/05/2020 05:19 PM    LDL, calculated 61 02/08/2019 03:51 PM    LDL, calculated 60 05/16/2017 01:17 PM    LDL, calculated 49 01/11/2016 03:58 PM    LDL, calculated 70 12/13/2013 04:43 PM    LDL, calculated 163 (H) 07/16/2013 04:57 AM    Triglyceride 49 11/05/2020 05:19 PM    Triglyceride 61 02/08/2019 03:51 PM    Triglyceride 89 05/16/2017 01:17 PM    Triglyceride 96 01/11/2016 03:58 PM    Triglyceride 87 12/13/2013 04:43 PM    CHOL/HDL Ratio 2.5 05/16/2013 02:30 AM                Please note that this dictation was completed with RipCode, the computer voice recognition software. Quite often unanticipated grammatical, syntax, homophones, and other interpretative errors are inadvertently transcribed by the computer software. Please disregard these errors. Please excuse any errors that have escaped final proofreading.

## 2021-08-11 ENCOUNTER — OFFICE VISIT (OUTPATIENT)
Dept: INTERNAL MEDICINE CLINIC | Age: 71
End: 2021-08-11
Payer: MEDICARE

## 2021-08-11 VITALS
OXYGEN SATURATION: 97 % | DIASTOLIC BLOOD PRESSURE: 85 MMHG | BODY MASS INDEX: 25.69 KG/M2 | WEIGHT: 139.6 LBS | SYSTOLIC BLOOD PRESSURE: 131 MMHG | HEART RATE: 81 BPM | HEIGHT: 62 IN | RESPIRATION RATE: 16 BRPM | TEMPERATURE: 98.7 F

## 2021-08-11 DIAGNOSIS — I10 ESSENTIAL HYPERTENSION: Primary | ICD-10-CM

## 2021-08-11 DIAGNOSIS — F41.1 ANXIETY NEUROSIS: ICD-10-CM

## 2021-08-11 DIAGNOSIS — E78.5 DYSLIPIDEMIA: ICD-10-CM

## 2021-08-11 DIAGNOSIS — I48.0 PAROXYSMAL ATRIAL FIBRILLATION (HCC): ICD-10-CM

## 2021-08-11 DIAGNOSIS — E04.9 NODULAR GOITER: ICD-10-CM

## 2021-08-11 PROCEDURE — 1101F PT FALLS ASSESS-DOCD LE1/YR: CPT | Performed by: INTERNAL MEDICINE

## 2021-08-11 PROCEDURE — 1090F PRES/ABSN URINE INCON ASSESS: CPT | Performed by: INTERNAL MEDICINE

## 2021-08-11 PROCEDURE — G8400 PT W/DXA NO RESULTS DOC: HCPCS | Performed by: INTERNAL MEDICINE

## 2021-08-11 PROCEDURE — G9899 SCRN MAM PERF RSLTS DOC: HCPCS | Performed by: INTERNAL MEDICINE

## 2021-08-11 PROCEDURE — G8419 CALC BMI OUT NRM PARAM NOF/U: HCPCS | Performed by: INTERNAL MEDICINE

## 2021-08-11 PROCEDURE — G8432 DEP SCR NOT DOC, RNG: HCPCS | Performed by: INTERNAL MEDICINE

## 2021-08-11 PROCEDURE — G8536 NO DOC ELDER MAL SCRN: HCPCS | Performed by: INTERNAL MEDICINE

## 2021-08-11 PROCEDURE — G8427 DOCREV CUR MEDS BY ELIG CLIN: HCPCS | Performed by: INTERNAL MEDICINE

## 2021-08-11 PROCEDURE — 3017F COLORECTAL CA SCREEN DOC REV: CPT | Performed by: INTERNAL MEDICINE

## 2021-08-11 PROCEDURE — G8752 SYS BP LESS 140: HCPCS | Performed by: INTERNAL MEDICINE

## 2021-08-11 PROCEDURE — G8754 DIAS BP LESS 90: HCPCS | Performed by: INTERNAL MEDICINE

## 2021-08-11 PROCEDURE — 99215 OFFICE O/P EST HI 40 MIN: CPT | Performed by: INTERNAL MEDICINE

## 2021-08-11 NOTE — PROGRESS NOTES
Chief Complaint   Patient presents with    Hypertension     routine follow up          1. Have you been to the ER, urgent care clinic since your last visit? Hospitalized since your last visit? No    2. Have you seen or consulted any other health care providers outside of the [address] [place] [address] since your last visit? Include any pap smears or colon screening.  No

## 2021-08-12 NOTE — PROGRESS NOTES
[address] [facility]  [place] [address]  [address] [place] [address]  Phone:  [phone]  Fax: [phone]       Chief Complaint   Patient presents with    Hypertension     routine follow up    . SUBJECTIVE:    Starr Sauer is a 79 y.o. female comes in for return visit stating that she has done well. She is following with the cardiologist.    We told her at length about the vaccine and the need for her to get it, but she has not done citing excuses that were corrected. She has a past history of primary hypertension and dyslipidemia. She is not that physically active. Current Outpatient Medications   Medication Sig Dispense Refill    Xarelto 20 mg tab tablet TAKE 1 TABLET BY MOUTH EVERY DAY 30 Tablet 11    HYDROcodone-acetaminophen (NORCO) 5-325 mg per tablet TAKE 1 TABLET BY MOUTH EVERY 4 6 HOURS AS NEEDED FOR PAIN      desonide (TRIDESILON) 0.05 % topical lotion Apply  to affected area two (2) times a day. 118 mL 4    potassium chloride (K-DUR, KLOR-CON) 20 mEq tablet Take 1 Tab by mouth daily. 90 Tab 3    magnesium oxide (MAG-OX) 400 mg tablet Take 1 Tab by mouth daily. 90 Tab 3    metoprolol succinate (TOPROL-XL) 25 mg XL tablet Take 1 Tab by mouth two (2) times a day. 180 Tab 3    rosuvastatin (CRESTOR) 10 mg tablet Take 1 tablet every day 90 Tab 3    fluticasone propion-salmeteroL (ADVAIR/WIXELA) 250-50 mcg/dose diskus inhaler Take 1 Puff by inhalation every twelve (12) hours. 1 Inhaler 11    albuterol (Ventolin HFA) 90 mcg/actuation inhaler Take 2 Puffs by inhalation every four (4) hours as needed for Wheezing. 1 Inhaler 11    hydroCHLOROthiazide (HYDRODIURIL) 12.5 mg tablet Take once daily 90 Tab 3    ascorbic acid, vitamin C, (Vitamin C) 500 mg tablet Take  by mouth.  cholecalciferol, vitamin D3, (Vitamin D3) 50 mcg (2,000 unit) tab Take  by mouth.  elderberry fruit (ELDERBERRY PO) Take  by mouth.       folic acid/multivit-min/lutein (CENTRUM SILVER PO) Take 1 Tab by mouth daily.  hydroquinone (ESOTERICA, MELQUIN) 4 % topical cream Apply  to affected area two (2) times a day. 30 g 6    ALPRAZolam (XANAX) 1 mg tablet Take 1 Tab by mouth three (3) times daily as needed for Anxiety. Max Daily Amount: 3 mg. (Patient taking differently: Take 1 mg by mouth two (2) times a day.) 10 Tab 0    QUEtiapine (SEROQUEL) 50 mg tablet Take 50 mg by mouth three (3) times daily.  FLUoxetine (PROZAC) 20 mg capsule Take 20 mg by mouth daily.        Past Medical History:   Diagnosis Date    Abdominal pain     Asthma     Bipolar 2 disorder ([facility] [address]     CAD (coronary artery disease)     ablation    DVT (deep venous thrombosis) (HCC)     Dyspnea 9/19/2014    Hepatic cyst 6/22/2019    Hypercholesteremia     Hypertension     Lumbar radicular pain 8/21/2014    Menopause     Synovitis of knee 1/28/2018    Tachycardia      Past Surgical History:   Procedure Laterality Date    ABLATION OF SVT  3/30/2015         EP STUDY W/INDUCTION  3/30/2015         HX BACK SURGERY      HX COLONOSCOPY  9-    HX HEART CATHETERIZATION      HX HYSTERECTOMY  1992     Allergies   Allergen Reactions    Ampicillin Nausea Only     Allergy was 30 years ago         REVIEW OF SYSTEMS:  General: negative for - chills or fever  ENT: negative for - headaches, nasal congestion or tinnitus  Respiratory: negative for - cough, hemoptysis, shortness of breath or wheezing  Cardiovascular : negative for - chest pain, edema, palpitations or shortness of breath  Gastrointestinal: negative for - abdominal pain, blood in stools, heartburn or nausea/vomiting  Genito-Urinary: no dysuria, trouble voiding, or hematuria  Musculoskeletal: negative for - gait disturbance, joint pain, joint stiffness or joint swelling  Neurological: no TIA or stroke symptoms  Hematologic: no bruises, no bleeding, no swollen glands  Integument: no lumps, mole changes, nail changes or rash  Endocrine: no malaise/lethargy or unexpected weight changes      Social History     Socioeconomic History    Marital status:      Spouse name: Not on file    Number of children: 1    Years of education: Not on file    Highest education level: Not on file   Occupational History    Occupation: retired--[employer]---disabled   Tobacco Use    Smoking status: Never Smoker    Smokeless tobacco: Never Used   Vaping Use    Vaping Use: Never used   Substance and Sexual Activity    Alcohol use: No     Alcohol/week: 0.0 standard drinks     Comment: rarely    Drug use: No    Sexual activity: Not Currently     Partners: Male     Social Determinants of Health     Financial Resource Strain:     Difficulty of Paying Living Expenses:    Food Insecurity:     Worried About Running Out of Food in the Last Year:     Ran Out of Food in the Last Year:    Transportation Needs:     Lack of Transportation (Medical):  Lack of Transportation (Non-Medical):    Physical Activity:     Days of Exercise per Week:     Minutes of Exercise per Session:    Stress:     Feeling of Stress :    Social Connections:     Frequency of Communication with Friends and Family:     Frequency of Social Gatherings with Friends and Family:     Attends Mu-ism Services:     Active Member of Clubs or Organizations:     Attends Club or Organization Meetings:     Marital Status:      Family History   Problem Relation Age of Onset    Hypertension Mother     Heart Disease Mother     Heart Disease Father     Stroke Father        OBJECTIVE:    Visit Vitals  /85   Pulse 81   Temp 98.7 °F (37.1 °C) (Oral)   Resp 16   Ht 5' 2\" (1.575 m)   Wt 139 lb 9.6 oz (63.3 kg)   SpO2 97%   BMI 25.53 kg/m²     CONSTITUTIONAL: well , well nourished, appears age appropriate  EYES: perrla, eom intact  ENMT:moist mucous membranes, pharynx clear  NECK: supple.  Thyroid normal  RESPIRATORY: Chest: clear to ascultation and percussion CARDIOVASCULAR: Heart: regular rate and rhythm  GASTROINTESTINAL: Abdomen: soft, bowel sounds active  HEMATOLOGIC: no pathological lymph nodes palpated  MUSCULOSKELETAL: Extremities: no edema, pulse 1+   INTEGUMENT: No unusual rashes or suspicious skin lesions noted. Nails appear normal.  NEUROLOGIC: non-focal exam   MENTAL STATUS: alert and oriented, appropriate affect      ASSESSMENT:  1. Essential hypertension    2. Nodular goiter    3. Dyslipidemia    4. Anxiety neurosis    5. Paroxysmal atrial fibrillation (HCC)        PLAN:  1. The patient's blood pressure is excellent. 2. She does have a nodular goiter, but this is reasonably stable. 3. She remains on a statin in view of her increased cardiovascular risk. Apparently she has some carotid plaquing, but no obstructive vascular disease exist in her coronary arteries. 4. Her anxiety remains modest. We talked at length about the vaccine and the need for her to get it. Hopefully I have convinced her to do so. 5. She will follow up with her cardiologist regarding her paroxysmal atrial fibrillation. His comments to date are ones of stability in her current cardiac history and the existence. No changes were made regarding her medication. Follow-up and Dispositions    · Return in about 3 months (around 11/11/2021).            Jorge Luis Wu MD

## 2021-08-17 ENCOUNTER — TELEPHONE (OUTPATIENT)
Dept: CARDIOLOGY CLINIC | Age: 71
End: 2021-08-17

## 2021-08-17 ENCOUNTER — HOSPITAL ENCOUNTER (OUTPATIENT)
Dept: CT IMAGING | Age: 71
Discharge: HOME | End: 2021-08-17
Attending: SPECIALIST
Payer: MEDICARE

## 2021-08-17 VITALS
OXYGEN SATURATION: 99 % | DIASTOLIC BLOOD PRESSURE: 98 MMHG | RESPIRATION RATE: 20 BRPM | SYSTOLIC BLOOD PRESSURE: 181 MMHG | HEART RATE: 60 BPM

## 2021-08-17 DIAGNOSIS — R07.9 CHEST PAIN, UNSPECIFIED TYPE: ICD-10-CM

## 2021-08-17 DIAGNOSIS — I48.19 PERSISTENT ATRIAL FIBRILLATION (HCC): ICD-10-CM

## 2021-08-17 LAB — CREAT BLD-MCNC: 1.1 MG/DL (ref 0.6–1.3)

## 2021-08-17 PROCEDURE — 74011000250 HC RX REV CODE- 250: Performed by: SPECIALIST

## 2021-08-17 PROCEDURE — 74011000636 HC RX REV CODE- 636: Performed by: SPECIALIST

## 2021-08-17 PROCEDURE — 82565 ASSAY OF CREATININE: CPT

## 2021-08-17 PROCEDURE — 74011250637 HC RX REV CODE- 250/637: Performed by: SPECIALIST

## 2021-08-17 PROCEDURE — 75574 CT ANGIO HRT W/3D IMAGE: CPT

## 2021-08-17 RX ORDER — METOPROLOL TARTRATE 5 MG/5ML
5 INJECTION INTRAVENOUS ONCE
Status: COMPLETED | OUTPATIENT
Start: 2021-08-17 | End: 2021-08-17

## 2021-08-17 RX ORDER — NITROGLYCERIN 0.4 MG/1
0.8 TABLET SUBLINGUAL AS NEEDED
Status: DISCONTINUED | OUTPATIENT
Start: 2021-08-17 | End: 2021-08-17

## 2021-08-17 RX ORDER — METOPROLOL TARTRATE 5 MG/5ML
INJECTION INTRAVENOUS
Status: DISCONTINUED
Start: 2021-08-17 | End: 2021-08-17

## 2021-08-17 RX ADMIN — IOPAMIDOL 100 ML: 755 INJECTION, SOLUTION INTRAVENOUS at 15:38

## 2021-08-17 RX ADMIN — METOPROLOL TARTRATE 5 MG: 5 INJECTION INTRAVENOUS at 15:08

## 2021-08-17 RX ADMIN — NITROGLYCERIN 0.8 MG: 0.4 TABLET, ORALLY DISINTEGRATING SUBLINGUAL at 15:25

## 2021-08-17 NOTE — TELEPHONE ENCOUNTER
Patient is requesting a call back in regards to her medication before her CT scan.     PHONE: [phone]

## 2021-09-03 ENCOUNTER — OFFICE VISIT (OUTPATIENT)
Dept: CARDIOLOGY CLINIC | Age: 71
End: 2021-09-03
Payer: MEDICARE

## 2021-09-03 VITALS
WEIGHT: 142 LBS | RESPIRATION RATE: 16 BRPM | HEART RATE: 58 BPM | SYSTOLIC BLOOD PRESSURE: 146 MMHG | OXYGEN SATURATION: 98 % | BODY MASS INDEX: 26.13 KG/M2 | DIASTOLIC BLOOD PRESSURE: 92 MMHG | HEIGHT: 62 IN

## 2021-09-03 DIAGNOSIS — F41.1 ANXIETY NEUROSIS: ICD-10-CM

## 2021-09-03 DIAGNOSIS — I48.19 PERSISTENT ATRIAL FIBRILLATION (HCC): Primary | ICD-10-CM

## 2021-09-03 DIAGNOSIS — E78.5 DYSLIPIDEMIA: ICD-10-CM

## 2021-09-03 PROCEDURE — G9899 SCRN MAM PERF RSLTS DOC: HCPCS | Performed by: SPECIALIST

## 2021-09-03 PROCEDURE — G8536 NO DOC ELDER MAL SCRN: HCPCS | Performed by: SPECIALIST

## 2021-09-03 PROCEDURE — G8753 SYS BP > OR = 140: HCPCS | Performed by: SPECIALIST

## 2021-09-03 PROCEDURE — 1101F PT FALLS ASSESS-DOCD LE1/YR: CPT | Performed by: SPECIALIST

## 2021-09-03 PROCEDURE — G8419 CALC BMI OUT NRM PARAM NOF/U: HCPCS | Performed by: SPECIALIST

## 2021-09-03 PROCEDURE — 3017F COLORECTAL CA SCREEN DOC REV: CPT | Performed by: SPECIALIST

## 2021-09-03 PROCEDURE — 1090F PRES/ABSN URINE INCON ASSESS: CPT | Performed by: SPECIALIST

## 2021-09-03 PROCEDURE — G8510 SCR DEP NEG, NO PLAN REQD: HCPCS | Performed by: SPECIALIST

## 2021-09-03 PROCEDURE — 99214 OFFICE O/P EST MOD 30 MIN: CPT | Performed by: SPECIALIST

## 2021-09-03 PROCEDURE — G8400 PT W/DXA NO RESULTS DOC: HCPCS | Performed by: SPECIALIST

## 2021-09-03 PROCEDURE — G8755 DIAS BP > OR = 90: HCPCS | Performed by: SPECIALIST

## 2021-09-03 PROCEDURE — G8427 DOCREV CUR MEDS BY ELIG CLIN: HCPCS | Performed by: SPECIALIST

## 2021-09-03 NOTE — PATIENT INSTRUCTIONS
Please begin a blood pressure log. Include the date, time, your blood pressure and heart rate. Please take readings at different times each day. Keep this log for 2 week(s) and then report it to us. Remember to sit for at least 3 minutes. Have both feet flat on floor and arm at heart level when taking your blood pressure.     Follow up with Dr. Omar Rollins in 6 months with echocardiogram 1 week prior

## 2021-09-03 NOTE — PROGRESS NOTES
[address] [facility]                                                            OFFICE NOTE        Terri Encarnacion M.D.,PRUDENCE [place]   1950  [id number]    Date/Time:  9/3/495024:56 AM            SUBJECTIVE:  No recurrent cp    no sob   she is tired today she has not slept last night         Assessment/Plan    1. Chest pain: No recurrent chest pain reported. We have discussed the results of the CT angiography of August 2021 which is failed to reveal any critical stenosis the more stenosis was the 30 to 40% stenosis at the level of the RCA. Continue with Toprol and Crestor at this time with no additional changes. Chest pain likely noncardiac in origin. 2.  Atrial fibrillation: Chronic. Continue Xarelto and metoprolol rate is controlled. She has seen Dr. Pearl Harry in the past and he suggested proceed with atrial fibrillation ablation by she declined at that time. She understands that atrial fibrillation begets itself. No untoward effects with Xarelto. Warn her to hold off fish oil of similar at this point. Proceed with echocardiogram at the next office visit also to reevaluate tricuspid regurgitation. 3.  Hypertension: Blood pressure is elevated today. Will recheck before letter and go home and before changing medication we will proceed with 2 weeks of blood pressure log.    4.  Hyperlipidemia: On Crestor closely followed by her primary care physician. 5.  DVT: On Xarelto remote history of DVT at this time. 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 7.  Carotid stenosis: Carotid ultrasound of November 2020 with less than 10% stenosis bilaterally. Consider repeat in November 2022. Otherwise I will see her back in 6 months with an echo 1 week prior. HPI   70 y.o. female. Patient with h/o PSVT and  Tachy iron followed in [facility], admitted on 5/13 at [facility] with chest pain, at that time nuclear stress test showed no ischemia or MI and EF of 69%, patient remained in NSR during hospital stay. Here for follow up  Echo on 7/12 EF 55% mild TR and MR  Also h/o PAF as per her cardiologist in [place]. 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 Ejection fraction was estimated to be 63 %. No obvious wall motion  abnormalities identified in the views obtained. Right ventricle: The size was at the upper limits of normal.    Left atrium: The atrium was moderately dilated. Right atrium: The atrium was moderately dilated. Mitral valve: There was mild regurgitation. Tricuspid valve: There was moderate annular dilatation. There was moderate      Recurrent atrial flutter in 10/ 2017 seen by Ep, ablation offered she decided to wait              CARDIAC STUDIES    CT Results (most recent):  Results from Hospital Encounter encounter on 08/17/21    CT CORONARY ART W STRUC MORPH FUNC    Addendum 8/18/2021 11:55 AM  Addendum: INDICATION:   CAD    COMPARISON: None. CPT Code: 14952. TECHNIQUE:  Precontrast  images were obtained to localize the volume for acquisition. Multislice helical gated cardiac coronary CT arteriography was performed from  the diaphragm to the main pulmonary artery level during uneventful rapid bolus  intravenous administration of 100 mL of Isovue  370. Premedication with atenolol  was used to achieve target heart rate of less than 60 beats per minute. Lung and  soft tissue windows were generated.  Post processing and 3 D image reconstruction  was performed. IMPRESSION:  1. Left main originate normally from left coronary cusp and is normal size  without any significant atherosclerotic plaque or calcification. 2. The LAD is normal size vessel with mild calcified plaque in the proximal  segment without any significant luminal stenosis. The D1 diagonal branch  demonstrate ostial calcified plaque without any significant luminal stenosis. The D2 and D3 diagonal branches are small without any significant lesion or  calcification. There is no myocardial bridging seen. 3. The left circumflex is normal size vessel without any significant  calcification or disease. The OM1, OM 2 branches are seen without any  significant disease or calcification. 4. The RCA is normal size vessel and originate normally from the right coronary  cusp. There is proximal and mid vessel with moderate calcified and noncalcified  plaque with 30-40% luminal stenosis. There is no significant disease in the  distal RCA, PDA or PLB branches. Narrative  The cardiac portion of this examination was supervised and will be interpreted  by the cardiologist. The visualized lung fields are clear. There is no  mediastinal or hilar lymphadenopathy. No visible pleural effusion or evidence of  pericardial effusion. Visualized portion of the upper abdomen is unremarkable. 08/11/20    NUCLEAR CARDIAC STRESS TEST 09/02/2020 9/3/2020    Interpretation Summary  · Gated SPECT: Left ventricular function post-stress was normal. Calculated ejection fraction is 62%. There is no evidence of transient ischemic dilation (TID). The TID ratio is 0.98. · Baseline ECG: Atrial fibrillation, non-specific ST-T wave abnormalities. · Myocardial perfusion imaging defect 1: There is a defect that is small in size present in the basal-mid anterior location(s) that is predominantly fixed. There is normal wall motion in the defect area.  The defect appears to probably be artifact caused by breast attenuation. · Left ventricular perfusion is probably normal.  · Myocardial perfusion imaging supports a low risk stress test.    Signed by: Bety Alan MD on 9/2/2020 12:05 PM                    EKG Results     None              IMAGING      MRI Results (most recent):  Results from [place] encounter on 02/15/13    MRI LUMB SPINE WO CONT    Narrative  **Final Report**      ICD Codes / Adm. Diagnosis: V76.12   / Thoracic or lumbosacral neurit  Examination:  MR L SPINE WO CON  - [id number] - Feb 15 2013  1:58PM  Accession No:  [id number]  Reason:  lumbar radiculopathy      REPORT:  INDICATION: lumbar radiculopathy , fell down stairs at home December 2012  724.4    COMPARISON: Radiographs November 5, 2011    EXAM: Sagittal T1-weighted spin-echo, sagittal T2-weighted fast spin-echo,  sagittal inversion recovery, axial T1-weighted spin-echo and axial  T2-weighted fast spin-echo MR images of the lumbar spine are obtained. FINDINGS: Conus position, morphology and signal normal. There is normal  vertebral body height and bone signal. There is no substantial listhesis  though there is a mild levoconvex lumbar curve centered at L3. No paraspinal  soft tissue mass is shown. There is no substantial disc or facet abnormality at T11-12 and T12-L1. L1-2 shows mild disc space narrowing with small left lateral disc  protrusion. There is mild bilateral facet arthropathy. There is no canal or  foraminal stenosis. L2-3 shows mild disc space narrowing with mild leftward lateralizing diffuse  disc bulging. There is mild bilateral facet arthropathy. There is no canal  or foraminal stenosis. L3-4 shows mild disc space narrowing with mild leftward lateralizing diffuse  disc bulging and moderate bilateral facet arthropathy. There is a mild  appearance of canal stenosis with mild right foraminal stenosis.     L4-5 shows mild disc space narrowing with mild central disc bulging and  moderate bilateral facet arthropathy. There is a borderline appearance of  canal stenosis and mild right foraminal narrowing. L5-S1 shows minimal disc space narrowing with a moderate-sized left  paracentral and lateral disc herniation measuring 9 mm craniocaudal, 1.2 cm  transverse and 0.7 cm anterior posterior. There is indentation of the  central and left anterolateral thecal sac as well as posterior displacement  of the traversing left S1 nerve. There is no substantial foraminal narrowing. IMPRESSION:  1. Small left lateral disc protrusion at L1-2.  2. Mild L3-4 and borderline L4-5 canal stenosis with mild right foraminal  narrowing. 3. Moderate-sized left paracentral and lateral disc herniation at L5-S1 with  posterior displacement of traversing left S1 nerve. Signing/Reading Doctor: Maria C Xavier ([id number])  Approved: ROB Xavier ([id number])  Feb 15 2013  2:39PM      CT Results (most recent):  Results from Hospital Encounter encounter on 08/17/21    CT CORONARY ART W STRUC MORPH FUNC    Addendum 8/18/2021 11:55 AM  Addendum: INDICATION:   CAD    COMPARISON: None. CPT Code: 31556. TECHNIQUE:  Precontrast  images were obtained to localize the volume for acquisition. Multislice helical gated cardiac coronary CT arteriography was performed from  the diaphragm to the main pulmonary artery level during uneventful rapid bolus  intravenous administration of 100 mL of Isovue  370. Premedication with atenolol  was used to achieve target heart rate of less than 60 beats per minute. Lung and  soft tissue windows were generated. Post processing and 3 D image reconstruction  was performed. IMPRESSION:  1. Left main originate normally from left coronary cusp and is normal size  without any significant atherosclerotic plaque or calcification. 2. The LAD is normal size vessel with mild calcified plaque in the proximal  segment without any significant luminal stenosis.  The D1 diagonal branch  demonstrate ostial calcified plaque without any significant luminal stenosis. The D2 and D3 diagonal branches are small without any significant lesion or  calcification. There is no myocardial bridging seen. 3. The left circumflex is normal size vessel without any significant  calcification or disease. The OM1, OM 2 branches are seen without any  significant disease or calcification. 4. The RCA is normal size vessel and originate normally from the right coronary  cusp. There is proximal and mid vessel with moderate calcified and noncalcified  plaque with 30-40% luminal stenosis. There is no significant disease in the  distal RCA, PDA or PLB branches. Narrative  The cardiac portion of this examination was supervised and will be interpreted  by the cardiologist. The visualized lung fields are clear. There is no  mediastinal or hilar lymphadenopathy. No visible pleural effusion or evidence of  pericardial effusion. Visualized portion of the upper abdomen is unremarkable. XR Results (most recent):  Results from Hospital Encounter encounter on 10/24/19    XR CHEST PORT    Narrative  EXAM: XR CHEST PORT    INDICATION: Upper abdominal and epigastric pain x3 days exacerbated by  inspiration with associated shortness of breath. COMPARISON: Chest x-ray 11/9/2018. FINDINGS: A portable AP radiograph of the chest was obtained at 22:26 hours. The  lungs are clear. The cardiac and mediastinal contours and pulmonary vascularity  are normal.  The bones and soft tissues are grossly within normal limits. Visualized upper abdomen appears unremarkable. Impression  IMPRESSION: No acute findings.           Past Medical History:   Diagnosis Date    Abdominal pain     Asthma     Bipolar 2 disorder ([facility] [address]     CAD (coronary artery disease)     ablation    DVT (deep venous thrombosis) (HCC)     Dyspnea 9/19/2014    Hepatic cyst 6/22/2019    Hypercholesteremia     Hypertension     Lumbar radicular pain 8/21/2014    Menopause     Synovitis of knee 1/28/2018    Tachycardia      Past Surgical History:   Procedure Laterality Date    ABLATION OF SVT  3/30/2015         EP STUDY W/INDUCTION  3/30/2015         HX BACK SURGERY      HX COLONOSCOPY  9-    HX HEART CATHETERIZATION      HX HYSTERECTOMY  1992     Social History     Tobacco Use    Smoking status: Never Smoker    Smokeless tobacco: Never Used   Vaping Use    Vaping Use: Never used   Substance Use Topics    Alcohol use: No     Alcohol/week: 0.0 standard drinks     Comment: rarely    Drug use: No     Family History   Problem Relation Age of Onset    Hypertension Mother     Heart Disease Mother     Heart Disease Father     Stroke Father      Allergies   Allergen Reactions    Ampicillin Nausea Only     Allergy was 30 years ago         Visit Vitals  BP (!) 150/90   Pulse (!) 58   Resp 16   Ht 5' 2\" (1.575 m)   Wt 142 lb (64.4 kg)   SpO2 98%   BMI 25.97 kg/m²         Last 3 Recorded Weights in this Encounter    09/03/21 1120   Weight: 142 lb (64.4 kg)            Review of Systems:   Pertinent items are noted in the History of Present Illness. Visit Vitals  BP (!) 150/90   Pulse (!) 58   Resp 16   Ht 5' 2\" (1.575 m)   Wt 142 lb (64.4 kg)   SpO2 98%   BMI 25.97 kg/m²     General Appearance:  Well developed, well nourished,alert and oriented x 3, and individual in no acute distress. Ears/Nose/Mouth/Throat:   Hearing grossly normal.         Neck: Supple. Chest:   Lungs clear to auscultation bilaterally. Cardiovascular:  Regular rate and rhythm, S1, S2 normal, no murmur. Abdomen:   Soft, non-tender, bowel sounds are active. Extremities: No edema bilaterally. Skin: Warm and dry.                  Current Outpatient Medications on File Prior to Visit   Medication Sig Dispense Refill    Xarelto 20 mg tab tablet TAKE 1 TABLET BY MOUTH EVERY DAY 30 Tablet 11    HYDROcodone-acetaminophen (NORCO) 5-325 mg per tablet TAKE 1 TABLET BY MOUTH EVERY 4 6 HOURS AS NEEDED FOR PAIN      desonide (TRIDESILON) 0.05 % topical lotion Apply  to affected area two (2) times a day. 118 mL 4    potassium chloride (K-DUR, KLOR-CON) 20 mEq tablet Take 1 Tab by mouth daily. 90 Tab 3    magnesium oxide (MAG-OX) 400 mg tablet Take 1 Tab by mouth daily. 90 Tab 3    metoprolol succinate (TOPROL-XL) 25 mg XL tablet Take 1 Tab by mouth two (2) times a day. 180 Tab 3    rosuvastatin (CRESTOR) 10 mg tablet Take 1 tablet every day 90 Tab 3    fluticasone propion-salmeteroL (ADVAIR/WIXELA) 250-50 mcg/dose diskus inhaler Take 1 Puff by inhalation every twelve (12) hours. 1 Inhaler 11    albuterol (Ventolin HFA) 90 mcg/actuation inhaler Take 2 Puffs by inhalation every four (4) hours as needed for Wheezing. 1 Inhaler 11    hydroCHLOROthiazide (HYDRODIURIL) 12.5 mg tablet Take once daily 90 Tab 3    ascorbic acid, vitamin C, (Vitamin C) 500 mg tablet Take  by mouth.  cholecalciferol, vitamin D3, (Vitamin D3) 50 mcg (2,000 unit) tab Take  by mouth.  elderberry fruit (ELDERBERRY PO) Take  by mouth.  folic acid/multivit-min/lutein (CENTRUM SILVER PO) Take 1 Tab by mouth daily.  hydroquinone (ESOTERICA, MELQUIN) 4 % topical cream Apply  to affected area two (2) times a day. 30 g 6    ALPRAZolam (XANAX) 1 mg tablet Take 1 Tab by mouth three (3) times daily as needed for Anxiety. Max Daily Amount: 3 mg. (Patient taking differently: Take 1 mg by mouth two (2) times a day.) 10 Tab 0    QUEtiapine (SEROQUEL) 50 mg tablet Take 50 mg by mouth three (3) times daily.  FLUoxetine (PROZAC) 20 mg capsule Take 20 mg by mouth daily. No current facility-administered medications on file prior to visit. Yasmin Carlson had no medications administered during this visit.      Current Outpatient Medications   Medication Sig    Xarelto 20 mg tab tablet TAKE 1 TABLET BY MOUTH EVERY DAY    HYDROcodone-acetaminophen (NORCO) 5-325 mg per tablet TAKE 1 TABLET BY MOUTH EVERY 4 6 HOURS AS NEEDED FOR PAIN    desonide (TRIDESILON) 0.05 % topical lotion Apply  to affected area two (2) times a day.  potassium chloride (K-DUR, KLOR-CON) 20 mEq tablet Take 1 Tab by mouth daily.  magnesium oxide (MAG-OX) 400 mg tablet Take 1 Tab by mouth daily.  metoprolol succinate (TOPROL-XL) 25 mg XL tablet Take 1 Tab by mouth two (2) times a day.  rosuvastatin (CRESTOR) 10 mg tablet Take 1 tablet every day    fluticasone propion-salmeteroL (ADVAIR/WIXELA) 250-50 mcg/dose diskus inhaler Take 1 Puff by inhalation every twelve (12) hours.  albuterol (Ventolin HFA) 90 mcg/actuation inhaler Take 2 Puffs by inhalation every four (4) hours as needed for Wheezing.  hydroCHLOROthiazide (HYDRODIURIL) 12.5 mg tablet Take once daily    ascorbic acid, vitamin C, (Vitamin C) 500 mg tablet Take  by mouth.  cholecalciferol, vitamin D3, (Vitamin D3) 50 mcg (2,000 unit) tab Take  by mouth.  elderberry fruit (ELDERBERRY PO) Take  by mouth.  folic acid/multivit-min/lutein (CENTRUM SILVER PO) Take 1 Tab by mouth daily.  hydroquinone (ESOTERICA, MELQUIN) 4 % topical cream Apply  to affected area two (2) times a day.  ALPRAZolam (XANAX) 1 mg tablet Take 1 Tab by mouth three (3) times daily as needed for Anxiety. Max Daily Amount: 3 mg. (Patient taking differently: Take 1 mg by mouth two (2) times a day.)    QUEtiapine (SEROQUEL) 50 mg tablet Take 50 mg by mouth three (3) times daily.  FLUoxetine (PROZAC) 20 mg capsule Take 20 mg by mouth daily. No current facility-administered medications for this visit.          Lab Results   Component Value Date/Time    Cholesterol, total 139 11/05/2020 05:19 PM    HDL Cholesterol 83 11/05/2020 05:19 PM    LDL, calculated 45 11/05/2020 05:19 PM    LDL, calculated 61 02/08/2019 03:51 PM    VLDL, calculated 11 11/05/2020 05:19 PM    VLDL, calculated 12 02/08/2019 03:51 PM    Triglyceride 49 11/05/2020 05:19 PM    CHOL/HDL Ratio 2.5 05/16/2013 02:30 AM Lab Results   Component Value Date/Time    Sodium 143 11/05/2020 05:19 PM    Potassium 4.1 11/05/2020 05:19 PM    Chloride 104 11/05/2020 05:19 PM    CO2 21 11/05/2020 05:19 PM    Anion gap 8 10/24/2019 08:50 PM    Glucose 89 11/05/2020 05:19 PM    BUN 11 11/05/2020 05:19 PM    Creatinine 1.05 (H) 11/05/2020 05:19 PM    BUN/Creatinine ratio 10 (L) 11/05/2020 05:19 PM    GFR est AA 63 11/05/2020 05:19 PM    GFR est non-AA 54 (L) 11/05/2020 05:19 PM    Calcium 10.2 11/05/2020 05:19 PM       Lab Results   Component Value Date/Time    ALT (SGPT) 29 11/05/2020 05:19 PM    Alk.  phosphatase 65 11/05/2020 05:19 PM    Bilirubin, direct 0.21 03/05/2019 11:46 AM    Bilirubin, total 1.1 11/05/2020 05:19 PM       Lab Results   Component Value Date/Time    WBC 3.9 11/05/2020 05:19 PM    Hemoglobin (POC) 10.9 (L) 07/06/2013 01:06 AM    HGB 14.0 11/05/2020 05:19 PM    Hematocrit (POC) 32 (L) 07/06/2013 01:06 AM    HCT 40.6 11/05/2020 05:19 PM    PLATELET 940 62/28/5555 05:19 PM    MCV 89 11/05/2020 05:19 PM       Lab Results   Component Value Date/Time    TSH 0.934 11/05/2020 05:19 PM         Lab Results   Component Value Date/Time    Cholesterol, total 139 11/05/2020 05:19 PM    Cholesterol, total 167 02/08/2019 03:51 PM    Cholesterol, total 158 05/16/2017 01:17 PM    Cholesterol, total 167 01/11/2016 03:58 PM    Cholesterol, total 191 12/13/2013 04:43 PM    HDL Cholesterol 83 11/05/2020 05:19 PM    HDL Cholesterol 94 02/08/2019 03:51 PM    HDL Cholesterol 80 05/16/2017 01:17 PM    HDL Cholesterol 99 01/11/2016 03:58 PM    HDL Cholesterol 104 12/13/2013 04:43 PM    LDL, calculated 45 11/05/2020 05:19 PM    LDL, calculated 61 02/08/2019 03:51 PM    LDL, calculated 60 05/16/2017 01:17 PM    LDL, calculated 49 01/11/2016 03:58 PM    LDL, calculated 70 12/13/2013 04:43 PM    LDL, calculated 163 (H) 07/16/2013 04:57 AM    Triglyceride 49 11/05/2020 05:19 PM    Triglyceride 61 02/08/2019 03:51 PM    Triglyceride 89 05/16/2017 01:17 PM    Triglyceride 96 01/11/2016 03:58 PM    Triglyceride 87 12/13/2013 04:43 PM    CHOL/HDL Ratio 2.5 05/16/2013 02:30 AM                Please note that this dictation was completed with [employer], the computer voice recognition software. Quite often unanticipated grammatical, syntax, homophones, and other interpretative errors are inadvertently transcribed by the computer software. Please disregard these errors. Please excuse any errors that have escaped final proofreading.

## 2021-09-03 NOTE — PROGRESS NOTES
Visit Vitals  BP (!) 160/90   Pulse (!) 58   Resp 16   Ht 5' 2\" (1.575 m)   Wt 142 lb (64.4 kg)   SpO2 98%   BMI 25.97 kg/m²

## 2021-11-17 ENCOUNTER — OFFICE VISIT (OUTPATIENT)
Dept: INTERNAL MEDICINE CLINIC | Age: 71
End: 2021-11-17
Payer: MEDICARE

## 2021-11-17 VITALS
TEMPERATURE: 98.7 F | OXYGEN SATURATION: 96 % | RESPIRATION RATE: 16 BRPM | HEIGHT: 62 IN | HEART RATE: 73 BPM | WEIGHT: 139.6 LBS | SYSTOLIC BLOOD PRESSURE: 130 MMHG | BODY MASS INDEX: 25.69 KG/M2 | DIASTOLIC BLOOD PRESSURE: 84 MMHG

## 2021-11-17 DIAGNOSIS — E78.5 DYSLIPIDEMIA: ICD-10-CM

## 2021-11-17 DIAGNOSIS — I25.10 ASHD (ARTERIOSCLEROTIC HEART DISEASE): Primary | ICD-10-CM

## 2021-11-17 DIAGNOSIS — R21 RASH: ICD-10-CM

## 2021-11-17 DIAGNOSIS — Z23 NEEDS FLU SHOT: ICD-10-CM

## 2021-11-17 DIAGNOSIS — E04.9 NODULAR GOITER: ICD-10-CM

## 2021-11-17 DIAGNOSIS — L81.9 HYPERPIGMENTATION: ICD-10-CM

## 2021-11-17 DIAGNOSIS — M54.16 LUMBAR RADICULAR PAIN: ICD-10-CM

## 2021-11-17 DIAGNOSIS — I10 PRIMARY HYPERTENSION: ICD-10-CM

## 2021-11-17 DIAGNOSIS — F41.1 ANXIETY NEUROSIS: ICD-10-CM

## 2021-11-17 DIAGNOSIS — I48.0 PAROXYSMAL ATRIAL FIBRILLATION (HCC): ICD-10-CM

## 2021-11-17 LAB
COMMENT, HOLDF: NORMAL
SAMPLES BEING HELD,HOLD: NORMAL

## 2021-11-17 PROCEDURE — 1101F PT FALLS ASSESS-DOCD LE1/YR: CPT | Performed by: INTERNAL MEDICINE

## 2021-11-17 PROCEDURE — G8427 DOCREV CUR MEDS BY ELIG CLIN: HCPCS | Performed by: INTERNAL MEDICINE

## 2021-11-17 PROCEDURE — 3017F COLORECTAL CA SCREEN DOC REV: CPT | Performed by: INTERNAL MEDICINE

## 2021-11-17 PROCEDURE — 99215 OFFICE O/P EST HI 40 MIN: CPT | Performed by: INTERNAL MEDICINE

## 2021-11-17 PROCEDURE — G8536 NO DOC ELDER MAL SCRN: HCPCS | Performed by: INTERNAL MEDICINE

## 2021-11-17 PROCEDURE — G9899 SCRN MAM PERF RSLTS DOC: HCPCS | Performed by: INTERNAL MEDICINE

## 2021-11-17 PROCEDURE — G8400 PT W/DXA NO RESULTS DOC: HCPCS | Performed by: INTERNAL MEDICINE

## 2021-11-17 PROCEDURE — 90694 VACC AIIV4 NO PRSRV 0.5ML IM: CPT | Performed by: INTERNAL MEDICINE

## 2021-11-17 PROCEDURE — G8754 DIAS BP LESS 90: HCPCS | Performed by: INTERNAL MEDICINE

## 2021-11-17 PROCEDURE — G0008 ADMIN INFLUENZA VIRUS VAC: HCPCS | Performed by: INTERNAL MEDICINE

## 2021-11-17 PROCEDURE — G8419 CALC BMI OUT NRM PARAM NOF/U: HCPCS | Performed by: INTERNAL MEDICINE

## 2021-11-17 PROCEDURE — G8752 SYS BP LESS 140: HCPCS | Performed by: INTERNAL MEDICINE

## 2021-11-17 PROCEDURE — 1090F PRES/ABSN URINE INCON ASSESS: CPT | Performed by: INTERNAL MEDICINE

## 2021-11-17 PROCEDURE — G8510 SCR DEP NEG, NO PLAN REQD: HCPCS | Performed by: INTERNAL MEDICINE

## 2021-11-17 RX ORDER — HYDROQUINONE 40 MG/G
CREAM TOPICAL 2 TIMES DAILY
Qty: 30 G | Refills: 6 | Status: SHIPPED | OUTPATIENT
Start: 2021-11-17 | End: 2022-05-02

## 2021-11-17 RX ORDER — HYDROCODONE BITARTRATE AND ACETAMINOPHEN 5; 325 MG/1; MG/1
1 TABLET ORAL
Qty: 30 TABLET | Refills: 0 | Status: SHIPPED | OUTPATIENT
Start: 2021-11-17 | End: 2021-12-17

## 2021-11-17 RX ORDER — DESONIDE 0.5 MG/ML
LOTION TOPICAL 2 TIMES DAILY
Qty: 118 ML | Refills: 4 | Status: SHIPPED | OUTPATIENT
Start: 2021-11-17 | End: 2022-05-02

## 2021-11-17 NOTE — PROGRESS NOTES
Chief Complaint   Patient presents with    Hypertension     routine follow up          1. Have you been to the ER, urgent care clinic since your last visit? Hospitalized since your last visit? No    2. Have you seen or consulted any other health care providers outside of the [address] [place] [address] since your last visit? Include any pap smears or colon screening.  No

## 2021-11-18 LAB
ALBUMIN SERPL-MCNC: 3.9 G/DL (ref 3.5–5)
ALBUMIN/GLOB SERPL: 0.8 {RATIO} (ref 1.1–2.2)
ALP SERPL-CCNC: 76 U/L (ref 45–117)
ALT SERPL-CCNC: 44 U/L (ref 12–78)
ANION GAP SERPL CALC-SCNC: 9 MMOL/L (ref 5–15)
APPEARANCE UR: CLEAR
AST SERPL-CCNC: 55 U/L (ref 15–37)
BASOPHILS # BLD: 0 K/UL (ref 0–0.1)
BASOPHILS NFR BLD: 1 % (ref 0–1)
BILIRUB SERPL-MCNC: 1.3 MG/DL (ref 0.2–1)
BILIRUB UR QL: NEGATIVE
BUN SERPL-MCNC: 22 MG/DL (ref 6–20)
BUN/CREAT SERPL: 20 (ref 12–20)
CALCIUM SERPL-MCNC: 9.8 MG/DL (ref 8.5–10.1)
CHLORIDE SERPL-SCNC: 106 MMOL/L (ref 97–108)
CHOLEST SERPL-MCNC: 140 MG/DL
CO2 SERPL-SCNC: 21 MMOL/L (ref 21–32)
COLOR UR: NORMAL
CREAT SERPL-MCNC: 1.08 MG/DL (ref 0.55–1.02)
CRP SERPL HS-MCNC: 3.4 MG/L
DIFFERENTIAL METHOD BLD: NORMAL
EOSINOPHIL # BLD: 0.1 K/UL (ref 0–0.4)
EOSINOPHIL NFR BLD: 2 % (ref 0–7)
ERYTHROCYTE [DISTWIDTH] IN BLOOD BY AUTOMATED COUNT: 14.3 % (ref 11.5–14.5)
GLOBULIN SER CALC-MCNC: 4.7 G/DL (ref 2–4)
GLUCOSE SERPL-MCNC: 93 MG/DL (ref 65–100)
GLUCOSE UR STRIP.AUTO-MCNC: NEGATIVE MG/DL
HCT VFR BLD AUTO: 42.5 % (ref 35–47)
HDLC SERPL-MCNC: 92 MG/DL
HDLC SERPL: 1.5 {RATIO} (ref 0–5)
HGB BLD-MCNC: 14.2 G/DL (ref 11.5–16)
HGB UR QL STRIP: NEGATIVE
IMM GRANULOCYTES # BLD AUTO: 0 K/UL (ref 0–0.04)
IMM GRANULOCYTES NFR BLD AUTO: 0 % (ref 0–0.5)
KETONES UR QL STRIP.AUTO: NEGATIVE MG/DL
LDLC SERPL CALC-MCNC: 37.2 MG/DL (ref 0–100)
LEUKOCYTE ESTERASE UR QL STRIP.AUTO: NEGATIVE
LYMPHOCYTES # BLD: 1.8 K/UL (ref 0.8–3.5)
LYMPHOCYTES NFR BLD: 42 % (ref 12–49)
MCH RBC QN AUTO: 30.9 PG (ref 26–34)
MCHC RBC AUTO-ENTMCNC: 33.4 G/DL (ref 30–36.5)
MCV RBC AUTO: 92.6 FL (ref 80–99)
MONOCYTES # BLD: 0.4 K/UL (ref 0–1)
MONOCYTES NFR BLD: 10 % (ref 5–13)
NEUTS SEG # BLD: 1.9 K/UL (ref 1.8–8)
NEUTS SEG NFR BLD: 45 % (ref 32–75)
NITRITE UR QL STRIP.AUTO: NEGATIVE
NRBC # BLD: 0 K/UL (ref 0–0.01)
NRBC BLD-RTO: 0 PER 100 WBC
PH UR STRIP: 6.5 [PH] (ref 5–8)
PLATELET # BLD AUTO: 167 K/UL (ref 150–400)
PMV BLD AUTO: 11 FL (ref 8.9–12.9)
POTASSIUM SERPL-SCNC: 5.4 MMOL/L (ref 3.5–5.1)
PROT SERPL-MCNC: 8.6 G/DL (ref 6.4–8.2)
PROT UR STRIP-MCNC: NEGATIVE MG/DL
RBC # BLD AUTO: 4.59 M/UL (ref 3.8–5.2)
SODIUM SERPL-SCNC: 136 MMOL/L (ref 136–145)
SP GR UR REFRACTOMETRY: 1.01 (ref 1–1.03)
TRIGL SERPL-MCNC: 54 MG/DL (ref ?–150)
TSH SERPL DL<=0.05 MIU/L-ACNC: 1.72 UIU/ML (ref 0.36–3.74)
UROBILINOGEN UR QL STRIP.AUTO: 0.2 EU/DL (ref 0.2–1)
VLDLC SERPL CALC-MCNC: 10.8 MG/DL
WBC # BLD AUTO: 4.3 K/UL (ref 3.6–11)

## 2021-11-18 NOTE — PROGRESS NOTES
[facility]  [place] [address]  [address] [place] [address]  Phone:  [phone]  Fax: [phone]       Chief Complaint   Patient presents with    Hypertension     routine follow up    . SUBJECTIVE:    Claudine Browning is a 79 y.o. female comes in for return visit stating that she has done reasonably well. She has not gotten her COVID vaccine. We talked at length about the need to have this done. I explained to her there is absolutely no excuse for not having it done. She plans to do so in the near future. She is following up with her cardiologist.  Apparently she had a coronary arteriogram and demonstrated 30% stenosis in one of her vessels. She did not start on her statin with her last ApoB values being in the 40 range which is quite protective. She has been having intermittent low back pain which is a chronic problem for her. She has a past history of primary hypertension, dyslipidemia and chronic atrial fibrillation. She is reasonably physically active also. Current Outpatient Medications   Medication Sig Dispense Refill    hydroquinone (ESOTERICA, MELQUIN) 4 % topical cream Apply  to affected area two (2) times a day. 30 g 6    desonide (TRIDESILON) 0.05 % topical lotion Apply  to affected area two (2) times a day. 118 mL 4    HYDROcodone-acetaminophen (NORCO) 5-325 mg per tablet Take 1 Tablet by mouth daily as needed for Pain for up to 30 days. 30 Tablet 0    Xarelto 20 mg tab tablet TAKE 1 TABLET BY MOUTH EVERY DAY 30 Tablet 11    potassium chloride (K-DUR, KLOR-CON) 20 mEq tablet Take 1 Tab by mouth daily. 90 Tab 3    magnesium oxide (MAG-OX) 400 mg tablet Take 1 Tab by mouth daily. 90 Tab 3    metoprolol succinate (TOPROL-XL) 25 mg XL tablet Take 1 Tab by mouth two (2) times a day.  180 Tab 3    rosuvastatin (CRESTOR) 10 mg tablet Take 1 tablet every day 90 Tab 3    fluticasone propion-salmeteroL (ADVAIR/WIXELA) 250-50 mcg/dose diskus inhaler Take 1 Puff by inhalation every twelve (12) hours. 1 Inhaler 11    albuterol (Ventolin HFA) 90 mcg/actuation inhaler Take 2 Puffs by inhalation every four (4) hours as needed for Wheezing. 1 Inhaler 11    hydroCHLOROthiazide (HYDRODIURIL) 12.5 mg tablet Take once daily 90 Tab 3    ascorbic acid, vitamin C, (Vitamin C) 500 mg tablet Take  by mouth.  cholecalciferol, vitamin D3, (Vitamin D3) 50 mcg (2,000 unit) tab Take  by mouth.  elderberry fruit (ELDERBERRY PO) Take  by mouth.  folic acid/multivit-min/lutein (CENTRUM SILVER PO) Take 1 Tab by mouth daily.  ALPRAZolam (XANAX) 1 mg tablet Take 1 Tab by mouth three (3) times daily as needed for Anxiety. Max Daily Amount: 3 mg. (Patient taking differently: Take 1 mg by mouth two (2) times a day.) 10 Tab 0    QUEtiapine (SEROQUEL) 50 mg tablet Take 50 mg by mouth three (3) times daily.  FLUoxetine (PROZAC) 20 mg capsule Take 20 mg by mouth daily.        Past Medical History:   Diagnosis Date    Abdominal pain     Asthma     Bipolar 2 disorder ([facility] [address]     CAD (coronary artery disease)     ablation    DVT (deep venous thrombosis) (HCC)     Dyspnea 9/19/2014    Hepatic cyst 6/22/2019    Hypercholesteremia     Hypertension     Lumbar radicular pain 8/21/2014    Menopause     Synovitis of knee 1/28/2018    Tachycardia      Past Surgical History:   Procedure Laterality Date    ABLATION OF SVT  3/30/2015         EP STUDY W/INDUCTION  3/30/2015         HX BACK SURGERY      HX COLONOSCOPY  9-    HX HEART CATHETERIZATION      HX HYSTERECTOMY  1992     Allergies   Allergen Reactions    Ampicillin Nausea Only     Allergy was 30 years ago         REVIEW OF SYSTEMS:  General: negative for - chills or fever  ENT: negative for - headaches, nasal congestion or tinnitus  Respiratory: negative for - cough, hemoptysis, shortness of breath or wheezing  Cardiovascular : negative for - chest pain, edema, palpitations or shortness of breath  Gastrointestinal: negative for - abdominal pain, blood in stools, heartburn or nausea/vomiting  Genito-Urinary: no dysuria, trouble voiding, or hematuria  Musculoskeletal: negative for - gait disturbance, joint pain, joint stiffness or joint swelling  Neurological: no TIA or stroke symptoms  Hematologic: no bruises, no bleeding, no swollen glands  Integument: no lumps, mole changes, nail changes or rash  Endocrine: no malaise/lethargy or unexpected weight changes      Social History     Socioeconomic History    Marital status:     Number of children: 1   Occupational History    Occupation: retired--[employer]---disabled   Tobacco Use    Smoking status: Never Smoker    Smokeless tobacco: Never Used   Vaping Use    Vaping Use: Never used   Substance and Sexual Activity    Alcohol use: No     Alcohol/week: 0.0 standard drinks     Comment: rarely    Drug use: No    Sexual activity: Not Currently     Partners: Male     Family History   Problem Relation Age of Onset    Hypertension Mother     Heart Disease Mother     Heart Disease Father     Stroke Father        OBJECTIVE:    Visit Vitals  /84   Pulse 73   Temp 98.7 °F (37.1 °C) (Oral)   Resp 16   Ht 5' 2\" (1.575 m)   Wt 139 lb 9.6 oz (63.3 kg)   SpO2 96%   BMI 25.53 kg/m²     CONSTITUTIONAL: well , well nourished, appears age appropriate  EYES: perrla, eom intact  ENMT:moist mucous membranes, pharynx clear  NECK: supple. Thyroid normal  RESPIRATORY: Chest: clear to ascultation and percussion   CARDIOVASCULAR: Heart: regular rate and rhythm  Breast: No masses noted  GASTROINTESTINAL: Abdomen: soft, bowel sounds active  HEMATOLOGIC: no pathological lymph nodes palpated  MUSCULOSKELETAL: Extremities: no edema, pulse 1+   INTEGUMENT: No unusual rashes or suspicious skin lesions noted. Nails appear normal.  NEUROLOGIC: non-focal exam   MENTAL STATUS: alert and oriented, appropriate affect      ASSESSMENT:  1.  ASHD (arteriosclerotic heart disease)    2. Primary hypertension    3. Paroxysmal atrial fibrillation (HCC)    4. Dyslipidemia    5. Anxiety neurosis    6. Nodular goiter    7. Lumbar radicular pain    8. Hyperpigmentation    9. Rash        PLAN:  1. The patient does have history of coronary artery disease now. Apparently she has 30% stenotic area in one of the vessels. She is actively followed by Cardiology. Stress test is predicatively negative. 2. Blood pressure is reasonable, no adjustments are made. 3. She follows up with EP physician for her paroxysmal atrial fibrillation which has been stable. She remains on her DOAC. 4. Because of her secondary cardiovascular risk prevention status currently she remains on her statin as she has been for years. Previous ApoB values have been in the 40 range well within the protective level. 5. Her anxiety and neurosis is reasonable stable. She remains on her psychiatric medication. 6. She does have a nodular goiter, but this is unchanged. 7. She needs refills on her topical preparations. 8. For her low back pain I will give her hydrocodone, but only a small number since she takes this quite sparingly. .  Orders Placed This Encounter    APOLIPOPROTEIN B    CBC WITH AUTOMATED DIFF    LIPID PANEL    METABOLIC PANEL, COMPREHENSIVE    TSH 3RD GENERATION    URINALYSIS W/ RFLX MICROSCOPIC    CRP, HIGH SENSITIVITY    hydroquinone (ESOTERICA, MELQUIN) 4 % topical cream    desonide (TRIDESILON) 0.05 % topical lotion    HYDROcodone-acetaminophen (NORCO) 5-325 mg per tablet         Follow-up and Dispositions    · Return in about 3 months (around 2/17/2022).            Anabelle Shaffer MD

## 2021-11-19 LAB — APO B SERPL-MCNC: 53 MG/DL

## 2021-11-24 ENCOUNTER — TELEPHONE (OUTPATIENT)
Dept: CARDIOLOGY CLINIC | Age: 71
End: 2021-11-24

## 2021-11-24 NOTE — TELEPHONE ENCOUNTER
Left voicemail requesting a call back to reschedule upcoming appointment on 12/3 as Dr. Bre Morel will not be in the office. Please reschedule to next available. If the patient would like to they can also be rescheduled with [facility]. Thanks.

## 2021-11-24 NOTE — PATIENT INSTRUCTIONS
Vaccine Information Statement    Influenza (Flu) Vaccine (Inactivated or Recombinant): What You Need to Know    Many vaccine information statements are available in [place] and other languages. See www.immunize.org/vis. Hojas de información sobre vacunas están disponibles en español y en muchos otros idiomas. Visite www.immunize.org/vis. 1. Why get vaccinated? Influenza vaccine can prevent influenza (flu). Flu is a contagious disease that spreads around the [facility] every year, usually between October and May. Anyone can get the flu, but it is more dangerous for some people. Infants and young children, people 72 years and older, pregnant people, and people with certain health conditions or a weakened immune system are at greatest risk of flu complications. Pneumonia, bronchitis, sinus infections, and ear infections are examples of flu-related complications. If you have a medical condition, such as heart disease, cancer, or diabetes, flu can make it worse. Flu can cause fever and chills, sore throat, muscle aches, fatigue, cough, headache, and runny or stuffy nose. Some people may have vomiting and diarrhea, though this is more common in children than adults. In an average year, thousands of people in the [facility] die from flu, and many more are hospitalized. Flu vaccine prevents millions of illnesses and flu-related visits to the doctor each year. 2. Influenza vaccines     CDC recommends everyone 6 months and older get vaccinated every flu season. Children 6 months through 6years of age may need 2 doses during a single flu season. Everyone else needs only 1 dose each flu season. It takes about 2 weeks for protection to develop after vaccination. There are many flu viruses, and they are always changing. Each year a new flu vaccine is made to protect against the influenza viruses believed to be likely to cause disease in the upcoming flu season.  Even when the vaccine doesnt exactly match these viruses, it may still provide some protection. Influenza vaccine does not cause flu. Influenza vaccine may be given at the same time as other vaccines. 3. Talk with your health care provider    Tell your vaccination provider if the person getting the vaccine:   Has had an allergic reaction after a previous dose of influenza vaccine, or has any severe, life-threatening allergies    Has ever had Guillain-Barré Syndrome (also called GBS)    In some cases, your health care provider may decide to postpone influenza vaccination until a future visit. Influenza vaccine can be administered at any time during pregnancy. People who are or will be pregnant during influenza season should receive inactivated influenza vaccine. People with minor illnesses, such as a cold, may be vaccinated. People who are moderately or severely ill should usually wait until they recover before getting influenza vaccine. Your health care provider can give you more information. 4. Risks of a vaccine reaction     Soreness, redness, and swelling where the shot is given, fever, muscle aches, and headache can happen after influenza vaccination.  There may be a very small increased risk of Guillain-Barré Syndrome (GBS) after inactivated influenza vaccine (the flu shot). Jenna Tinsley children who get the flu shot along with pneumococcal vaccine (PCV13) and/or DTaP vaccine at the same time might be slightly more likely to have a seizure caused by fever. Tell your health care provider if a child who is getting flu vaccine has ever had a seizure. People sometimes faint after medical procedures, including vaccination. Tell your provider if you feel dizzy or have vision changes or ringing in the ears. As with any medicine, there is a very remote chance of a vaccine causing a severe allergic reaction, other serious injury, or death. 5. What if there is a serious problem?     An allergic reaction could occur after the vaccinated person leaves the clinic. If you see signs of a severe allergic reaction (hives, swelling of the face and throat, difficulty breathing, a fast heartbeat, dizziness, or weakness), call 9-1-1 and get the person to the nearest hospital.    For other signs that concern you, call your health care provider. Adverse reactions should be reported to the Vaccine Adverse Event Reporting System (VAERS). Your health care provider will usually file this report, or you can do it yourself. Visit the VAERS website at www.vaers. [facility].gov or call 2-771.124.8696. VAERS is only for reporting reactions, and VAERS staff members do not give medical advice. 6. The National Vaccine Injury Compensation Program    The [facility] Vaccine Injury Compensation Program (VICP) is a federal program that was created to compensate people who may have been injured by certain vaccines. Claims regarding alleged injury or death due to vaccination have a time limit for filing, which may be as short as two years. Visit the VICP website at www.[facility].gov/vaccinecompensation or call 5-126.607.4836 to learn about the program and about filing a claim. 7. How can I learn more?  Ask your health care provider.  Call your local or state health department.  Visit the website of the Food and Drug Administration (FDA) for vaccine package inserts and additional information at www.fda.gov/vaccines-blood-biologics/vaccines.  Contact the Centers for Disease Control and Prevention (CDC):  - Call 5-631.802.4491 (1-800-CDC-INFO) or  - Visit CDCs influenza website at www.cdc.gov/flu. Vaccine Information Statement   Inactivated Influenza Vaccine   8/6/2021  42 RIA [address] 286VD-69   Department of Health and Human Services  Centers for Disease Control and Prevention    Office Use Only

## 2021-11-30 ENCOUNTER — CLINICAL SUPPORT (OUTPATIENT)
Dept: CARDIOLOGY CLINIC | Age: 71
End: 2021-11-30

## 2021-11-30 ENCOUNTER — ANCILLARY PROCEDURE (OUTPATIENT)
Dept: CARDIOLOGY CLINIC | Age: 71
End: 2021-11-30

## 2021-11-30 VITALS — WEIGHT: 139 LBS | BODY MASS INDEX: 25.58 KG/M2 | HEIGHT: 62 IN

## 2021-11-30 DIAGNOSIS — I10 ESSENTIAL HYPERTENSION: Primary | ICD-10-CM

## 2021-11-30 DIAGNOSIS — I10 HYPERTENSION, UNSPECIFIED TYPE: ICD-10-CM

## 2021-11-30 DIAGNOSIS — I48.91 ATRIAL FIBRILLATION, UNSPECIFIED TYPE (HCC): ICD-10-CM

## 2021-11-30 PROCEDURE — 93306 TTE W/DOPPLER COMPLETE: CPT | Performed by: SPECIALIST

## 2021-12-01 ENCOUNTER — TRANSCRIBE ORDER (OUTPATIENT)
Dept: SCHEDULING | Age: 71
End: 2021-12-01

## 2021-12-01 DIAGNOSIS — Z12.31 BREAST CANCER SCREENING BY MAMMOGRAM: Primary | ICD-10-CM

## 2021-12-01 LAB
ECHO AO ASC DIAM: 2.51 CM
ECHO AO ROOT DIAM: 2.74 CM
ECHO AV AREA PEAK VELOCITY: 1.29 CM2
ECHO AV AREA VTI: 1.15 CM2
ECHO AV AREA/BSA PEAK VELOCITY: 0.8 CM2/M2
ECHO AV AREA/BSA VTI: 0.7 CM2/M2
ECHO AV MEAN GRADIENT: 2.45 MMHG
ECHO AV PEAK GRADIENT: 4.15 MMHG
ECHO AV PEAK VELOCITY: 101.85 CM/S
ECHO AV VTI: 20.82 CM
ECHO IVC PROX: 1.42 CM
ECHO LA AREA 4C: 24.27 CM2
ECHO LA MAJOR AXIS: 4.35 CM
ECHO LA MINOR AXIS: 2.65 CM
ECHO LA VOL 2C: 63.66 ML (ref 22–52)
ECHO LA VOL 4C: 72.15 ML (ref 22–52)
ECHO LA VOL BP: 78.8 ML (ref 22–52)
ECHO LA VOL/BSA BIPLANE: 48.05 ML/M2 (ref 16–28)
ECHO LA VOLUME INDEX A2C: 38.82 ML/M2 (ref 16–28)
ECHO LA VOLUME INDEX A4C: 43.99 ML/M2 (ref 16–28)
ECHO LV EDV A2C: 50.5 ML
ECHO LV EDV A4C: 62.52 ML
ECHO LV EDV BP: 57.46 ML (ref 56–104)
ECHO LV EDV INDEX A4C: 38.1 ML/M2
ECHO LV EDV INDEX BP: 35 ML/M2
ECHO LV EDV NDEX A2C: 30.8 ML/M2
ECHO LV EJECTION FRACTION A2C: 54 PERCENT
ECHO LV EJECTION FRACTION A4C: 51 PERCENT
ECHO LV EJECTION FRACTION BIPLANE: 53.5 PERCENT (ref 55–100)
ECHO LV ESV A2C: 23.08 ML
ECHO LV ESV A4C: 30.74 ML
ECHO LV ESV BP: 26.74 ML (ref 19–49)
ECHO LV ESV INDEX A2C: 14.1 ML/M2
ECHO LV ESV INDEX A4C: 18.7 ML/M2
ECHO LV ESV INDEX BP: 16.3 ML/M2
ECHO LV INTERNAL DIMENSION DIASTOLIC: 4.49 CM (ref 3.9–5.3)
ECHO LV INTERNAL DIMENSION SYSTOLIC: 2.97 CM
ECHO LV IVSD: 0.99 CM (ref 0.6–0.9)
ECHO LV MASS 2D: 166.7 G (ref 67–162)
ECHO LV MASS INDEX 2D: 101.6 G/M2 (ref 43–95)
ECHO LV POSTERIOR WALL DIASTOLIC: 1.14 CM (ref 0.6–0.9)
ECHO LVOT DIAM: 1.88 CM
ECHO LVOT PEAK GRADIENT: 0.89 MMHG
ECHO LVOT PEAK VELOCITY: 47.09 CM/S
ECHO LVOT SV: 23.9 ML
ECHO LVOT VTI: 8.6 CM
ECHO PV MAX VELOCITY: 56.12 CM/S
ECHO PV PEAK INSTANTANEOUS GRADIENT SYSTOLIC: 1.26 MMHG
ECHO PV REGURGITANT MAX VELOCITY: 122.6 CM/S
ECHO RA MINOR AXIS: 4.32 CM
ECHO RV INTERNAL DIMENSION: 3.25 CM
ECHO RV TAPSE: 1.74 CM (ref 1.5–2)
ECHO TV REGURGITANT MAX VELOCITY: 308.73 CM/S
ECHO TV REGURGITANT PEAK GRADIENT: 38.12 MMHG
LA VOL DISK BP: 71.7 ML (ref 22–52)

## 2021-12-07 ENCOUNTER — TELEPHONE (OUTPATIENT)
Dept: INTERNAL MEDICINE CLINIC | Age: 71
End: 2021-12-07

## 2021-12-07 NOTE — TELEPHONE ENCOUNTER
----- Message from Deuce Dennison MD sent at 11/27/2021  1:15 PM EST -----  Repeat CMP with gammopathy evaluation

## 2021-12-10 ENCOUNTER — TELEPHONE (OUTPATIENT)
Dept: CARDIOLOGY CLINIC | Age: 71
End: 2021-12-10

## 2021-12-10 NOTE — TELEPHONE ENCOUNTER
----- Message from Emma Laura MD sent at 12/9/2021  9:20 AM EST -----  Bp mostly ok need 2 weeks of more recent bps  ----- Message -----  From: Marina Melgar RN  Sent: 12/9/2021   8:00 AM EST  To: Emma Laura MD, Miles Canas RN    BP log in my folder.

## 2021-12-10 NOTE — TELEPHONE ENCOUNTER
TC to pt, ID verified. Advised pt per Dr. David Hawkins no changes to medications at this time but to keep BP log for 2 weeks and send in. Pt states she will start the log Monday and send in after 2 weeks. She also wanted to make Dr. David Hawkins aware Dr. Judith Stauffer was checking her for low protein. Will notify MD. No further questions.

## 2021-12-13 NOTE — TELEPHONE ENCOUNTER
Lakia Dove MD  You 1 hour ago (12:03 PM)     MG    Ok thanks    Message text       Marjan Aguilar MD 3 days ago     ES    She wanted me to make you aware Dr. Sharla Kim is checking her protein levels.     Routing comment

## 2021-12-19 DIAGNOSIS — I25.10 ASHD (ARTERIOSCLEROTIC HEART DISEASE): ICD-10-CM

## 2021-12-26 PROBLEM — D89.2 PARAPROTEINEMIA: Status: ACTIVE | Noted: 2021-12-26

## 2021-12-27 DIAGNOSIS — I48.20 CHRONIC ATRIAL FIBRILLATION (HCC): ICD-10-CM

## 2021-12-27 DIAGNOSIS — I10 ESSENTIAL HYPERTENSION: ICD-10-CM

## 2021-12-27 RX ORDER — METOPROLOL SUCCINATE 25 MG/1
TABLET, EXTENDED RELEASE ORAL
Qty: 180 TABLET | Refills: 3 | Status: SHIPPED | OUTPATIENT
Start: 2021-12-27

## 2021-12-27 RX ORDER — HYDROCHLOROTHIAZIDE 12.5 MG/1
TABLET ORAL
Qty: 90 TABLET | Refills: 3 | Status: SHIPPED | OUTPATIENT
Start: 2021-12-27

## 2021-12-27 RX ORDER — LANOLIN ALCOHOL/MO/W.PET/CERES
CREAM (GRAM) TOPICAL
Qty: 90 TABLET | Refills: 3 | Status: SHIPPED | OUTPATIENT
Start: 2021-12-27

## 2021-12-27 RX ORDER — POTASSIUM CHLORIDE 20 MEQ/1
TABLET, EXTENDED RELEASE ORAL
Qty: 90 TABLET | Refills: 3 | Status: SHIPPED | OUTPATIENT
Start: 2021-12-27

## 2021-12-30 ENCOUNTER — HOSPITAL ENCOUNTER (OUTPATIENT)
Dept: MAMMOGRAPHY | Age: 71
Discharge: HOME | End: 2021-12-30
Attending: INTERNAL MEDICINE
Payer: MEDICARE

## 2021-12-30 DIAGNOSIS — Z12.31 BREAST CANCER SCREENING BY MAMMOGRAM: ICD-10-CM

## 2021-12-30 PROCEDURE — 77063 BREAST TOMOSYNTHESIS BI: CPT

## 2022-02-03 DIAGNOSIS — J45.20 MILD INTERMITTENT REACTIVE AIRWAY DISEASE WITHOUT COMPLICATION: ICD-10-CM

## 2022-02-03 RX ORDER — ALBUTEROL SULFATE 90 UG/1
2 AEROSOL, METERED RESPIRATORY (INHALATION)
Qty: 1 EACH | Refills: 11 | Status: SHIPPED | OUTPATIENT
Start: 2022-02-03

## 2022-02-17 ENCOUNTER — OFFICE VISIT (OUTPATIENT)
Dept: INTERNAL MEDICINE CLINIC | Age: 72
End: 2022-02-17
Payer: MEDICARE

## 2022-02-17 DIAGNOSIS — E78.5 DYSLIPIDEMIA: ICD-10-CM

## 2022-02-17 DIAGNOSIS — I10 PRIMARY HYPERTENSION: Primary | ICD-10-CM

## 2022-02-17 DIAGNOSIS — Z13.31 SCREENING FOR DEPRESSION: ICD-10-CM

## 2022-02-17 DIAGNOSIS — I48.0 PAROXYSMAL ATRIAL FIBRILLATION (HCC): ICD-10-CM

## 2022-02-17 DIAGNOSIS — E04.9 NODULAR GOITER: ICD-10-CM

## 2022-02-17 DIAGNOSIS — Z00.00 WELLNESS EXAMINATION: ICD-10-CM

## 2022-02-17 DIAGNOSIS — D89.2 PARAPROTEINEMIA: ICD-10-CM

## 2022-02-17 DIAGNOSIS — F41.1 ANXIETY NEUROSIS: ICD-10-CM

## 2022-02-17 PROCEDURE — G8754 DIAS BP LESS 90: HCPCS | Performed by: INTERNAL MEDICINE

## 2022-02-17 PROCEDURE — G0439 PPPS, SUBSEQ VISIT: HCPCS | Performed by: INTERNAL MEDICINE

## 2022-02-17 PROCEDURE — 99214 OFFICE O/P EST MOD 30 MIN: CPT | Performed by: INTERNAL MEDICINE

## 2022-02-17 PROCEDURE — G8753 SYS BP > OR = 140: HCPCS | Performed by: INTERNAL MEDICINE

## 2022-02-17 PROCEDURE — 1101F PT FALLS ASSESS-DOCD LE1/YR: CPT | Performed by: INTERNAL MEDICINE

## 2022-02-17 PROCEDURE — 1090F PRES/ABSN URINE INCON ASSESS: CPT | Performed by: INTERNAL MEDICINE

## 2022-02-17 PROCEDURE — G9899 SCRN MAM PERF RSLTS DOC: HCPCS | Performed by: INTERNAL MEDICINE

## 2022-02-17 PROCEDURE — 3017F COLORECTAL CA SCREEN DOC REV: CPT | Performed by: INTERNAL MEDICINE

## 2022-02-17 PROCEDURE — G8400 PT W/DXA NO RESULTS DOC: HCPCS | Performed by: INTERNAL MEDICINE

## 2022-02-17 PROCEDURE — G8536 NO DOC ELDER MAL SCRN: HCPCS | Performed by: INTERNAL MEDICINE

## 2022-02-17 PROCEDURE — G8427 DOCREV CUR MEDS BY ELIG CLIN: HCPCS | Performed by: INTERNAL MEDICINE

## 2022-02-17 PROCEDURE — G8419 CALC BMI OUT NRM PARAM NOF/U: HCPCS | Performed by: INTERNAL MEDICINE

## 2022-02-17 PROCEDURE — G8510 SCR DEP NEG, NO PLAN REQD: HCPCS | Performed by: INTERNAL MEDICINE

## 2022-02-17 NOTE — PROGRESS NOTES
Chief Complaint   Patient presents with   [place] Annual Wellness Visit         1. Have you been to the ER, urgent care clinic since your last visit? Hospitalized since your last visit? No    2. Have you seen or consulted any other health care providers outside of the [address] [place] [address] since your last visit? Include any pap smears or colon screening.  No

## 2022-02-17 NOTE — PROGRESS NOTES
[facility]  [facility]  [address] [place] [id number]  Phone:  [phone]  Fax: [phone]       Chief Complaint   Patient presents with   [facility] Wellness Visit   . SUBJECTIVE:    Slim Rodas is a 70 y.o. female comes in for return visit stating that she has done reasonably well. She still has not gotten her vaccine. She has no logical reasons for not getting it. We talked at length as usual about this. She follows with the cardiologist regarding her atrial fibrillation. She has a past history of primary hypertension, dyslipidemia, as well as intermittent low back pain, which is not really causing any major problems for now. She is seeing the cardiologist regarding her blood pressure. Her blood pressure is very easily controlled, but historically what she would do is discontinue several of her antihypertensive medications because the pressure was normal.  Invariably it would go back up again and she will go to the same cycle. I will do absolutely nothing for her blood pressure and leave this to her cardiologist.  As I stated earlier it is extremely easily controlled. Current Outpatient Medications   Medication Sig Dispense Refill    albuterol (Ventolin HFA) 90 mcg/actuation inhaler Take 2 Puffs by inhalation every four (4) hours as needed for Wheezing. 1 Each 11    hydroCHLOROthiazide (HYDRODIURIL) 12.5 mg tablet TAKE 1 TABLET ONE TIME DAILY 90 Tablet 3    metoprolol succinate (TOPROL-XL) 25 mg XL tablet TAKE 1 TABLET TWICE DAILY 180 Tablet 3    potassium chloride (K-DUR, KLOR-CON M20) 20 mEq tablet TAKE 1 TABLET EVERY DAY 90 Tablet 3    magnesium oxide (MAG-OX) 400 mg tablet TAKE 1 TABLET EVERY DAY 90 Tablet 3    hydroquinone (ESOTERICA, MELQUIN) 4 % topical cream Apply  to affected area two (2) times a day. 30 g 6    desonide (TRIDESILON) 0.05 % topical lotion Apply  to affected area two (2) times a day.  118 mL 4    Xarelto 20 mg tab tablet TAKE 1 TABLET BY MOUTH EVERY DAY 30 Tablet 11    rosuvastatin (CRESTOR) 10 mg tablet Take 1 tablet every day 90 Tab 3    fluticasone propion-salmeteroL (ADVAIR/WIXELA) 250-50 mcg/dose diskus inhaler Take 1 Puff by inhalation every twelve (12) hours. 1 Inhaler 11    ascorbic acid, vitamin C, (Vitamin C) 500 mg tablet Take  by mouth.  cholecalciferol, vitamin D3, (Vitamin D3) 50 mcg (2,000 unit) tab Take  by mouth.  elderberry fruit (ELDERBERRY PO) Take  by mouth.  folic acid/multivit-min/lutein (CENTRUM SILVER PO) Take 1 Tab by mouth daily.  ALPRAZolam (XANAX) 1 mg tablet Take 1 Tab by mouth three (3) times daily as needed for Anxiety. Max Daily Amount: 3 mg. (Patient taking differently: Take 1 mg by mouth two (2) times a day.) 10 Tab 0    QUEtiapine (SEROQUEL) 50 mg tablet Take 50 mg by mouth three (3) times daily.  FLUoxetine (PROZAC) 20 mg capsule Take 20 mg by mouth daily.        Past Medical History:   Diagnosis Date    Abdominal pain     Asthma     Hepatic cyst 6/22/2019    Lumbar radicular pain 8/21/2014    Synovitis of knee 1/28/2018     Past Surgical History:   Procedure Laterality Date    ABLATION OF SVT  3/30/2015         EP STUDY W/INDUCTION  3/30/2015         HX BACK SURGERY      HX COLONOSCOPY  9-    HX HEART CATHETERIZATION      HX HYSTERECTOMY  1992     Allergies   Allergen Reactions    Ampicillin Nausea Only     Allergy was 30 years ago         REVIEW OF SYSTEMS:  General: negative for - chills or fever  ENT: negative for - headaches, nasal congestion or tinnitus  Respiratory: negative for - cough, hemoptysis, shortness of breath or wheezing  Cardiovascular : negative for - chest pain, edema, palpitations or shortness of breath  Gastrointestinal: negative for - abdominal pain, blood in stools, heartburn or nausea/vomiting  Genito-Urinary: no dysuria, trouble voiding, or hematuria  Musculoskeletal: negative for - gait disturbance, joint pain, joint stiffness or joint swelling  Neurological: no TIA or stroke symptoms  Hematologic: no bruises, no bleeding, no swollen glands  Integument: no lumps, mole changes, nail changes or rash  Endocrine: no malaise/lethargy or unexpected weight changes      Social History     Socioeconomic History    Marital status:     Number of children: 1   Occupational History    Occupation: retired--[employer]---disabled   Tobacco Use    Smoking status: Never Smoker    Smokeless tobacco: Never Used   Vaping Use    Vaping Use: Never used   Substance and Sexual Activity    Alcohol use: No     Alcohol/week: 0.0 standard drinks     Comment: rarely    Drug use: No    Sexual activity: Not Currently     Partners: Male     Family History   Problem Relation Age of Onset    Hypertension Mother     Heart Disease Mother     Heart Disease Father     Stroke Father        OBJECTIVE:    Visit Vitals  BP (!) 150/80   Pulse 75   Temp 98.7 °F (37.1 °C) (Oral)   Resp 16   Ht 5' 2\" (1.575 m)   Wt 141 lb 1.6 oz (64 kg)   SpO2 97%   BMI 25.81 kg/m²     CONSTITUTIONAL: well , well nourished, appears age appropriate  EYES: perrla, eom intact  ENMT:moist mucous membranes, pharynx clear  NECK: supple. Thyroid normal  RESPIRATORY: Chest: clear to ascultation and percussion   CARDIOVASCULAR: Heart: regular rate and rhythm  GASTROINTESTINAL: Abdomen: soft, bowel sounds active  HEMATOLOGIC: no pathological lymph nodes palpated  MUSCULOSKELETAL: Extremities: no edema, pulse 1+   INTEGUMENT: No unusual rashes or suspicious skin lesions noted. Nails appear normal.  NEUROLOGIC: non-focal exam   MENTAL STATUS: alert and oriented, appropriate affect      ASSESSMENT:  1. Primary hypertension    2. Paroxysmal atrial fibrillation (HCC)    3. Nodular goiter    4. Anxiety neurosis    5. Dyslipidemia    6. Paraproteinemia        PLAN:  1.  The patient's blood pressure is slightly elevated, but again this is in the hands of the cardiologist.  2. She is in a normal rhythm today. She will follow up with Cardiology regarding atrial fibrillation. 3. She does have a nodular goiter, but she has been euglycemic. This should not increase in size. 4. Her anxiety is stable. 5. She remains on her statin and is having no adverse effects from her medication. 6. She does have mild paraproteinemia, but nothing needs to be done at this point. I will repeat the value in six months to ensure stability. 7. I also again encouraged the patient to get the vaccine for COVID. Yi Garcia Orders Placed This Encounter    METABOLIC PANEL, BASIC         Follow-up and Dispositions    · Return in about 3 months (around 5/17/2022). Whitley King MD  This is the Subsequent Medicare Annual Wellness Exam, performed 12 months or more after the Initial AWV or the last Subsequent AWV    I have reviewed the patient's medical history in detail and updated the computerized patient record. Assessment/Plan   Education and counseling provided:  Are appropriate based on today's review and evaluation    1. Primary hypertension  -     METABOLIC PANEL, BASIC; Future  2. Paroxysmal atrial fibrillation (HCC)  3. Nodular goiter  4. Anxiety neurosis  5. Dyslipidemia  6.  Paraproteinemia       Depression Risk Factor Screening     3 most recent PHQ Screens 2/17/2022   PHQ Not Done -   Little interest or pleasure in doing things Not at all   Feeling down, depressed, irritable, or hopeless Not at all   Total Score PHQ 2 0   Trouble falling or staying asleep, or sleeping too much Not at all   Feeling tired or having little energy Not at all   Poor appetite, weight loss, or overeating Not at all   Feeling bad about yourself - or that you are a failure or have let yourself or your family down Not at all   Trouble concentrating on things such as school, work, reading, or watching TV Not at all   Moving or speaking so slowly that other people could have noticed; or the opposite being so fidgety that others notice Not at all   Thoughts of being better off dead, or hurting yourself in some way Not at all   PHQ 9 Score 0       Alcohol & Drug Abuse Risk Screen    Do you average more than 1 drink per night or more than 7 drinks a week:  No    On any one occasion in the past three months have you have had more than 3 drinks containing alcohol:  No          Functional Ability and Level of Safety    Hearing: Hearing is good. Activities of Daily Living: The home contains: no safety equipment. Patient does total self care      Ambulation: with no difficulty     Fall Risk:  Fall Risk Assessment, last 12 mths 2/17/2022   Able to walk? Yes   Fall in past 12 months? 0   Do you feel unsteady?  0   Are you worried about falling 0      Abuse Screen:  Patient is not abused       Cognitive Screening    Has your family/caregiver stated any concerns about your memory: no     Cognitive Screening: Not applicable    Health Maintenance Due     Health Maintenance Due   Topic Date Due    COVID-19 Vaccine (1) Never done       Patient Care Team   Patient Care Team:  Yaakov Councilman, MD as PCP - General (Internal Medicine)  Yaakov Councilman, MD [facility] PCP [facility]  Johnna Trimble MD (Cardiology)  Pauly Hager MD (Cardiology)    History     Patient Active Problem List   Diagnosis Code    Mastalgia N64.4    Atrial fibrillation ([address] I48.91    HTN (hypertension) I10    Anxiety neurosis F41.1    Dyslipidemia E78.5    Stanton filter in place Z95.828    Dermatitis L30.9    Asthma J45.909    Episodic tension-type headache, not intractable G44.219    Nodular goiter E04.9    Ganglion cyst of right foot M67.471    ASHD (arteriosclerotic heart disease) I25.10    Paraproteinemia D89.2     Past Medical History:   Diagnosis Date    Abdominal pain     Asthma     Hepatic cyst 6/22/2019    Lumbar radicular pain 8/21/2014    Synovitis of knee 1/28/2018      Past Surgical History:   Procedure Laterality Date    ABLATION OF SVT  3/30/2015         EP STUDY W/INDUCTION  3/30/2015         HX BACK SURGERY      HX COLONOSCOPY  9-    HX HEART CATHETERIZATION      HX HYSTERECTOMY  1992     Current Outpatient Medications   Medication Sig Dispense Refill    albuterol (Ventolin HFA) 90 mcg/actuation inhaler Take 2 Puffs by inhalation every four (4) hours as needed for Wheezing. 1 Each 11    hydroCHLOROthiazide (HYDRODIURIL) 12.5 mg tablet TAKE 1 TABLET ONE TIME DAILY 90 Tablet 3    metoprolol succinate (TOPROL-XL) 25 mg XL tablet TAKE 1 TABLET TWICE DAILY 180 Tablet 3    potassium chloride (K-DUR, KLOR-CON M20) 20 mEq tablet TAKE 1 TABLET EVERY DAY 90 Tablet 3    magnesium oxide (MAG-OX) 400 mg tablet TAKE 1 TABLET EVERY DAY 90 Tablet 3    hydroquinone (ESOTERICA, MELQUIN) 4 % topical cream Apply  to affected area two (2) times a day. 30 g 6    desonide (TRIDESILON) 0.05 % topical lotion Apply  to affected area two (2) times a day. 118 mL 4    Xarelto 20 mg tab tablet TAKE 1 TABLET BY MOUTH EVERY DAY 30 Tablet 11    rosuvastatin (CRESTOR) 10 mg tablet Take 1 tablet every day 90 Tab 3    fluticasone propion-salmeteroL (ADVAIR/WIXELA) 250-50 mcg/dose diskus inhaler Take 1 Puff by inhalation every twelve (12) hours. 1 Inhaler 11    ascorbic acid, vitamin C, (Vitamin C) 500 mg tablet Take  by mouth.  cholecalciferol, vitamin D3, (Vitamin D3) 50 mcg (2,000 unit) tab Take  by mouth.  elderberry fruit (ELDERBERRY PO) Take  by mouth.  folic acid/multivit-min/lutein (CENTRUM SILVER PO) Take 1 Tab by mouth daily.  ALPRAZolam (XANAX) 1 mg tablet Take 1 Tab by mouth three (3) times daily as needed for Anxiety. Max Daily Amount: 3 mg. (Patient taking differently: Take 1 mg by mouth two (2) times a day.) 10 Tab 0    QUEtiapine (SEROQUEL) 50 mg tablet Take 50 mg by mouth three (3) times daily.  FLUoxetine (PROZAC) 20 mg capsule Take 20 mg by mouth daily.        Allergies   Allergen Reactions    Ampicillin Nausea Only     Allergy was 30 years ago       Family History   Problem Relation Age of Onset    Hypertension Mother     Heart Disease Mother     Heart Disease Father     Stroke Father      Social History     Tobacco Use    Smoking status: Never Smoker    Smokeless tobacco: Never Used   Substance Use Topics    Alcohol use: No     Alcohol/week: 0.0 standard drinks     Comment: rarely         Boris Paulson MD

## 2022-02-18 LAB
ANION GAP SERPL CALC-SCNC: 5 MMOL/L (ref 5–15)
BUN SERPL-MCNC: 23 MG/DL (ref 6–20)
BUN/CREAT SERPL: 21 (ref 12–20)
CALCIUM SERPL-MCNC: 10.2 MG/DL (ref 8.5–10.1)
CHLORIDE SERPL-SCNC: 108 MMOL/L (ref 97–108)
CO2 SERPL-SCNC: 27 MMOL/L (ref 21–32)
CREAT SERPL-MCNC: 1.09 MG/DL (ref 0.55–1.02)
GLUCOSE SERPL-MCNC: 96 MG/DL (ref 65–100)
POTASSIUM SERPL-SCNC: 3.9 MMOL/L (ref 3.5–5.1)
SODIUM SERPL-SCNC: 140 MMOL/L (ref 136–145)

## 2022-02-19 VITALS
RESPIRATION RATE: 16 BRPM | SYSTOLIC BLOOD PRESSURE: 150 MMHG | BODY MASS INDEX: 25.96 KG/M2 | HEART RATE: 75 BPM | TEMPERATURE: 98.7 F | DIASTOLIC BLOOD PRESSURE: 80 MMHG | OXYGEN SATURATION: 97 % | HEIGHT: 62 IN | WEIGHT: 141.1 LBS

## 2022-02-19 NOTE — PATIENT INSTRUCTIONS

## 2022-03-18 PROBLEM — E04.9 NODULAR GOITER: Status: ACTIVE | Noted: 2019-01-13

## 2022-03-19 PROBLEM — I25.10 ASHD (ARTERIOSCLEROTIC HEART DISEASE): Status: ACTIVE | Noted: 2021-11-17

## 2022-03-19 PROBLEM — M67.471 GANGLION CYST OF RIGHT FOOT: Status: ACTIVE | Noted: 2021-02-14

## 2022-03-19 PROBLEM — G44.219 EPISODIC TENSION-TYPE HEADACHE, NOT INTRACTABLE: Status: ACTIVE | Noted: 2018-01-28

## 2022-03-20 PROBLEM — D89.2 PARAPROTEINEMIA: Status: ACTIVE | Noted: 2021-12-26

## 2022-04-04 ENCOUNTER — OFFICE VISIT (OUTPATIENT)
Dept: INTERNAL MEDICINE CLINIC | Age: 72
End: 2022-04-04
Payer: MEDICARE

## 2022-04-04 ENCOUNTER — TELEPHONE (OUTPATIENT)
Dept: CARDIOLOGY CLINIC | Age: 72
End: 2022-04-04

## 2022-04-04 VITALS
DIASTOLIC BLOOD PRESSURE: 97 MMHG | SYSTOLIC BLOOD PRESSURE: 169 MMHG | RESPIRATION RATE: 16 BRPM | HEART RATE: 83 BPM | HEIGHT: 62 IN | BODY MASS INDEX: 25.67 KG/M2 | TEMPERATURE: 97.6 F | OXYGEN SATURATION: 95 % | WEIGHT: 139.5 LBS

## 2022-04-04 DIAGNOSIS — I10 PRIMARY HYPERTENSION: ICD-10-CM

## 2022-04-04 DIAGNOSIS — I48.0 PAROXYSMAL ATRIAL FIBRILLATION (HCC): ICD-10-CM

## 2022-04-04 DIAGNOSIS — M54.16 LUMBAR RADICULOPATHY: Primary | ICD-10-CM

## 2022-04-04 DIAGNOSIS — E78.5 DYSLIPIDEMIA: ICD-10-CM

## 2022-04-04 PROCEDURE — G9899 SCRN MAM PERF RSLTS DOC: HCPCS | Performed by: INTERNAL MEDICINE

## 2022-04-04 PROCEDURE — 3017F COLORECTAL CA SCREEN DOC REV: CPT | Performed by: INTERNAL MEDICINE

## 2022-04-04 PROCEDURE — G8536 NO DOC ELDER MAL SCRN: HCPCS | Performed by: INTERNAL MEDICINE

## 2022-04-04 PROCEDURE — G8419 CALC BMI OUT NRM PARAM NOF/U: HCPCS | Performed by: INTERNAL MEDICINE

## 2022-04-04 PROCEDURE — G8510 SCR DEP NEG, NO PLAN REQD: HCPCS | Performed by: INTERNAL MEDICINE

## 2022-04-04 PROCEDURE — 1090F PRES/ABSN URINE INCON ASSESS: CPT | Performed by: INTERNAL MEDICINE

## 2022-04-04 PROCEDURE — G8755 DIAS BP > OR = 90: HCPCS | Performed by: INTERNAL MEDICINE

## 2022-04-04 PROCEDURE — G8427 DOCREV CUR MEDS BY ELIG CLIN: HCPCS | Performed by: INTERNAL MEDICINE

## 2022-04-04 PROCEDURE — 99214 OFFICE O/P EST MOD 30 MIN: CPT | Performed by: INTERNAL MEDICINE

## 2022-04-04 PROCEDURE — G8753 SYS BP > OR = 140: HCPCS | Performed by: INTERNAL MEDICINE

## 2022-04-04 PROCEDURE — G8400 PT W/DXA NO RESULTS DOC: HCPCS | Performed by: INTERNAL MEDICINE

## 2022-04-04 PROCEDURE — 1101F PT FALLS ASSESS-DOCD LE1/YR: CPT | Performed by: INTERNAL MEDICINE

## 2022-04-04 RX ORDER — RIVAROXABAN 20 MG/1
TABLET, FILM COATED ORAL
Qty: 30 TABLET | Refills: 0 | Status: SHIPPED | OUTPATIENT
Start: 2022-04-04 | End: 2022-07-06 | Stop reason: SDUPTHER

## 2022-04-04 RX ORDER — RIVAROXABAN 20 MG/1
TABLET, FILM COATED ORAL
Qty: 30 TABLET | Refills: 0 | Status: SHIPPED | OUTPATIENT
Start: 2022-04-04 | End: 2022-04-04 | Stop reason: SDUPTHER

## 2022-04-04 RX ORDER — HYDROCODONE BITARTRATE AND ACETAMINOPHEN 5; 325 MG/1; MG/1
1 TABLET ORAL
Refills: 0 | Status: CANCELLED | OUTPATIENT
Start: 2022-04-04 | End: 2022-04-07

## 2022-04-04 NOTE — PROGRESS NOTES
Chief Complaint   Patient presents with    Headache    Other     Patient states that she is concerned about her blood pressure. 1. Have you been to the ER, urgent care clinic since your last visit? Hospitalized since your last visit? No    2. Have you seen or consulted any other health care providers outside of the [address] [place] [address] since your last visit? Include any pap smears or colon screening.  No

## 2022-04-04 NOTE — TELEPHONE ENCOUNTER
Left message for Pt to call back with blood pressure log, please record a seven day blood pressure log if patient call back :)

## 2022-04-04 NOTE — TELEPHONE ENCOUNTER
Patient called back to speak to the nurse, provided blood pressure readings, also asked if there are any samples of xarelto 20mg        3/21/22 166/82 9:01pm, 121/65 1:23am  3/25/22 150/66, 144/78  3/27/22 163/84 p 50 11:00pm, 141/76 11:21pm  3/28/22 114/77 15:55pm  3/29/22 177/85, 136/74 4:45pm  3/30/22 124/72 p 5611:30am  3/31/22 159/80 8:04am        [phone]

## 2022-04-04 NOTE — TELEPHONE ENCOUNTER
Patient called to move appointment up to a sooner date, pt scheduled to be seen 5/2/22, there we no available appts as the patient stated she has been keeping a record of her blood pressures and they have been elevated and low, scheduled her with NP on 4/21/22 and the patient wanted to be seen sooner, also been having headaches, please advise      [phone]

## 2022-04-04 NOTE — TELEPHONE ENCOUNTER
TC to pt, ID verified. Advised pt we do not have samples of Xarelto at this time. Advised pt I would send her BP log to Dr. Taco Valadez who is covering [facility] this week and get back with her about recommendations. Pt states the BP readings that are elevated went down after she sat for 10min or so. She states she did sit for about 3 minutes before the high readings. She states she is also concerned about her HR. States she has seen it as low as 37 on her home monitor and she is concerned. She does not feel bad when it is this low, just concerned. Advised I would get back with her with recommendations from Dr. Taco Valadez once I have them.

## 2022-04-06 NOTE — TELEPHONE ENCOUNTER
LM for Abe Lopez MD  You 21 hours ago (4:32 PM)     MG    Start norvasc 2.5 mg every day     Message text

## 2022-04-07 DIAGNOSIS — I49.5 TACHY-BRADY SYNDROME (HCC): ICD-10-CM

## 2022-04-07 DIAGNOSIS — E78.5 DYSLIPIDEMIA: ICD-10-CM

## 2022-04-07 RX ORDER — ROSUVASTATIN CALCIUM 10 MG/1
TABLET, COATED ORAL
Qty: 90 TABLET | Refills: 3 | Status: SHIPPED | OUTPATIENT
Start: 2022-04-07

## 2022-04-07 RX ORDER — ROSUVASTATIN CALCIUM 10 MG/1
TABLET, COATED ORAL
Qty: 90 TABLET | Refills: 0 | Status: SHIPPED | OUTPATIENT
Start: 2022-04-07 | End: 2022-04-07 | Stop reason: SDUPTHER

## 2022-04-07 NOTE — TELEPHONE ENCOUNTER
Cardiologist: Dr. Michael Hernandez    Last appt: 3/29/2022  Future Appointments   Date Time Provider Carina Morrisi   4/21/2022  2:40 PM Bianca Cruz NP [facility] AMB   5/2/2022  2:20 PM MD FLOR Garcia BS AMB   5/19/2022  3:30 PM En Clark MD [facility]       Requested Prescriptions     Signed Prescriptions Disp Refills    rosuvastatin (CRESTOR) 10 mg tablet 90 Tablet 0     Sig: Take 1 tablet every day     Authorizing Provider: Kyle Sahni     Ordering User: KEVIN PRESSLEY         Refills VO per Dr. Michael Hernandez.

## 2022-04-07 NOTE — TELEPHONE ENCOUNTER
Pt states she needs a refill on the rosuvastatin 10 mg. Only has about 3 days left, but [place] [facility] so [place] can [place] it. Requesting 60 day supply.      [employer]: [phone]     Pt: [phone]

## 2022-04-30 NOTE — PROGRESS NOTES
[facility]  [place] [address]  [address] [place] [address]  Phone:  [phone]  Fax: [phone]       Chief Complaint   Patient presents with    Headache    Other     Patient states that she is concerned about her blood pressure. .      SUBJECTIVE:    Starr Sauer is a 70 y.o. female comes in complaining of vague headaches, which she has had previously. The quality and character are unchanged. She continues to be concerned about her blood pressure, but this is in the hands of the cardiologist.  He is making adjustments, so I will not intervene. Her blood pressure was well controlled until she continued to complain about side effects to certain medications. Additionally, she complains of vague pain in her back radiating down to the posterior aspect of her right leg, which she has had intermittently for several years. This is felt to be a lumbar radiculopathy. She has a past history of primary hypertension as well as dyslipidemia. She still has not got her vaccine and continues to offer no logic or reasonable explanation of why she has not. She has relegated herself by restricting her activities with others and covering all of her open areas that need to be covered to protect her. Current Outpatient Medications   Medication Sig Dispense Refill    albuterol (Ventolin HFA) 90 mcg/actuation inhaler Take 2 Puffs by inhalation every four (4) hours as needed for Wheezing. 1 Each 11    hydroCHLOROthiazide (HYDRODIURIL) 12.5 mg tablet TAKE 1 TABLET ONE TIME DAILY 90 Tablet 3    metoprolol succinate (TOPROL-XL) 25 mg XL tablet TAKE 1 TABLET TWICE DAILY 180 Tablet 3    potassium chloride (K-DUR, KLOR-CON M20) 20 mEq tablet TAKE 1 TABLET EVERY DAY 90 Tablet 3    magnesium oxide (MAG-OX) 400 mg tablet TAKE 1 TABLET EVERY DAY 90 Tablet 3    hydroquinone (ESOTERICA, MELQUIN) 4 % topical cream Apply  to affected area two (2) times a day.  30 g 6  desonide (TRIDESILON) 0.05 % topical lotion Apply  to affected area two (2) times a day. 118 mL 4    fluticasone propion-salmeteroL (ADVAIR/WIXELA) 250-50 mcg/dose diskus inhaler Take 1 Puff by inhalation every twelve (12) hours. 1 Inhaler 11    ascorbic acid, vitamin C, (Vitamin C) 500 mg tablet Take  by mouth.  cholecalciferol, vitamin D3, (Vitamin D3) 50 mcg (2,000 unit) tab Take  by mouth.  elderberry fruit (ELDERBERRY PO) Take  by mouth.  folic acid/multivit-min/lutein (CENTRUM SILVER PO) Take 1 Tab by mouth daily.  ALPRAZolam (XANAX) 1 mg tablet Take 1 Tab by mouth three (3) times daily as needed for Anxiety. Max Daily Amount: 3 mg. (Patient taking differently: Take 1 mg by mouth two (2) times a day.) 10 Tab 0    QUEtiapine (SEROQUEL) 50 mg tablet Take 50 mg by mouth three (3) times daily.  FLUoxetine (PROZAC) 20 mg capsule Take 20 mg by mouth daily.       rosuvastatin (CRESTOR) 10 mg tablet Take 1 tablet every day 90 Tablet 3    Xarelto 20 mg tab tablet TAKE 1 TABLET BY MOUTH EVERY DAY 30 Tablet 0     Past Medical History:   Diagnosis Date    Abdominal pain     Asthma     Hepatic cyst 6/22/2019    Lumbar radicular pain 8/21/2014    Synovitis of knee 1/28/2018     Past Surgical History:   Procedure Laterality Date    ABLATION OF SVT  3/30/2015         EP STUDY W/INDUCTION  3/30/2015         HX BACK SURGERY      HX COLONOSCOPY  9-    HX HEART CATHETERIZATION      HX HYSTERECTOMY  1992     Allergies   Allergen Reactions    Ampicillin Nausea Only     Allergy was 30 years ago         REVIEW OF SYSTEMS:  General: negative for - chills or fever  ENT: negative for - headaches, nasal congestion or tinnitus  Respiratory: negative for - cough, hemoptysis, shortness of breath or wheezing  Cardiovascular : negative for - chest pain, edema, palpitations or shortness of breath  Gastrointestinal: negative for - abdominal pain, blood in stools, heartburn or nausea/vomiting  Genito-Urinary: no dysuria, trouble voiding, or hematuria  Musculoskeletal: negative for - gait disturbance, joint pain, joint stiffness or joint swelling  Neurological: no TIA or stroke symptoms  Hematologic: no bruises, no bleeding, no swollen glands  Integument: no lumps, mole changes, nail changes or rash  Endocrine: no malaise/lethargy or unexpected weight changes      Social History     Socioeconomic History    Marital status:     Number of children: 1   Occupational History    Occupation: retired--[employer]---disabled   Tobacco Use    Smoking status: Never Smoker    Smokeless tobacco: Never Used   Vaping Use    Vaping Use: Never used   Substance and Sexual Activity    Alcohol use: No     Alcohol/week: 0.0 standard drinks     Comment: rarely    Drug use: No    Sexual activity: Not Currently     Partners: Male     Family History   Problem Relation Age of Onset    Hypertension Mother     Heart Disease Mother     Heart Disease Father     Stroke Father        OBJECTIVE:    Visit Vitals  BP (!) 169/97   Pulse 83   Temp 97.6 °F (36.4 °C) (Oral)   Resp 16   Ht 5' 2\" (1.575 m)   Wt 139 lb 8 oz (63.3 kg)   SpO2 95%   BMI 25.51 kg/m²     CONSTITUTIONAL: well , well nourished, appears age appropriate  EYES: perrla, eom intact  ENMT:moist mucous membranes, pharynx clear  NECK: supple. Thyroid normal  RESPIRATORY: Chest: clear to ascultation and percussion   CARDIOVASCULAR: Heart: regular rate and rhythm  GASTROINTESTINAL: Abdomen: soft, bowel sounds active  HEMATOLOGIC: no pathological lymph nodes palpated  MUSCULOSKELETAL: Extremities: no edema, pulse 1+   INTEGUMENT: No unusual rashes or suspicious skin lesions noted. Nails appear normal.  NEUROLOGIC: non-focal exam   MENTAL STATUS: alert and oriented, appropriate affect      ASSESSMENT:  1. Lumbar radiculopathy    2. Paroxysmal atrial fibrillation (HCC)    3. Dyslipidemia    4. Primary hypertension        PLAN:  1.  The patient appears to have a lumbar radiculopathy on the right, which she has had previously. Symptomatic treatment for now. 2. She does have a history of paroxysmal atrial fibrillation and follows up with her cardiologist regarding this. The ventricular rate is quite stable. She remains on her Xarelto prescription, which is sent in today. 3. She remains on a statin. Her cardiovascular risk is mild to moderate. 4. Her blood pressure is slightly elevated, but I suggest she follow with Cardiology regarding this. .  Orders Placed This Encounter    DISCONTD: Xarelto 20 mg tab tablet         Follow-up and Dispositions    · Return keep old apt.            Munira Hull MD

## 2022-05-02 ENCOUNTER — OFFICE VISIT (OUTPATIENT)
Dept: CARDIOLOGY CLINIC | Age: 72
End: 2022-05-02
Payer: MEDICARE

## 2022-05-02 VITALS
RESPIRATION RATE: 16 BRPM | WEIGHT: 137 LBS | DIASTOLIC BLOOD PRESSURE: 80 MMHG | HEART RATE: 60 BPM | OXYGEN SATURATION: 97 % | BODY MASS INDEX: 25.21 KG/M2 | SYSTOLIC BLOOD PRESSURE: 130 MMHG | HEIGHT: 62 IN

## 2022-05-02 DIAGNOSIS — I48.0 PAROXYSMAL ATRIAL FIBRILLATION (HCC): Primary | ICD-10-CM

## 2022-05-02 PROCEDURE — 3017F COLORECTAL CA SCREEN DOC REV: CPT | Performed by: SPECIALIST

## 2022-05-02 PROCEDURE — G8432 DEP SCR NOT DOC, RNG: HCPCS | Performed by: SPECIALIST

## 2022-05-02 PROCEDURE — G8400 PT W/DXA NO RESULTS DOC: HCPCS | Performed by: SPECIALIST

## 2022-05-02 PROCEDURE — G8536 NO DOC ELDER MAL SCRN: HCPCS | Performed by: SPECIALIST

## 2022-05-02 PROCEDURE — 1101F PT FALLS ASSESS-DOCD LE1/YR: CPT | Performed by: SPECIALIST

## 2022-05-02 PROCEDURE — G8419 CALC BMI OUT NRM PARAM NOF/U: HCPCS | Performed by: SPECIALIST

## 2022-05-02 PROCEDURE — 1090F PRES/ABSN URINE INCON ASSESS: CPT | Performed by: SPECIALIST

## 2022-05-02 PROCEDURE — 93000 ELECTROCARDIOGRAM COMPLETE: CPT | Performed by: SPECIALIST

## 2022-05-02 PROCEDURE — G9899 SCRN MAM PERF RSLTS DOC: HCPCS | Performed by: SPECIALIST

## 2022-05-02 PROCEDURE — 99214 OFFICE O/P EST MOD 30 MIN: CPT | Performed by: SPECIALIST

## 2022-05-02 PROCEDURE — G8754 DIAS BP LESS 90: HCPCS | Performed by: SPECIALIST

## 2022-05-02 PROCEDURE — G8427 DOCREV CUR MEDS BY ELIG CLIN: HCPCS | Performed by: SPECIALIST

## 2022-05-02 PROCEDURE — G8752 SYS BP LESS 140: HCPCS | Performed by: SPECIALIST

## 2022-05-02 RX ORDER — HYDROCODONE BITARTRATE AND ACETAMINOPHEN 5; 325 MG/1; MG/1
TABLET ORAL
COMMUNITY
Start: 2022-04-22 | End: 2022-05-02

## 2022-05-02 NOTE — PATIENT INSTRUCTIONS
Please begin a blood pressure log. Include the date, time, your blood pressure and heart rate. Please take readings at different times each day. Keep this log for 2 week(s) and then report it to us. Remember to sit for at least 3 minutes. Have both feet flat on floor and arm at heart level when taking your blood pressure.     Follow up with Dr. Quentin Pang in 6 months

## 2022-05-02 NOTE — PROGRESS NOTES
Visit Vitals  /80   Pulse 60   Resp 16   Ht 5' 2\" (1.575 m)   Wt 137 lb (62.1 kg)   SpO2 97%   BMI 25.06 kg/m²

## 2022-05-02 NOTE — PROGRESS NOTES
[facility]                                                            OFFICE NOTE        Kwesi Gonzalez M.D.,PRUDENCE [place]   1950  [id number]    Date/Time:  5/2/20223:13 PM            SUBJECTIVE:  She is doing okay. No recurrent chest pain thus far. No particular shortness of breath and no palpitations. Little bit concerning fluctuation in her blood pressure. Assessment/Plan  1. Chest pain: No recurrent chest pain reported. We have discussed the results of the CT angiography of August 2021 which is failed to reveal any critical stenosis the more stenosis was the 30 to 40% stenosis at the level of the RCA.     Continue with Toprol and Crestor at this time with no additional changes. EKG today with atrial fibrillation nonspecific ST-T changes.     2. Atrial fibrillation: Chronic. Continue Xarelto and metoprolol rate is controlled.     She has seen Dr. Jarrett Apley in the past and he suggested proceed with atrial fibrillation ablation by she declined at that time. She understands that atrial fibrillation begets itself.     No untoward effects with Xarelto. Warn her to hold off fish oil of similar at this point.     Last echocardiogram November 2021 normal with preserved ejection fraction. Although the lower limits of normal 50 to 55% mild to moderate MR and TR noted.     3. Hypertension: Her blood pressure is normal today and suspect some of the fluctuation in blood pressure related to atrial fibrillation but also anxiety. Proceed with 2 weeks blood pressure log.     4. Hyperlipidemia: On Crestor closely followed by her primary care physician.     5.   DVT: On Xarelto remote history of DVT at this time.     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     7.  Carotid stenosis: Carotid ultrasound of November 2020 with less than 10% stenosis bilaterally. Consider repeat in November 2022.     Otherwise I will see her back in 6 months . HPI     70 y.o. female. Patient with h/o PSVT and  Tachy iron followed in [facility], admitted on 5/13 at [facility] with chest pain, at that time nuclear stress test showed no ischemia or MI and EF of 69%, patient remained in NSR during hospital stay. Here for follow up  Echo on 7/12 EF 55% mild TR and MR  Also h/o PAF as per her cardiologist in [place]. 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 Ejection fraction was estimated to be 63 %. No obvious wall motion  abnormalities identified in the views obtained. Right ventricle: The size was at the upper limits of normal.    Left atrium: The atrium was moderately dilated. Right atrium: The atrium was moderately dilated. Mitral valve: There was mild regurgitation. Tricuspid valve: There was moderate annular dilatation. There was moderate      Recurrent atrial flutter in 10/ 2017 seen by Ep, ablation offered she decided to wait                    CARDIAC STUDIES     CT Results (most recent):  Results from [place] encounter on 08/17/21     CT CORONARY ART W STRUC MORPH [facility]     Addendum 8/18/2021 11:55 AM  Addendum: INDICATION:   CAD     COMPARISON: None.        CPT Code: 60166.     TECHNIQUE:  Precontrast  images were obtained to localize the volume for acquisition.   Multislice helical gated cardiac coronary CT arteriography was performed from  the diaphragm to the main pulmonary artery level during uneventful rapid bolus  intravenous administration of 100 mL of Isovue  370. Premedication with atenolol  was used to achieve target heart rate of less than 60 beats per minute. Lung and  soft tissue windows were generated. Post processing and 3 D image reconstruction  was performed.     IMPRESSION:  1. Left main originate normally from left coronary cusp and is normal size  without any significant atherosclerotic plaque or calcification. 2. The LAD is normal size vessel with mild calcified plaque in the proximal  segment without any significant luminal stenosis. The D1 diagonal branch  demonstrate ostial calcified plaque without any significant luminal stenosis. The D2 and D3 diagonal branches are small without any significant lesion or  calcification. There is no myocardial bridging seen. 3. The left circumflex is normal size vessel without any significant  calcification or disease. The OM1, OM 2 branches are seen without any  significant disease or calcification. 4. The RCA is normal size vessel and originate normally from the right coronary  cusp. There is proximal and mid vessel with moderate calcified and noncalcified  plaque with 30-40% luminal stenosis. There is no significant disease in the  distal RCA, PDA or PLB branches.     Narrative  The cardiac portion of this examination was supervised and will be interpreted  by the cardiologist. The visualized lung fields are clear. There is no  mediastinal or hilar lymphadenopathy. No visible pleural effusion or evidence of  pericardial effusion. Visualized portion of the upper abdomen is unremarkable.                          08/11/20     NUCLEAR CARDIAC STRESS TEST 09/02/2020 9/3/2020     Interpretation Summary  · Gated SPECT: Left ventricular function post-stress was normal. Calculated ejection fraction is 62%. There is no evidence of transient ischemic dilation (TID). The TID ratio is 0.98. · Baseline ECG: Atrial fibrillation, non-specific ST-T wave abnormalities.   · Myocardial perfusion imaging defect 1: There is a defect that is small in size present in the basal-mid anterior location(s) that is predominantly fixed. There is normal wall motion in the defect area. The defect appears to probably be artifact caused by breast attenuation. · Left ventricular perfusion is probably normal.  · Myocardial perfusion imaging supports a low risk stress test.     Signed by: Charmayne Abu, MD on 9/2/2020 12:05 PM            CARDIAC STUDIES        11/30/21    ECHO ADULT COMPLETE 12/01/2021 12/1/2021    Interpretation Summary  · LV: Estimated LVEF is 50 - 55%. Normal cavity size and systolic function (ejection fraction normal). Mild concentric hypertrophy. Wall motion: normal.  · LA: Moderately dilated left atrium. · RV: Borderline low systolic function. · RA: Mildly dilated right atrium. · PV: Mild pulmonic valve regurgitation is present. · MV: Mitral valve non-specific thickening. Mild to moderate mitral valve regurgitation is present. · TV: Mild to moderate tricuspid valve regurgitation is present. Right Ventricular Arterial Pressure (RVSP) is 42 mmHg. Signed by: Charmayne Abu, MD on 12/1/2021  5:21 PM            08/11/20    NUCLEAR CARDIAC STRESS TEST 09/02/2020 9/3/2020    Interpretation Summary  · Gated SPECT: Left ventricular function post-stress was normal. Calculated ejection fraction is 62%. There is no evidence of transient ischemic dilation (TID). The TID ratio is 0.98. · Baseline ECG: Atrial fibrillation, non-specific ST-T wave abnormalities. · Myocardial perfusion imaging defect 1: There is a defect that is small in size present in the basal-mid anterior location(s) that is predominantly fixed. There is normal wall motion in the defect area. The defect appears to probably be artifact caused by breast attenuation.   · Left ventricular perfusion is probably normal.  · Myocardial perfusion imaging supports a low risk stress test.    Signed by: Charmayne Abu, MD on 9/2/2020 12:05 PM                    EKG Results Procedure 720 Value Units Date/Time    AMB POC EKG ROUTINE W/ 12 LEADS, INTER & REP [[id number]] Resulted: 05/02/22 1506    Order Status: Completed Updated: 05/02/22 1510              IMAGING      MRI Results (most recent):  Results from [place] encounter on 02/15/13    MRI LUMB SPINE WO CONT    Narrative  **Final Report**      ICD Codes / Adm. Diagnosis: V76.12   / Thoracic or lumbosacral neurit  Examination:  MR L SPINE WO CON  - [id number] - Feb 15 2013  1:58PM  Accession No:  [id number]  Reason:  lumbar radiculopathy      REPORT:  INDICATION: lumbar radiculopathy , fell down stairs at home December 2012  724.4    COMPARISON: Radiographs November 5, 2011    EXAM: Sagittal T1-weighted spin-echo, sagittal T2-weighted fast spin-echo,  sagittal inversion recovery, axial T1-weighted spin-echo and axial  T2-weighted fast spin-echo MR images of the lumbar spine are obtained. FINDINGS: Conus position, morphology and signal normal. There is normal  vertebral body height and bone signal. There is no substantial listhesis  though there is a mild levoconvex lumbar curve centered at L3. No paraspinal  soft tissue mass is shown. There is no substantial disc or facet abnormality at T11-12 and T12-L1. L1-2 shows mild disc space narrowing with small left lateral disc  protrusion. There is mild bilateral facet arthropathy. There is no canal or  foraminal stenosis. L2-3 shows mild disc space narrowing with mild leftward lateralizing diffuse  disc bulging. There is mild bilateral facet arthropathy. There is no canal  or foraminal stenosis. L3-4 shows mild disc space narrowing with mild leftward lateralizing diffuse  disc bulging and moderate bilateral facet arthropathy. There is a mild  appearance of canal stenosis with mild right foraminal stenosis. L4-5 shows mild disc space narrowing with mild central disc bulging and  moderate bilateral facet arthropathy.  There is a borderline appearance of  canal stenosis and mild right foraminal narrowing. L5-S1 shows minimal disc space narrowing with a moderate-sized left  paracentral and lateral disc herniation measuring 9 mm craniocaudal, 1.2 cm  transverse and 0.7 cm anterior posterior. There is indentation of the  central and left anterolateral thecal sac as well as posterior displacement  of the traversing left S1 nerve. There is no substantial foraminal narrowing. IMPRESSION:  1. Small left lateral disc protrusion at L1-2.  2. Mild L3-4 and borderline L4-5 canal stenosis with mild right foraminal  narrowing. 3. Moderate-sized left paracentral and lateral disc herniation at L5-S1 with  posterior displacement of traversing left S1 nerve. Signing/Reading Doctor: Thomas Shankar ([id number])  Approved: ROB Shankar ([id number])  Feb 15 2013  2:39PM      CT Results (most recent):  Results from Hospital Encounter encounter on 08/17/21    CT CORONARY ART W STRUC MORPH FUNC    Addendum 8/18/2021 11:55 AM  Addendum: INDICATION:   CAD    COMPARISON: None. CPT Code: 49113. TECHNIQUE:  Precontrast  images were obtained to localize the volume for acquisition. Multislice helical gated cardiac coronary CT arteriography was performed from  the diaphragm to the main pulmonary artery level during uneventful rapid bolus  intravenous administration of 100 mL of Isovue  370. Premedication with atenolol  was used to achieve target heart rate of less than 60 beats per minute. Lung and  soft tissue windows were generated. Post processing and 3 D image reconstruction  was performed. IMPRESSION:  1. Left main originate normally from left coronary cusp and is normal size  without any significant atherosclerotic plaque or calcification. 2. The LAD is normal size vessel with mild calcified plaque in the proximal  segment without any significant luminal stenosis. The D1 diagonal branch  demonstrate ostial calcified plaque without any significant luminal stenosis.   The D2 and D3 diagonal branches are small without any significant lesion or  calcification. There is no myocardial bridging seen. 3. The left circumflex is normal size vessel without any significant  calcification or disease. The OM1, OM 2 branches are seen without any  significant disease or calcification. 4. The RCA is normal size vessel and originate normally from the right coronary  cusp. There is proximal and mid vessel with moderate calcified and noncalcified  plaque with 30-40% luminal stenosis. There is no significant disease in the  distal RCA, PDA or PLB branches. Narrative  The cardiac portion of this examination was supervised and will be interpreted  by the cardiologist. The visualized lung fields are clear. There is no  mediastinal or hilar lymphadenopathy. No visible pleural effusion or evidence of  pericardial effusion. Visualized portion of the upper abdomen is unremarkable. XR Results (most recent):  Results from Hospital Encounter encounter on 10/24/19    XR CHEST PORT    Narrative  EXAM: XR CHEST PORT    INDICATION: Upper abdominal and epigastric pain x3 days exacerbated by  inspiration with associated shortness of breath. COMPARISON: Chest x-ray 11/9/2018. FINDINGS: A portable AP radiograph of the chest was obtained at 22:26 hours. The  lungs are clear. The cardiac and mediastinal contours and pulmonary vascularity  are normal.  The bones and soft tissues are grossly within normal limits. Visualized upper abdomen appears unremarkable. Impression  IMPRESSION: No acute findings.           Past Medical History:   Diagnosis Date    Abdominal pain     Asthma     Hepatic cyst 6/22/2019    Lumbar radicular pain 8/21/2014    Synovitis of knee 1/28/2018     Past Surgical History:   Procedure Laterality Date    ABLATION OF SVT  3/30/2015         EP STUDY W/INDUCTION  3/30/2015         HX BACK SURGERY      HX COLONOSCOPY  9-    HX HEART CATHETERIZATION      HX HYSTERECTOMY  1992 Social History     Tobacco Use    Smoking status: Never Smoker    Smokeless tobacco: Never Used   Vaping Use    Vaping Use: Never used   Substance Use Topics    Alcohol use: No     Alcohol/week: 0.0 standard drinks     Comment: rarely    Drug use: No     Family History   Problem Relation Age of Onset    Hypertension Mother     Heart Disease Mother     Heart Disease Father     Stroke Father      Allergies   Allergen Reactions    Ampicillin Nausea Only     Allergy was 30 years ago         Visit Vitals  /80   Pulse 60   Resp 16   Ht 5' 2\" (1.575 m)   Wt 137 lb (62.1 kg)   SpO2 97%   BMI 25.06 kg/m²         Last 3 Recorded Weights in this Encounter    05/02/22 1457   Weight: 137 lb (62.1 kg)            Review of Systems:   Pertinent items are noted in the History of Present Illness. Neck: no JVD  Heart: irregularly irregular rhythm  Lungs: clear to auscultation bilaterally  Abdomen: soft, non-tender. Bowel sounds normal. No masses,  no organomegaly  Extremities: no edema      Current Outpatient Medications on File Prior to Visit   Medication Sig Dispense Refill    rosuvastatin (CRESTOR) 10 mg tablet Take 1 tablet every day 90 Tablet 3    Xarelto 20 mg tab tablet TAKE 1 TABLET BY MOUTH EVERY DAY 30 Tablet 0    albuterol (Ventolin HFA) 90 mcg/actuation inhaler Take 2 Puffs by inhalation every four (4) hours as needed for Wheezing. 1 Each 11    hydroCHLOROthiazide (HYDRODIURIL) 12.5 mg tablet TAKE 1 TABLET ONE TIME DAILY 90 Tablet 3    metoprolol succinate (TOPROL-XL) 25 mg XL tablet TAKE 1 TABLET TWICE DAILY 180 Tablet 3    potassium chloride (K-DUR, KLOR-CON M20) 20 mEq tablet TAKE 1 TABLET EVERY DAY 90 Tablet 3    magnesium oxide (MAG-OX) 400 mg tablet TAKE 1 TABLET EVERY DAY 90 Tablet 3    fluticasone propion-salmeteroL (ADVAIR/WIXELA) 250-50 mcg/dose diskus inhaler Take 1 Puff by inhalation every twelve (12) hours.  1 Inhaler 11    ascorbic acid, vitamin C, (Vitamin C) 500 mg tablet Take  by mouth.  cholecalciferol, vitamin D3, (Vitamin D3) 50 mcg (2,000 unit) tab Take  by mouth.  elderberry fruit (ELDERBERRY PO) Take  by mouth.  folic acid/multivit-min/lutein (CENTRUM SILVER PO) Take 1 Tab by mouth daily.  QUEtiapine (SEROQUEL) 50 mg tablet Take 50 mg by mouth three (3) times daily.  FLUoxetine (PROZAC) 20 mg capsule Take 20 mg by mouth daily.  HYDROcodone-acetaminophen (NORCO) 5-325 mg per tablet TAKE 1 TABLET BY MOUTH DAILY AS NEEDED FOR PAIN FOR UP TO 30 DAYS.  hydroquinone (ESOTERICA, MELQUIN) 4 % topical cream Apply  to affected area two (2) times a day. 30 g 6    desonide (TRIDESILON) 0.05 % topical lotion Apply  to affected area two (2) times a day. 118 mL 4    ALPRAZolam (XANAX) 1 mg tablet Take 1 Tab by mouth three (3) times daily as needed for Anxiety. Max Daily Amount: 3 mg. (Patient taking differently: Take 1 mg by mouth two (2) times a day.) 10 Tab 0     No current facility-administered medications on file prior to visit. Douglas Cagle had no medications administered during this visit. Current Outpatient Medications   Medication Sig    rosuvastatin (CRESTOR) 10 mg tablet Take 1 tablet every day    Xarelto 20 mg tab tablet TAKE 1 TABLET BY MOUTH EVERY DAY    albuterol (Ventolin HFA) 90 mcg/actuation inhaler Take 2 Puffs by inhalation every four (4) hours as needed for Wheezing.  hydroCHLOROthiazide (HYDRODIURIL) 12.5 mg tablet TAKE 1 TABLET ONE TIME DAILY    metoprolol succinate (TOPROL-XL) 25 mg XL tablet TAKE 1 TABLET TWICE DAILY    potassium chloride (K-DUR, KLOR-CON M20) 20 mEq tablet TAKE 1 TABLET EVERY DAY    magnesium oxide (MAG-OX) 400 mg tablet TAKE 1 TABLET EVERY DAY    fluticasone propion-salmeteroL (ADVAIR/WIXELA) 250-50 mcg/dose diskus inhaler Take 1 Puff by inhalation every twelve (12) hours.  ascorbic acid, vitamin C, (Vitamin C) 500 mg tablet Take  by mouth.     cholecalciferol, vitamin D3, (Vitamin D3) 50 mcg (2,000 unit) tab Take  by mouth.  elderberry fruit (ELDERBERRY PO) Take  by mouth.  folic acid/multivit-min/lutein (CENTRUM SILVER PO) Take 1 Tab by mouth daily.  QUEtiapine (SEROQUEL) 50 mg tablet Take 50 mg by mouth three (3) times daily.  FLUoxetine (PROZAC) 20 mg capsule Take 20 mg by mouth daily.  HYDROcodone-acetaminophen (NORCO) 5-325 mg per tablet TAKE 1 TABLET BY MOUTH DAILY AS NEEDED FOR PAIN FOR UP TO 30 DAYS.  hydroquinone (ESOTERICA, MELQUIN) 4 % topical cream Apply  to affected area two (2) times a day.  desonide (TRIDESILON) 0.05 % topical lotion Apply  to affected area two (2) times a day.  ALPRAZolam (XANAX) 1 mg tablet Take 1 Tab by mouth three (3) times daily as needed for Anxiety. Max Daily Amount: 3 mg. (Patient taking differently: Take 1 mg by mouth two (2) times a day.)     No current facility-administered medications for this visit. Lab Results   Component Value Date/Time    Cholesterol, total 140 11/17/2021 05:35 PM    HDL Cholesterol 92 11/17/2021 05:35 PM    LDL, calculated 37.2 11/17/2021 05:35 PM    VLDL, calculated 10.8 11/17/2021 05:35 PM    Triglyceride 54 11/17/2021 05:35 PM    CHOL/HDL Ratio 1.5 11/17/2021 05:35 PM       Lab Results   Component Value Date/Time    Sodium 140 02/17/2022 05:44 PM    Potassium 3.9 02/17/2022 05:44 PM    Chloride 108 02/17/2022 05:44 PM    CO2 27 02/17/2022 05:44 PM    Anion gap 5 02/17/2022 05:44 PM    Glucose 96 02/17/2022 05:44 PM    BUN 23 (H) 02/17/2022 05:44 PM    Creatinine 1.09 (H) 02/17/2022 05:44 PM    BUN/Creatinine ratio 21 (H) 02/17/2022 05:44 PM    GFR est AA 60 (L) 02/17/2022 05:44 PM    GFR est non-AA 49 (L) 02/17/2022 05:44 PM    Calcium 10.2 (H) 02/17/2022 05:44 PM       Lab Results   Component Value Date/Time    ALT (SGPT) 28 12/17/2021 02:22 PM    Alk.  phosphatase 52 12/17/2021 02:22 PM    Bilirubin, direct 0.21 03/05/2019 11:46 AM    Bilirubin, total 1.1 (H) 12/17/2021 02:22 PM Lab Results   Component Value Date/Time    WBC 4.3 11/17/2021 05:35 PM    Hemoglobin (POC) 10.9 (L) 07/06/2013 01:06 AM    HGB 14.2 11/17/2021 05:35 PM    Hematocrit (POC) 32 (L) 07/06/2013 01:06 AM    HCT 42.5 11/17/2021 05:35 PM    PLATELET 213 49/48/7897 05:35 PM    MCV 92.6 11/17/2021 05:35 PM       Lab Results   Component Value Date/Time    TSH 1.72 11/17/2021 05:35 PM         Lab Results   Component Value Date/Time    Cholesterol, total 140 11/17/2021 05:35 PM    Cholesterol, total 139 11/05/2020 05:19 PM    Cholesterol, total 167 02/08/2019 03:51 PM    Cholesterol, total 158 05/16/2017 01:17 PM    Cholesterol, total 167 01/11/2016 03:58 PM    HDL Cholesterol 92 11/17/2021 05:35 PM    HDL Cholesterol 83 11/05/2020 05:19 PM    HDL Cholesterol 94 02/08/2019 03:51 PM    HDL Cholesterol 80 05/16/2017 01:17 PM    HDL Cholesterol 99 01/11/2016 03:58 PM    LDL, calculated 37.2 11/17/2021 05:35 PM    LDL, calculated 45 11/05/2020 05:19 PM    LDL, calculated 61 02/08/2019 03:51 PM    LDL, calculated 60 05/16/2017 01:17 PM    LDL, calculated 49 01/11/2016 03:58 PM    LDL, calculated 70 12/13/2013 04:43 PM    Triglyceride 54 11/17/2021 05:35 PM    Triglyceride 49 11/05/2020 05:19 PM    Triglyceride 61 02/08/2019 03:51 PM    Triglyceride 89 05/16/2017 01:17 PM    Triglyceride 96 01/11/2016 03:58 PM    CHOL/HDL Ratio 1.5 11/17/2021 05:35 PM    CHOL/HDL Ratio 2.5 05/16/2013 02:30 AM                Please note that this dictation was completed with [employer], the [employer] voice recognition software. Quite often unanticipated grammatical, syntax, homophones, and other interpretative errors are inadvertently transcribed by the computer software. Please disregard these errors. Please excuse any errors that have escaped final proofreading.

## 2022-05-19 ENCOUNTER — OFFICE VISIT (OUTPATIENT)
Dept: INTERNAL MEDICINE CLINIC | Age: 72
End: 2022-05-19
Payer: MEDICARE

## 2022-05-19 DIAGNOSIS — E78.5 DYSLIPIDEMIA: ICD-10-CM

## 2022-05-19 DIAGNOSIS — I10 PRIMARY HYPERTENSION: ICD-10-CM

## 2022-05-19 DIAGNOSIS — I48.0 PAROXYSMAL ATRIAL FIBRILLATION (HCC): Primary | ICD-10-CM

## 2022-05-19 DIAGNOSIS — F41.1 ANXIETY NEUROSIS: ICD-10-CM

## 2022-05-19 PROCEDURE — 3017F COLORECTAL CA SCREEN DOC REV: CPT | Performed by: INTERNAL MEDICINE

## 2022-05-19 PROCEDURE — G8400 PT W/DXA NO RESULTS DOC: HCPCS | Performed by: INTERNAL MEDICINE

## 2022-05-19 PROCEDURE — G8427 DOCREV CUR MEDS BY ELIG CLIN: HCPCS | Performed by: INTERNAL MEDICINE

## 2022-05-19 PROCEDURE — 1090F PRES/ABSN URINE INCON ASSESS: CPT | Performed by: INTERNAL MEDICINE

## 2022-05-19 PROCEDURE — G8753 SYS BP > OR = 140: HCPCS | Performed by: INTERNAL MEDICINE

## 2022-05-19 PROCEDURE — G8536 NO DOC ELDER MAL SCRN: HCPCS | Performed by: INTERNAL MEDICINE

## 2022-05-19 PROCEDURE — 99214 OFFICE O/P EST MOD 30 MIN: CPT | Performed by: INTERNAL MEDICINE

## 2022-05-19 PROCEDURE — G8420 CALC BMI NORM PARAMETERS: HCPCS | Performed by: INTERNAL MEDICINE

## 2022-05-19 PROCEDURE — G8754 DIAS BP LESS 90: HCPCS | Performed by: INTERNAL MEDICINE

## 2022-05-19 PROCEDURE — G8432 DEP SCR NOT DOC, RNG: HCPCS | Performed by: INTERNAL MEDICINE

## 2022-05-19 PROCEDURE — 1101F PT FALLS ASSESS-DOCD LE1/YR: CPT | Performed by: INTERNAL MEDICINE

## 2022-05-19 PROCEDURE — G9899 SCRN MAM PERF RSLTS DOC: HCPCS | Performed by: INTERNAL MEDICINE

## 2022-05-19 NOTE — PROGRESS NOTES
Chief Complaint   Patient presents with    Hypertension     routine follow up          1. Have you been to the ER, urgent care clinic since your last visit? Hospitalized since your last visit? No    2. Have you seen or consulted any other health care providers outside of the [address] [place] [address] since your last visit? Include any pap smears or colon screening.  No

## 2022-05-20 VITALS
BODY MASS INDEX: 24.93 KG/M2 | OXYGEN SATURATION: 95 % | RESPIRATION RATE: 16 BRPM | HEART RATE: 77 BPM | SYSTOLIC BLOOD PRESSURE: 130 MMHG | TEMPERATURE: 98.2 F | DIASTOLIC BLOOD PRESSURE: 78 MMHG | WEIGHT: 135.5 LBS | HEIGHT: 62 IN

## 2022-06-13 NOTE — PATIENT INSTRUCTIONS
Schedule bilateral carotid doppler. Our office will call you with results. Follow up with Dr. Chase Prado in 6 months. Resident

## 2022-07-01 ENCOUNTER — TELEPHONE (OUTPATIENT)
Dept: CARDIOLOGY CLINIC | Age: 72
End: 2022-07-01

## 2022-07-01 NOTE — TELEPHONE ENCOUNTER
Patient is inquiring whether she can  samples of Xarelto 20mg, since she is out and will not get her refill in before she runs out.     She is requesting if there are no samples if a prescription can be sent to the local pharmacy that she uses      [facility] pharmacy [phone]

## 2022-07-06 DIAGNOSIS — I48.0 PAROXYSMAL ATRIAL FIBRILLATION (HCC): ICD-10-CM

## 2022-07-06 RX ORDER — RIVAROXABAN 20 MG/1
TABLET, FILM COATED ORAL
Qty: 90 TABLET | Refills: 1 | Status: SHIPPED | OUTPATIENT
Start: 2022-07-06 | End: 2022-08-29

## 2022-07-06 RX ORDER — RIVAROXABAN 20 MG/1
TABLET, FILM COATED ORAL
Qty: 14 TABLET | Refills: 0 | Status: SHIPPED | COMMUNITY
Start: 2022-07-06 | End: 2022-07-06 | Stop reason: SDUPTHER

## 2022-07-06 NOTE — TELEPHONE ENCOUNTER
2 weeks of samples available for . Requested Prescriptions     Signed Prescriptions Disp Refills    Xarelto 20 mg tab tablet 90 Tablet 1     Sig: TAKE 1 TABLET BY MOUTH EVERY DAY     Authorizing Provider: Catarina Momin     Ordering User: George Harris     Verbal order per Dr. Cat Guardado.     Future Appointments   Date Time Provider Carina Peñaloza   9/22/2022  4:00 PM Clarisa Ragsdale MD [facility] BS AMB   11/2/2022  2:40 PM Reyes Borg, MD [facility] BS AMB

## 2022-10-19 ENCOUNTER — TRANSCRIBE ORDER (OUTPATIENT)
Dept: SCHEDULING | Age: 72
End: 2022-10-19

## 2022-10-19 DIAGNOSIS — Z12.31 VISIT FOR SCREENING MAMMOGRAM: Primary | ICD-10-CM

## 2022-11-02 ENCOUNTER — OFFICE VISIT (OUTPATIENT)
Dept: CARDIOLOGY CLINIC | Age: 72
End: 2022-11-02
Payer: MEDICARE

## 2022-11-02 VITALS
RESPIRATION RATE: 16 BRPM | WEIGHT: 134 LBS | HEIGHT: 62 IN | HEART RATE: 84 BPM | OXYGEN SATURATION: 98 % | DIASTOLIC BLOOD PRESSURE: 80 MMHG | SYSTOLIC BLOOD PRESSURE: 120 MMHG | BODY MASS INDEX: 24.66 KG/M2

## 2022-11-02 DIAGNOSIS — I48.0 PAROXYSMAL ATRIAL FIBRILLATION (HCC): Primary | ICD-10-CM

## 2022-11-02 PROCEDURE — G8400 PT W/DXA NO RESULTS DOC: HCPCS | Performed by: SPECIALIST

## 2022-11-02 PROCEDURE — G8510 SCR DEP NEG, NO PLAN REQD: HCPCS | Performed by: SPECIALIST

## 2022-11-02 PROCEDURE — G8427 DOCREV CUR MEDS BY ELIG CLIN: HCPCS | Performed by: SPECIALIST

## 2022-11-02 PROCEDURE — G9899 SCRN MAM PERF RSLTS DOC: HCPCS | Performed by: SPECIALIST

## 2022-11-02 PROCEDURE — G8754 DIAS BP LESS 90: HCPCS | Performed by: SPECIALIST

## 2022-11-02 PROCEDURE — 99214 OFFICE O/P EST MOD 30 MIN: CPT | Performed by: SPECIALIST

## 2022-11-02 PROCEDURE — 93000 ELECTROCARDIOGRAM COMPLETE: CPT | Performed by: SPECIALIST

## 2022-11-02 PROCEDURE — G8752 SYS BP LESS 140: HCPCS | Performed by: SPECIALIST

## 2022-11-02 PROCEDURE — 3078F DIAST BP <80 MM HG: CPT | Performed by: SPECIALIST

## 2022-11-02 PROCEDURE — 1090F PRES/ABSN URINE INCON ASSESS: CPT | Performed by: SPECIALIST

## 2022-11-02 PROCEDURE — 1123F ACP DISCUSS/DSCN MKR DOCD: CPT | Performed by: SPECIALIST

## 2022-11-02 PROCEDURE — G8420 CALC BMI NORM PARAMETERS: HCPCS | Performed by: SPECIALIST

## 2022-11-02 PROCEDURE — 3074F SYST BP LT 130 MM HG: CPT | Performed by: SPECIALIST

## 2022-11-02 PROCEDURE — 1101F PT FALLS ASSESS-DOCD LE1/YR: CPT | Performed by: SPECIALIST

## 2022-11-02 PROCEDURE — 3017F COLORECTAL CA SCREEN DOC REV: CPT | Performed by: SPECIALIST

## 2022-11-02 PROCEDURE — G8536 NO DOC ELDER MAL SCRN: HCPCS | Performed by: SPECIALIST

## 2022-11-02 RX ORDER — ALPRAZOLAM 1 MG/1
TABLET ORAL
COMMUNITY
Start: 2022-09-19

## 2022-11-02 NOTE — PROGRESS NOTES
Visit Vitals  /80   Pulse 84   Resp 16   Ht 5' 2\" (1.575 m)   Wt 134 lb (60.8 kg)   SpO2 98%   BMI 24.51 kg/m²

## 2022-11-02 NOTE — PROGRESS NOTES
[facility]                                                            OFFICE NOTE        Lindsay Dickinson M.D.,PRUDENCE Arsen Hopkins   1950  [id number]    Date/Time:  11/2/20223:56 PM            SUBJECTIVE:    Overall doing ok   No cp or sob or palpitations noted of significance  Some occasional blood tinged sputum? Assessment/Plan  1. Chest pain: No recurrent chest pain reported. We have discussed the results of the CT angiography of August 2021 which is failed to reveal any critical stenosis the more stenosis was the 30 to 40% stenosis at the level of the RCA. Continue with Toprol and Crestor at this time with no additional changes. EKG today with atrial fibrillation nonspecific ST-T changes unchanged from previous     2. Atrial fibrillation: Chronic. Continue Xarelto and metoprolol rate is controlled. She has seen Dr. Kris Bello in the past and he suggested proceed with atrial fibrillation ablation by she declined at that time. She understands that atrial fibrillation begets itself. Blood tinged sputum inconsistent but noted . She tells me she will follow with her pcp for that , in the meanwhile continue xarelto unless of hemoptysis . Warn her to hold off fish oil of similar at this point. Last echocardiogram November 2021 normal with preserved ejection fraction. Although the lower limits of normal 50 to 55% mild to moderate MR and TR noted. 3.  Hypertension: Her blood pressure is normal today and suspect some of the fluctuation in blood pressure related to atrial fibrillation but also anxiety. No changes in meds     4. Hyperlipidemia: On Crestor closely followed by her primary care physician. 5.  DVT: On Xarelto remote history of DVT at this time. 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      7.  Carotid stenosis: Carotid ultrasound of November 2020 with less than 10% stenosis bilaterally. Consider repeat next R Fontainhas 53 to proceed with lipoma resection in left foot   Ok to hold xarelto for no longer than 48 hours     Otherwise I will see her back in 6 months . HPI   70 y.o. female. Patient with h/o PSVT and  Tachy iron followed in [facility], admitted on 5/13 at [facility] with chest pain, at that time nuclear stress test showed no ischemia or MI and EF of 69%, patient remained in NSR during hospital stay. Here for follow up  Echo on 7/12 EF 55% mild TR and MR  Also h/o PAF as per her cardiologist in [place]. 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  Stress echo on 7/15 : no ischemia or Mi and EF 60%  PAF during stress echo on 7/5/16 started on metoprolol at that time  Stress echo on 7/16 negative for ischemia and EF 60%  holter on 7/16:holter 7/28/2016 no AF, one  bpm and rare 2:1 av block, so no pacer yet  Echo on 10/17:Systolic function was normal by EF (biplane method of  disks). Ejection fraction was estimated to be 63 %. No obvious wall motion  abnormalities identified in the views obtained. Right ventricle: The size was at the upper limits of normal.    Left atrium: The atrium was moderately dilated. Right atrium: The atrium was moderately dilated. Mitral valve: There was mild regurgitation. Tricuspid valve: There was moderate annular dilatation. There was moderate      Recurrent atrial flutter in 10/ 2017 seen by Ep, ablation offered she decided to wait              CARDIAC STUDIES        11/30/21    ECHO ADULT COMPLETE 12/01/2021 12/1/2021    Interpretation Summary  · LV: Estimated LVEF is 50 - 55%. Normal cavity size and systolic function (ejection fraction normal). Mild concentric hypertrophy. Wall motion: normal.  · LA: Moderately dilated left atrium. · RV: Borderline low systolic function. · RA: Mildly dilated right atrium.   · PV: Mild pulmonic valve regurgitation is present. · MV: Mitral valve non-specific thickening. Mild to moderate mitral valve regurgitation is present. · TV: Mild to moderate tricuspid valve regurgitation is present. Right Ventricular Arterial Pressure (RVSP) is 42 mmHg. Signed by: Libia Raygoza MD on 12/1/2021  5:21 PM            08/11/20    NUCLEAR CARDIAC STRESS TEST 09/02/2020 9/3/2020    Interpretation Summary  · Gated SPECT: Left ventricular function post-stress was normal. Calculated ejection fraction is 62%. There is no evidence of transient ischemic dilation (TID). The TID ratio is 0.98. · Baseline ECG: Atrial fibrillation, non-specific ST-T wave abnormalities. · Myocardial perfusion imaging defect 1: There is a defect that is small in size present in the basal-mid anterior location(s) that is predominantly fixed. There is normal wall motion in the defect area. The defect appears to probably be artifact caused by breast attenuation. · Left ventricular perfusion is probably normal.  · Myocardial perfusion imaging supports a low risk stress test.    Signed by: Libia Raygoza MD on 9/2/2020 12:05 PM                    EKG Results       Procedure 720 Value Units Date/Time    AMB POC EKG ROUTINE W/ 12 LEADS, INTER & REP [[id number]]     Order Status: Sent                 IMAGING      MRI Results (most recent):  Results from [place] encounter on 02/15/13    MRI LUMB SPINE WO CONT    Narrative  **Final Report**      ICD Codes / Adm. Diagnosis: V76.12   / Thoracic or lumbosacral neurit  Examination:  MR GARNER SPINE WO CON  - [id number] - Feb 15 2013  1:58PM  Accession No:  [id number]  Reason:  lumbar radiculopathy      REPORT:  INDICATION: lumbar radiculopathy , fell down stairs at home December 2012  724.4    COMPARISON: Radiographs November 5, 2011    EXAM: Sagittal T1-weighted spin-echo, sagittal T2-weighted fast spin-echo,  sagittal inversion recovery, axial T1-weighted spin-echo and axial  T2-weighted fast spin-echo MR images of the lumbar spine are obtained. FINDINGS: Conus position, morphology and signal normal. There is normal  vertebral body height and bone signal. There is no substantial listhesis  though there is a mild levoconvex lumbar curve centered at L3. No paraspinal  soft tissue mass is shown. There is no substantial disc or facet abnormality at T11-12 and T12-L1. L1-2 shows mild disc space narrowing with small left lateral disc  protrusion. There is mild bilateral facet arthropathy. There is no canal or  foraminal stenosis. L2-3 shows mild disc space narrowing with mild leftward lateralizing diffuse  disc bulging. There is mild bilateral facet arthropathy. There is no canal  or foraminal stenosis. L3-4 shows mild disc space narrowing with mild leftward lateralizing diffuse  disc bulging and moderate bilateral facet arthropathy. There is a mild  appearance of canal stenosis with mild right foraminal stenosis. L4-5 shows mild disc space narrowing with mild central disc bulging and  moderate bilateral facet arthropathy. There is a borderline appearance of  canal stenosis and mild right foraminal narrowing. L5-S1 shows minimal disc space narrowing with a moderate-sized left  paracentral and lateral disc herniation measuring 9 mm craniocaudal, 1.2 cm  transverse and 0.7 cm anterior posterior. There is indentation of the  central and left anterolateral thecal sac as well as posterior displacement  of the traversing left S1 nerve. There is no substantial foraminal narrowing. IMPRESSION:  1. Small left lateral disc protrusion at L1-2.  2. Mild L3-4 and borderline L4-5 canal stenosis with mild right foraminal  narrowing. 3. Moderate-sized left paracentral and lateral disc herniation at L5-S1 with  posterior displacement of traversing left S1 nerve. Signing/Reading Doctor: Maty Black. Rafia Pierre ([id number])  Approved: ROB Pierre ([id number])  Feb 15 2013  2:39PM      CT Results (most recent):  Results from [place] encounter on 08/17/21    CT CORONARY ART W STRUC MORPH [facility]    Addendum 8/18/2021 11:55 AM  Addendum: INDICATION:   CAD    COMPARISON: None. CPT Code: 65999. TECHNIQUE:  Precontrast  images were obtained to localize the volume for acquisition. Multislice helical gated cardiac coronary CT arteriography was performed from  the diaphragm to the main pulmonary artery level during uneventful rapid bolus  intravenous administration of 100 mL of Isovue  370. Premedication with atenolol  was used to achieve target heart rate of less than 60 beats per minute. Lung and  soft tissue windows were generated. Post processing and 3 D image reconstruction  was performed. IMPRESSION:  1. Left main originate normally from left coronary cusp and is normal size  without any significant atherosclerotic plaque or calcification. 2. The LAD is normal size vessel with mild calcified plaque in the proximal  segment without any significant luminal stenosis. The D1 diagonal branch  demonstrate ostial calcified plaque without any significant luminal stenosis. The D2 and D3 diagonal branches are small without any significant lesion or  calcification. There is no myocardial bridging seen. 3. The left circumflex is normal size vessel without any significant  calcification or disease. The OM1, OM 2 branches are seen without any  significant disease or calcification. 4. The RCA is normal size vessel and originate normally from the right coronary  cusp. There is proximal and mid vessel with moderate calcified and noncalcified  plaque with 30-40% luminal stenosis. There is no significant disease in the  distal RCA, PDA or PLB branches. Narrative  The cardiac portion of this examination was supervised and will be interpreted  by the cardiologist. The visualized lung fields are clear. There is no  mediastinal or hilar lymphadenopathy.  No visible pleural effusion or evidence of  pericardial effusion. Visualized portion of the upper abdomen is unremarkable. XR Results (most recent):  Results from Hospital Encounter encounter on 10/24/19    XR CHEST PORT    Narrative  EXAM: XR CHEST PORT    INDICATION: Upper abdominal and epigastric pain x3 days exacerbated by  inspiration with associated shortness of breath. COMPARISON: Chest x-ray 11/9/2018. FINDINGS: A portable AP radiograph of the chest was obtained at 22:26 hours. The  lungs are clear. The cardiac and mediastinal contours and pulmonary vascularity  are normal.  The bones and soft tissues are grossly within normal limits. Visualized upper abdomen appears unremarkable. Impression  IMPRESSION: No acute findings. Past Medical History:   Diagnosis Date    Abdominal pain     Asthma     Hepatic cyst 6/22/2019    Lumbar radicular pain 8/21/2014    Synovitis of knee 1/28/2018     Past Surgical History:   Procedure Laterality Date    ABLATION OF SVT  3/30/2015         EP STUDY W/INDUCTION  3/30/2015         HX BACK SURGERY      HX COLONOSCOPY  9-    HX HEART CATHETERIZATION      HX HYSTERECTOMY  1992     Social History     Tobacco Use    Smoking status: Never    Smokeless tobacco: Never   Vaping Use    Vaping Use: Never used   Substance Use Topics    Alcohol use: No     Alcohol/week: 0.0 standard drinks     Comment: rarely    Drug use: No     Family History   Problem Relation Age of Onset    Hypertension Mother     Heart Disease Mother     Heart Disease Father     Stroke Father      Allergies   Allergen Reactions    Ampicillin Nausea Only     Allergy was 30 years ago         Visit Vitals  Resp 16   Ht 5' 2\" (1.575 m)   BMI 24.78 kg/m²         There were no vitals filed for this visit. Review of Systems:   Pertinent items are noted in the History of Present Illness. Neck: no JVD  Heart: irregularly irregular rhythm  Lungs: clear to auscultation bilaterally  Abdomen: soft, non-tender.  Bowel sounds normal. No masses,  no organomegaly  Extremities: edema trace around ankles      Current Outpatient Medications on File Prior to Visit   Medication Sig Dispense Refill    Xarelto 20 mg tab tablet TAKE 1 TABLET BY MOUTH EVERY DAY 30 Tablet 11    rosuvastatin (CRESTOR) 10 mg tablet Take 1 tablet every day 90 Tablet 3    albuterol (Ventolin HFA) 90 mcg/actuation inhaler Take 2 Puffs by inhalation every four (4) hours as needed for Wheezing. 1 Each 11    hydroCHLOROthiazide (HYDRODIURIL) 12.5 mg tablet TAKE 1 TABLET ONE TIME DAILY 90 Tablet 3    metoprolol succinate (TOPROL-XL) 25 mg XL tablet TAKE 1 TABLET TWICE DAILY 180 Tablet 3    potassium chloride (K-DUR, KLOR-CON M20) 20 mEq tablet TAKE 1 TABLET EVERY DAY 90 Tablet 3    magnesium oxide (MAG-OX) 400 mg tablet TAKE 1 TABLET EVERY DAY 90 Tablet 3    fluticasone propion-salmeteroL (ADVAIR/WIXELA) 250-50 mcg/dose diskus inhaler Take 1 Puff by inhalation every twelve (12) hours. 1 Inhaler 11    ascorbic acid, vitamin C, (Vitamin C) 500 mg tablet Take  by mouth. cholecalciferol, vitamin D3, (Vitamin D3) 50 mcg (2,000 unit) tab Take  by mouth.      elderberry fruit (ELDERBERRY PO) Take  by mouth. folic acid/multivit-min/lutein (CENTRUM SILVER PO) Take 1 Tab by mouth daily. QUEtiapine (SEROQUEL) 50 mg tablet Take 50 mg by mouth three (3) times daily. FLUoxetine (PROZAC) 20 mg capsule Take 20 mg by mouth daily. No current facility-administered medications on file prior to visit. Aria Lucero had no medications administered during this visit. Current Outpatient Medications   Medication Sig    Xarelto 20 mg tab tablet TAKE 1 TABLET BY MOUTH EVERY DAY    rosuvastatin (CRESTOR) 10 mg tablet Take 1 tablet every day    albuterol (Ventolin HFA) 90 mcg/actuation inhaler Take 2 Puffs by inhalation every four (4) hours as needed for Wheezing.     hydroCHLOROthiazide (HYDRODIURIL) 12.5 mg tablet TAKE 1 TABLET ONE TIME DAILY metoprolol succinate (TOPROL-XL) 25 mg XL tablet TAKE 1 TABLET TWICE DAILY    potassium chloride (K-DUR, KLOR-CON M20) 20 mEq tablet TAKE 1 TABLET EVERY DAY    magnesium oxide (MAG-OX) 400 mg tablet TAKE 1 TABLET EVERY DAY    fluticasone propion-salmeteroL (ADVAIR/WIXELA) 250-50 mcg/dose diskus inhaler Take 1 Puff by inhalation every twelve (12) hours. ascorbic acid, vitamin C, (Vitamin C) 500 mg tablet Take  by mouth. cholecalciferol, vitamin D3, (Vitamin D3) 50 mcg (2,000 unit) tab Take  by mouth.    elderberry fruit (ELDERBERRY PO) Take  by mouth. folic acid/multivit-min/lutein (CENTRUM SILVER PO) Take 1 Tab by mouth daily. QUEtiapine (SEROQUEL) 50 mg tablet Take 50 mg by mouth three (3) times daily. FLUoxetine (PROZAC) 20 mg capsule Take 20 mg by mouth daily. No current facility-administered medications for this visit. Lab Results   Component Value Date/Time    Cholesterol, total 140 11/17/2021 05:35 PM    HDL Cholesterol 92 11/17/2021 05:35 PM    LDL, calculated 37.2 11/17/2021 05:35 PM    VLDL, calculated 10.8 11/17/2021 05:35 PM    Triglyceride 54 11/17/2021 05:35 PM    CHOL/HDL Ratio 1.5 11/17/2021 05:35 PM       Lab Results   Component Value Date/Time    Sodium 140 02/17/2022 05:44 PM    Potassium 3.9 02/17/2022 05:44 PM    Chloride 108 02/17/2022 05:44 PM    CO2 27 02/17/2022 05:44 PM    Anion gap 5 02/17/2022 05:44 PM    Glucose 96 02/17/2022 05:44 PM    BUN 23 (H) 02/17/2022 05:44 PM    Creatinine 1.09 (H) 02/17/2022 05:44 PM    BUN/Creatinine ratio 21 (H) 02/17/2022 05:44 PM    GFR est AA 60 (L) 02/17/2022 05:44 PM    GFR est non-AA 49 (L) 02/17/2022 05:44 PM    Calcium 10.2 (H) 02/17/2022 05:44 PM       Lab Results   Component Value Date/Time    ALT (SGPT) 28 12/17/2021 02:22 PM    Alk.  phosphatase 52 12/17/2021 02:22 PM    Bilirubin, direct 0.21 03/05/2019 11:46 AM    Bilirubin, total 1.1 (H) 12/17/2021 02:22 PM       Lab Results   Component Value Date/Time    WBC 4.3 11/17/2021 05:35 PM    Hemoglobin (POC) 10.9 (L) 07/06/2013 01:06 AM    HGB 14.2 11/17/2021 05:35 PM    Hematocrit (POC) 32 (L) 07/06/2013 01:06 AM    HCT 42.5 11/17/2021 05:35 PM    PLATELET 420 42/42/1693 05:35 PM    MCV 92.6 11/17/2021 05:35 PM       Lab Results   Component Value Date/Time    TSH 1.72 11/17/2021 05:35 PM         Lab Results   Component Value Date/Time    Cholesterol, total 140 11/17/2021 05:35 PM    Cholesterol, total 139 11/05/2020 05:19 PM    Cholesterol, total 167 02/08/2019 03:51 PM    Cholesterol, total 158 05/16/2017 01:17 PM    Cholesterol, total 167 01/11/2016 03:58 PM    HDL Cholesterol 92 11/17/2021 05:35 PM    HDL Cholesterol 83 11/05/2020 05:19 PM    HDL Cholesterol 94 02/08/2019 03:51 PM    HDL Cholesterol 80 05/16/2017 01:17 PM    HDL Cholesterol 99 01/11/2016 03:58 PM    LDL, calculated 37.2 11/17/2021 05:35 PM    LDL, calculated 45 11/05/2020 05:19 PM    LDL, calculated 61 02/08/2019 03:51 PM    LDL, calculated 60 05/16/2017 01:17 PM    LDL, calculated 49 01/11/2016 03:58 PM    LDL, calculated 70 12/13/2013 04:43 PM    Triglyceride 54 11/17/2021 05:35 PM    Triglyceride 49 11/05/2020 05:19 PM    Triglyceride 61 02/08/2019 03:51 PM    Triglyceride 89 05/16/2017 01:17 PM    Triglyceride 96 01/11/2016 03:58 PM    CHOL/HDL Ratio 1.5 11/17/2021 05:35 PM    CHOL/HDL Ratio 2.5 05/16/2013 02:30 AM                Please note that this dictation was completed with Vertigo, the computer voice recognition software. Quite often unanticipated grammatical, syntax, homophones, and other interpretative errors are inadvertently transcribed by the computer software. Please disregard these errors. Please excuse any errors that have escaped final proofreading.

## 2022-11-08 ENCOUNTER — OFFICE VISIT (OUTPATIENT)
Dept: INTERNAL MEDICINE CLINIC | Age: 72
End: 2022-11-08
Payer: MEDICARE

## 2022-11-08 DIAGNOSIS — J01.00 ACUTE MAXILLARY SINUSITIS, RECURRENCE NOT SPECIFIED: ICD-10-CM

## 2022-11-08 DIAGNOSIS — D17.79 LIPOMA OF OTHER SPECIFIED SITES: ICD-10-CM

## 2022-11-08 DIAGNOSIS — J30.0 VASOMOTOR RHINITIS: ICD-10-CM

## 2022-11-08 DIAGNOSIS — I10 PRIMARY HYPERTENSION: ICD-10-CM

## 2022-11-08 DIAGNOSIS — I48.0 PAROXYSMAL ATRIAL FIBRILLATION (HCC): ICD-10-CM

## 2022-11-08 DIAGNOSIS — F41.1 ANXIETY NEUROSIS: ICD-10-CM

## 2022-11-08 DIAGNOSIS — Z23 ENCOUNTER FOR IMMUNIZATION: Primary | ICD-10-CM

## 2022-11-08 DIAGNOSIS — J45.20 MILD INTERMITTENT ASTHMA WITHOUT COMPLICATION: ICD-10-CM

## 2022-11-08 DIAGNOSIS — J45.20 MILD INTERMITTENT REACTIVE AIRWAY DISEASE WITHOUT COMPLICATION: ICD-10-CM

## 2022-11-08 DIAGNOSIS — L30.9 DERMATITIS: ICD-10-CM

## 2022-11-08 PROCEDURE — 3017F COLORECTAL CA SCREEN DOC REV: CPT | Performed by: INTERNAL MEDICINE

## 2022-11-08 PROCEDURE — 3074F SYST BP LT 130 MM HG: CPT | Performed by: INTERNAL MEDICINE

## 2022-11-08 PROCEDURE — G8420 CALC BMI NORM PARAMETERS: HCPCS | Performed by: INTERNAL MEDICINE

## 2022-11-08 PROCEDURE — G9899 SCRN MAM PERF RSLTS DOC: HCPCS | Performed by: INTERNAL MEDICINE

## 2022-11-08 PROCEDURE — G8400 PT W/DXA NO RESULTS DOC: HCPCS | Performed by: INTERNAL MEDICINE

## 2022-11-08 PROCEDURE — G8536 NO DOC ELDER MAL SCRN: HCPCS | Performed by: INTERNAL MEDICINE

## 2022-11-08 PROCEDURE — 90694 VACC AIIV4 NO PRSRV 0.5ML IM: CPT | Performed by: INTERNAL MEDICINE

## 2022-11-08 PROCEDURE — 1123F ACP DISCUSS/DSCN MKR DOCD: CPT | Performed by: INTERNAL MEDICINE

## 2022-11-08 PROCEDURE — 3078F DIAST BP <80 MM HG: CPT | Performed by: INTERNAL MEDICINE

## 2022-11-08 PROCEDURE — 1101F PT FALLS ASSESS-DOCD LE1/YR: CPT | Performed by: INTERNAL MEDICINE

## 2022-11-08 PROCEDURE — 1090F PRES/ABSN URINE INCON ASSESS: CPT | Performed by: INTERNAL MEDICINE

## 2022-11-08 PROCEDURE — G8510 SCR DEP NEG, NO PLAN REQD: HCPCS | Performed by: INTERNAL MEDICINE

## 2022-11-08 PROCEDURE — G8753 SYS BP > OR = 140: HCPCS | Performed by: INTERNAL MEDICINE

## 2022-11-08 PROCEDURE — G8427 DOCREV CUR MEDS BY ELIG CLIN: HCPCS | Performed by: INTERNAL MEDICINE

## 2022-11-08 PROCEDURE — G8755 DIAS BP > OR = 90: HCPCS | Performed by: INTERNAL MEDICINE

## 2022-11-08 PROCEDURE — G0008 ADMIN INFLUENZA VIRUS VAC: HCPCS | Performed by: INTERNAL MEDICINE

## 2022-11-08 PROCEDURE — 99214 OFFICE O/P EST MOD 30 MIN: CPT | Performed by: INTERNAL MEDICINE

## 2022-11-08 NOTE — PROGRESS NOTES
Catrachito Hernández is a 70 y.o. female  Chief Complaint   Patient presents with    Follow-up    Hypertension     Patient here for follow up high blood pressure. 1. Have you been to the ER, urgent care clinic since your last visit? Hospitalized since your last visit? No    2. Have you seen or consulted any other health care providers outside of the [address] [place] [address] since your last visit? Include any pap smears or colon screening.  No

## 2022-11-09 VITALS
DIASTOLIC BLOOD PRESSURE: 78 MMHG | HEART RATE: 90 BPM | BODY MASS INDEX: 24.49 KG/M2 | WEIGHT: 133.1 LBS | TEMPERATURE: 98.3 F | SYSTOLIC BLOOD PRESSURE: 142 MMHG | RESPIRATION RATE: 16 BRPM | HEIGHT: 62 IN | OXYGEN SATURATION: 98 %

## 2022-11-09 PROCEDURE — G0008 ADMIN INFLUENZA VIRUS VAC: HCPCS | Performed by: INTERNAL MEDICINE

## 2022-11-09 PROCEDURE — 90694 VACC AIIV4 NO PRSRV 0.5ML IM: CPT | Performed by: INTERNAL MEDICINE

## 2022-11-09 RX ORDER — DESONIDE 0.5 MG/ML
LOTION TOPICAL 2 TIMES DAILY
Qty: 59 ML | Refills: 11 | Status: SHIPPED | OUTPATIENT
Start: 2022-11-09

## 2022-11-09 RX ORDER — FLUTICASONE PROPIONATE AND SALMETEROL 250; 50 UG/1; UG/1
1 POWDER RESPIRATORY (INHALATION) EVERY 12 HOURS
Qty: 1 EACH | Refills: 11 | Status: SHIPPED | OUTPATIENT
Start: 2022-11-09

## 2022-11-09 RX ORDER — AMOXICILLIN 250 MG/1
250 CAPSULE ORAL 3 TIMES DAILY
Qty: 30 CAPSULE | Refills: 0 | Status: SHIPPED | OUTPATIENT
Start: 2022-11-09 | End: 2022-11-19

## 2022-11-09 RX ORDER — HYDROQUINONE 40 MG/G
CREAM TOPICAL 2 TIMES DAILY
Qty: 30 G | Refills: 11 | Status: SHIPPED | OUTPATIENT
Start: 2022-11-09

## 2022-11-09 NOTE — PROGRESS NOTES
[facility]  [place] [address]  [address] [place] [address]  Phone:  [phone]  Fax: [phone]       Chief Complaint   Patient presents with    Follow-up    Hypertension     Patient here for follow up high blood pressure. .      SUBJECTIVE:    Reggie Hatchet is a 70 y.o. female comes in for return visit. She states that on October 19th she began having a.m. mucous that was initially reddish tinged and periodically it was greenish. This is episodic. She saw a podiatrist on October 18th for cystic structure on her right foot, but she also had somewhat of a fibrous structure on the left foot. Surgical extrication. She is also concerned about her blood pressure. She wishes to have refills on several of her medications. Interestingly she still has not gotten her COVID vaccine. Current Outpatient Medications   Medication Sig Dispense Refill    fluticasone propion-salmeteroL (ADVAIR/WIXELA) 250-50 mcg/dose diskus inhaler Take 1 Puff by inhalation every twelve (12) hours. 1 Each 11    desonide (TRIDESILON) 0.05 % topical lotion Apply  to affected area two (2) times a day. 59 mL 11    hydroquinone (ESOTERICA, MELQUIN) 4 % topical cream Apply  to affected area two (2) times a day. 30 g 11    amoxicillin (AMOXIL) 250 mg capsule Take 1 Capsule by mouth three (3) times daily for 10 days. 30 Capsule 0    ALPRAZolam (XANAX) 1 mg tablet       Xarelto 20 mg tab tablet TAKE 1 TABLET BY MOUTH EVERY DAY 30 Tablet 11    rosuvastatin (CRESTOR) 10 mg tablet Take 1 tablet every day 90 Tablet 3    albuterol (Ventolin HFA) 90 mcg/actuation inhaler Take 2 Puffs by inhalation every four (4) hours as needed for Wheezing.  1 Each 11    hydroCHLOROthiazide (HYDRODIURIL) 12.5 mg tablet TAKE 1 TABLET ONE TIME DAILY 90 Tablet 3    metoprolol succinate (TOPROL-XL) 25 mg XL tablet TAKE 1 TABLET TWICE DAILY 180 Tablet 3    potassium chloride (K-DUR, KLOR-CON M20) 20 mEq tablet TAKE 1 TABLET EVERY DAY 90 Tablet 3    magnesium oxide (MAG-OX) 400 mg tablet TAKE 1 TABLET EVERY DAY 90 Tablet 3    ascorbic acid, vitamin C, (VITAMIN C) 500 mg tablet Take  by mouth. cholecalciferol, vitamin D3, 50 mcg (2,000 unit) tab Take  by mouth.      elderberry fruit (ELDERBERRY PO) Take  by mouth. folic acid/multivit-min/lutein (CENTRUM SILVER PO) Take 1 Tab by mouth daily. QUEtiapine (SEROquel) 50 mg tablet Take 50 mg by mouth three (3) times daily. FLUoxetine (PROZAC) 20 mg capsule Take 20 mg by mouth daily.        Past Medical History:   Diagnosis Date    Abdominal pain     Asthma     Hepatic cyst 6/22/2019    Lumbar radicular pain 8/21/2014    Synovitis of knee 1/28/2018     Past Surgical History:   Procedure Laterality Date    ABLATION OF SVT  3/30/2015         EP STUDY W/INDUCTION  3/30/2015         HX BACK SURGERY      HX COLONOSCOPY  9-    HX HEART CATHETERIZATION      HX HYSTERECTOMY  1992     Allergies   Allergen Reactions    Ampicillin Nausea Only     Allergy was 30 years ago         REVIEW OF SYSTEMS:  General: negative for - chills or fever  ENT: negative for - headaches, nasal congestion or tinnitus  Respiratory: negative for - cough, hemoptysis, shortness of breath or wheezing  Cardiovascular : negative for - chest pain, edema, palpitations or shortness of breath  Gastrointestinal: negative for - abdominal pain, blood in stools, heartburn or nausea/vomiting  Genito-Urinary: no dysuria, trouble voiding, or hematuria  Musculoskeletal: negative for - gait disturbance, joint pain, joint stiffness or joint swelling  Neurological: no TIA or stroke symptoms  Hematologic: no bruises, no bleeding, no swollen glands  Integument: no lumps, mole changes, nail changes or rash  Endocrine: no malaise/lethargy or unexpected weight changes      Social History     Socioeconomic History    Marital status:     Number of children: 1   Occupational History    Occupation: retired--[employer]---disabled   Tobacco Use    Smoking status: Never    Smokeless tobacco: Never   Vaping Use    Vaping Use: Never used   Substance and Sexual Activity    Alcohol use: No     Alcohol/week: 0.0 standard drinks     Comment: rarely    Drug use: No    Sexual activity: Not Currently     Partners: Male     Family History   Problem Relation Age of Onset    Hypertension Mother     Heart Disease Mother     Heart Disease Father     Stroke Father        OBJECTIVE:    Visit Vitals  BP (!) 142/78   Pulse 90   Temp 98.3 °F (36.8 °C) (Oral)   Resp 16   Ht 5' 2\" (1.575 m)   Wt 133 lb 1.6 oz (60.4 kg)   SpO2 98%   BMI 24.34 kg/m²     CONSTITUTIONAL: well , well nourished, appears age appropriate  EYES: perrla, eom intact  ENMT:moist mucous membranes, pharynx clear  NECK: supple. Thyroid normal  RESPIRATORY: Chest: clear to ascultation and percussion   CARDIOVASCULAR: Heart: regular rate and rhythm  GASTROINTESTINAL: Abdomen: soft, bowel sounds active  HEMATOLOGIC: no pathological lymph nodes palpated  MUSCULOSKELETAL: Extremities: no edema, pulse 1+   INTEGUMENT: No unusual rashes or suspicious skin lesions noted. Nails appear normal.  NEUROLOGIC: non-focal exam   MENTAL STATUS: alert and oriented, appropriate affect      ASSESSMENT:  1. Vasomotor rhinitis    2. Acute maxillary sinusitis, recurrence not specified    3. Lipoma of other specified sites    4. Primary hypertension    5. Paroxysmal atrial fibrillation (HCC)    6. Mild intermittent asthma without complication    7. Anxiety neurosis    8. Mild intermittent reactive airway disease without complication    9. Dermatitis        PLAN:  1. The patient appears to have a rhinitis. Superimposed on that could be maxillary sinusitis in view of the colored mucous emanating from her coughing typically in the morning. She did have some blood-tinged mucous also. Empiric treatment with an antibiotic. If things clear up, nothing more needs to be done.   If not, she will have to see an otolaryngologist.  2. She will follow up with her podiatrist regarding the presumed lipoma of her left foot. 3. Blood pressure is actually improving. 4. She follows up with Cardiology regarding her paroxysmal atrial fib. This was almost exclusively rate controlled as far as the approach is concerned. 5. She does have periodic reactive airway disease flare ups. She will be given a refill on her Advair. 6. Her anxiety is reasonably stable.                   Staci Oreilly MD

## 2022-12-15 DIAGNOSIS — I48.20 CHRONIC ATRIAL FIBRILLATION (HCC): ICD-10-CM

## 2022-12-15 DIAGNOSIS — I10 ESSENTIAL HYPERTENSION: ICD-10-CM

## 2022-12-15 DIAGNOSIS — I49.5 TACHY-BRADY SYNDROME (HCC): ICD-10-CM

## 2022-12-15 DIAGNOSIS — G89.29 CHRONIC MIDLINE LOW BACK PAIN WITHOUT SCIATICA: Primary | ICD-10-CM

## 2022-12-15 DIAGNOSIS — M54.50 CHRONIC MIDLINE LOW BACK PAIN WITHOUT SCIATICA: Primary | ICD-10-CM

## 2022-12-15 DIAGNOSIS — E78.5 DYSLIPIDEMIA: ICD-10-CM

## 2022-12-15 RX ORDER — ROSUVASTATIN CALCIUM 10 MG/1
TABLET, COATED ORAL
Qty: 90 TABLET | Refills: 3 | Status: SHIPPED | OUTPATIENT
Start: 2022-12-15

## 2022-12-15 RX ORDER — METOPROLOL SUCCINATE 25 MG/1
TABLET, EXTENDED RELEASE ORAL
Qty: 180 TABLET | Refills: 3 | OUTPATIENT
Start: 2022-12-15

## 2022-12-15 RX ORDER — POTASSIUM CHLORIDE 20 MEQ/1
TABLET, EXTENDED RELEASE ORAL
Qty: 90 TABLET | Refills: 3 | Status: SHIPPED | OUTPATIENT
Start: 2022-12-15

## 2022-12-15 RX ORDER — POTASSIUM CHLORIDE 20 MEQ/1
TABLET, EXTENDED RELEASE ORAL
Qty: 90 TABLET | Refills: 3 | OUTPATIENT
Start: 2022-12-15

## 2022-12-15 RX ORDER — HYDROCODONE BITARTRATE AND ACETAMINOPHEN 5; 325 MG/1; MG/1
1 TABLET ORAL DAILY
Qty: 30 TABLET | Refills: 0 | Status: SHIPPED | OUTPATIENT
Start: 2022-12-15 | End: 2022-12-18

## 2022-12-15 RX ORDER — HYDROCHLOROTHIAZIDE 12.5 MG/1
TABLET ORAL
Qty: 90 TABLET | Refills: 3 | OUTPATIENT
Start: 2022-12-15

## 2022-12-15 RX ORDER — METOPROLOL SUCCINATE 25 MG/1
25 TABLET, EXTENDED RELEASE ORAL 2 TIMES DAILY
Qty: 180 TABLET | Refills: 3 | Status: SHIPPED | OUTPATIENT
Start: 2022-12-15

## 2022-12-15 RX ORDER — HYDROCHLOROTHIAZIDE 12.5 MG/1
TABLET ORAL
Qty: 90 TABLET | Refills: 3 | Status: SHIPPED | OUTPATIENT
Start: 2022-12-15

## 2022-12-15 RX ORDER — LANOLIN ALCOHOL/MO/W.PET/CERES
CREAM (GRAM) TOPICAL
Qty: 90 TABLET | Refills: 3 | Status: SHIPPED | OUTPATIENT
Start: 2022-12-15

## 2022-12-15 NOTE — TELEPHONE ENCOUNTER
okay to refill hydrocodone Patient on room air with documented SATS and in no apparent distress. Will continue to monitor.

## 2023-01-03 ENCOUNTER — HOSPITAL ENCOUNTER (OUTPATIENT)
Dept: MAMMOGRAPHY | Age: 73
Discharge: HOME | End: 2023-01-03
Attending: INTERNAL MEDICINE
Payer: MEDICARE

## 2023-01-03 DIAGNOSIS — Z12.31 VISIT FOR SCREENING MAMMOGRAM: ICD-10-CM

## 2023-01-03 PROCEDURE — 77063 BREAST TOMOSYNTHESIS BI: CPT

## 2023-02-08 ENCOUNTER — OFFICE VISIT (OUTPATIENT)
Dept: INTERNAL MEDICINE CLINIC | Age: 73
End: 2023-02-08
Payer: MEDICARE

## 2023-02-08 VITALS
OXYGEN SATURATION: 97 % | WEIGHT: 136.3 LBS | SYSTOLIC BLOOD PRESSURE: 174 MMHG | TEMPERATURE: 98.1 F | DIASTOLIC BLOOD PRESSURE: 84 MMHG | HEART RATE: 80 BPM | RESPIRATION RATE: 16 BRPM | HEIGHT: 62 IN | BODY MASS INDEX: 25.08 KG/M2

## 2023-02-08 DIAGNOSIS — D89.2 PARAPROTEINEMIA: ICD-10-CM

## 2023-02-08 DIAGNOSIS — E04.9 NODULAR GOITER: ICD-10-CM

## 2023-02-08 DIAGNOSIS — E78.5 DYSLIPIDEMIA: ICD-10-CM

## 2023-02-08 DIAGNOSIS — I48.0 PAROXYSMAL ATRIAL FIBRILLATION (HCC): Primary | ICD-10-CM

## 2023-02-08 DIAGNOSIS — J45.20 MILD INTERMITTENT ASTHMA WITHOUT COMPLICATION: ICD-10-CM

## 2023-02-08 DIAGNOSIS — I10 PRIMARY HYPERTENSION: ICD-10-CM

## 2023-02-08 NOTE — PROGRESS NOTES
Massimo Rivera is a 67 y.o. female  Chief Complaint   Patient presents with    Follow-up    Hypertension     Patient here for follow up high blood pressure. 1. Have you been to the ER, urgent care clinic since your last visit? Hospitalized since your last visit? No    2. Have you seen or consulted any other health care providers outside of the [address] [place] [address] since your last visit? Include any pap smears or colon screening.  No

## 2023-02-09 LAB
ALBUMIN SERPL-MCNC: 3.9 G/DL (ref 3.5–5)
ALBUMIN/GLOB SERPL: 1 (ref 1.1–2.2)
ALP SERPL-CCNC: 59 U/L (ref 45–117)
ALT SERPL-CCNC: 36 U/L (ref 12–78)
ANION GAP SERPL CALC-SCNC: 7 MMOL/L (ref 5–15)
APPEARANCE UR: CLEAR
AST SERPL-CCNC: 35 U/L (ref 15–37)
BASOPHILS # BLD: 0.1 K/UL (ref 0–0.1)
BASOPHILS NFR BLD: 1 % (ref 0–1)
BILIRUB SERPL-MCNC: 1.4 MG/DL (ref 0.2–1)
BILIRUB UR QL: NEGATIVE
BUN SERPL-MCNC: 12 MG/DL (ref 6–20)
BUN/CREAT SERPL: 12 (ref 12–20)
CALCIUM SERPL-MCNC: 9.6 MG/DL (ref 8.5–10.1)
CHLORIDE SERPL-SCNC: 107 MMOL/L (ref 97–108)
CHOLEST SERPL-MCNC: 142 MG/DL
CO2 SERPL-SCNC: 25 MMOL/L (ref 21–32)
COLOR UR: NORMAL
COMMENT, HOLDF: NORMAL
CREAT SERPL-MCNC: 1.02 MG/DL (ref 0.55–1.02)
CRP SERPL HS-MCNC: 1.1 MG/L
DIFFERENTIAL METHOD BLD: ABNORMAL
EOSINOPHIL # BLD: 0.1 K/UL (ref 0–0.4)
EOSINOPHIL NFR BLD: 3 % (ref 0–7)
ERYTHROCYTE [DISTWIDTH] IN BLOOD BY AUTOMATED COUNT: 14.1 % (ref 11.5–14.5)
GLOBULIN SER CALC-MCNC: 3.9 G/DL (ref 2–4)
GLUCOSE SERPL-MCNC: 101 MG/DL (ref 65–100)
GLUCOSE UR STRIP.AUTO-MCNC: NEGATIVE MG/DL
HCT VFR BLD AUTO: 38.6 % (ref 35–47)
HDLC SERPL-MCNC: 93 MG/DL
HDLC SERPL: 1.5 (ref 0–5)
HGB BLD-MCNC: 12.8 G/DL (ref 11.5–16)
HGB UR QL STRIP: NEGATIVE
IMM GRANULOCYTES # BLD AUTO: 0 K/UL (ref 0–0.04)
IMM GRANULOCYTES NFR BLD AUTO: 0 % (ref 0–0.5)
KETONES UR QL STRIP.AUTO: NEGATIVE MG/DL
LDLC SERPL CALC-MCNC: 40 MG/DL (ref 0–100)
LEUKOCYTE ESTERASE UR QL STRIP.AUTO: NEGATIVE
LYMPHOCYTES # BLD: 2.1 K/UL (ref 0.8–3.5)
LYMPHOCYTES NFR BLD: 51 % (ref 12–49)
MCH RBC QN AUTO: 30.2 PG (ref 26–34)
MCHC RBC AUTO-ENTMCNC: 33.2 G/DL (ref 30–36.5)
MCV RBC AUTO: 91 FL (ref 80–99)
MONOCYTES # BLD: 0.5 K/UL (ref 0–1)
MONOCYTES NFR BLD: 12 % (ref 5–13)
NEUTS SEG # BLD: 1.3 K/UL (ref 1.8–8)
NEUTS SEG NFR BLD: 33 % (ref 32–75)
NITRITE UR QL STRIP.AUTO: NEGATIVE
NRBC # BLD: 0 K/UL (ref 0–0.01)
NRBC BLD-RTO: 0 PER 100 WBC
PH UR STRIP: 7.5 (ref 5–8)
PLATELET # BLD AUTO: 135 K/UL (ref 150–400)
PMV BLD AUTO: 10.8 FL (ref 8.9–12.9)
POTASSIUM SERPL-SCNC: 4 MMOL/L (ref 3.5–5.1)
PROT SERPL-MCNC: 7.8 G/DL (ref 6.4–8.2)
PROT UR STRIP-MCNC: NEGATIVE MG/DL
RBC # BLD AUTO: 4.24 M/UL (ref 3.8–5.2)
SAMPLES BEING HELD,HOLD: NORMAL
SODIUM SERPL-SCNC: 139 MMOL/L (ref 136–145)
SP GR UR REFRACTOMETRY: 1.01 (ref 1–1.03)
TRIGL SERPL-MCNC: 45 MG/DL (ref ?–150)
TSH SERPL DL<=0.05 MIU/L-ACNC: 0.94 UIU/ML (ref 0.36–3.74)
UROBILINOGEN UR QL STRIP.AUTO: 0.2 EU/DL (ref 0.2–1)
VLDLC SERPL CALC-MCNC: 9 MG/DL
WBC # BLD AUTO: 4.1 K/UL (ref 3.6–11)

## 2023-02-12 LAB — APO B SERPL-MCNC: 45 MG/DL

## 2023-02-12 NOTE — PROGRESS NOTES
[facility] and [facility]  [facility]  [address] [place] [address]  Phone:  [phone]  Fax: [phone]       Chief Complaint   Patient presents with    Follow-up    Hypertension     Patient here for follow up high blood pressure. .      SUBJECTIVE:    Will Dunlap is a 67 y.o. female  Dictation on: 02/12/2023 10:40 AM by: Mike Downey [[id number]]          Current Outpatient Medications   Medication Sig Dispense Refill    magnesium oxide (MAG-OX) 400 mg tablet TAKE 1 TABLET EVERY DAY 30 Tablet 11    rosuvastatin (CRESTOR) 10 mg tablet Take 1 tablet every day 30 Tablet 11    hydroCHLOROthiazide (HYDRODIURIL) 12.5 mg tablet TAKE 1 TABLET ONE TIME DAILY 90 Tablet 3    magnesium oxide (MAG-OX) 400 mg tablet TAKE 1 TABLET EVERY DAY 90 Tablet 3    potassium chloride (K-DUR, KLOR-CON M20) 20 mEq tablet TAKE 1 TABLET EVERY DAY 90 Tablet 3    metoprolol succinate (TOPROL-XL) 25 mg XL tablet Take 1 Tablet by mouth two (2) times a day. 180 Tablet 3    fluticasone propion-salmeteroL (ADVAIR/WIXELA) 250-50 mcg/dose diskus inhaler Take 1 Puff by inhalation every twelve (12) hours. 1 Each 11    desonide (TRIDESILON) 0.05 % topical lotion Apply  to affected area two (2) times a day. 59 mL 11    hydroquinone (ESOTERICA, MELQUIN) 4 % topical cream Apply  to affected area two (2) times a day. 30 g 11    ALPRAZolam (XANAX) 1 mg tablet       Xarelto 20 mg tab tablet TAKE 1 TABLET BY MOUTH EVERY DAY 30 Tablet 11    albuterol (Ventolin HFA) 90 mcg/actuation inhaler Take 2 Puffs by inhalation every four (4) hours as needed for Wheezing. 1 Each 11    ascorbic acid, vitamin C, (VITAMIN C) 500 mg tablet Take  by mouth. cholecalciferol, vitamin D3, 50 mcg (2,000 unit) tab Take  by mouth.      elderberry fruit (ELDERBERRY PO) Take  by mouth. folic acid/multivit-min/lutein (CENTRUM SILVER PO) Take 1 Tab by mouth daily.       QUEtiapine (SEROquel) 50 mg tablet Take 50 mg by mouth three (3) times daily.      FLUoxetine (PROZAC) 20 mg capsule Take 20 mg by mouth daily.        Past Medical History:   Diagnosis Date    Abdominal pain     Asthma     Hepatic cyst 6/22/2019    Lumbar radicular pain 8/21/2014    Synovitis of knee 1/28/2018     Past Surgical History:   Procedure Laterality Date    ABLATION OF SVT  3/30/2015         EP STUDY W/INDUCTION  3/30/2015         HX BACK SURGERY      HX COLONOSCOPY  9-    HX HEART CATHETERIZATION      HX HYSTERECTOMY  1992     Allergies   Allergen Reactions    Ampicillin Nausea Only     Allergy was 30 years ago         REVIEW OF SYSTEMS:  General: negative for - chills or fever  ENT: negative for - headaches, nasal congestion or tinnitus  Respiratory: negative for - cough, hemoptysis, shortness of breath or wheezing  Cardiovascular : negative for - chest pain, edema, palpitations or shortness of breath  Gastrointestinal: negative for - abdominal pain, blood in stools, heartburn or nausea/vomiting  Genito-Urinary: no dysuria, trouble voiding, or hematuria  Musculoskeletal: negative for - gait disturbance, joint pain, joint stiffness or joint swelling  Neurological: no TIA or stroke symptoms  Hematologic: no bruises, no bleeding, no swollen glands  Integument: no lumps, mole changes, nail changes or rash  Endocrine: no malaise/lethargy or unexpected weight changes      Social History     Socioeconomic History    Marital status:     Number of children: 1   Occupational History    Occupation: retired--[employer]---disabled   Tobacco Use    Smoking status: Never    Smokeless tobacco: Never   Vaping Use    Vaping Use: Never used   Substance and Sexual Activity    Alcohol use: No     Alcohol/week: 0.0 standard drinks     Comment: rarely    Drug use: No    Sexual activity: Not Currently     Partners: Male     Family History   Problem Relation Age of Onset    Hypertension Mother     Heart Disease Mother     Heart Disease Father     Stroke Father        OBJECTIVE:    Visit Vitals  BP (!) 174/84   Pulse 80   Temp 98.1 °F (36.7 °C) (Oral)   Resp 16   Ht 5' 2\" (1.575 m)   Wt 136 lb 4.8 oz (61.8 kg)   SpO2 97%   BMI 24.93 kg/m²     CONSTITUTIONAL: well , well nourished, appears age appropriate  EYES: perrla, eom intact  ENMT:moist mucous membranes, pharynx clear  NECK: supple. Thyroid normal  RESPIRATORY: Chest: clear to ascultation and percussion   CARDIOVASCULAR: Heart: regular rate and rhythm  GASTROINTESTINAL: Abdomen: soft, bowel sounds active  HEMATOLOGIC: no pathological lymph nodes palpated  MUSCULOSKELETAL: Extremities: no edema, pulse 1+   INTEGUMENT: No unusual rashes or suspicious skin lesions noted. Nails appear normal.  NEUROLOGIC: non-focal exam   MENTAL STATUS: alert and oriented, appropriate affect      ASSESSMENT:  1. Paroxysmal atrial fibrillation (HCC)    2. Primary hypertension    3. Dyslipidemia    4. Mild intermittent asthma without complication    5. Nodular goiter    6. Paraproteinemia        PLAN:   Dictation on: 02/12/2023 10:42 AM by: Nikos Triplett [[id number]]   . Orders Placed This Encounter    APOLIPOPROTEIN B    CBC WITH AUTOMATED DIFF    CRP, HIGH SENSITIVITY    LIPID PANEL    METABOLIC PANEL, COMPREHENSIVE    TSH 3RD GENERATION    URINALYSIS W/ RFLX MICROSCOPIC    SAMPLES BEING HELD         Follow-up and Dispositions    Return in about 3 months (around 5/8/2023).            Freddie Milton MD

## 2023-04-01 RX ORDER — HYDROCHLOROTHIAZIDE 12.5 MG/1
TABLET ORAL
Qty: 10 TABLET | Refills: 0 | Status: SHIPPED | OUTPATIENT
Start: 2023-04-01

## 2023-05-10 ENCOUNTER — OFFICE VISIT (OUTPATIENT)
Facility: CLINIC | Age: 73
End: 2023-05-10

## 2023-05-10 VITALS
SYSTOLIC BLOOD PRESSURE: 161 MMHG | BODY MASS INDEX: 24.86 KG/M2 | WEIGHT: 135.1 LBS | DIASTOLIC BLOOD PRESSURE: 90 MMHG | RESPIRATION RATE: 16 BRPM | TEMPERATURE: 98.1 F | HEIGHT: 62 IN | OXYGEN SATURATION: 97 % | HEART RATE: 87 BPM

## 2023-05-10 DIAGNOSIS — I10 PRIMARY HYPERTENSION: Primary | ICD-10-CM

## 2023-05-10 DIAGNOSIS — I48.0 PAROXYSMAL ATRIAL FIBRILLATION (HCC): ICD-10-CM

## 2023-05-10 DIAGNOSIS — F41.1 ANXIETY NEUROSIS: ICD-10-CM

## 2023-05-10 DIAGNOSIS — M54.50 RIGHT-SIDED LOW BACK PAIN WITHOUT SCIATICA, UNSPECIFIED CHRONICITY: ICD-10-CM

## 2023-05-10 DIAGNOSIS — E78.5 DYSLIPIDEMIA: ICD-10-CM

## 2023-05-10 DIAGNOSIS — E04.9 NODULAR GOITER: ICD-10-CM

## 2023-05-10 SDOH — ECONOMIC STABILITY: HOUSING INSECURITY
IN THE LAST 12 MONTHS, WAS THERE A TIME WHEN YOU DID NOT HAVE A STEADY PLACE TO SLEEP OR SLEPT IN A SHELTER (INCLUDING NOW)?: NO

## 2023-05-10 SDOH — ECONOMIC STABILITY: HOUSING INSECURITY: IN THE LAST 12 MONTHS, HOW MANY PLACES HAVE YOU LIVED?: 1

## 2023-05-10 SDOH — ECONOMIC STABILITY: FOOD INSECURITY: WITHIN THE PAST 12 MONTHS, YOU WORRIED THAT YOUR FOOD WOULD RUN OUT BEFORE YOU GOT MONEY TO BUY MORE.: NEVER TRUE

## 2023-05-10 SDOH — ECONOMIC STABILITY: FOOD INSECURITY: WITHIN THE PAST 12 MONTHS, THE FOOD YOU BOUGHT JUST DIDN'T LAST AND YOU DIDN'T HAVE MONEY TO GET MORE.: NEVER TRUE

## 2023-05-10 SDOH — ECONOMIC STABILITY: TRANSPORTATION INSECURITY
IN THE PAST 12 MONTHS, HAS THE LACK OF TRANSPORTATION KEPT YOU FROM MEDICAL APPOINTMENTS OR FROM GETTING MEDICATIONS?: NO

## 2023-05-10 SDOH — ECONOMIC STABILITY: TRANSPORTATION INSECURITY
IN THE PAST 12 MONTHS, HAS LACK OF TRANSPORTATION KEPT YOU FROM MEETINGS, WORK, OR FROM GETTING THINGS NEEDED FOR DAILY LIVING?: NO

## 2023-05-10 SDOH — ECONOMIC STABILITY: INCOME INSECURITY: IN THE LAST 12 MONTHS, WAS THERE A TIME WHEN YOU WERE NOT ABLE TO PAY THE MORTGAGE OR RENT ON TIME?: NO

## 2023-05-10 ASSESSMENT — PATIENT HEALTH QUESTIONNAIRE - PHQ9
1. LITTLE INTEREST OR PLEASURE IN DOING THINGS: 0
SUM OF ALL RESPONSES TO PHQ QUESTIONS 1-9: 0
2. FEELING DOWN, DEPRESSED OR HOPELESS: 0
SUM OF ALL RESPONSES TO PHQ QUESTIONS 1-9: 0
SUM OF ALL RESPONSES TO PHQ9 QUESTIONS 1 & 2: 0
SUM OF ALL RESPONSES TO PHQ QUESTIONS 1-9: 0
SUM OF ALL RESPONSES TO PHQ QUESTIONS 1-9: 0

## 2023-05-10 ASSESSMENT — LIFESTYLE VARIABLES
HOW MANY STANDARD DRINKS CONTAINING ALCOHOL DO YOU HAVE ON A TYPICAL DAY: PATIENT DOES NOT DRINK
HOW OFTEN DO YOU HAVE A DRINK CONTAINING ALCOHOL: NEVER

## 2023-05-10 ASSESSMENT — ANXIETY QUESTIONNAIRES
2. NOT BEING ABLE TO STOP OR CONTROL WORRYING: 0
3. WORRYING TOO MUCH ABOUT DIFFERENT THINGS: 0
4. TROUBLE RELAXING: 0
IF YOU CHECKED OFF ANY PROBLEMS ON THIS QUESTIONNAIRE, HOW DIFFICULT HAVE THESE PROBLEMS MADE IT FOR YOU TO DO YOUR WORK, TAKE CARE OF THINGS AT HOME, OR GET ALONG WITH OTHER PEOPLE: NOT DIFFICULT AT ALL
1. FEELING NERVOUS, ANXIOUS, OR ON EDGE: 1
7. FEELING AFRAID AS IF SOMETHING AWFUL MIGHT HAPPEN: 0
5. BEING SO RESTLESS THAT IT IS HARD TO SIT STILL: 0
6. BECOMING EASILY ANNOYED OR IRRITABLE: 0
GAD7 TOTAL SCORE: 1

## 2023-05-10 ASSESSMENT — SOCIAL DETERMINANTS OF HEALTH (SDOH)
WITHIN THE LAST YEAR, HAVE YOU BEEN AFRAID OF YOUR PARTNER OR EX-PARTNER?: NO
DO YOU BELONG TO ANY CLUBS OR ORGANIZATIONS SUCH AS CHURCH GROUPS UNIONS, FRATERNAL OR ATHLETIC GROUPS, OR SCHOOL GROUPS?: NO
WITHIN THE LAST YEAR, HAVE YOU BEEN KICKED, HIT, SLAPPED, OR OTHERWISE PHYSICALLY HURT BY YOUR PARTNER OR EX-PARTNER?: NO
IN A TYPICAL WEEK, HOW MANY TIMES DO YOU TALK ON THE PHONE WITH FAMILY, FRIENDS, OR NEIGHBORS?: MORE THAN THREE TIMES A WEEK
HOW HARD IS IT FOR YOU TO PAY FOR THE VERY BASICS LIKE FOOD, HOUSING, MEDICAL CARE, AND HEATING?: NOT VERY HARD
HOW OFTEN DO YOU ATTEND CHURCH OR RELIGIOUS SERVICES?: NEVER
WITHIN THE LAST YEAR, HAVE TO BEEN RAPED OR FORCED TO HAVE ANY KIND OF SEXUAL ACTIVITY BY YOUR PARTNER OR EX-PARTNER?: NO
HOW OFTEN DO YOU ATTENT MEETINGS OF THE CLUB OR ORGANIZATION YOU BELONG TO?: MORE THAN 4 TIMES PER YEAR
HOW OFTEN DO YOU GET TOGETHER WITH FRIENDS OR RELATIVES?: MORE THAN THREE TIMES A WEEK
WITHIN THE LAST YEAR, HAVE YOU BEEN HUMILIATED OR EMOTIONALLY ABUSED IN OTHER WAYS BY YOUR PARTNER OR EX-PARTNER?: NO

## 2023-05-10 NOTE — PROGRESS NOTES
[address] [place] [address] and Primary Care  [place] [address]  [address] [place] [address]  Phone:  [phone]  Fax: [phone]       Chief Complaint   Patient presents with   [facility]     Patient here for Medicare Wellness Exam.    .      SUBJECTIVE:    Bijal Truong is a 67 y.o. female  Dictation on: 05/10/2023  9:32 PM by: Romina Kim [[id number]]          Current Outpatient Medications   Medication Sig Dispense Refill    albuterol sulfate HFA (PROVENTIL;VENTOLIN;PROAIR) 108 (90 Base) MCG/ACT inhaler Inhale 2 puffs into the lungs every 4 hours as needed      ALPRAZolam (XANAX) 1 MG tablet [facility] the [facility] [address] [place] [address]: Outside name: ALPRAZolam (XANAX) 1 mg tablet      ascorbic acid (VITAMIN C) 500 MG tablet Take by mouth      Cholecalciferol 50 MCG (2000 UT) TABS Take by mouth      desonide (DESOWEN) 0.05 % lotion Apply topically 2 times daily      FLUoxetine (PROZAC) 20 MG capsule Take 1 capsule by mouth daily      hydroCHLOROthiazide (HYDRODIURIL) 12.5 MG tablet TAKE 1 TABLET ONE TIME DAILY      hydroquinone 4 % cream Apply topically 2 times daily      magnesium oxide (MAG-OX) 400 MG tablet Take 1 tablet by mouth daily      metoprolol succinate (TOPROL XL) 25 MG extended release tablet Take 1 tablet by mouth 2 times daily      potassium chloride (KLOR-CON M) 20 MEQ extended release tablet Take 1 tablet by mouth daily      QUEtiapine (SEROQUEL) 50 MG tablet Take 1 tablet by mouth 3 times daily      rivaroxaban (XARELTO) 20 MG TABS tablet Take 1 tablet by mouth daily      rosuvastatin (CRESTOR) 10 MG tablet Take 1 tablet by mouth daily       No current facility-administered medications for this visit.      Past Medical History:   Diagnosis Date    Abdominal pain     Asthma     Hepatic cyst 6/22/2019    Lumbar radicular pain 8/21/2014    Synovitis of knee 1/28/2018     Past Surgical History:   Procedure Laterality Date    ABLATION OF SVT  3/30/2015

## 2023-05-10 NOTE — PROGRESS NOTES
Jaiden Dang is a 67 y.o. female  Chief Complaint   Patient presents with    Medicare AWV     Patient here for Medicare Wellness Exam.      Jaiden Dang is a 67 y.o. female  Chief Complaint   Patient presents with    Medicare AWV     Patient here for Medicare Wellness Exam.          YES Answers must have Comments  1. \"Have you been to the ER, urgent care clinic since your last visit? Hospitalized since your last visit? \"    [] YES   [x] NO       2. Have you seen or consulted any other health care providers outside of [address] [place] [address] since your last visit?     [] YES   [] NO       3. For patients aged 39-70: Have you had a colorectal cancer screening such as a colonoscopy/FIT/Cologuard? Nurse/CMA to request records if not in chart   [x] YES When: 2020 Where: Home Reason for visit: Home test Smear  [] NO   [] NA, based on age    If the patient is female:      4. For female patients aged 41-77: Elwyn Peeling you had a mammogram in the last two years?  Nurse/CMA to request records if not in chart   [x] YES When: 2023 Where: [facility]  [] NO   [] NA, based on age    11. For female patients aged 21-65: Elwyn Peeling you had a pap smear?   Nurse/CMA to request records if not in chart   [x] YES When: 2023 Where: Dr. Sophie Ahn  [] NO  [] NA, based on age

## 2023-06-26 ENCOUNTER — OFFICE VISIT (OUTPATIENT)
Age: 73
End: 2023-06-26

## 2023-06-26 VITALS
RESPIRATION RATE: 16 BRPM | HEART RATE: 72 BPM | OXYGEN SATURATION: 99 % | DIASTOLIC BLOOD PRESSURE: 92 MMHG | TEMPERATURE: 98.5 F | BODY MASS INDEX: 24.87 KG/M2 | WEIGHT: 136 LBS | SYSTOLIC BLOOD PRESSURE: 185 MMHG

## 2023-06-26 DIAGNOSIS — J20.9 BRONCHITIS WITH BRONCHOSPASM: ICD-10-CM

## 2023-06-26 DIAGNOSIS — R05.1 ACUTE COUGH: Primary | ICD-10-CM

## 2023-06-26 LAB
Lab: NORMAL
QC PASS/FAIL: NORMAL
SARS-COV-2, POC: NORMAL

## 2023-06-26 RX ORDER — AZITHROMYCIN 250 MG/1
250 TABLET, FILM COATED ORAL SEE ADMIN INSTRUCTIONS
Qty: 6 TABLET | Refills: 0 | Status: SHIPPED | OUTPATIENT
Start: 2023-06-26 | End: 2023-07-01

## 2023-06-26 RX ORDER — DEXTROMETHORPHAN HYDROBROMIDE AND PROMETHAZINE HYDROCHLORIDE 15; 6.25 MG/5ML; MG/5ML
5 SYRUP ORAL NIGHTLY PRN
Qty: 50 ML | Refills: 0 | Status: SHIPPED | OUTPATIENT
Start: 2023-06-26

## 2023-06-26 RX ORDER — BENZONATATE 200 MG/1
200 CAPSULE ORAL 3 TIMES DAILY PRN
Qty: 30 CAPSULE | Refills: 0 | Status: SHIPPED | OUTPATIENT
Start: 2023-06-26

## 2023-06-26 RX ORDER — METHYLPREDNISOLONE 4 MG/1
TABLET ORAL
Qty: 1 KIT | Refills: 0 | Status: SHIPPED | OUTPATIENT
Start: 2023-06-26

## 2023-06-26 ASSESSMENT — ENCOUNTER SYMPTOMS
HEMOPTYSIS: 0
HEARTBURN: 0
COUGH: 1
WHEEZING: 1
CHEST TIGHTNESS: 1
SHORTNESS OF BREATH: 1

## 2023-07-10 ENCOUNTER — OFFICE VISIT (OUTPATIENT)
Facility: CLINIC | Age: 73
End: 2023-07-10
Payer: COMMERCIAL

## 2023-07-10 VITALS
HEART RATE: 82 BPM | HEIGHT: 62 IN | WEIGHT: 138.2 LBS | BODY MASS INDEX: 25.43 KG/M2 | SYSTOLIC BLOOD PRESSURE: 191 MMHG | TEMPERATURE: 98.1 F | DIASTOLIC BLOOD PRESSURE: 96 MMHG | OXYGEN SATURATION: 94 % | RESPIRATION RATE: 16 BRPM

## 2023-07-10 DIAGNOSIS — I48.0 PAROXYSMAL ATRIAL FIBRILLATION (HCC): ICD-10-CM

## 2023-07-10 DIAGNOSIS — N64.4 MASTALGIA: Primary | ICD-10-CM

## 2023-07-10 DIAGNOSIS — I10 PRIMARY HYPERTENSION: ICD-10-CM

## 2023-07-10 DIAGNOSIS — E78.5 DYSLIPIDEMIA: ICD-10-CM

## 2023-07-10 PROCEDURE — 99214 OFFICE O/P EST MOD 30 MIN: CPT | Performed by: INTERNAL MEDICINE

## 2023-07-10 PROCEDURE — 1123F ACP DISCUSS/DSCN MKR DOCD: CPT | Performed by: INTERNAL MEDICINE

## 2023-07-10 PROCEDURE — 3074F SYST BP LT 130 MM HG: CPT | Performed by: INTERNAL MEDICINE

## 2023-07-10 PROCEDURE — 3078F DIAST BP <80 MM HG: CPT | Performed by: INTERNAL MEDICINE

## 2023-07-10 ASSESSMENT — ANXIETY QUESTIONNAIRES
IF YOU CHECKED OFF ANY PROBLEMS ON THIS QUESTIONNAIRE, HOW DIFFICULT HAVE THESE PROBLEMS MADE IT FOR YOU TO DO YOUR WORK, TAKE CARE OF THINGS AT HOME, OR GET ALONG WITH OTHER PEOPLE: NOT DIFFICULT AT ALL
6. BECOMING EASILY ANNOYED OR IRRITABLE: 0
3. WORRYING TOO MUCH ABOUT DIFFERENT THINGS: 0
2. NOT BEING ABLE TO STOP OR CONTROL WORRYING: 0
7. FEELING AFRAID AS IF SOMETHING AWFUL MIGHT HAPPEN: 0
4. TROUBLE RELAXING: 0
1. FEELING NERVOUS, ANXIOUS, OR ON EDGE: 0
5. BEING SO RESTLESS THAT IT IS HARD TO SIT STILL: 0
GAD7 TOTAL SCORE: 0

## 2023-07-10 ASSESSMENT — PATIENT HEALTH QUESTIONNAIRE - PHQ9
SUM OF ALL RESPONSES TO PHQ QUESTIONS 1-9: 0
1. LITTLE INTEREST OR PLEASURE IN DOING THINGS: 0
2. FEELING DOWN, DEPRESSED OR HOPELESS: 0
SUM OF ALL RESPONSES TO PHQ QUESTIONS 1-9: 0
SUM OF ALL RESPONSES TO PHQ9 QUESTIONS 1 & 2: 0

## 2023-08-17 ENCOUNTER — TELEPHONE (OUTPATIENT)
Age: 73
End: 2023-08-17

## 2023-08-25 ENCOUNTER — TELEPHONE (OUTPATIENT)
Age: 73
End: 2023-08-25

## 2023-08-25 NOTE — TELEPHONE ENCOUNTER
PT called wanted to come in person see Nurse to have paperwork filled out by doctor informed patient office hours will send message to Nurse

## 2023-09-08 ENCOUNTER — TELEPHONE (OUTPATIENT)
Facility: CLINIC | Age: 73
End: 2023-09-08

## 2023-09-12 ENCOUNTER — TELEPHONE (OUTPATIENT)
Facility: CLINIC | Age: 73
End: 2023-09-12

## 2023-09-14 RX ORDER — FLUTICASONE PROPIONATE AND SALMETEROL 250; 50 UG/1; UG/1
1 POWDER RESPIRATORY (INHALATION) EVERY 12 HOURS
Qty: 60 EACH | Refills: 3 | Status: SHIPPED | OUTPATIENT
Start: 2023-09-14

## 2023-09-14 RX ORDER — HYDROCHLOROTHIAZIDE 12.5 MG/1
TABLET ORAL
Qty: 90 TABLET | Refills: 3 | Status: SHIPPED | OUTPATIENT
Start: 2023-09-14

## 2023-09-14 RX ORDER — ALBUTEROL SULFATE 90 UG/1
2 AEROSOL, METERED RESPIRATORY (INHALATION) EVERY 4 HOURS PRN
Qty: 18 G | Refills: 11 | Status: SHIPPED | OUTPATIENT
Start: 2023-09-14

## 2023-09-21 ENCOUNTER — NURSE ONLY (OUTPATIENT)
Facility: CLINIC | Age: 73
End: 2023-09-21

## 2023-09-21 VITALS — HEART RATE: 86 BPM | SYSTOLIC BLOOD PRESSURE: 170 MMHG | OXYGEN SATURATION: 94 % | DIASTOLIC BLOOD PRESSURE: 92 MMHG

## 2023-09-21 RX ORDER — HYDROCHLOROTHIAZIDE 12.5 MG/1
TABLET ORAL
Qty: 90 TABLET | Refills: 3 | Status: SHIPPED | OUTPATIENT
Start: 2023-09-21

## 2023-09-21 RX ORDER — METOPROLOL SUCCINATE 25 MG/1
25 TABLET, EXTENDED RELEASE ORAL 2 TIMES DAILY
Qty: 30 TABLET | Refills: 11 | Status: SHIPPED | OUTPATIENT
Start: 2023-09-21

## 2023-09-25 ENCOUNTER — OFFICE VISIT (OUTPATIENT)
Facility: CLINIC | Age: 73
End: 2023-09-25
Payer: COMMERCIAL

## 2023-09-25 VITALS
SYSTOLIC BLOOD PRESSURE: 182 MMHG | TEMPERATURE: 97.9 F | HEIGHT: 62 IN | WEIGHT: 138.8 LBS | OXYGEN SATURATION: 98 % | RESPIRATION RATE: 16 BRPM | BODY MASS INDEX: 25.54 KG/M2 | DIASTOLIC BLOOD PRESSURE: 80 MMHG | HEART RATE: 85 BPM

## 2023-09-25 DIAGNOSIS — I48.0 PAROXYSMAL ATRIAL FIBRILLATION (HCC): ICD-10-CM

## 2023-09-25 DIAGNOSIS — I10 PRIMARY HYPERTENSION: Primary | ICD-10-CM

## 2023-09-25 DIAGNOSIS — F41.1 ANXIETY NEUROSIS: ICD-10-CM

## 2023-09-25 DIAGNOSIS — E78.5 DYSLIPIDEMIA: ICD-10-CM

## 2023-09-25 PROCEDURE — 3074F SYST BP LT 130 MM HG: CPT | Performed by: INTERNAL MEDICINE

## 2023-09-25 PROCEDURE — 1123F ACP DISCUSS/DSCN MKR DOCD: CPT | Performed by: INTERNAL MEDICINE

## 2023-09-25 PROCEDURE — 3078F DIAST BP <80 MM HG: CPT | Performed by: INTERNAL MEDICINE

## 2023-09-25 PROCEDURE — 99214 OFFICE O/P EST MOD 30 MIN: CPT | Performed by: INTERNAL MEDICINE

## 2023-09-25 RX ORDER — AMLODIPINE BESYLATE 2.5 MG/1
2.5 TABLET ORAL DAILY
Qty: 30 TABLET | Refills: 1 | Status: SHIPPED | OUTPATIENT
Start: 2023-09-25 | End: 2023-10-12 | Stop reason: SDUPTHER

## 2023-09-25 SDOH — HEALTH STABILITY: PHYSICAL HEALTH: ON AVERAGE, HOW MANY DAYS PER WEEK DO YOU ENGAGE IN MODERATE TO STRENUOUS EXERCISE (LIKE A BRISK WALK)?: 0 DAYS

## 2023-09-25 SDOH — HEALTH STABILITY: PHYSICAL HEALTH: ON AVERAGE, HOW MANY MINUTES DO YOU ENGAGE IN EXERCISE AT THIS LEVEL?: 0 MIN

## 2023-09-25 ASSESSMENT — PATIENT HEALTH QUESTIONNAIRE - PHQ9
SUM OF ALL RESPONSES TO PHQ QUESTIONS 1-9: 0
SUM OF ALL RESPONSES TO PHQ9 QUESTIONS 1 & 2: 0
SUM OF ALL RESPONSES TO PHQ QUESTIONS 1-9: 0
1. LITTLE INTEREST OR PLEASURE IN DOING THINGS: 0
2. FEELING DOWN, DEPRESSED OR HOPELESS: 0
SUM OF ALL RESPONSES TO PHQ QUESTIONS 1-9: 0
SUM OF ALL RESPONSES TO PHQ QUESTIONS 1-9: 0

## 2023-09-25 ASSESSMENT — ANXIETY QUESTIONNAIRES
5. BEING SO RESTLESS THAT IT IS HARD TO SIT STILL: 0
GAD7 TOTAL SCORE: 0
4. TROUBLE RELAXING: 0
1. FEELING NERVOUS, ANXIOUS, OR ON EDGE: 0
2. NOT BEING ABLE TO STOP OR CONTROL WORRYING: 0
IF YOU CHECKED OFF ANY PROBLEMS ON THIS QUESTIONNAIRE, HOW DIFFICULT HAVE THESE PROBLEMS MADE IT FOR YOU TO DO YOUR WORK, TAKE CARE OF THINGS AT HOME, OR GET ALONG WITH OTHER PEOPLE: NOT DIFFICULT AT ALL
3. WORRYING TOO MUCH ABOUT DIFFERENT THINGS: 0
6. BECOMING EASILY ANNOYED OR IRRITABLE: 0
7. FEELING AFRAID AS IF SOMETHING AWFUL MIGHT HAPPEN: 0

## 2023-10-01 RX ORDER — FLUTICASONE PROPIONATE AND SALMETEROL 250; 50 UG/1; UG/1
1 POWDER RESPIRATORY (INHALATION) EVERY 12 HOURS
Qty: 60 EACH | Refills: 3 | Status: SHIPPED | OUTPATIENT
Start: 2023-10-01

## 2023-10-12 ENCOUNTER — OFFICE VISIT (OUTPATIENT)
Facility: CLINIC | Age: 73
End: 2023-10-12
Payer: COMMERCIAL

## 2023-10-12 VITALS
WEIGHT: 136.7 LBS | SYSTOLIC BLOOD PRESSURE: 147 MMHG | DIASTOLIC BLOOD PRESSURE: 77 MMHG | TEMPERATURE: 98.4 F | RESPIRATION RATE: 18 BRPM | OXYGEN SATURATION: 94 % | HEIGHT: 62 IN | HEART RATE: 104 BPM | BODY MASS INDEX: 25.16 KG/M2

## 2023-10-12 DIAGNOSIS — E78.5 DYSLIPIDEMIA: ICD-10-CM

## 2023-10-12 DIAGNOSIS — Z23 NEED FOR VACCINATION: ICD-10-CM

## 2023-10-12 DIAGNOSIS — I48.0 PAROXYSMAL ATRIAL FIBRILLATION (HCC): ICD-10-CM

## 2023-10-12 DIAGNOSIS — F41.1 ANXIETY NEUROSIS: ICD-10-CM

## 2023-10-12 DIAGNOSIS — I10 PRIMARY HYPERTENSION: Primary | ICD-10-CM

## 2023-10-12 PROCEDURE — 3078F DIAST BP <80 MM HG: CPT | Performed by: INTERNAL MEDICINE

## 2023-10-12 PROCEDURE — 1123F ACP DISCUSS/DSCN MKR DOCD: CPT | Performed by: INTERNAL MEDICINE

## 2023-10-12 PROCEDURE — 3077F SYST BP >= 140 MM HG: CPT | Performed by: INTERNAL MEDICINE

## 2023-10-12 RX ORDER — AMLODIPINE BESYLATE 2.5 MG/1
2.5 TABLET ORAL DAILY
Qty: 30 TABLET | Refills: 1 | Status: SHIPPED | OUTPATIENT
Start: 2023-10-12

## 2023-10-12 ASSESSMENT — PATIENT HEALTH QUESTIONNAIRE - PHQ9
1. LITTLE INTEREST OR PLEASURE IN DOING THINGS: 0
2. FEELING DOWN, DEPRESSED OR HOPELESS: 0
SUM OF ALL RESPONSES TO PHQ QUESTIONS 1-9: 0
SUM OF ALL RESPONSES TO PHQ9 QUESTIONS 1 & 2: 0
SUM OF ALL RESPONSES TO PHQ QUESTIONS 1-9: 0

## 2023-10-13 NOTE — PROGRESS NOTES
[address] [facility] and Primary Care  [address] [facility] [address]  Phone:  [phone]  Fax: [phone]       Chief Complaint   Patient presents with    Hypertension   . SUBJECTIVE:    Sundeep Johnson is a 67 y.o. female  Dictation on: 10/12/2023 10:40 PM by: Linda Galvin [[id number]]          Current Outpatient Medications   Medication Sig Dispense Refill    amLODIPine (NORVASC) 2.5 MG tablet Take 1 tablet by mouth daily 30 tablet 1    fluticasone-salmeterol (WIXELA INHUB) 250-50 MCG/ACT AEPB diskus inhaler Inhale 1 puff into the lungs in the morning and 1 puff in the evening. 60 each 3    hydroCHLOROthiazide (HYDRODIURIL) 12.5 MG tablet TAKE 1 TABLET ONE TIME DAILY 90 tablet 3    metoprolol succinate (TOPROL XL) 25 MG extended release tablet Take 1 tablet by mouth 2 times daily 30 tablet 11    albuterol sulfate HFA (PROVENTIL;VENTOLIN;PROAIR) 108 (90 Base) MCG/ACT inhaler Inhale 2 puffs into the lungs every 4 hours as needed for Wheezing 18 g 11    ALPRAZolam (XANAX) 1 MG tablet ceived the following from [facility]: Outside name: ALPRAZolam (XANAX) 1 mg tablet      ascorbic acid (VITAMIN C) 500 MG tablet Take by mouth      Cholecalciferol 50 MCG (2000 UT) TABS Take by mouth      FLUoxetine (PROZAC) 20 MG capsule Take 1 capsule by mouth daily      hydroquinone 4 % cream Apply topically 2 times daily      magnesium oxide (MAG-OX) 400 MG tablet Take 1 tablet by mouth daily      potassium chloride (KLOR-CON M) 20 MEQ extended release tablet Take 1 tablet by mouth daily      QUEtiapine (SEROQUEL) 50 MG tablet Take 1 tablet by mouth 3 times daily      rivaroxaban (XARELTO) 20 MG TABS tablet Take 1 tablet by mouth daily      rosuvastatin (CRESTOR) 10 MG tablet Take 1 tablet by mouth daily      methylPREDNISolone (MEDROL, RADHA,) 4 MG tablet Take by mouth.  (Patient not taking: Reported on 9/25/2023) 1 kit 0    benzonatate (TESSALON) 200 MG capsule Take 1 capsule by

## 2023-10-13 NOTE — PROGRESS NOTES
1. The patient's blood pressure is 142/70. This is improving, but she is not at an optimal level. I would not make any adjustments and continue with amlodipine 2.5 mg daily and see her back in our office the next month. Her pressure remains elevated, and amlodipine would be increased. 2. She does have a history of paroxysmal atrial fibrillation. She happens to be in sinus rhythm currently. 3. Her anxiety is reasonably stable. 4. Finally, she has an increased cardiovascular risks and remains on a statin.

## 2023-10-13 NOTE — PROGRESS NOTES
comes in for return visit stating that she has done well. On the last visit, blood pressure was elevated, and I started her on amlodipine 2.5 mg daily. She has had no adverse effects on the medication. She has a history of paroxysmal atrial fibrillation and remains on her Xarelto as well as her beta-blocker. She has a past history of dyslipidemia and anxiety.

## 2023-11-14 ENCOUNTER — OFFICE VISIT (OUTPATIENT)
Facility: CLINIC | Age: 73
End: 2023-11-14
Payer: COMMERCIAL

## 2023-11-14 DIAGNOSIS — G89.29 CHRONIC RIGHT-SIDED LOW BACK PAIN WITHOUT SCIATICA: ICD-10-CM

## 2023-11-14 DIAGNOSIS — M54.50 CHRONIC RIGHT-SIDED LOW BACK PAIN WITHOUT SCIATICA: ICD-10-CM

## 2023-11-14 DIAGNOSIS — I10 PRIMARY HYPERTENSION: ICD-10-CM

## 2023-11-14 DIAGNOSIS — E78.5 DYSLIPIDEMIA: ICD-10-CM

## 2023-11-14 DIAGNOSIS — I25.10 ASHD (ARTERIOSCLEROTIC HEART DISEASE): ICD-10-CM

## 2023-11-14 DIAGNOSIS — I48.0 PAROXYSMAL ATRIAL FIBRILLATION (HCC): Primary | ICD-10-CM

## 2023-11-14 DIAGNOSIS — F41.1 ANXIETY NEUROSIS: ICD-10-CM

## 2023-11-14 PROCEDURE — 3078F DIAST BP <80 MM HG: CPT | Performed by: INTERNAL MEDICINE

## 2023-11-14 PROCEDURE — 1123F ACP DISCUSS/DSCN MKR DOCD: CPT | Performed by: INTERNAL MEDICINE

## 2023-11-14 PROCEDURE — 99214 OFFICE O/P EST MOD 30 MIN: CPT | Performed by: INTERNAL MEDICINE

## 2023-11-14 PROCEDURE — 3075F SYST BP GE 130 - 139MM HG: CPT | Performed by: INTERNAL MEDICINE

## 2023-11-14 ASSESSMENT — PATIENT HEALTH QUESTIONNAIRE - PHQ9
1. LITTLE INTEREST OR PLEASURE IN DOING THINGS: 0
SUM OF ALL RESPONSES TO PHQ9 QUESTIONS 1 & 2: 0
2. FEELING DOWN, DEPRESSED OR HOPELESS: 0
SUM OF ALL RESPONSES TO PHQ QUESTIONS 1-9: 0

## 2023-11-25 VITALS
RESPIRATION RATE: 17 BRPM | HEIGHT: 62 IN | SYSTOLIC BLOOD PRESSURE: 138 MMHG | WEIGHT: 142.3 LBS | BODY MASS INDEX: 26.19 KG/M2 | DIASTOLIC BLOOD PRESSURE: 68 MMHG | HEART RATE: 64 BPM | OXYGEN SATURATION: 95 % | TEMPERATURE: 97.5 F

## 2023-11-25 NOTE — PROGRESS NOTES
[address] [facility] and Primary Care  [address] [facility] [address]  Phone:  [phone]  Fax: [phone]       Chief Complaint   Patient presents with    Hypertension    Cholesterol Problem   . SUBJECTIVE:    Lionel Shah is a 67 y.o. female  Dictation on: 11/25/2023  4:30 PM by: Michelle Barajas [[id number]]          Current Outpatient Medications   Medication Sig Dispense Refill    amLODIPine (NORVASC) 2.5 MG tablet Take 1 tablet by mouth daily 30 tablet 1    fluticasone-salmeterol (WIXELA INHUB) 250-50 MCG/ACT AEPB diskus inhaler Inhale 1 puff into the lungs in the morning and 1 puff in the evening. 60 each 3    hydroCHLOROthiazide (HYDRODIURIL) 12.5 MG tablet TAKE 1 TABLET ONE TIME DAILY 90 tablet 3    metoprolol succinate (TOPROL XL) 25 MG extended release tablet Take 1 tablet by mouth 2 times daily 30 tablet 11    albuterol sulfate HFA (PROVENTIL;VENTOLIN;PROAIR) 108 (90 Base) MCG/ACT inhaler Inhale 2 puffs into the lungs every 4 hours as needed for Wheezing 18 g 11    ALPRAZolam (XANAX) 1 MG tablet ceived the following from [facility]: Outside name: ALPRAZolam (XANAX) 1 mg tablet      ascorbic acid (VITAMIN C) 500 MG tablet Take by mouth      Cholecalciferol 50 MCG (2000 UT) TABS Take by mouth      FLUoxetine (PROZAC) 20 MG capsule Take 1 capsule by mouth daily      hydroquinone 4 % cream Apply topically 2 times daily      magnesium oxide (MAG-OX) 400 MG tablet Take 1 tablet by mouth daily      potassium chloride (KLOR-CON M) 20 MEQ extended release tablet Take 1 tablet by mouth daily      QUEtiapine (SEROQUEL) 50 MG tablet Take 1 tablet by mouth 3 times daily      rivaroxaban (XARELTO) 20 MG TABS tablet Take 1 tablet by mouth daily      rosuvastatin (CRESTOR) 10 MG tablet Take 1 tablet by mouth daily      methylPREDNISolone (MEDROL, RADHA,) 4 MG tablet Take by mouth.  (Patient not taking: Reported on 9/25/2023) 1 kit 0    benzonatate (TESSALON) 200 MG

## 2023-11-29 ENCOUNTER — TELEPHONE (OUTPATIENT)
Age: 73
End: 2023-11-29

## 2023-11-29 DIAGNOSIS — I10 PRIMARY HYPERTENSION: ICD-10-CM

## 2023-11-29 NOTE — TELEPHONE ENCOUNTER
Requested Prescriptions     Signed Prescriptions Disp Refills    rivaroxaban (XARELTO) 20 MG TABS tablet 90 tablet 1     Sig: Take 1 tablet by mouth daily     Authorizing Provider: Angela Callahan     Ordering User: Nasima Dutton     Verbal order per Dr. Sylvia Lucas.     Future Appointments   Date Time Provider [address]   12/21/2023  3:30 PM Barbara Romo MD [facility] MAIN BS AMB   1/23/2024  3:00 PM Angela Callahan MD [facility] BS AMB

## 2023-11-29 NOTE — TELEPHONE ENCOUNTER
Pt. Is asking her Zolrelto to be called into the [facility], her [address] [place] [address] has shipped it, but she won't get it in time. Pt. Has 3 pills left.     [facility] - [phone]

## 2023-11-30 RX ORDER — HYDROCODONE BITARTRATE AND ACETAMINOPHEN 5; 325 MG/1; MG/1
1 TABLET ORAL 2 TIMES DAILY
Qty: 30 TABLET | Refills: 0 | Status: SHIPPED | OUTPATIENT
Start: 2023-11-30 | End: 2023-12-30

## 2023-11-30 RX ORDER — AMLODIPINE BESYLATE 2.5 MG/1
2.5 TABLET ORAL DAILY
Qty: 90 TABLET | Refills: 3 | Status: SHIPPED | OUTPATIENT
Start: 2023-11-30 | End: 2023-12-01

## 2023-11-30 RX ORDER — MAGNESIUM OXIDE 400 MG/1
1 TABLET ORAL DAILY
Qty: 90 TABLET | Refills: 3 | Status: SHIPPED | OUTPATIENT
Start: 2023-11-30 | End: 2023-12-07 | Stop reason: SDUPTHER

## 2023-11-30 RX ORDER — AMLODIPINE BESYLATE 2.5 MG/1
2.5 TABLET ORAL DAILY
Qty: 30 TABLET | Refills: 11 | Status: SHIPPED | OUTPATIENT
Start: 2023-11-30 | End: 2023-12-01

## 2023-11-30 RX ORDER — METOPROLOL SUCCINATE 25 MG/1
25 TABLET, EXTENDED RELEASE ORAL 2 TIMES DAILY
Qty: 180 TABLET | Refills: 3 | Status: SHIPPED | OUTPATIENT
Start: 2023-11-30 | End: 2023-12-01

## 2023-12-01 RX ORDER — POTASSIUM CHLORIDE 20 MEQ/1
20 TABLET, EXTENDED RELEASE ORAL DAILY
Qty: 90 TABLET | Refills: 3 | Status: SHIPPED | OUTPATIENT
Start: 2023-12-01

## 2023-12-01 RX ORDER — METOPROLOL SUCCINATE 25 MG/1
25 TABLET, EXTENDED RELEASE ORAL 2 TIMES DAILY
Qty: 180 TABLET | Refills: 3 | Status: SHIPPED | OUTPATIENT
Start: 2023-12-01

## 2023-12-06 RX ORDER — AMLODIPINE BESYLATE 2.5 MG/1
2.5 TABLET ORAL DAILY
Qty: 90 TABLET | Refills: 3 | Status: SHIPPED | OUTPATIENT
Start: 2023-12-06 | End: 2023-12-07 | Stop reason: SDUPTHER

## 2023-12-08 RX ORDER — MAGNESIUM OXIDE 400 MG/1
1 TABLET ORAL DAILY
Qty: 10 TABLET | Refills: 0 | Status: SHIPPED | OUTPATIENT
Start: 2023-12-08

## 2023-12-08 RX ORDER — AMLODIPINE BESYLATE 2.5 MG/1
2.5 TABLET ORAL DAILY
Qty: 30 TABLET | Refills: 0 | Status: SHIPPED | OUTPATIENT
Start: 2023-12-08

## 2023-12-08 RX ORDER — POTASSIUM CHLORIDE 20 MEQ/1
20 TABLET, EXTENDED RELEASE ORAL DAILY
Qty: 10 TABLET | Refills: 0 | Status: SHIPPED | OUTPATIENT
Start: 2023-12-08

## 2024-01-23 ENCOUNTER — OFFICE VISIT (OUTPATIENT)
Age: 74
End: 2024-01-23
Payer: COMMERCIAL

## 2024-01-23 VITALS
OXYGEN SATURATION: 97 % | DIASTOLIC BLOOD PRESSURE: 90 MMHG | SYSTOLIC BLOOD PRESSURE: 146 MMHG | HEART RATE: 81 BPM | BODY MASS INDEX: 25.21 KG/M2 | HEIGHT: 62 IN | WEIGHT: 137 LBS

## 2024-01-23 DIAGNOSIS — I25.10 ASHD (ARTERIOSCLEROTIC HEART DISEASE): ICD-10-CM

## 2024-01-23 DIAGNOSIS — I48.0 PAROXYSMAL ATRIAL FIBRILLATION (HCC): Primary | ICD-10-CM

## 2024-01-23 DIAGNOSIS — I10 PRIMARY HYPERTENSION: ICD-10-CM

## 2024-01-23 PROCEDURE — 99214 OFFICE O/P EST MOD 30 MIN: CPT | Performed by: SPECIALIST

## 2024-01-23 PROCEDURE — 1123F ACP DISCUSS/DSCN MKR DOCD: CPT | Performed by: SPECIALIST

## 2024-01-23 PROCEDURE — 3078F DIAST BP <80 MM HG: CPT | Performed by: SPECIALIST

## 2024-01-23 PROCEDURE — 3077F SYST BP >= 140 MM HG: CPT | Performed by: SPECIALIST

## 2024-01-23 PROCEDURE — 93000 ELECTROCARDIOGRAM COMPLETE: CPT | Performed by: SPECIALIST

## 2024-01-23 RX ORDER — HYDROCHLOROTHIAZIDE 12.5 MG/1
12.5 TABLET ORAL DAILY
COMMUNITY

## 2024-01-23 RX ORDER — HYDROCODONE BITARTRATE AND ACETAMINOPHEN 5; 325 MG/1; MG/1
1 TABLET ORAL EVERY 6 HOURS PRN
COMMUNITY

## 2024-01-23 ASSESSMENT — PATIENT HEALTH QUESTIONNAIRE - PHQ9
SUM OF ALL RESPONSES TO PHQ QUESTIONS 1-9: 0
2. FEELING DOWN, DEPRESSED OR HOPELESS: 0
SUM OF ALL RESPONSES TO PHQ QUESTIONS 1-9: 0
SUM OF ALL RESPONSES TO PHQ9 QUESTIONS 1 & 2: 0

## 2024-01-23 NOTE — PATIENT INSTRUCTIONS
You will be scheduled for a Nuclear Stress Test after your appointment today.    Nuclear stress testing evaluates blood flow to your heart muscle and assesses cardiac function. There are 2 parts (Rest/Stress) to this procedure and will include either an exercise on a treadmill or an IV administration of a stressing medication called Lexiscan. Your cardiologist will determine which type of testing is best for you. This test can be performed in one day unless it is determined that better quality images will be obtained by performing the test over two days.     *Please arrive 15 minutes prior to your appointment time    Test Duration:    -One day testing will take 4 hours   -Two day testing will take 2 hours each day. Your second day(resting images) will be scheduled after the first day is completed    Day of testing instructions:    NO CAFFEINE (not even decaffeinated products) 24 HOURS PRIOR TO TESTING. This includes coffee, soda, tea, chocolate, multivitamins, and migraine medication, like Excedrin or Fioricet that contains caffeine.  Nothing to eat or drink 4 HOURS prior to testing  NO NICOTINE 12 hours prior to testing  Hold any medications requested by your cardiologist. Otherwise take medications as directed with a few sips of water. If you are unsure you may bring your medications with you to take after instructed by your stressing nurse. It is recommended you hold NONE of your medications prior to your test. DIABETIC PATIENTS: Take half of your insulin with a light meal 4 hours before your test.  Wear comfortable clothes and shoes (Shirts with no metal, shorts or pants, tennis shoes, no heels or flip flops)    IMPORTANT: This testing involves a cardiac tracer ordered specifically for you. If you are unable to make your appointment, please call to cancel/reschedule AT LEAST 24 hours prior to your appointment so your tracer can be cancelled. [phone].    **NOTE: You will need to follow up about 1 week after

## 2024-01-23 NOTE — PROGRESS NOTES
[facility]                DIVISION OF CARDIOLOGY          [facility]                                                            OFFICE NOTE        DALE TRAYLOR M.D.,ARIAS            HERNAN YORKTERS   1950  [id number]    Date/Time:  1/23/20243:43 PM            SUBJECTIVE:  She seems to be doing okay mostly complaining of back pain no clear chest discomfort noted pretty significant fatigue reported no worsening shortness of breath       Assessment/Plan    1.  Chest pain: No recurrent chest pain reported.  We have discussed the results of the CT angiography of August 2021 which is failed to reveal any critical stenosis the more stenosis was the 30 to 40% stenosis at the level of the RCA.    EKG today atrial fibrillation 1 PVCs nonspecific ST-T changes forward progression in the anteroseptal leads        Extreme fatigue and needs further evaluation in this patient proceed with Lexiscan Myoview stress test     Continue with Toprol and Crestor at this time with no additional changes.        2.  Atrial fibrillation: Chronic.  Continue Xarelto and metoprolol rate is controlled.     She has seen Dr. Millan in the past and he suggested proceed with atrial fibrillation ablation by she declined at that time.  She understands that atrial fibrillation begets itself.     Continue Xarelto.  No untoward effects thus far.     Last echocardiogram November 2021 normal with preserved ejection fraction.  Although the lower limits of normal 50 to 55% mild to moderate MR and TR noted.  Consider repeat echo at the next office visit     3.  Hypertension: Blood borderline today she was started on amlodipine by her primary care physician.     No changes in meds for now blood pressure log for 1 week     4.  Hyperlipidemia: On Crestor closely followed by her primary care physician.     5.  DVT: On Xarelto remote history of DVT at this time.     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

## 2024-01-24 ENCOUNTER — TELEPHONE (OUTPATIENT)
Facility: CLINIC | Age: 74
End: 2024-01-24

## 2024-01-24 ENCOUNTER — TELEPHONE (OUTPATIENT)
Age: 74
End: 2024-01-24

## 2024-01-24 NOTE — TELEPHONE ENCOUNTER
Cecy is calling from [facility] because she needs the ICD-10 code for the Xarelto medicine that the patient is on.    [phone] Cecy

## 2024-01-24 NOTE — TELEPHONE ENCOUNTER
GARRY from [facility] with [facility]. Advised ICD 10 code for Xarelto I48.0.

## 2024-01-24 NOTE — TELEPHONE ENCOUNTER
Spoke with Ms Payton about getting her medications covered with a PA. Will follow up with pharmacy, CoverMyMeds and Insurance company in the morning.   Ref# [id number] (xarelto 20mg)  Ref#[id number] (Wixela 250-50)  Ms Payton spoke with Eri NIXON from [facility] and gave her the reference #'s to mention if someone calls about the medications. [facility] # [phone]  [facility] Fax: [phone]  [facility] and [facility], on [address], for wixela.    JW

## 2024-01-25 ENCOUNTER — TELEPHONE (OUTPATIENT)
Facility: CLINIC | Age: 74
End: 2024-01-25

## 2024-01-25 NOTE — TELEPHONE ENCOUNTER
Called [employer] to verify if there was a lower tier medication and there is not, due to PA denial.   Contacted [employer] pt assistant program ([phone]) [specialty pharmacy is [facility]] about getting pt help with cost coverage for xarelto 20mg. Website: [email or web address]   Printed form and will fill out info for Dr Alexander to sign and then will fax documents over to [place] Patient Assistance Program for review.  GERMAIN SALAZAR

## 2024-01-29 ENCOUNTER — TELEPHONE (OUTPATIENT)
Age: 74
End: 2024-01-29

## 2024-01-29 NOTE — TELEPHONE ENCOUNTER
Pt is calling to see if she can get samples of Xarelto.Pt said she is going through a program to help her with trying to get approve for the medicine through her PCP.    Pt would like to know if there is a program that can assist her with this medicine that our office is aware of.Pt would like a call back when she can come into  samples.    [phone]

## 2024-01-30 NOTE — TELEPHONE ENCOUNTER
Pt called because she would like to know if she can get samples of Xarelto. Pt said she is going through a program to help her with trying to get approved for the medicine through her PCP. Pt would like to know if there is a program that can assist her with this medicine that our office is aware of. Pt would like a call back.    Pt ph# [phone]

## 2024-01-31 ENCOUNTER — TELEPHONE (OUTPATIENT)
Facility: CLINIC | Age: 74
End: 2024-01-31

## 2024-01-31 NOTE — TELEPHONE ENCOUNTER
Identifiers x 2. Informed of samples available and patient assistance forms. Discussed need to complete form and return to our office for MD to complete.   States that Dr. Alexander's office may have sent patient assistant form.  Reiterated that she does not need to have 2 sets of application as that will delay the determination. Verbalized understanding.

## 2024-01-31 NOTE — TELEPHONE ENCOUNTER
Attempted to reach patient by telephone. A message was left for return call.     Requested Prescriptions     Signed Prescriptions Disp Refills    rivaroxaban (XARELTO) 20 MG TABS tablet 14 tablet 0     Sig: Take 1 tablet by mouth daily     Authorizing Provider: DALE JOHNSTON     Ordering User: HARSH EVANS     Verbal order per Dr. Johnston.    Patient assistance form provided with samples.

## 2024-01-31 NOTE — TELEPHONE ENCOUNTER
Pt called about clarification of documents needed for [facility] patients Assistance program. I told her that I sent the documents yesterday afternoon.  MARTIN

## 2024-02-02 ENCOUNTER — TELEPHONE (OUTPATIENT)
Facility: CLINIC | Age: 74
End: 2024-02-02

## 2024-02-02 NOTE — TELEPHONE ENCOUNTER
Pt was accepted to the [employer] patient assistance program for 1 year, effective 02/01/2024, for her xarelto medication.  JW

## 2024-02-02 NOTE — TELEPHONE ENCOUNTER
----- Message from Navya Castellon sent at 2/2/2024  2:25 PM EST -----  Subject: Message to Provider    QUESTIONS  Information for Provider? Pt would like referral sent to Dr Jimi Culver at   [facility] Fax # is [phone] and Phone # is [phone] for her   lower back problems, she saw him previously.   ---------------------------------------------------------------------------  --------------  CALL BACK INFO  [phone]; Do not leave any message, patient will call back for answer  ---------------------------------------------------------------------------  --------------  SCRIPT ANSWERS  Relationship to Patient? Self

## 2024-02-05 ENCOUNTER — TELEPHONE (OUTPATIENT)
Facility: CLINIC | Age: 74
End: 2024-02-05

## 2024-02-05 NOTE — TELEPHONE ENCOUNTER
Wixela 250-50 inhub does not need a prior authorization.  There is a discount program through [employer] at  [email or web address]  shandra SALAZAR

## 2024-02-08 RX ORDER — ALBUTEROL SULFATE 90 UG/1
AEROSOL, METERED RESPIRATORY (INHALATION)
Qty: 18 EACH | Refills: 11 | Status: SHIPPED | OUTPATIENT
Start: 2024-02-08

## 2024-02-26 ENCOUNTER — TRANSCRIBE ORDERS (OUTPATIENT)
Facility: HOSPITAL | Age: 74
End: 2024-02-26

## 2024-02-26 DIAGNOSIS — Z12.31 ENCOUNTER FOR SCREENING MAMMOGRAM FOR MALIGNANT NEOPLASM OF BREAST: Primary | ICD-10-CM

## 2024-02-27 ENCOUNTER — HOSPITAL ENCOUNTER (OUTPATIENT)
Facility: HOSPITAL | Age: 74
Discharge: HOME | End: 2024-03-01
Attending: INTERNAL MEDICINE
Payer: COMMERCIAL

## 2024-02-27 VITALS — HEIGHT: 62 IN | BODY MASS INDEX: 25.21 KG/M2 | WEIGHT: 137 LBS

## 2024-02-27 DIAGNOSIS — Z12.31 ENCOUNTER FOR SCREENING MAMMOGRAM FOR MALIGNANT NEOPLASM OF BREAST: ICD-10-CM

## 2024-02-27 PROCEDURE — 77063 BREAST TOMOSYNTHESIS BI: CPT

## 2024-03-06 ENCOUNTER — TELEPHONE (OUTPATIENT)
Age: 74
End: 2024-03-06

## 2024-03-06 ENCOUNTER — OFFICE VISIT (OUTPATIENT)
Age: 74
End: 2024-03-06
Payer: COMMERCIAL

## 2024-03-06 VITALS
WEIGHT: 136 LBS | HEART RATE: 42 BPM | HEIGHT: 62 IN | OXYGEN SATURATION: 99 % | DIASTOLIC BLOOD PRESSURE: 84 MMHG | SYSTOLIC BLOOD PRESSURE: 156 MMHG | BODY MASS INDEX: 25.03 KG/M2

## 2024-03-06 DIAGNOSIS — R94.39 ABNORMAL NUCLEAR STRESS TEST: ICD-10-CM

## 2024-03-06 DIAGNOSIS — I25.10 ASHD (ARTERIOSCLEROTIC HEART DISEASE): ICD-10-CM

## 2024-03-06 DIAGNOSIS — Z01.812 PRE-PROCEDURE LAB EXAM: ICD-10-CM

## 2024-03-06 DIAGNOSIS — R94.39 ABNORMAL STRESS TEST: Primary | ICD-10-CM

## 2024-03-06 DIAGNOSIS — I48.0 PAROXYSMAL ATRIAL FIBRILLATION (HCC): Primary | ICD-10-CM

## 2024-03-06 DIAGNOSIS — I42.9 CARDIOMYOPATHY (HCC): ICD-10-CM

## 2024-03-06 DIAGNOSIS — I10 PRIMARY HYPERTENSION: ICD-10-CM

## 2024-03-06 PROCEDURE — 93000 ELECTROCARDIOGRAM COMPLETE: CPT | Performed by: SPECIALIST

## 2024-03-06 PROCEDURE — 1123F ACP DISCUSS/DSCN MKR DOCD: CPT | Performed by: SPECIALIST

## 2024-03-06 PROCEDURE — 3077F SYST BP >= 140 MM HG: CPT | Performed by: SPECIALIST

## 2024-03-06 PROCEDURE — 3079F DIAST BP 80-89 MM HG: CPT | Performed by: SPECIALIST

## 2024-03-06 PROCEDURE — 99214 OFFICE O/P EST MOD 30 MIN: CPT | Performed by: SPECIALIST

## 2024-03-06 RX ORDER — FLUOXETINE HYDROCHLORIDE 20 MG/1
20 CAPSULE ORAL DAILY
COMMUNITY

## 2024-03-06 ASSESSMENT — PATIENT HEALTH QUESTIONNAIRE - PHQ9
2. FEELING DOWN, DEPRESSED OR HOPELESS: 0
SUM OF ALL RESPONSES TO PHQ QUESTIONS 1-9: 0
1. LITTLE INTEREST OR PLEASURE IN DOING THINGS: 0
SUM OF ALL RESPONSES TO PHQ QUESTIONS 1-9: 0
SUM OF ALL RESPONSES TO PHQ QUESTIONS 1-9: 0
SUM OF ALL RESPONSES TO PHQ9 QUESTIONS 1 & 2: 0
SUM OF ALL RESPONSES TO PHQ QUESTIONS 1-9: 0

## 2024-03-06 NOTE — PROGRESS NOTES
[facility]                DIVISION OF CARDIOLOGY          [facility]                                                            OFFICE NOTE        DALE TRAYLOR M.D.,ARIAS            HERNAN DOW NELSON   1950  [id number]    Date/Time:  3/6/09712:59 PM            SUBJECTIVE:  No clear chest pain reported she remains fatigued she is quite unsure about symptoms may be some shortness of breath.       Assessment/Plan  1.  Chest pain: No recurrent chest pain reported.  We have discussed the results of the CT angiography of August 2021 which is failed to reveal any critical stenosis the more stenosis was the 30 to 40% stenosis at the level of the RCA.     EKG today atrial fibrillation 1 PVCs nonspecific ST-T changes forward progression in the anteroseptal leads     Now status post nuclear stress testing February 2024 with anterior wall ischemia.    I had a long conversation with her regarding the potential for false positive stress test especially in the area and female patient.    Not withstanding this in my opinion given also risk factors which consider proceeding with cardiac catheterization.    The patient is agreeable and wants to proceed as soon as possible.    Discussed risk and benefits once again patient is agreeable and understands risks.     Continue with Toprol and Crestor at this time with no additional changes.         2.  Atrial fibrillation: Chronic.  Continue Xarelto and metoprolol rate is controlled.     She has seen Dr. Millan in the past and he suggested proceed with atrial fibrillation ablation by she declined at that time.  She understands that atrial fibrillation begets itself.     Continue Xarelto.  No untoward effects thus far.     Obtain echo at the next office visit after the cardiac catheterization as well.     3.  Hypertension: Blood borderline today she was started on amlodipine by her primary care physician.     No changes in meds for

## 2024-03-06 NOTE — TELEPHONE ENCOUNTER
Patient was seen in office today and was scheduled for [facility] with Dr. Persaud on 3/15 @ 10:45 am with 8:45 am arrival. Instructions gone over with patient, labs already done, and to hold Xarelto 2 days prior to procedure. Patient verbalized understanding and had no questions at the time of scheduling.    Patient identification verified x2.      Patient Instructions    Catheterization     Pre-procedure instructions  Lab work: Done 3/6  [facility] Draw Sites:  [facility] [place]: [address] [facility] [address]  [place]: [address] [address] [facility] [address]  [place]: [address] [facility] [address]  [facility]: [address] [facility] [address]  [place]: [address] [facility] [address]  [facility]: [address] [facility] [address]  [place]: [address] [facility] [address]  [facility]: [address]. [place] [address]    The night before the procedure nothing to eat or drink after midnight, you may take approved medications the morning of the procedure with a few sips of water.  Stop blood thinners 2 days prior to procedure EXCEPT: Brilinta, Plavix or Aspirin  Medications restrictions: Hold Xarelto 2 days prior to procedure.    Procedure day  Have a  that will bring you and take you home (6-8 hours)  Have a responsible adult that can stay with you for 24 hours  Bring ID and insurance card  Wear comfortable clothing  Leave valuables at home,   Bring: dentures, hearing aids, or glasses  Bring overnight bag (just in case of an overnight stay)  Where to report  [facility]: go through main entrance and to the left is outpatient registration    Date of procedure: 3/15/24 w/ Dr. Persaud  Arrival Time: 8:45 am    Post procedure instructions  No driving for 24 hours  No heavy lifting (over 10lbs) or strenuous activity for 48 hours  No baths, swimming, hot

## 2024-03-06 NOTE — PATIENT INSTRUCTIONS
You will be scheduled for a cardiac catheterization. See Celeste in [address].     Schedule echocardiogram in 2-3 weeks.  Follow up with Dr. Johnston, after echo and cardiac catheterization.

## 2024-03-07 ENCOUNTER — PREP FOR PROCEDURE (OUTPATIENT)
Age: 74
End: 2024-03-07

## 2024-03-07 DIAGNOSIS — R94.39 ABNORMAL STRESS TEST: Primary | ICD-10-CM

## 2024-03-07 LAB
ANION GAP SERPL CALC-SCNC: 6 MMOL/L (ref 5–15)
BUN SERPL-MCNC: 17 MG/DL (ref 6–20)
BUN/CREAT SERPL: 16 (ref 12–20)
CALCIUM SERPL-MCNC: 9.9 MG/DL (ref 8.5–10.1)
CHLORIDE SERPL-SCNC: 109 MMOL/L (ref 97–108)
CO2 SERPL-SCNC: 23 MMOL/L (ref 21–32)
CREAT SERPL-MCNC: 1.04 MG/DL (ref 0.55–1.02)
ERYTHROCYTE [DISTWIDTH] IN BLOOD BY AUTOMATED COUNT: 13.6 % (ref 11.5–14.5)
GLUCOSE SERPL-MCNC: 85 MG/DL (ref 65–100)
HCT VFR BLD AUTO: 40.8 % (ref 35–47)
HGB BLD-MCNC: 13.5 G/DL (ref 11.5–16)
MCH RBC QN AUTO: 30.3 PG (ref 26–34)
MCHC RBC AUTO-ENTMCNC: 33.1 G/DL (ref 30–36.5)
MCV RBC AUTO: 91.5 FL (ref 80–99)
NRBC # BLD: 0 K/UL (ref 0–0.01)
NRBC BLD-RTO: 0 PER 100 WBC
PLATELET # BLD AUTO: 158 K/UL (ref 150–400)
PMV BLD AUTO: 11.2 FL (ref 8.9–12.9)
POTASSIUM SERPL-SCNC: 3.3 MMOL/L (ref 3.5–5.1)
RBC # BLD AUTO: 4.46 M/UL (ref 3.8–5.2)
SODIUM SERPL-SCNC: 138 MMOL/L (ref 136–145)
WBC # BLD AUTO: 5.1 K/UL (ref 3.6–11)

## 2024-03-07 RX ORDER — SODIUM CHLORIDE 0.9 % (FLUSH) 0.9 %
5-40 SYRINGE (ML) INJECTION PRN
Status: CANCELLED | OUTPATIENT
Start: 2024-03-07

## 2024-03-07 RX ORDER — SODIUM CHLORIDE 9 MG/ML
INJECTION, SOLUTION INTRAVENOUS CONTINUOUS
Status: CANCELLED | OUTPATIENT
Start: 2024-03-07 | End: 2024-03-07

## 2024-03-08 ENCOUNTER — TELEPHONE (OUTPATIENT)
Age: 74
End: 2024-03-08

## 2024-03-08 NOTE — TELEPHONE ENCOUNTER
Patient called stating that she has concerns about her upcoming surgery and has a few questions. Patient would like a call back ASAP.    Patient phone number - [phone]

## 2024-03-08 NOTE — TELEPHONE ENCOUNTER
Identifiers x 2. Spent approximately 25 minutes on phone with patient.  Answered multiple questions regarding scheduled cardiac cath.  Discussed rationale due to abnormal nuclear test.  Patient states ongoing anxiety/worry. Has not received covid vaccine.  Informed that it will not deter procedure. Would like to discuss with family.  No changes made to date of procedure at this time. Recommended that patient discuss with family and write down any questions/concerns that she has. To contact patient on 3-11-24 to determine if she wants to postpone procedure.     Patient takes xanax.  Would like to know if she can take the morning of procedure. To discuss with NP.

## 2024-03-12 NOTE — TELEPHONE ENCOUNTER
Patient would like to cancel cath scheduled for 3/15/24, want to have the procedure but would like to discuss more with her family and also would like to meet with Dr. Persaud as well before scheduling procedure again.         Patient# [phone]

## 2024-03-13 ENCOUNTER — OFFICE VISIT (OUTPATIENT)
Facility: CLINIC | Age: 74
End: 2024-03-13
Payer: COMMERCIAL

## 2024-03-13 VITALS
OXYGEN SATURATION: 93 % | WEIGHT: 131.9 LBS | DIASTOLIC BLOOD PRESSURE: 68 MMHG | SYSTOLIC BLOOD PRESSURE: 122 MMHG | HEIGHT: 62 IN | HEART RATE: 48 BPM | TEMPERATURE: 98.3 F | BODY MASS INDEX: 24.27 KG/M2 | RESPIRATION RATE: 16 BRPM

## 2024-03-13 DIAGNOSIS — R94.39 POSITIVE CARDIAC STRESS TEST: ICD-10-CM

## 2024-03-13 DIAGNOSIS — I48.0 PAROXYSMAL ATRIAL FIBRILLATION (HCC): ICD-10-CM

## 2024-03-13 DIAGNOSIS — I10 PRIMARY HYPERTENSION: ICD-10-CM

## 2024-03-13 DIAGNOSIS — E78.5 DYSLIPIDEMIA: Primary | ICD-10-CM

## 2024-03-13 PROCEDURE — 3074F SYST BP LT 130 MM HG: CPT | Performed by: INTERNAL MEDICINE

## 2024-03-13 PROCEDURE — 3078F DIAST BP <80 MM HG: CPT | Performed by: INTERNAL MEDICINE

## 2024-03-13 PROCEDURE — 99214 OFFICE O/P EST MOD 30 MIN: CPT | Performed by: INTERNAL MEDICINE

## 2024-03-13 PROCEDURE — 1123F ACP DISCUSS/DSCN MKR DOCD: CPT | Performed by: INTERNAL MEDICINE

## 2024-03-13 SDOH — ECONOMIC STABILITY: INCOME INSECURITY: HOW HARD IS IT FOR YOU TO PAY FOR THE VERY BASICS LIKE FOOD, HOUSING, MEDICAL CARE, AND HEATING?: NOT HARD AT ALL

## 2024-03-13 SDOH — ECONOMIC STABILITY: FOOD INSECURITY: WITHIN THE PAST 12 MONTHS, YOU WORRIED THAT YOUR FOOD WOULD RUN OUT BEFORE YOU GOT MONEY TO BUY MORE.: NEVER TRUE

## 2024-03-13 SDOH — ECONOMIC STABILITY: FOOD INSECURITY: WITHIN THE PAST 12 MONTHS, THE FOOD YOU BOUGHT JUST DIDN'T LAST AND YOU DIDN'T HAVE MONEY TO GET MORE.: NEVER TRUE

## 2024-03-13 ASSESSMENT — ANXIETY QUESTIONNAIRES
5. BEING SO RESTLESS THAT IT IS HARD TO SIT STILL: 0
2. NOT BEING ABLE TO STOP OR CONTROL WORRYING: 0
GAD7 TOTAL SCORE: 0
1. FEELING NERVOUS, ANXIOUS, OR ON EDGE: 0
4. TROUBLE RELAXING: 0
7. FEELING AFRAID AS IF SOMETHING AWFUL MIGHT HAPPEN: 0
IF YOU CHECKED OFF ANY PROBLEMS ON THIS QUESTIONNAIRE, HOW DIFFICULT HAVE THESE PROBLEMS MADE IT FOR YOU TO DO YOUR WORK, TAKE CARE OF THINGS AT HOME, OR GET ALONG WITH OTHER PEOPLE: NOT DIFFICULT AT ALL
3. WORRYING TOO MUCH ABOUT DIFFERENT THINGS: 0
6. BECOMING EASILY ANNOYED OR IRRITABLE: 0

## 2024-03-13 ASSESSMENT — PATIENT HEALTH QUESTIONNAIRE - PHQ9
SUM OF ALL RESPONSES TO PHQ QUESTIONS 1-9: 0
1. LITTLE INTEREST OR PLEASURE IN DOING THINGS: 0
SUM OF ALL RESPONSES TO PHQ QUESTIONS 1-9: 0
2. FEELING DOWN, DEPRESSED OR HOPELESS: 0
SUM OF ALL RESPONSES TO PHQ QUESTIONS 1-9: 0
SUM OF ALL RESPONSES TO PHQ QUESTIONS 1-9: 0
SUM OF ALL RESPONSES TO PHQ9 QUESTIONS 1 & 2: 0

## 2024-03-13 NOTE — TELEPHONE ENCOUNTER
Attempted to reach patient by telephone. A message was left for return call.     To inform of scheduled appt with Dr. Persaud.    Future Appointments   Date Time Provider Department Center   3/13/2024  3:30 PM Camacho Alexander MD [facility] MAIN BS AMB   4/3/2024  2:00 PM Juvenal Persaud MD [facility] BS AMB   4/3/2024  3:45 PM [facility] ECHO 2 [facility] AMB   4/8/2024  2:45 PM Camacho Alexander MD [facility] MAIN BS AMB   4/10/2024  3:00 PM Nahid Johnston MD [facility] BS AMB

## 2024-03-14 LAB — CRP SERPL HS-MCNC: 1.2 MG/L

## 2024-03-14 NOTE — PROGRESS NOTES
Chief Complaint   Patient presents with    Follow-up    Hypertension     \"Have you been to the ER, urgent care clinic since your last visit?  Hospitalized since your last visit?\"    NO    “Have you seen or consulted any other health care providers outside of [facility] since your last visit?”    NO            Click Here for Release of Records Request  
Comes in for return visit.  She came in because she had a positive nuclear stress test by her cardiologist, who wants now to do a cardiac cath. She wanted me to review the data.  I do not have it available at this point.  Her previous cardiac cath revealed trivial coronary artery disease.  No intervention obviously occurred at that particular time.      I am quite surprised that there had indeed been progression, to the point where there is a significant amount of stenosis leading to symptoms.    She is taking Rosuvastatin 10 mg q.d.      She has a past history of primary hypertension and atrial fib.  She had one ablation done and it appears to have made a difference.    She is not taking her Amlodipine for her blood pressure, only the Hydrochlorothiazide.    She continues to complain of low back pain, which is related to an injury sustained several years ago when she was taking care of her aunt.    
m (5' 2\")   Wt 59.8 kg (131 lb 14.4 oz)   SpO2 93%   BMI 24.12 kg/m²   CONSTITUTIONAL: well , well nourished, appears age appropriate  EYES: perrla, eom intact  ENMT:moist mucous membranes, pharynx clear  NECK: supple. Thyroid normal  RESPIRATORY: Chest: clear to ascultation and percussion   CARDIOVASCULAR: Heart: regular rate and rhythm  GASTROINTESTINAL: Abdomen: soft, bowel sounds active  HEMATOLOGIC: no pathological lymph nodes palpated  MUSCULOSKELETAL: Extremities: no edema, pulse 1+   INTEGUMENT: No unusual rashes or suspicious skin lesions noted. Nails appear normal.  NEUROLOGIC: non-focal exam   MENTAL STATUS: alert and oriented, appropriate affect      ASSESSMENT:  1. Dyslipidemia    2. Positive cardiac stress test    3. Paroxysmal atrial fibrillation (HCC)    4. Primary hypertension        PLAN:   Dictation on: 03/17/2024  4:36 PM by: ASIA ALEXANDER [[id number]]     Orders Placed This Encounter   Procedures    Apolipoprotein B-100     Standing Status:   Future     Number of Occurrences:   1     Standing Expiration Date:   3/13/2025    High sensitivity CRP     Standing Status:   Future     Number of Occurrences:   1     Standing Expiration Date:   3/13/2025    Lipoprotein A (LPA)     Standing Status:   Future     Number of Occurrences:   1     Standing Expiration Date:   3/13/2025        Follow-up and Dispositions    Return in about 3 months (around 6/13/2024).             Asia Alexander MD

## 2024-03-14 NOTE — TELEPHONE ENCOUNTER
Left message for patient that procedure has been canceled and advised her that a voice mail was left for her yesterday with upcoming appointment information for Dr. Persaud.

## 2024-03-14 NOTE — TELEPHONE ENCOUNTER
Identifiers x 2.  Informed that LHC has been canceled  Informed that appt has been scheduled with Dr. Persaud at her request so that she can discuss procedure with family member present and meet Dr. Persaud.     Added to 4-3-24 with already scheduled echocardiogram.      Informed that follow up with Dr. Johnston to be canceled until procedure scheduled. Verbalized understanding.     Requested that I fax copy of nuclear stress test results to Dr. Alexander.  Informed that he could read in chart as we share same database. States his office unable to see results. Faxed to [phone] and [phone]. Confirmation received.

## 2024-03-15 LAB — LPA SERPL-SCNC: 67.1 NMOL/L

## 2024-03-16 LAB — APO B SERPL-MCNC: 52 MG/DL

## 2024-03-29 RX ORDER — ROSUVASTATIN CALCIUM 10 MG/1
10 TABLET, COATED ORAL DAILY
Qty: 90 TABLET | Refills: 3 | Status: SHIPPED | OUTPATIENT
Start: 2024-03-29

## 2024-04-03 ENCOUNTER — OFFICE VISIT (OUTPATIENT)
Age: 74
End: 2024-04-03
Payer: COMMERCIAL

## 2024-04-03 VITALS
WEIGHT: 131.6 LBS | BODY MASS INDEX: 24.22 KG/M2 | DIASTOLIC BLOOD PRESSURE: 88 MMHG | HEART RATE: 70 BPM | SYSTOLIC BLOOD PRESSURE: 118 MMHG | HEIGHT: 62 IN | OXYGEN SATURATION: 98 %

## 2024-04-03 DIAGNOSIS — R94.39 ABNORMAL STRESS TEST: ICD-10-CM

## 2024-04-03 DIAGNOSIS — I10 PRIMARY HYPERTENSION: Primary | ICD-10-CM

## 2024-04-03 DIAGNOSIS — I25.10 ASHD (ARTERIOSCLEROTIC HEART DISEASE): ICD-10-CM

## 2024-04-03 PROCEDURE — 3074F SYST BP LT 130 MM HG: CPT | Performed by: INTERNAL MEDICINE

## 2024-04-03 PROCEDURE — 1123F ACP DISCUSS/DSCN MKR DOCD: CPT | Performed by: INTERNAL MEDICINE

## 2024-04-03 PROCEDURE — 3079F DIAST BP 80-89 MM HG: CPT | Performed by: INTERNAL MEDICINE

## 2024-04-03 PROCEDURE — 99204 OFFICE O/P NEW MOD 45 MIN: CPT | Performed by: INTERNAL MEDICINE

## 2024-04-03 ASSESSMENT — PATIENT HEALTH QUESTIONNAIRE - PHQ9
1. LITTLE INTEREST OR PLEASURE IN DOING THINGS: NOT AT ALL
SUM OF ALL RESPONSES TO PHQ QUESTIONS 1-9: 0
2. FEELING DOWN, DEPRESSED OR HOPELESS: NOT AT ALL
SUM OF ALL RESPONSES TO PHQ QUESTIONS 1-9: 0
SUM OF ALL RESPONSES TO PHQ9 QUESTIONS 1 & 2: 0
SUM OF ALL RESPONSES TO PHQ QUESTIONS 1-9: 0
SUM OF ALL RESPONSES TO PHQ QUESTIONS 1-9: 0

## 2024-04-03 NOTE — PROGRESS NOTES
Dr. Hung Molina [facility].  [phone]            Interventional cardiology Consult/Progress Note      Requesting/referring provider: Camacho Alexander MDNo ref. provider found     Reason for Consult: Discussion regarding cardiac catheterization    Assessment/Plan:  1.  Tachybradycardia syndrome  2.  History of prior A-fib with ablation  3.  Recent abnormal stress test  4.  Possible systolic vascular disease  5.  Prior IVC filter  6.  Significant anxiety  7.  Hypercholesterolemia    Araceli Payton is here to discuss chronic catheterization.  She had a recent stress which was abnormal.  Notably she has had atypical chest discomfort.  Remotely she also had a CTA which did not demonstrate significant disease in the LAD territory but recent stress test demonstrated anterior perfusion abnormality.  I informed her that this could be falsely positive stress test in light of PVCs as well as due to possible continuation artifact from breast tissue.    However she would like to get clarification regarding whether she truly has significant CAD burden or not.  In that case we can proceed with cardiac catheterization.I discussed the risks/benefits/alternatives of the procedure with the patient. Risks include (but are not limited to) bleeding, infection,stroke,heart attack, need for emergency surgery/pericardiocentesis, need for dialysis or death. The patient understands but she like to have her other family members be involved in the decision as well and would like to wait for another appointment before she finalizes her plan.  She understand that she will have to discontinue her rivaroxaban for about 3 days prior to the procedure.      Investigations ordered    []    High complexity decision making was performed  []    Patient is at high-risk of decompensation with multiple organ involvement  []    Complex/difficult social determinants of health outcomes  Total of ** minutes

## 2024-04-08 ENCOUNTER — OFFICE VISIT (OUTPATIENT)
Facility: CLINIC | Age: 74
End: 2024-04-08
Payer: COMMERCIAL

## 2024-04-08 VITALS
HEART RATE: 45 BPM | BODY MASS INDEX: 23.85 KG/M2 | RESPIRATION RATE: 16 BRPM | SYSTOLIC BLOOD PRESSURE: 137 MMHG | WEIGHT: 130.4 LBS | DIASTOLIC BLOOD PRESSURE: 67 MMHG | OXYGEN SATURATION: 99 % | TEMPERATURE: 98 F

## 2024-04-08 DIAGNOSIS — R94.39 POSITIVE CARDIAC STRESS TEST: Primary | ICD-10-CM

## 2024-04-08 DIAGNOSIS — D68.69 SECONDARY HYPERCOAGULABLE STATE (HCC): ICD-10-CM

## 2024-04-08 DIAGNOSIS — E78.5 DYSLIPIDEMIA: ICD-10-CM

## 2024-04-08 DIAGNOSIS — I48.0 PAROXYSMAL ATRIAL FIBRILLATION (HCC): ICD-10-CM

## 2024-04-08 DIAGNOSIS — I10 PRIMARY HYPERTENSION: ICD-10-CM

## 2024-04-08 DIAGNOSIS — F41.1 ANXIETY NEUROSIS: ICD-10-CM

## 2024-04-08 PROCEDURE — G8420 CALC BMI NORM PARAMETERS: HCPCS | Performed by: INTERNAL MEDICINE

## 2024-04-08 PROCEDURE — G8427 DOCREV CUR MEDS BY ELIG CLIN: HCPCS | Performed by: INTERNAL MEDICINE

## 2024-04-08 PROCEDURE — 3075F SYST BP GE 130 - 139MM HG: CPT | Performed by: INTERNAL MEDICINE

## 2024-04-08 PROCEDURE — 1123F ACP DISCUSS/DSCN MKR DOCD: CPT | Performed by: INTERNAL MEDICINE

## 2024-04-08 PROCEDURE — 1090F PRES/ABSN URINE INCON ASSESS: CPT | Performed by: INTERNAL MEDICINE

## 2024-04-08 PROCEDURE — 1036F TOBACCO NON-USER: CPT | Performed by: INTERNAL MEDICINE

## 2024-04-08 PROCEDURE — G8400 PT W/DXA NO RESULTS DOC: HCPCS | Performed by: INTERNAL MEDICINE

## 2024-04-08 PROCEDURE — 3017F COLORECTAL CA SCREEN DOC REV: CPT | Performed by: INTERNAL MEDICINE

## 2024-04-08 PROCEDURE — 99214 OFFICE O/P EST MOD 30 MIN: CPT | Performed by: INTERNAL MEDICINE

## 2024-04-08 PROCEDURE — 3078F DIAST BP <80 MM HG: CPT | Performed by: INTERNAL MEDICINE

## 2024-04-08 ASSESSMENT — PATIENT HEALTH QUESTIONNAIRE - PHQ9
SUM OF ALL RESPONSES TO PHQ9 QUESTIONS 1 & 2: 0
SUM OF ALL RESPONSES TO PHQ QUESTIONS 1-9: 0
2. FEELING DOWN, DEPRESSED OR HOPELESS: NOT AT ALL
SUM OF ALL RESPONSES TO PHQ QUESTIONS 1-9: 0
1. LITTLE INTEREST OR PLEASURE IN DOING THINGS: NOT AT ALL

## 2024-04-09 ENCOUNTER — TELEPHONE (OUTPATIENT)
Age: 74
End: 2024-04-09

## 2024-04-09 DIAGNOSIS — I10 PRIMARY HYPERTENSION: Primary | ICD-10-CM

## 2024-04-09 DIAGNOSIS — R94.39 ABNORMAL STRESS TEST: Primary | ICD-10-CM

## 2024-04-09 NOTE — TELEPHONE ENCOUNTER
Telephone call made to patient. Two patient identifiers verified. Discussed cath procedure with patient; she wishes to proceed and get the procedure scheduled. Per Dr. Persaud's note-OK to proceed. I let her know that Celeste will be reaching out to her to schedule the catheterization. All patient questions answered. Patient verbalized understanding.

## 2024-04-09 NOTE — TELEPHONE ENCOUNTER
Left message for patient to return call to schedule procedure.      ----- Message from Magda Kan RN sent at 4/9/2024  2:16 PM EDT -----  Regarding: [facility]  Jamshid Alonso, per Dr. Persaud can we get this patient scheduled for a [facility] with him?    Diagnosis Code: 50885  CPT Code: R94.39    Medications:  - Xarelto can be held three days prior to procedure.     Labs:  - She will need to have labs drawn if you would let her know please.      Thank you so much!

## 2024-04-09 NOTE — TELEPHONE ENCOUNTER
Patient is calling because she wants the nurse to give her a call back about setting up her C.Patient said that her son will bring her and a friend will stay with her for the procedure.    Patient would like to know if she has not had her COVID-19 shot would it interfere with her having the procedure.      [phone]

## 2024-04-10 ENCOUNTER — TELEPHONE (OUTPATIENT)
Age: 74
End: 2024-04-10

## 2024-04-10 NOTE — TELEPHONE ENCOUNTER
Spoke to patient again for 1/2 hour to reschedule procecdure offered date 4/12 4/15 4/16 4/22 4/23 and 4/26 patient still undecided when she would like to schedule procedure, will call me back to confirm.    Patient identification verified x2.

## 2024-04-10 NOTE — TELEPHONE ENCOUNTER
Call from pt; 2 pt identifiers. Pt wondering what Dr. Persaud thinks of covid vaccine. Advised we typically follow the guidelines, and have no specific contraindications against it. Pt verbalizes understanding.    Pt asking if 1 mgxanax is ok to take before cath; confirmed that xanax can be taken.    Pt states she will call back tmrw to schedule; would like to schedule 4/22 but will have to schedule 4/16 unless her son can get off work 4/22. Pt verbalizes understanding. No further questions.

## 2024-04-11 DIAGNOSIS — I10 PRIMARY HYPERTENSION: ICD-10-CM

## 2024-04-12 LAB
ANION GAP SERPL CALC-SCNC: 4 MMOL/L (ref 5–15)
BUN SERPL-MCNC: 18 MG/DL (ref 6–20)
BUN/CREAT SERPL: 17 (ref 12–20)
CALCIUM SERPL-MCNC: 10 MG/DL (ref 8.5–10.1)
CHLORIDE SERPL-SCNC: 107 MMOL/L (ref 97–108)
CO2 SERPL-SCNC: 28 MMOL/L (ref 21–32)
CREAT SERPL-MCNC: 1.07 MG/DL (ref 0.55–1.02)
ERYTHROCYTE [DISTWIDTH] IN BLOOD BY AUTOMATED COUNT: 14.1 % (ref 11.5–14.5)
GLUCOSE SERPL-MCNC: 91 MG/DL (ref 65–100)
HCT VFR BLD AUTO: 44.5 % (ref 35–47)
HGB BLD-MCNC: 14.3 G/DL (ref 11.5–16)
MCH RBC QN AUTO: 30 PG (ref 26–34)
MCHC RBC AUTO-ENTMCNC: 32.1 G/DL (ref 30–36.5)
MCV RBC AUTO: 93.5 FL (ref 80–99)
NRBC # BLD: 0 K/UL (ref 0–0.01)
NRBC BLD-RTO: 0 PER 100 WBC
PLATELET # BLD AUTO: 169 K/UL (ref 150–400)
PMV BLD AUTO: 11.3 FL (ref 8.9–12.9)
POTASSIUM SERPL-SCNC: 3.8 MMOL/L (ref 3.5–5.1)
RBC # BLD AUTO: 4.76 M/UL (ref 3.8–5.2)
SODIUM SERPL-SCNC: 139 MMOL/L (ref 136–145)
WBC # BLD AUTO: 4.7 K/UL (ref 3.6–11)

## 2024-04-12 RX ORDER — SODIUM CHLORIDE 9 MG/ML
INJECTION, SOLUTION INTRAVENOUS CONTINUOUS
Status: CANCELLED | OUTPATIENT
Start: 2024-04-12

## 2024-04-12 RX ORDER — SODIUM CHLORIDE 0.9 % (FLUSH) 0.9 %
5-40 SYRINGE (ML) INJECTION EVERY 12 HOURS SCHEDULED
Status: CANCELLED | OUTPATIENT
Start: 2024-04-12

## 2024-04-16 ENCOUNTER — HOSPITAL ENCOUNTER (OUTPATIENT)
Facility: HOSPITAL | Age: 74
Discharge: HOME | End: 2024-04-16
Attending: INTERNAL MEDICINE | Admitting: INTERNAL MEDICINE
Payer: MEDICARE

## 2024-04-16 VITALS
SYSTOLIC BLOOD PRESSURE: 126 MMHG | DIASTOLIC BLOOD PRESSURE: 75 MMHG | TEMPERATURE: 98.2 F | BODY MASS INDEX: 24.11 KG/M2 | RESPIRATION RATE: 24 BRPM | WEIGHT: 131 LBS | HEART RATE: 51 BPM | HEIGHT: 62 IN | OXYGEN SATURATION: 95 %

## 2024-04-16 DIAGNOSIS — R94.39 ABNORMAL STRESS TEST: ICD-10-CM

## 2024-04-16 PROBLEM — D68.69 SECONDARY HYPERCOAGULABLE STATE (HCC): Status: ACTIVE | Noted: 2024-04-16

## 2024-04-16 LAB — ECHO BSA: 1.61 M2

## 2024-04-16 PROCEDURE — 99152 MOD SED SAME PHYS/QHP 5/>YRS: CPT | Performed by: INTERNAL MEDICINE

## 2024-04-16 PROCEDURE — 93458 L HRT ARTERY/VENTRICLE ANGIO: CPT | Performed by: INTERNAL MEDICINE

## 2024-04-16 PROCEDURE — 2709999900 HC NON-CHARGEABLE SUPPLY: Performed by: INTERNAL MEDICINE

## 2024-04-16 PROCEDURE — C1894 INTRO/SHEATH, NON-LASER: HCPCS | Performed by: INTERNAL MEDICINE

## 2024-04-16 PROCEDURE — 6360000002 HC RX W HCPCS: Performed by: INTERNAL MEDICINE

## 2024-04-16 PROCEDURE — 2500000003 HC RX 250 WO HCPCS: Performed by: INTERNAL MEDICINE

## 2024-04-16 PROCEDURE — 2580000003 HC RX 258: Performed by: NURSE PRACTITIONER

## 2024-04-16 PROCEDURE — 6360000004 HC RX CONTRAST MEDICATION: Performed by: INTERNAL MEDICINE

## 2024-04-16 PROCEDURE — C1713 ANCHOR/SCREW BN/BN,TIS/BN: HCPCS | Performed by: INTERNAL MEDICINE

## 2024-04-16 RX ORDER — LIDOCAINE HYDROCHLORIDE 10 MG/ML
INJECTION, SOLUTION INFILTRATION; PERINEURAL PRN
Status: DISCONTINUED | OUTPATIENT
Start: 2024-04-16 | End: 2024-04-16 | Stop reason: HOSPADM

## 2024-04-16 RX ORDER — ACETAMINOPHEN 325 MG/1
650 TABLET ORAL EVERY 4 HOURS PRN
Status: DISCONTINUED | OUTPATIENT
Start: 2024-04-16 | End: 2024-04-16 | Stop reason: HOSPADM

## 2024-04-16 RX ORDER — MIDAZOLAM HYDROCHLORIDE 1 MG/ML
INJECTION INTRAMUSCULAR; INTRAVENOUS PRN
Status: DISCONTINUED | OUTPATIENT
Start: 2024-04-16 | End: 2024-04-16 | Stop reason: HOSPADM

## 2024-04-16 RX ORDER — FENTANYL CITRATE 50 UG/ML
INJECTION, SOLUTION INTRAMUSCULAR; INTRAVENOUS PRN
Status: DISCONTINUED | OUTPATIENT
Start: 2024-04-16 | End: 2024-04-16 | Stop reason: HOSPADM

## 2024-04-16 RX ORDER — SODIUM CHLORIDE 0.9 % (FLUSH) 0.9 %
5-40 SYRINGE (ML) INJECTION EVERY 12 HOURS SCHEDULED
Status: DISCONTINUED | OUTPATIENT
Start: 2024-04-16 | End: 2024-04-16 | Stop reason: HOSPADM

## 2024-04-16 RX ORDER — SODIUM CHLORIDE 9 MG/ML
INJECTION, SOLUTION INTRAVENOUS CONTINUOUS
Status: DISCONTINUED | OUTPATIENT
Start: 2024-04-16 | End: 2024-04-16 | Stop reason: HOSPADM

## 2024-04-16 RX ORDER — HEPARIN SODIUM 1000 [USP'U]/ML
INJECTION, SOLUTION INTRAVENOUS; SUBCUTANEOUS PRN
Status: DISCONTINUED | OUTPATIENT
Start: 2024-04-16 | End: 2024-04-16 | Stop reason: HOSPADM

## 2024-04-16 RX ORDER — SODIUM CHLORIDE 9 MG/ML
INJECTION, SOLUTION INTRAVENOUS PRN
Status: DISCONTINUED | OUTPATIENT
Start: 2024-04-16 | End: 2024-04-16 | Stop reason: HOSPADM

## 2024-04-16 RX ORDER — SODIUM CHLORIDE 0.9 % (FLUSH) 0.9 %
5-40 SYRINGE (ML) INJECTION PRN
Status: DISCONTINUED | OUTPATIENT
Start: 2024-04-16 | End: 2024-04-16 | Stop reason: HOSPADM

## 2024-04-16 RX ADMIN — SODIUM CHLORIDE: 9 INJECTION, SOLUTION INTRAVENOUS at 12:10

## 2024-04-16 ASSESSMENT — PAIN - FUNCTIONAL ASSESSMENT
PAIN_FUNCTIONAL_ASSESSMENT: NONE - DENIES PAIN
PAIN_FUNCTIONAL_ASSESSMENT: NONE - DENIES PAIN

## 2024-04-16 NOTE — DISCHARGE INSTRUCTIONS
Future Appointments   Date Time Provider Department Center   4/30/2024  4:15 PM Camacho Alexander MD [facility] MAIN BS AMB   6/13/2024  3:45 PM Camacho Alexander MD [facility] MAIN BS AMB

## 2024-04-16 NOTE — PROGRESS NOTES
CATH LAB to RECOVERY ROOM REPORT    Procedure: [facility]    MD: CHRISTIANO Persaud MD    The procedure was diagnostic only.    Verbal Report given to Recovery Nurse on patient being transferred to Cardiac Cath Lab  for routine post-op. Patient stable upon transfer to .  Vitals, mental status, MAR, procedural summary discussed with recovery RN.    Medication given during procedure:    Contrast:15mL                          Heparin:3000units     Versed:3mg                                                               Fentanyl:50mcg                                                           PSheaths:    Right radial sheath pulled at 1300 pm, band at 11mL of air

## 2024-04-16 NOTE — PROGRESS NOTES
Cardiac Cath Lab Recovery Arrival Note:      Araceli Payton arrived to Cardiac Cath Lab, Recovery Area. Staff introduced to patient. Patient identifiers verified with NAME and DATE OF BIRTH. Procedure verified with patient. Consent forms reviewed and signed by patient or authorized representative and verified. Allergies verified. Patient informed of procedure and plan of care. Questions answered with review. Patient prepped for procedure, per orders from physician, prior to arrival.    Patient on cardiac monitor, non-invasive blood pressure, SPO2 monitor. Patient is A&Ox 4. Patient reports no complaints.     Patient in stretcher, in low position, with side rails up, call bell within reach, patient instructed to call of assistance as needed.    Patient prep in: [facility] Recovery Area, Bed# FT.   Family in: waiting room.   Prep by: ALFA Gomez

## 2024-04-16 NOTE — PROGRESS NOTES
Cardiac Cath Lab Procedure Area Arrival Note:    Araceli Payton arrived to Cardiac Cath Lab, Procedure Area. Patient identifiers verified with NAME and DATE OF BIRTH. Procedure verified with patient. Consent forms verified. Allergies verified. Patient informed of procedure and plan of care. Questions answered with review. Patient voiced understanding of procedure and plan of care.    Patient on cardiac monitor, non-invasive blood pressure, SPO2 monitor. On oxygen or O2 @ 2 lpm via nc.  IV of ns on pump at 50 ml/hr. Patient status doing well without problems. Patient is A&Ox 4. Patient reports no complaints.     Patient medicated during procedure with orders obtained and verified by Dr. Persaud.    Refer to patients Cardiac Cath Lab PROCEDURE REPORT for vital signs, assessment, status, and response during procedure, printed at end of case. Printed report on chart or scanned into chart.

## 2024-04-16 NOTE — PROGRESS NOTES
1315- TRANSFER - IN Cath Lab RR REPORT:    Verbal report received from Dustin on Araceli Payton  being received from the Cardiac Cath Lab for routine progression of care. Report consisted of patient’s Situation, Background, Assessment and Recommendations(SBAR). Procedural summary and MAR reviewed with receiving nurse. Opportunity for questions and clarification was provided. Assessment completed upon patient’s arrival to Cardiac Cath Lab RECOVERY AREA and care assumed.       Cardiac Cath Lab Recovery Arrival Note:    Araceli Payton arrived to [facility] recovery area.  Patient procedure= LHC. Patient on cardiac monitor, non-invasive blood pressure, SPO2 monitor. On RA.  IV  of NS on pump at 125 ml/hr. Patient is A&Ox 3. Patient reports NO CP SOB.    PROCEDURE SITE CHECK:    Procedure site: No bleeding and no hematoma, no pain/discomfort reported at procedure site.     No change in patient status. Continue to monitor patient and status.     1530- Discharge note    Ambulated patient to the bathroom with a steady gait, voided without difficulty. Patient denies chest pain, shortness of breath, or dizziness. [facility] Vital signs stable.     Procedural site is clean, dry, and intact. No bleeding, no hematoma.     Assisted patient in dressing.    Educated patient about their sedation precautions such as not driving, operating any machinery, or signing legal documents 24 hours post procedure.     Reviewed discharge instructions, including medications and site care using the teach back method.  Answered all questions. Verbalized understanding.     Removed peripheral IV.    Escorted to discharge area in a wheelchair with all of their belongings.    Patient's relative present to take patient home. Reviewed discharge instructions with patients' relative, they verbalized understanding.

## 2024-04-17 NOTE — PROGRESS NOTES
1. She has a positive stress test and I strongly advised her to have a cardiac cath done.  2. Her rhythm is regular today.  It appears that the ablation was quite successful.  3. She remains on her DOAC in view of her history of paroxysmal atrial fibrillation.  4. 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  5. She remains on a statin in view of her secondary cardiovascular risk prevention status.  6. Blood pressure is excellent, no adjustments are made.    
Chief Complaint   Patient presents with    Hypertension     \"Have you been to the ER, urgent care clinic since your last visit?  Hospitalized since your last visit?\"    NO    “Have you seen or consulted any other health care providers outside of [facility] since your last visit?”    NO            Click Here for Release of Records Request  
Comes in for return visit and I explained to the patient that I did see the data that was generated to justify a cardiac catheterization.  Basically the stress test was positive, suggesting an area of ischemia.  We talk at length about this.    She has a past history of atrial fibrillation, which was treated with ablation, and she is doing quite well from this perspective.    She also has a past history of primary hypertension, dyslipidemia and chronic anxiety.    
Food in the Last Year: Never true     Ran Out of Food in the Last Year: Never true   Transportation Needs: No Transportation Needs (3/13/2024)    PRAPARE - Transportation     Lack of Transportation (Medical): No     Lack of Transportation (Non-Medical): No   Physical Activity: Inactive (9/25/2023)    Exercise Vital Sign     Days of Exercise per Week: 0 days     Minutes of Exercise per Session: 0 min   Stress: No Stress Concern Present (5/10/2023)    [employer] [employer] [employer] - Occupational Stress Questionnaire     Feeling of Stress : Only a little   Social Connections: Moderately Isolated (5/10/2023)    Social Connection and Isolation Panel [NHANES]     Frequency of Communication with Friends and Family: More than three times a week     Frequency of Social Gatherings with Friends and Family: More than three times a week     Attends Hinduism Services: Never     Active Member of Clubs or Organizations: No     Attends Club or Organization Meetings: More than 4 times per year     Marital Status:    Intimate Partner Violence: Not At Risk (5/10/2023)    Humiliation, Afraid, Rape, and Kick questionnaire     Fear of Current or Ex-Partner: No     Emotionally Abused: No     Physically Abused: No     Sexually Abused: No   Housing Stability: Low Risk  (3/13/2024)    Housing Stability Vital Sign     Unable to Pay for Housing in the Last Year: No     Number of Places Lived in the Last Year: 1     Unstable Housing in the Last Year: No     Family History   Problem Relation Age of Onset    Heart Disease Mother     Hypertension Mother     Stroke Father     Heart Disease Father        OBJECTIVE:    /67 (Site: Left Upper Arm, Position: Sitting, Cuff Size: Medium Adult)   Pulse (!) 45   Temp 98 °F (36.7 °C) (Oral)   Resp 16   Wt 59.1 kg (130 lb 6.4 oz)   SpO2 99%   BMI 23.85 kg/m²   CONSTITUTIONAL: well , well nourished, appears age appropriate  EYES: perrla, eom intact  ENMT:moist mucous membranes,

## 2024-04-30 ENCOUNTER — OFFICE VISIT (OUTPATIENT)
Facility: CLINIC | Age: 74
End: 2024-04-30
Payer: MEDICARE

## 2024-04-30 VITALS
TEMPERATURE: 97.8 F | BODY MASS INDEX: 23.79 KG/M2 | SYSTOLIC BLOOD PRESSURE: 124 MMHG | WEIGHT: 129.3 LBS | HEART RATE: 60 BPM | HEIGHT: 62 IN | OXYGEN SATURATION: 96 % | RESPIRATION RATE: 18 BRPM | DIASTOLIC BLOOD PRESSURE: 57 MMHG

## 2024-04-30 DIAGNOSIS — E78.5 DYSLIPIDEMIA: ICD-10-CM

## 2024-04-30 DIAGNOSIS — I10 PRIMARY HYPERTENSION: ICD-10-CM

## 2024-04-30 DIAGNOSIS — I48.0 PAROXYSMAL ATRIAL FIBRILLATION (HCC): ICD-10-CM

## 2024-04-30 DIAGNOSIS — F41.1 ANXIETY NEUROSIS: ICD-10-CM

## 2024-04-30 DIAGNOSIS — Z00.00 MEDICARE ANNUAL WELLNESS VISIT, SUBSEQUENT: Primary | ICD-10-CM

## 2024-04-30 DIAGNOSIS — I25.10 NONOBSTRUCTIVE ATHEROSCLEROSIS OF CORONARY ARTERY: ICD-10-CM

## 2024-04-30 PROCEDURE — 3078F DIAST BP <80 MM HG: CPT | Performed by: INTERNAL MEDICINE

## 2024-04-30 PROCEDURE — 3017F COLORECTAL CA SCREEN DOC REV: CPT | Performed by: INTERNAL MEDICINE

## 2024-04-30 PROCEDURE — G0439 PPPS, SUBSEQ VISIT: HCPCS | Performed by: INTERNAL MEDICINE

## 2024-04-30 PROCEDURE — 3074F SYST BP LT 130 MM HG: CPT | Performed by: INTERNAL MEDICINE

## 2024-04-30 PROCEDURE — 1123F ACP DISCUSS/DSCN MKR DOCD: CPT | Performed by: INTERNAL MEDICINE

## 2024-04-30 RX ORDER — AZITHROMYCIN 250 MG/1
TABLET, FILM COATED ORAL
Qty: 6 TABLET | Refills: 0 | Status: CANCELLED | OUTPATIENT
Start: 2024-04-30 | End: 2024-05-10

## 2024-04-30 ASSESSMENT — PATIENT HEALTH QUESTIONNAIRE - PHQ9
SUM OF ALL RESPONSES TO PHQ QUESTIONS 1-9: 0
1. LITTLE INTEREST OR PLEASURE IN DOING THINGS: NOT AT ALL
SUM OF ALL RESPONSES TO PHQ9 QUESTIONS 1 & 2: 0
SUM OF ALL RESPONSES TO PHQ QUESTIONS 1-9: 0
2. FEELING DOWN, DEPRESSED OR HOPELESS: NOT AT ALL

## 2024-04-30 ASSESSMENT — ANXIETY QUESTIONNAIRES
3. WORRYING TOO MUCH ABOUT DIFFERENT THINGS: NOT AT ALL
7. FEELING AFRAID AS IF SOMETHING AWFUL MIGHT HAPPEN: NOT AT ALL
2. NOT BEING ABLE TO STOP OR CONTROL WORRYING: NOT AT ALL
GAD7 TOTAL SCORE: 0
1. FEELING NERVOUS, ANXIOUS, OR ON EDGE: NOT AT ALL
6. BECOMING EASILY ANNOYED OR IRRITABLE: NOT AT ALL
5. BEING SO RESTLESS THAT IT IS HARD TO SIT STILL: NOT AT ALL
4. TROUBLE RELAXING: NOT AT ALL
IF YOU CHECKED OFF ANY PROBLEMS ON THIS QUESTIONNAIRE, HOW DIFFICULT HAVE THESE PROBLEMS MADE IT FOR YOU TO DO YOUR WORK, TAKE CARE OF THINGS AT HOME, OR GET ALONG WITH OTHER PEOPLE: NOT DIFFICULT AT ALL

## 2024-04-30 NOTE — PROGRESS NOTES
Chief Complaint   Patient presents with    Medicare AWV     Patient here for Medicare Annual Wellness Exam.      \"Have you been to the ER, urgent care clinic since your last visit?  Hospitalized since your last visit?\"    YES - When: approximately 2  weeks ago.  Where and Why: [facility] for Stress Test.    “Have you seen or consulted any other health care providers outside of [facility] since your last visit?”    NO            Click Here for Release of Records Request

## 2024-05-01 RX ORDER — AZITHROMYCIN 250 MG/1
TABLET, FILM COATED ORAL
Qty: 6 TABLET | Refills: 0 | Status: SHIPPED | OUTPATIENT
Start: 2024-05-01 | End: 2024-05-10

## 2024-05-07 NOTE — PROGRESS NOTES
[facility]  [address]  [address]  [facility] [address]  Phone:  [phone]  Fax: [phone]       Chief Complaint   Patient presents with    Medicare AW     Patient here for Medicare Annual Wellness Exam.    .      SUBJECTIVE:    Araceli Payton is a 73 y.o. female  Dictation on: 05/06/2024  8:47 PM by: ASIA HINOJOSA [[id number]]          Current Outpatient Medications   Medication Sig Dispense Refill    rosuvastatin (CRESTOR) 10 MG tablet TAKE 1 TABLET EVERY DAY 90 tablet 3    FLUoxetine (PROZAC) 20 MG capsule Take 1 capsule by mouth daily      VENTOLIN  (90 Base) MCG/ACT inhaler INHALE 2 PUFFS BY MOUTH EVERY 4 HOURS AS NEEDED FOR WHEEZE 18 each 11    rivaroxaban (XARELTO) 20 MG TABS tablet Take 1 tablet by mouth daily 14 tablet 0    hydroCHLOROthiazide 12.5 MG tablet Take 1 tablet by mouth daily      HYDROcodone-acetaminophen (NORCO) 5-325 MG per tablet Take 1 tablet by mouth every 6 hours as needed for Pain.      potassium chloride (KLOR-CON M) 20 MEQ extended release tablet Take 1 tablet by mouth daily 10 tablet 0    magnesium oxide (MAG-OX) 400 MG tablet Take 1 tablet by mouth daily 10 tablet 0    amLODIPine (NORVASC) 2.5 MG tablet Take 1 tablet by mouth daily 30 tablet 0    metoprolol succinate (TOPROL XL) 25 MG extended release tablet TAKE 1 TABLET TWICE DAILY 180 tablet 3    ALPRAZolam (XANAX) 1 MG tablet Take 1 tablet by mouth 2 times daily as needed for Anxiety or Sleep. ceived the following from [facility]: Outside name: ALPRAZolam (XANAX) 1 mg tablet      ascorbic acid (VITAMIN C) 500 MG tablet Take by mouth      Cholecalciferol 50 MCG (2000 UT) TABS Take by mouth      QUEtiapine (SEROQUEL) 50 MG tablet Take 1 tablet by mouth 3 times daily as needed      azithromycin (ZITHROMAX) 250 MG tablet 500mg on day 1 followed by 250mg on days 2 - 5 6 tablet 0     No current facility-administered medications for this visit.     Past Medical

## 2024-05-09 ENCOUNTER — OFFICE VISIT (OUTPATIENT)
Facility: CLINIC | Age: 74
End: 2024-05-09
Payer: MEDICARE

## 2024-05-09 VITALS
OXYGEN SATURATION: 98 % | HEART RATE: 52 BPM | SYSTOLIC BLOOD PRESSURE: 137 MMHG | DIASTOLIC BLOOD PRESSURE: 78 MMHG | BODY MASS INDEX: 23.9 KG/M2 | TEMPERATURE: 98.1 F | WEIGHT: 129.9 LBS | RESPIRATION RATE: 18 BRPM | HEIGHT: 62 IN

## 2024-05-09 DIAGNOSIS — I10 PRIMARY HYPERTENSION: ICD-10-CM

## 2024-05-09 DIAGNOSIS — I25.10 ASHD (ARTERIOSCLEROTIC HEART DISEASE): ICD-10-CM

## 2024-05-09 DIAGNOSIS — I48.0 PAROXYSMAL ATRIAL FIBRILLATION (HCC): ICD-10-CM

## 2024-05-09 DIAGNOSIS — F41.1 ANXIETY NEUROSIS: ICD-10-CM

## 2024-05-09 DIAGNOSIS — E78.5 DYSLIPIDEMIA: ICD-10-CM

## 2024-05-09 DIAGNOSIS — J45.40 MODERATE PERSISTENT ASTHMATIC BRONCHITIS WITHOUT COMPLICATION: Primary | ICD-10-CM

## 2024-05-09 PROCEDURE — G8400 PT W/DXA NO RESULTS DOC: HCPCS | Performed by: INTERNAL MEDICINE

## 2024-05-09 PROCEDURE — G8427 DOCREV CUR MEDS BY ELIG CLIN: HCPCS | Performed by: INTERNAL MEDICINE

## 2024-05-09 PROCEDURE — 1123F ACP DISCUSS/DSCN MKR DOCD: CPT | Performed by: INTERNAL MEDICINE

## 2024-05-09 PROCEDURE — G8420 CALC BMI NORM PARAMETERS: HCPCS | Performed by: INTERNAL MEDICINE

## 2024-05-09 PROCEDURE — 3075F SYST BP GE 130 - 139MM HG: CPT | Performed by: INTERNAL MEDICINE

## 2024-05-09 PROCEDURE — 99214 OFFICE O/P EST MOD 30 MIN: CPT | Performed by: INTERNAL MEDICINE

## 2024-05-09 PROCEDURE — 1090F PRES/ABSN URINE INCON ASSESS: CPT | Performed by: INTERNAL MEDICINE

## 2024-05-09 PROCEDURE — 3017F COLORECTAL CA SCREEN DOC REV: CPT | Performed by: INTERNAL MEDICINE

## 2024-05-09 PROCEDURE — 3078F DIAST BP <80 MM HG: CPT | Performed by: INTERNAL MEDICINE

## 2024-05-09 PROCEDURE — 1036F TOBACCO NON-USER: CPT | Performed by: INTERNAL MEDICINE

## 2024-05-09 RX ORDER — CEFUROXIME AXETIL 250 MG/1
250 TABLET ORAL 2 TIMES DAILY
Qty: 14 TABLET | Refills: 0 | Status: SHIPPED | OUTPATIENT
Start: 2024-05-09 | End: 2024-05-16

## 2024-05-10 NOTE — PROGRESS NOTES
comes in having had a cardiac catheterization.  She had nonobstructive disease which thereby did not require stenting of any type.  She is doing quite well currently and discussed at length the findings with me.    She remains on her statin as she has had for years.    She has a history of primary hypertension and atrial fibrillation for which she had a recent ablation.

## 2024-05-10 NOTE — PROGRESS NOTES
1. 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  Extreme vascular protection is needed.  2. Her paroxysmal atrial fibrillation is doing quite well.  Her rhythm when I listened to her today as well as the last two visits demonstrated sinus.  3. Her anxiety is stable.  4. She remains on her statin as prescribed.  5. She is also normotensive and no adjustments are made.

## 2024-05-11 RX ORDER — PREDNISONE 20 MG/1
20 TABLET ORAL 2 TIMES DAILY
Qty: 14 TABLET | Refills: 0 | Status: SHIPPED | OUTPATIENT
Start: 2024-05-11 | End: 2024-05-18

## 2024-05-11 NOTE — PROGRESS NOTES
Chief Complaint   Patient presents with    Follow-up    Cough     Patient here for follow up productive cough x two weeks.      \"Have you been to the ER, urgent care clinic since your last visit?  Hospitalized since your last visit?\"    NO    “Have you seen or consulted any other health care providers outside of [facility] since your last visit?”    NO            Click Here for Release of Records Request    
Upper Arm, Position: Sitting, Cuff Size: Medium Adult)   Pulse 52   Temp 98.1 °F (36.7 °C) (Oral)   Resp 18   Ht 1.575 m (5' 2\")   Wt 58.9 kg (129 lb 14.4 oz)   SpO2 98%   BMI 23.76 kg/m²   CONSTITUTIONAL: well , well nourished, appears age appropriate  EYES: perrla, eom intact  ENMT:moist mucous membranes, pharynx clear  NECK: supple. Thyroid normal  RESPIRATORY: Chest: clear to ascultation and percussion   CARDIOVASCULAR: Heart: regular rate and rhythm  GASTROINTESTINAL: Abdomen: soft, bowel sounds active  HEMATOLOGIC: no pathological lymph nodes palpated  MUSCULOSKELETAL: Extremities: no edema, pulse 1+   INTEGUMENT: No unusual rashes or suspicious skin lesions noted. Nails appear normal.  NEUROLOGIC: non-focal exam   MENTAL STATUS: alert and oriented, appropriate affect      ASSESSMENT:  1. Moderate persistent asthmatic bronchitis without complication    2. Paroxysmal atrial fibrillation (HCC)    3. ASHD (arteriosclerotic heart disease)    4. Anxiety neurosis    5. Dyslipidemia    6. Primary hypertension        PLAN:   Dictation on: 05/11/2024  1:18 PM by: ASIA ALEXANDER [[id number]]     No orders of the defined types were placed in this encounter.       Follow-up and Dispositions    Return in about 4 weeks (around 6/6/2024).             Asia Alexander MD

## 2024-06-10 ENCOUNTER — OFFICE VISIT (OUTPATIENT)
Age: 74
End: 2024-06-10
Payer: MEDICARE

## 2024-06-10 VITALS
HEART RATE: 60 BPM | DIASTOLIC BLOOD PRESSURE: 76 MMHG | WEIGHT: 128 LBS | HEIGHT: 62 IN | SYSTOLIC BLOOD PRESSURE: 122 MMHG | OXYGEN SATURATION: 97 % | BODY MASS INDEX: 23.55 KG/M2

## 2024-06-10 DIAGNOSIS — I10 PRIMARY HYPERTENSION: ICD-10-CM

## 2024-06-10 DIAGNOSIS — I25.10 ASHD (ARTERIOSCLEROTIC HEART DISEASE): ICD-10-CM

## 2024-06-10 DIAGNOSIS — I48.0 PAROXYSMAL ATRIAL FIBRILLATION (HCC): Primary | ICD-10-CM

## 2024-06-10 PROCEDURE — 1090F PRES/ABSN URINE INCON ASSESS: CPT | Performed by: SPECIALIST

## 2024-06-10 PROCEDURE — G8420 CALC BMI NORM PARAMETERS: HCPCS | Performed by: SPECIALIST

## 2024-06-10 PROCEDURE — 99214 OFFICE O/P EST MOD 30 MIN: CPT | Performed by: SPECIALIST

## 2024-06-10 PROCEDURE — 93005 ELECTROCARDIOGRAM TRACING: CPT | Performed by: SPECIALIST

## 2024-06-10 PROCEDURE — G8400 PT W/DXA NO RESULTS DOC: HCPCS | Performed by: SPECIALIST

## 2024-06-10 PROCEDURE — 3074F SYST BP LT 130 MM HG: CPT | Performed by: SPECIALIST

## 2024-06-10 PROCEDURE — 1123F ACP DISCUSS/DSCN MKR DOCD: CPT | Performed by: SPECIALIST

## 2024-06-10 PROCEDURE — 1036F TOBACCO NON-USER: CPT | Performed by: SPECIALIST

## 2024-06-10 PROCEDURE — 93010 ELECTROCARDIOGRAM REPORT: CPT | Performed by: SPECIALIST

## 2024-06-10 PROCEDURE — 3078F DIAST BP <80 MM HG: CPT | Performed by: SPECIALIST

## 2024-06-10 PROCEDURE — G8427 DOCREV CUR MEDS BY ELIG CLIN: HCPCS | Performed by: SPECIALIST

## 2024-06-10 PROCEDURE — 3017F COLORECTAL CA SCREEN DOC REV: CPT | Performed by: SPECIALIST

## 2024-06-10 RX ORDER — FLUTICASONE PROPIONATE AND SALMETEROL 250; 50 UG/1; UG/1
POWDER RESPIRATORY (INHALATION)
COMMUNITY
Start: 2024-05-07

## 2024-06-10 ASSESSMENT — PATIENT HEALTH QUESTIONNAIRE - PHQ9
SUM OF ALL RESPONSES TO PHQ9 QUESTIONS 1 & 2: 0
SUM OF ALL RESPONSES TO PHQ QUESTIONS 1-9: 0
SUM OF ALL RESPONSES TO PHQ QUESTIONS 1-9: 0
2. FEELING DOWN, DEPRESSED OR HOPELESS: NOT AT ALL
SUM OF ALL RESPONSES TO PHQ QUESTIONS 1-9: 0
1. LITTLE INTEREST OR PLEASURE IN DOING THINGS: NOT AT ALL
SUM OF ALL RESPONSES TO PHQ QUESTIONS 1-9: 0

## 2024-06-10 NOTE — PROGRESS NOTES
[facility]                                     DIVISION OF CARDIOLOGY                                                                             OFFICE NOTE                  DALE TRAYLOR M.D. , ARIAS            HERNAN NELSON   1950  [id number]    Date/Time:  6/10/82269:47 PM      /76 (Site: Left Upper Arm, Position: Sitting, Cuff Size: Medium Adult)   Pulse 60   Ht 1.575 m (5' 2\")   Wt 58.1 kg (128 lb)   SpO2 97%   BMI 23.41 kg/m²        Wt Readings from Last 3 Encounters:   06/10/24 58.1 kg (128 lb)   05/09/24 58.9 kg (129 lb 14.4 oz)   04/30/24 58.7 kg (129 lb 4.8 oz)          Lab Results   Component Value Date    CHOL 142 02/08/2023    TRIG 45 02/08/2023    HDL 93 02/08/2023    VLDL 9 02/08/2023    CHOLHDLRATIO 1.5 02/08/2023              SUBJECTIVE:  She is doing good no chest pain or shortness of breath.  No palpitations.  No dizziness presyncopal syncopal episodes.       Assessment/Plan    1.  Chest pain: No recurrent chest pain reported.  We have discussed the results of the CT angiography of August 2021 which is failed to reveal any critical stenosis the more stenosis was the 30 to 40% stenosis at the level of the RCA.     EKG today atrial fibrillation 1 PVCs nonspecific ST-T changes forward progression in the anteroseptal leads      status post nuclear stress testing February 2024 with anterior wall ischemia.  This was a false positive.    She has undergone cardiac catheterization in April 2024 with cardiac normal     Continue with Toprol and Crestor at this time with no additional changes.         2.  Atrial fibrillation: Chronic.  Continue Xarelto and metoprolol rate is controlled.     She has seen Dr. Millan in the past and he suggested proceed with atrial fibrillation ablation by she declined at that time.  She understands that atrial fibrillation begets itself.     Continue Xarelto.  No untoward effects thus far.     Echocardiogram able

## 2024-06-13 ENCOUNTER — OFFICE VISIT (OUTPATIENT)
Facility: CLINIC | Age: 74
End: 2024-06-13
Payer: MEDICARE

## 2024-06-13 VITALS
WEIGHT: 127.4 LBS | BODY MASS INDEX: 23.45 KG/M2 | SYSTOLIC BLOOD PRESSURE: 120 MMHG | OXYGEN SATURATION: 95 % | RESPIRATION RATE: 16 BRPM | HEART RATE: 70 BPM | HEIGHT: 62 IN | DIASTOLIC BLOOD PRESSURE: 63 MMHG | TEMPERATURE: 98.5 F

## 2024-06-13 DIAGNOSIS — I10 PRIMARY HYPERTENSION: ICD-10-CM

## 2024-06-13 DIAGNOSIS — E78.5 DYSLIPIDEMIA: ICD-10-CM

## 2024-06-13 DIAGNOSIS — I48.0 PAROXYSMAL ATRIAL FIBRILLATION (HCC): Primary | ICD-10-CM

## 2024-06-13 DIAGNOSIS — F41.1 ANXIETY NEUROSIS: ICD-10-CM

## 2024-06-13 DIAGNOSIS — D89.2 PARAPROTEINEMIA: ICD-10-CM

## 2024-06-13 DIAGNOSIS — I25.10 ASHD (ARTERIOSCLEROTIC HEART DISEASE): ICD-10-CM

## 2024-06-13 DIAGNOSIS — L81.9 HYPERPIGMENTATION: ICD-10-CM

## 2024-06-13 PROCEDURE — G8420 CALC BMI NORM PARAMETERS: HCPCS | Performed by: INTERNAL MEDICINE

## 2024-06-13 PROCEDURE — 3078F DIAST BP <80 MM HG: CPT | Performed by: INTERNAL MEDICINE

## 2024-06-13 PROCEDURE — 1090F PRES/ABSN URINE INCON ASSESS: CPT | Performed by: INTERNAL MEDICINE

## 2024-06-13 PROCEDURE — 1036F TOBACCO NON-USER: CPT | Performed by: INTERNAL MEDICINE

## 2024-06-13 PROCEDURE — 99214 OFFICE O/P EST MOD 30 MIN: CPT | Performed by: INTERNAL MEDICINE

## 2024-06-13 PROCEDURE — G8427 DOCREV CUR MEDS BY ELIG CLIN: HCPCS | Performed by: INTERNAL MEDICINE

## 2024-06-13 PROCEDURE — 3074F SYST BP LT 130 MM HG: CPT | Performed by: INTERNAL MEDICINE

## 2024-06-13 PROCEDURE — G8400 PT W/DXA NO RESULTS DOC: HCPCS | Performed by: INTERNAL MEDICINE

## 2024-06-13 PROCEDURE — 3017F COLORECTAL CA SCREEN DOC REV: CPT | Performed by: INTERNAL MEDICINE

## 2024-06-13 PROCEDURE — 1123F ACP DISCUSS/DSCN MKR DOCD: CPT | Performed by: INTERNAL MEDICINE

## 2024-06-13 RX ORDER — METHYLPREDNISOLONE 4 MG/1
4 TABLET ORAL SEE ADMIN INSTRUCTIONS
COMMUNITY
Start: 2024-06-11

## 2024-06-13 SDOH — ECONOMIC STABILITY: FOOD INSECURITY: WITHIN THE PAST 12 MONTHS, THE FOOD YOU BOUGHT JUST DIDN'T LAST AND YOU DIDN'T HAVE MONEY TO GET MORE.: NEVER TRUE

## 2024-06-13 SDOH — ECONOMIC STABILITY: INCOME INSECURITY: HOW HARD IS IT FOR YOU TO PAY FOR THE VERY BASICS LIKE FOOD, HOUSING, MEDICAL CARE, AND HEATING?: NOT HARD AT ALL

## 2024-06-13 SDOH — ECONOMIC STABILITY: FOOD INSECURITY: WITHIN THE PAST 12 MONTHS, YOU WORRIED THAT YOUR FOOD WOULD RUN OUT BEFORE YOU GOT MONEY TO BUY MORE.: NEVER TRUE

## 2024-06-13 ASSESSMENT — PATIENT HEALTH QUESTIONNAIRE - PHQ9
SUM OF ALL RESPONSES TO PHQ9 QUESTIONS 1 & 2: 0
SUM OF ALL RESPONSES TO PHQ QUESTIONS 1-9: 0
2. FEELING DOWN, DEPRESSED OR HOPELESS: NOT AT ALL
SUM OF ALL RESPONSES TO PHQ QUESTIONS 1-9: 0
SUM OF ALL RESPONSES TO PHQ QUESTIONS 1-9: 0
1. LITTLE INTEREST OR PLEASURE IN DOING THINGS: NOT AT ALL
SUM OF ALL RESPONSES TO PHQ QUESTIONS 1-9: 0

## 2024-06-13 ASSESSMENT — ANXIETY QUESTIONNAIRES
3. WORRYING TOO MUCH ABOUT DIFFERENT THINGS: NOT AT ALL
7. FEELING AFRAID AS IF SOMETHING AWFUL MIGHT HAPPEN: NOT AT ALL
4. TROUBLE RELAXING: NOT AT ALL
GAD7 TOTAL SCORE: 0
2. NOT BEING ABLE TO STOP OR CONTROL WORRYING: NOT AT ALL
5. BEING SO RESTLESS THAT IT IS HARD TO SIT STILL: NOT AT ALL
IF YOU CHECKED OFF ANY PROBLEMS ON THIS QUESTIONNAIRE, HOW DIFFICULT HAVE THESE PROBLEMS MADE IT FOR YOU TO DO YOUR WORK, TAKE CARE OF THINGS AT HOME, OR GET ALONG WITH OTHER PEOPLE: NOT DIFFICULT AT ALL
1. FEELING NERVOUS, ANXIOUS, OR ON EDGE: NOT AT ALL
6. BECOMING EASILY ANNOYED OR IRRITABLE: NOT AT ALL

## 2024-06-13 NOTE — PROGRESS NOTES
Chief Complaint   Patient presents with    Follow-up     \"Have you been to the ER, urgent care clinic since your last visit?  Hospitalized since your last visit?\"    NO    “Have you seen or consulted any other health care providers outside of [facility] since your last visit?”    NO            Click Here for Release of Records Request

## 2024-06-16 RX ORDER — HYDROQUINONE 40 MG/G
CREAM TOPICAL
Qty: 28 G | Refills: 5 | Status: SHIPPED | OUTPATIENT
Start: 2024-06-16

## 2024-07-03 RX ORDER — HYDROCHLOROTHIAZIDE 12.5 MG/1
12.5 TABLET ORAL DAILY
Qty: 90 TABLET | Refills: 3 | Status: SHIPPED | OUTPATIENT
Start: 2024-07-03

## 2024-11-11 RX ORDER — ALBUTEROL SULFATE 90 UG/1
INHALANT RESPIRATORY (INHALATION)
Qty: 8.5 EACH | Refills: 11 | Status: SHIPPED | OUTPATIENT
Start: 2024-11-11

## 2024-11-22 ENCOUNTER — OFFICE VISIT (OUTPATIENT)
Age: 74
End: 2024-11-22
Payer: MEDICARE

## 2024-11-22 VITALS
BODY MASS INDEX: 24.37 KG/M2 | RESPIRATION RATE: 12 BRPM | SYSTOLIC BLOOD PRESSURE: 128 MMHG | DIASTOLIC BLOOD PRESSURE: 70 MMHG | HEIGHT: 62 IN | WEIGHT: 132.4 LBS | OXYGEN SATURATION: 99 % | HEART RATE: 53 BPM

## 2024-11-22 DIAGNOSIS — I34.0 MITRAL REGURGITATION: ICD-10-CM

## 2024-11-22 DIAGNOSIS — I48.0 PAROXYSMAL ATRIAL FIBRILLATION (HCC): Primary | ICD-10-CM

## 2024-11-22 PROCEDURE — G8420 CALC BMI NORM PARAMETERS: HCPCS | Performed by: SPECIALIST

## 2024-11-22 PROCEDURE — 99214 OFFICE O/P EST MOD 30 MIN: CPT | Performed by: SPECIALIST

## 2024-11-22 PROCEDURE — G8400 PT W/DXA NO RESULTS DOC: HCPCS | Performed by: SPECIALIST

## 2024-11-22 PROCEDURE — G8484 FLU IMMUNIZE NO ADMIN: HCPCS | Performed by: SPECIALIST

## 2024-11-22 PROCEDURE — 1123F ACP DISCUSS/DSCN MKR DOCD: CPT | Performed by: SPECIALIST

## 2024-11-22 PROCEDURE — 1090F PRES/ABSN URINE INCON ASSESS: CPT | Performed by: SPECIALIST

## 2024-11-22 PROCEDURE — 3078F DIAST BP <80 MM HG: CPT | Performed by: SPECIALIST

## 2024-11-22 PROCEDURE — 93010 ELECTROCARDIOGRAM REPORT: CPT | Performed by: SPECIALIST

## 2024-11-22 PROCEDURE — 3074F SYST BP LT 130 MM HG: CPT | Performed by: SPECIALIST

## 2024-11-22 PROCEDURE — 1160F RVW MEDS BY RX/DR IN RCRD: CPT | Performed by: SPECIALIST

## 2024-11-22 PROCEDURE — 1125F AMNT PAIN NOTED PAIN PRSNT: CPT | Performed by: SPECIALIST

## 2024-11-22 PROCEDURE — 3017F COLORECTAL CA SCREEN DOC REV: CPT | Performed by: SPECIALIST

## 2024-11-22 PROCEDURE — G8427 DOCREV CUR MEDS BY ELIG CLIN: HCPCS | Performed by: SPECIALIST

## 2024-11-22 PROCEDURE — 1159F MED LIST DOCD IN RCRD: CPT | Performed by: SPECIALIST

## 2024-11-22 PROCEDURE — 1036F TOBACCO NON-USER: CPT | Performed by: SPECIALIST

## 2024-11-22 PROCEDURE — 93005 ELECTROCARDIOGRAM TRACING: CPT | Performed by: SPECIALIST

## 2024-11-22 RX ORDER — HYDROCODONE BITARTRATE AND ACETAMINOPHEN 7.5; 325 MG/1; MG/1
1 TABLET ORAL EVERY 8 HOURS PRN
COMMUNITY

## 2024-11-22 RX ORDER — DESONIDE 0.5 MG/ML
1 LOTION TOPICAL 2 TIMES DAILY
COMMUNITY

## 2024-11-22 RX ORDER — METHOCARBAMOL 750 MG/1
750 TABLET, FILM COATED ORAL 3 TIMES DAILY PRN
COMMUNITY

## 2024-11-22 NOTE — PATIENT INSTRUCTIONS
Follow up in 6 months and have an echocardiogram 1 week prior      These medications were on a outside  allergy list.   Please check with your PCP to verify that you do not have an allergy to these medications.     Betalactams     Carbapenem     Cephalosporins

## 2024-11-22 NOTE — PROGRESS NOTES
04/11/2024 03:05 PM    HGB 14.3 04/11/2024 03:05 PM    HCT 44.5 04/11/2024 03:05 PM     04/11/2024 03:05 PM    MCV 93.5 04/11/2024 03:05 PM       Lab Results   Component Value Date/Time    TSH 0.94 02/08/2023 06:06 PM         Lab Results   Component Value Date/Time    CHOL 142 02/08/2023 06:06 PM    CHOL 140 11/17/2021 05:35 PM    CHOL 139 11/05/2020 05:19 PM    HDL 93 02/08/2023 06:06 PM    HDL 92 11/17/2021 05:35 PM    HDL 83 11/05/2020 05:19 PM    LDL 40 02/08/2023 06:06 PM    LDL 37.2 11/17/2021 05:35 PM    LDL 45 11/05/2020 05:19 PM                Please note that this dictation was completed with [employer], the [employer] voice recognition software.  Quite often unanticipated grammatical, syntax, homophones, and other interpretative errors are inadvertently transcribed by the computer software.  Please disregard these errors.  Please excuse any errors that have escaped final proofreading.

## 2024-11-26 RX ORDER — QUETIAPINE FUMARATE 50 MG/1
50 TABLET, FILM COATED ORAL 3 TIMES DAILY PRN
Qty: 270 TABLET | Refills: 3 | Status: SHIPPED | OUTPATIENT
Start: 2024-11-26

## 2024-11-26 RX ORDER — AMLODIPINE BESYLATE 2.5 MG/1
2.5 TABLET ORAL DAILY
Qty: 90 TABLET | Refills: 3 | Status: SHIPPED | OUTPATIENT
Start: 2024-11-26

## 2024-11-26 RX ORDER — POTASSIUM CHLORIDE 1500 MG/1
20 TABLET, EXTENDED RELEASE ORAL DAILY
Qty: 90 TABLET | Refills: 3 | Status: SHIPPED | OUTPATIENT
Start: 2024-11-26

## 2024-11-26 RX ORDER — ASCORBIC ACID 500 MG
500 TABLET ORAL DAILY
Qty: 90 TABLET | Refills: 3 | Status: SHIPPED | OUTPATIENT
Start: 2024-11-26

## 2024-11-26 RX ORDER — METOPROLOL SUCCINATE 25 MG/1
25 TABLET, EXTENDED RELEASE ORAL 2 TIMES DAILY
Qty: 180 TABLET | Refills: 3 | Status: SHIPPED | OUTPATIENT
Start: 2024-11-26

## 2024-11-26 RX ORDER — HYDROCHLOROTHIAZIDE 12.5 MG/1
12.5 TABLET ORAL DAILY
Qty: 90 TABLET | Refills: 3 | Status: SHIPPED | OUTPATIENT
Start: 2024-11-26

## 2024-11-26 RX ORDER — ROSUVASTATIN CALCIUM 10 MG/1
10 TABLET, COATED ORAL DAILY
Qty: 90 TABLET | Refills: 3 | Status: SHIPPED | OUTPATIENT
Start: 2024-11-26

## 2024-11-26 RX ORDER — ALBUTEROL SULFATE 90 UG/1
2 INHALANT RESPIRATORY (INHALATION) EVERY 4 HOURS PRN
Qty: 3 EACH | Refills: 3 | Status: SHIPPED | OUTPATIENT
Start: 2024-11-26

## 2024-11-26 RX ORDER — FLUTICASONE PROPIONATE AND SALMETEROL 250; 50 UG/1; UG/1
1 POWDER RESPIRATORY (INHALATION) 2 TIMES DAILY
Qty: 3 EACH | Refills: 3 | Status: SHIPPED | OUTPATIENT
Start: 2024-11-26

## 2024-12-03 ENCOUNTER — OFFICE VISIT (OUTPATIENT)
Facility: CLINIC | Age: 74
End: 2024-12-03
Payer: MEDICARE

## 2024-12-03 DIAGNOSIS — I48.0 PAROXYSMAL ATRIAL FIBRILLATION (HCC): ICD-10-CM

## 2024-12-03 DIAGNOSIS — F41.1 ANXIETY NEUROSIS: ICD-10-CM

## 2024-12-03 DIAGNOSIS — Z23 NEED FOR INFLUENZA VACCINATION: ICD-10-CM

## 2024-12-03 DIAGNOSIS — E78.5 DYSLIPIDEMIA: Primary | ICD-10-CM

## 2024-12-03 DIAGNOSIS — I10 PRIMARY HYPERTENSION: ICD-10-CM

## 2024-12-03 PROCEDURE — 90653 IIV ADJUVANT VACCINE IM: CPT | Performed by: INTERNAL MEDICINE

## 2024-12-03 PROCEDURE — 1090F PRES/ABSN URINE INCON ASSESS: CPT | Performed by: INTERNAL MEDICINE

## 2024-12-03 PROCEDURE — 99214 OFFICE O/P EST MOD 30 MIN: CPT | Performed by: INTERNAL MEDICINE

## 2024-12-03 PROCEDURE — 1159F MED LIST DOCD IN RCRD: CPT | Performed by: INTERNAL MEDICINE

## 2024-12-03 PROCEDURE — 1036F TOBACCO NON-USER: CPT | Performed by: INTERNAL MEDICINE

## 2024-12-03 PROCEDURE — 1123F ACP DISCUSS/DSCN MKR DOCD: CPT | Performed by: INTERNAL MEDICINE

## 2024-12-03 PROCEDURE — G8427 DOCREV CUR MEDS BY ELIG CLIN: HCPCS | Performed by: INTERNAL MEDICINE

## 2024-12-03 PROCEDURE — 3078F DIAST BP <80 MM HG: CPT | Performed by: INTERNAL MEDICINE

## 2024-12-03 PROCEDURE — G8400 PT W/DXA NO RESULTS DOC: HCPCS | Performed by: INTERNAL MEDICINE

## 2024-12-03 PROCEDURE — 3075F SYST BP GE 130 - 139MM HG: CPT | Performed by: INTERNAL MEDICINE

## 2024-12-03 PROCEDURE — G8420 CALC BMI NORM PARAMETERS: HCPCS | Performed by: INTERNAL MEDICINE

## 2024-12-03 PROCEDURE — 3017F COLORECTAL CA SCREEN DOC REV: CPT | Performed by: INTERNAL MEDICINE

## 2024-12-03 PROCEDURE — G8482 FLU IMMUNIZE ORDER/ADMIN: HCPCS | Performed by: INTERNAL MEDICINE

## 2024-12-03 PROCEDURE — G0008 ADMIN INFLUENZA VIRUS VAC: HCPCS | Performed by: INTERNAL MEDICINE

## 2024-12-03 RX ORDER — POTASSIUM CHLORIDE 1500 MG/1
20 TABLET, EXTENDED RELEASE ORAL DAILY
Qty: 90 TABLET | Refills: 3 | Status: SHIPPED | OUTPATIENT
Start: 2024-12-03

## 2024-12-03 NOTE — PROGRESS NOTES
Chief Complaint   Patient presents with    Hypertension     Patient is here for a follow up      \"Have you been to the ER, urgent care clinic since your last visit?  Hospitalized since your last visit?\"    NO    “Have you seen or consulted any other health care providers outside our system since your last visit?”    NO

## 2024-12-04 LAB
CHOLEST SERPL-MCNC: 157 MG/DL
CRP SERPL HS-MCNC: 0.9 MG/L
HDLC SERPL-MCNC: 96 MG/DL
HDLC SERPL: 1.6 (ref 0–5)
LDLC SERPL CALC-MCNC: 51.6 MG/DL (ref 0–100)
TRIGL SERPL-MCNC: 47 MG/DL
VLDLC SERPL CALC-MCNC: 9.4 MG/DL

## 2024-12-05 LAB
APO B SERPL-MCNC: 53 MG/DL
LPA SERPL-SCNC: 52 NMOL/L

## 2024-12-11 RX ORDER — AMLODIPINE BESYLATE 2.5 MG/1
2.5 TABLET ORAL DAILY
Qty: 14 TABLET | Refills: 2 | Status: SHIPPED | OUTPATIENT
Start: 2024-12-11

## 2024-12-14 VITALS
HEIGHT: 62 IN | RESPIRATION RATE: 16 BRPM | SYSTOLIC BLOOD PRESSURE: 130 MMHG | BODY MASS INDEX: 24.48 KG/M2 | TEMPERATURE: 98.7 F | OXYGEN SATURATION: 98 % | WEIGHT: 133 LBS | DIASTOLIC BLOOD PRESSURE: 72 MMHG | HEART RATE: 58 BPM

## 2024-12-14 NOTE — PROGRESS NOTES
[facility]  [facility]  [address]  [place] [facility] [address]  Phone:  [phone]  Fax: [phone]       Chief Complaint   Patient presents with    Hypertension     Patient is here for a follow up    .        History of Present Illness  Comes in for return visit stating that she has done reasonably well.  She does follow-up with a cardiologist on a regular basis.  She has a history of paroxysmal atrial fibrillation as well as coronary disease but it is nonobstructive.    She is a past history of primary hypertension dyslipidemia as well as chronic anxiety.         Current Outpatient Medications   Medication Sig Dispense Refill    potassium chloride (KLOR-CON M) 20 MEQ extended release tablet Take 1 tablet by mouth daily 90 tablet 3    albuterol sulfate HFA (PROVENTIL;VENTOLIN;PROAIR) 108 (90 Base) MCG/ACT inhaler Inhale 2 puffs into the lungs every 4 hours as needed for Wheezing 3 each 3    hydroCHLOROthiazide 12.5 MG tablet Take 1 tablet by mouth daily 90 tablet 3    metoprolol succinate (TOPROL XL) 25 MG extended release tablet Take 1 tablet by mouth 2 times daily 180 tablet 3    potassium chloride (KLOR-CON M) 20 MEQ extended release tablet Take 1 tablet by mouth daily 90 tablet 3    rivaroxaban (XARELTO) 20 MG TABS tablet Take 1 tablet by mouth daily 90 tablet 3    rosuvastatin (CRESTOR) 10 MG tablet Take 1 tablet by mouth daily 90 tablet 3    ascorbic acid (VITAMIN C) 500 MG tablet Take 1 tablet by mouth daily 90 tablet 3    Cholecalciferol 50 MCG (2000 UT) TABS Take 1 tablet by mouth daily 90 tablet 3    FLUoxetine (PROZAC) 20 MG capsule Take 1 capsule by mouth daily 90 capsule 3    QUEtiapine (SEROQUEL) 50 MG tablet Take 1 tablet by mouth 3 times daily as needed (a) 270 tablet 3    WIXELA INHUB 250-50 MCG/ACT AEPB diskus inhaler Inhale 1 puff into the lungs 2 times daily 3 each 3    HYDROcodone-acetaminophen (NORCO) 7.5-325 MG per tablet Take 1 tablet by mouth every 8 hours

## 2024-12-17 ENCOUNTER — TELEPHONE (OUTPATIENT)
Age: 74
End: 2024-12-17

## 2024-12-17 NOTE — TELEPHONE ENCOUNTER
Patient needs assistance with the miki and miki care package for 2025 for Xarelto, patient requested nurse assistance at [phone]

## 2024-12-18 NOTE — TELEPHONE ENCOUNTER
Identifiers x 2.  Discussing patient assistance process and that she will need to complete patient information form for 2025 and bring to our office for Dr. Johnston to complete provider portion. Patient wanted me to talk to [employer] rep.  Call disconnected while she tried to connect.     Attempted to reach patient by telephone. A message was left for return call.     Spoke to patient. She has called [employer] and spoke to Elba.      Spoke to Elba with [employer].  States that a letter was mailed to patient with QR code. Needs to complete specific form regarding previous disability form.  Patient does not need to complete new form as she is enrolling again for 2025.  Unable to complete form .  Verbalized to patient that I would contact her tomorrow to speak to patient and [employer] rep. Verbalized understanding.     Spoke to Fab with [employer].  Completed required form on line. Patient provided financial information, etc.  Received confirmation that form had been submitted. Patient to contact [employer] in the next couple of days regarding determination.

## 2024-12-18 NOTE — TELEPHONE ENCOUNTER
Patient called back again to get assistance with filling out her [employer] paper work , and would like to get a call back soon because she needs this in order to continue to get her Xarelto without fee.      Phone [phone]

## 2025-01-04 RX ORDER — METOPROLOL SUCCINATE 25 MG/1
25 TABLET, EXTENDED RELEASE ORAL 2 TIMES DAILY
Qty: 180 TABLET | Refills: 3 | Status: SHIPPED | OUTPATIENT
Start: 2025-01-04

## 2025-01-09 ENCOUNTER — TELEPHONE (OUTPATIENT)
Age: 75
End: 2025-01-09

## 2025-01-09 NOTE — TELEPHONE ENCOUNTER
Identifiers x 2 . Patient called regarding recent medicare assistance application for xarelto. States she called to check on status and representative, Amanda ROMERO @ [phone] Ext [phone]. Requested that I fax completed form to her. Patient unable to provide fax #.  Recommended she return call to representative and inquire to fax #.  Verbalized understanding.

## 2025-01-10 ENCOUNTER — TELEPHONE (OUTPATIENT)
Age: 75
End: 2025-01-10

## 2025-01-10 NOTE — TELEPHONE ENCOUNTER
Patient called to say that the application for [employer] that you sent on  did not go thru. To please re-fax on a LIS or LIX   form with patient's  and complete name. The fax number is [phone].  She said the representative is Ashlyn at extension # [phone].  Thanks

## 2025-01-12 RX ORDER — ROSUVASTATIN CALCIUM 10 MG/1
10 TABLET, COATED ORAL DAILY
Qty: 90 TABLET | Refills: 3 | Status: SHIPPED | OUTPATIENT
Start: 2025-01-12

## 2025-01-12 RX ORDER — ASCORBIC ACID 500 MG
500 TABLET ORAL DAILY
Qty: 90 TABLET | Refills: 3 | Status: SHIPPED | OUTPATIENT
Start: 2025-01-12

## 2025-01-12 RX ORDER — HYDROCHLOROTHIAZIDE 12.5 MG/1
12.5 TABLET ORAL DAILY
Qty: 90 TABLET | Refills: 3 | Status: SHIPPED | OUTPATIENT
Start: 2025-01-12

## 2025-01-12 RX ORDER — METHOCARBAMOL 750 MG/1
750 TABLET, FILM COATED ORAL 3 TIMES DAILY PRN
Qty: 90 TABLET | Refills: 3 | Status: SHIPPED | OUTPATIENT
Start: 2025-01-12

## 2025-01-12 RX ORDER — AMLODIPINE BESYLATE 2.5 MG/1
2.5 TABLET ORAL DAILY
Qty: 90 TABLET | Refills: 3 | Status: SHIPPED | OUTPATIENT
Start: 2025-01-12

## 2025-01-12 RX ORDER — FLUTICASONE PROPIONATE AND SALMETEROL 250; 50 UG/1; UG/1
1 POWDER RESPIRATORY (INHALATION) 2 TIMES DAILY
Qty: 3 EACH | Refills: 3 | Status: SHIPPED | OUTPATIENT
Start: 2025-01-12

## 2025-01-12 RX ORDER — DESONIDE 0.5 MG/ML
1 LOTION TOPICAL 2 TIMES DAILY
Qty: 118 ML | Refills: 3 | Status: SHIPPED | OUTPATIENT
Start: 2025-01-12

## 2025-01-12 RX ORDER — MAGNESIUM OXIDE 400 MG/1
1 TABLET ORAL DAILY
Qty: 10 TABLET | Refills: 0 | Status: SHIPPED | OUTPATIENT
Start: 2025-01-12

## 2025-01-12 RX ORDER — METOPROLOL SUCCINATE 25 MG/1
25 TABLET, EXTENDED RELEASE ORAL 2 TIMES DAILY
Qty: 180 TABLET | Refills: 3 | Status: SHIPPED | OUTPATIENT
Start: 2025-01-12

## 2025-01-12 RX ORDER — QUETIAPINE FUMARATE 50 MG/1
50 TABLET, FILM COATED ORAL 3 TIMES DAILY PRN
Qty: 270 TABLET | Refills: 3 | Status: SHIPPED | OUTPATIENT
Start: 2025-01-12

## 2025-01-12 RX ORDER — ALBUTEROL SULFATE 90 UG/1
2 INHALANT RESPIRATORY (INHALATION) EVERY 4 HOURS PRN
Qty: 3 EACH | Refills: 3 | Status: SHIPPED | OUTPATIENT
Start: 2025-01-12

## 2025-01-12 RX ORDER — POTASSIUM CHLORIDE 1500 MG/1
20 TABLET, EXTENDED RELEASE ORAL DAILY
Qty: 90 TABLET | Refills: 3 | Status: SHIPPED | OUTPATIENT
Start: 2025-01-12

## 2025-01-13 NOTE — TELEPHONE ENCOUNTER
Faxed Medicare Extra Help form to [phone]. Confirmation received.     Spoke to Trey at [employer] at [phone]. Ext [phone].  States form has been received and will be reviewed.  Our office and patient will be contacted regarding the determination.

## 2025-01-16 ENCOUNTER — TELEPHONE (OUTPATIENT)
Age: 75
End: 2025-01-16

## 2025-01-16 NOTE — TELEPHONE ENCOUNTER
Identifiers x 2. Patient states that she received letter from [employer] that she was denied for the [facility] Medicaid patient assistance. Recommended that she call contact number on letter to inquire regarding reason for denial. Will keep our office informed.

## 2025-01-16 NOTE — TELEPHONE ENCOUNTER
Patient called for help with miki and miki medication denial, read patient the encounter result from last message and gave her kyree contact, could you follow up to patient with clarity on whether she will be able to overturn decision and get her medication since she can't be without it, please follow up at [phone]

## 2025-01-22 NOTE — TELEPHONE ENCOUNTER
Spoke to Janice with [employer].  States that the correct information had been received. Will route the process back through so that insurance benefits can be reviewed and that determination can be made.  Bong Woody to keep patient informed.

## 2025-02-10 NOTE — TELEPHONE ENCOUNTER
Spoke to Rad at [employer] [phone] ext [phone].  States that patient has been approved and medication would be mailed by [facility]'s pharmacy. Medication  prescribed by PCP. Copies of forms sent to scanning.

## 2025-03-03 ENCOUNTER — TELEPHONE (OUTPATIENT)
Age: 75
End: 2025-03-03

## 2025-03-03 NOTE — TELEPHONE ENCOUNTER
Attempted to reach patient several times today with, no success.    Additional message left requesting a return call regarding her upcoming appt on 3/4 with Dr. Bee.  Please pass call to Ashlyn at ext #[phone]

## 2025-03-03 NOTE — TELEPHONE ENCOUNTER
LVM requesting a return call to discuss and/or with questions regarding upcoming appointment(s) with Dr. Johnston.    Please pass call to Ashlyn.... at ext #[phone]  Thank you!  Future Appointments   Date Time Provider Department Center   3/4/2025  3:00 PM Nahid Johnston MD [facility] AM[facility]   4/7/2025  2:30 PM Camacho Alexander MD [facility] DEP   5/15/2025  3:00 PM [facility]   5/23/2025  1:40 PM Nahid Johnston MD [facility]

## 2025-03-11 ENCOUNTER — TELEPHONE (OUTPATIENT)
Age: 75
End: 2025-03-11

## 2025-03-11 NOTE — TELEPHONE ENCOUNTER
Patient called for status of the information for her [employer] claim. She said the information was sent to you to complete about her condition.  Thanks       Patient # [phone]

## 2025-03-22 RX ORDER — MAGNESIUM OXIDE 400 MG/1
1 TABLET ORAL DAILY
Qty: 10 TABLET | Refills: 0 | Status: SHIPPED | OUTPATIENT
Start: 2025-03-22

## 2025-03-27 ENCOUNTER — TELEPHONE (OUTPATIENT)
Age: 75
End: 2025-03-27

## 2025-03-27 NOTE — TELEPHONE ENCOUNTER
Patient is calling to find out if the doctor has receive paper work from [employer] insurance company on her behalf.Paper work is to determine her health.    [phone]

## 2025-04-10 NOTE — TELEPHONE ENCOUNTER
Received request from [employer] regarding updated medical documentation form. To have MD sign at follow up on 5-23-25.

## 2025-05-12 ENCOUNTER — TRANSCRIBE ORDERS (OUTPATIENT)
Facility: HOSPITAL | Age: 75
End: 2025-05-12

## 2025-05-12 DIAGNOSIS — Z12.31 OTHER SCREENING MAMMOGRAM: Primary | ICD-10-CM

## 2025-05-22 ENCOUNTER — HOSPITAL ENCOUNTER (OUTPATIENT)
Facility: HOSPITAL | Age: 75
Discharge: HOME | End: 2025-05-25
Attending: INTERNAL MEDICINE
Payer: MEDICARE

## 2025-05-22 VITALS — HEIGHT: 62 IN | BODY MASS INDEX: 24.48 KG/M2 | WEIGHT: 133 LBS

## 2025-05-22 DIAGNOSIS — Z12.31 OTHER SCREENING MAMMOGRAM: ICD-10-CM

## 2025-05-22 PROCEDURE — 77063 BREAST TOMOSYNTHESIS BI: CPT

## 2025-05-22 PROCEDURE — 77067 SCR MAMMO BI INCL CAD: CPT

## 2025-07-01 ENCOUNTER — TELEPHONE (OUTPATIENT)
Age: 75
End: 2025-07-01

## 2025-07-01 NOTE — TELEPHONE ENCOUNTER
Patient is calling because she needs  to send a letter over or medical records to the Insurance Company   Chronic Care O - [employer] Special Need Program because it assists her with getting medical equipment, medications and other things she may need dealing with medical.    Patient needs the information to show that she has Chronic Afib and other medical conditions.Please assist.    [phone] Member Service  [phone] fax   [facility] [employer] (Insurance Company Chronic Care O-[employer] C-SNP Special Need Program)        [phone] patient

## (undated) DEVICE — CATHETER DIAG 5FR L100CM LUMN ID0.047IN JL3.5 CRV 0 SIDE H

## (undated) DEVICE — TR BAND RADIAL ARTERY COMPRESSION DEVICE: Brand: TR BAND

## (undated) DEVICE — CATHETER DIAG 5FR L100CM JR3.5 CRV SZ DBL BRAID WIRE SFT

## (undated) DEVICE — KIT MFLD ISOLATN NACL CNTRST PRT TBNG SPIK W/ PRSS TRNSDUC

## (undated) DEVICE — 3M™ TEGADERM™ TRANSPARENT FILM DRESSING FRAME STYLE, 1626W, 4 IN X 4-3/4 IN (10 CM X 12 CM), 50/CT 4CT/CASE: Brand: 3M™ TEGADERM™

## (undated) DEVICE — GLIDESHEATH SLENDER STAINLESS STEEL KIT: Brand: GLIDESHEATH SLENDER

## (undated) DEVICE — SPLINT WR VELC FOAM NEUT POS DISP FOR RAD ART ACC SFT STRP

## (undated) DEVICE — KIT MED IMAG CNTRST AGNT W/ IOPAMIDOL REUSE

## (undated) DEVICE — WASTE KIT - ST MARY: Brand: MEDLINE INDUSTRIES, INC.

## (undated) DEVICE — KIT HND CTRL 3 W STPCOCK ROT END 54IN PREM HI PRSS TBNG AT

## (undated) DEVICE — SPECIAL PROCEDURE DRAPE 32" X 34": Brand: SPECIAL PROCEDURE DRAPE

## (undated) DEVICE — PACK PROCEDURE SURG HRT CATH

## (undated) DEVICE — AT X65L

## (undated) DEVICE — ANGIOGRAPHY KIT